# Patient Record
Sex: FEMALE | Race: WHITE | NOT HISPANIC OR LATINO | Employment: UNEMPLOYED | URBAN - NONMETROPOLITAN AREA
[De-identification: names, ages, dates, MRNs, and addresses within clinical notes are randomized per-mention and may not be internally consistent; named-entity substitution may affect disease eponyms.]

---

## 2023-10-26 RX ORDER — AMOXICILLIN 500 MG/1
1 CAPSULE ORAL 2 TIMES DAILY
COMMUNITY
Start: 2017-06-25 | End: 2023-12-11 | Stop reason: ALTCHOICE

## 2023-10-26 RX ORDER — OFLOXACIN 3 MG/ML
10 SOLUTION AURICULAR (OTIC) DAILY
COMMUNITY
Start: 2017-06-25 | End: 2023-12-11 | Stop reason: ALTCHOICE

## 2023-10-26 RX ORDER — MECLIZINE HCL 25MG 25 MG/1
25 TABLET, CHEWABLE ORAL EVERY 8 HOURS
COMMUNITY
Start: 2017-06-25 | End: 2023-12-11 | Stop reason: WASHOUT

## 2023-10-27 ENCOUNTER — OFFICE VISIT (OUTPATIENT)
Dept: OBSTETRICS AND GYNECOLOGY | Facility: CLINIC | Age: 58
End: 2023-10-27
Payer: MEDICARE

## 2023-10-27 VITALS
SYSTOLIC BLOOD PRESSURE: 119 MMHG | DIASTOLIC BLOOD PRESSURE: 78 MMHG | HEIGHT: 69 IN | BODY MASS INDEX: 31.25 KG/M2 | WEIGHT: 211 LBS

## 2023-10-27 DIAGNOSIS — N39.3 STRESS BLADDER INCONTINENCE, FEMALE: ICD-10-CM

## 2023-10-27 DIAGNOSIS — Z01.419 WELL WOMAN EXAM WITH ROUTINE GYNECOLOGICAL EXAM: Primary | ICD-10-CM

## 2023-10-27 LAB
BILIRUBIN, POC: NEGATIVE
BLOOD URINE, POC: NEGATIVE
CLARITY, POC: CLEAR
COLOR, POC: YELLOW
GLUCOSE URINE, POC: NEGATIVE
KETONES, POC: NEGATIVE
LEUKOCYTE EST, POC: ABNORMAL
NITRITE, POC: NEGATIVE
PH, POC: 5.5
POC APPEARANCE OF BODY FLUID: CLEAR
SPECIFIC GRAVITY, POC: 1.01
URINE PROTEIN, POC: NEGATIVE
UROBILINOGEN, POC: 0.2

## 2023-10-27 PROCEDURE — 81002 URINALYSIS NONAUTO W/O SCOPE: CPT | Performed by: NURSE PRACTITIONER

## 2023-10-27 PROCEDURE — 99386 PREV VISIT NEW AGE 40-64: CPT | Performed by: NURSE PRACTITIONER

## 2023-10-27 PROCEDURE — 1036F TOBACCO NON-USER: CPT | Performed by: NURSE PRACTITIONER

## 2023-10-27 RX ORDER — VENLAFAXINE HYDROCHLORIDE 150 MG/1
150 CAPSULE, EXTENDED RELEASE ORAL DAILY
COMMUNITY
End: 2023-12-11 | Stop reason: SDUPTHER

## 2023-10-27 RX ORDER — ASPIRIN 81 MG/1
81 TABLET ORAL DAILY
COMMUNITY

## 2023-10-27 RX ORDER — VENLAFAXINE 75 MG/1
75 TABLET ORAL 2 TIMES DAILY
COMMUNITY
End: 2024-05-06 | Stop reason: SDUPTHER

## 2023-10-27 RX ORDER — METFORMIN HYDROCHLORIDE EXTENDED-RELEASE TABLETS 500 MG/1
500 TABLET, FILM COATED, EXTENDED RELEASE ORAL
COMMUNITY
End: 2024-02-29 | Stop reason: SDUPTHER

## 2023-10-27 RX ORDER — METOPROLOL TARTRATE 25 MG/1
25 TABLET, FILM COATED ORAL DAILY
COMMUNITY
End: 2023-12-11 | Stop reason: SDUPTHER

## 2023-10-27 RX ORDER — SENNOSIDES 8.6 MG/1
1 TABLET ORAL DAILY
COMMUNITY

## 2023-10-27 RX ORDER — LISINOPRIL 20 MG/1
20 TABLET ORAL DAILY
COMMUNITY
End: 2023-12-11 | Stop reason: SDUPTHER

## 2023-10-27 RX ORDER — HYDROXYCHLOROQUINE SULFATE 200 MG/1
200 TABLET, FILM COATED ORAL DAILY
COMMUNITY
End: 2024-03-19 | Stop reason: SDUPTHER

## 2023-10-27 RX ORDER — ROSUVASTATIN CALCIUM 20 MG/1
20 TABLET, COATED ORAL DAILY
COMMUNITY
End: 2023-12-11 | Stop reason: SDUPTHER

## 2023-10-27 RX ORDER — OMEPRAZOLE 40 MG/1
40 CAPSULE, DELAYED RELEASE ORAL
COMMUNITY
End: 2023-12-11 | Stop reason: SDUPTHER

## 2023-10-27 RX ORDER — HYDROCHLOROTHIAZIDE 12.5 MG/1
12.5 TABLET ORAL DAILY
COMMUNITY
End: 2023-12-11 | Stop reason: SDUPTHER

## 2023-10-27 NOTE — PROGRESS NOTES
Subjective   Patient ID: Raquel Horn is a 58 y.o. female who presents for Bladder incontinence  (Patient is here for a annual due to discovery of her cervix after getting hysterectomy. Patient is also here for bladder incontinence./Last PAP= 5/2014 negative //Last MAMMO= 2022 next appointment 10/31 //Last Colonoscopy= not due //[facility]. [facility] BRUCE MA II/).  Raquel Horn is a 58 y.o. female new patient here today for well-woman and UI    OB/GYN History:  - Pt is needing a pap, she had a hysterectomy but is unsure if her cervix was removed    - Denies bleeding    Prolapse Symptoms:   - She had prolapse and had this surgically repaired in 2000  - She reports she feels a bulge again     Urinary Symptoms:  - She reports she can usually makes it to the bathroom but does leak slightly  - She wears pads   - Caffeine only in the morning     Bowel Symptoms:   - Reports she feels like she does not empty well sometimes     Sexual Activity:   - Not sexually active since 2012  - She repots she will awake from her sleeping and is having an orgasm   - She denies feeling aroused but reports she does orgasm   - This happens a few times a month   - She has masturbated and this doesn't prevent the spontaneous orgasms  - She is not interested in seeing a provided for this at this time but potentially in the future     PMH:   - DM  - HTN    PSH:   - Vaginal hysterectomy 2000  - Appendectomy    Social History: Non-smoker, no alcohol use, no drug use    FHx: Denies family history of breast, ovarian, colon, and uterine cancer.    Screenings:   - Last pap 2014, WNL  - Mammogram scheduled            Review of Systems    Objective   Physical Exam  Constitutional:       Appearance: Normal appearance.   Genitourinary:      Vulva, bladder, rectum and urethral meatus normal.      Right Labia: No rash.     Left Labia: No rash.     No labial fusion noted.      Vaginal cuff intact.     No vaginal prolapse present.     No vaginal atrophy  present.       Right Adnexa: no mass present.     Left Adnexa: no mass present.     No cervical discharge.      Uterus is not enlarged or tender.      No urethral tenderness present.      Levator ani not tender and obturator internus not tender.  HENT:      Head: Normocephalic.   Pulmonary:      Effort: Pulmonary effort is normal.   Neurological:      Mental Status: She is alert and oriented to person, place, and time.   Psychiatric:         Mood and Affect: Mood normal.         Behavior: Behavior normal.             Assessment/Plan   ASSESSMENT &PLAN     Assessment:   58 y.o. old female being assessed for orgasmic dysfunction and Well woman visit with routine gynecologic exam  Comorbidities include: HTN    Diagnoses:   #1 orgasmic dysfunction  #2 Well woman visit with routine gynecologic exam,   3 Incontinence    Plan:   1. Well woman visit with routine gynecologic exam  - Normal pelvic exam today.   - Advised patient she did not have a cervix as she had a vaginal hysterectomy    The vaginal cuff has a dimple from having a sacrospinous fixation for prolapse.     2 . orgasmic dysfunction  - Advised patient we could place a referral for this however she opted to post-pone treatment as it happens rarely     3. Discussed incontinence briefly and if more of a problem in the future can address    Follow-up PRN with Nanda OGDEN       Scribe Attestation  By signing my name below, IDeidre , Janett   attest that this documentation has been prepared under the direction and in the presence of ALIN Bourne.     Nanda Figueroa APRN-CNP, personally performed the services described in this documentation which was scribed virtually and I confirm that it is both accurate and complete.

## 2023-10-31 ENCOUNTER — APPOINTMENT (OUTPATIENT)
Dept: RADIOLOGY | Facility: HOSPITAL | Age: 58
End: 2023-10-31
Payer: COMMERCIAL

## 2023-11-03 ENCOUNTER — HOSPITAL ENCOUNTER (OUTPATIENT)
Dept: RADIOLOGY | Facility: HOSPITAL | Age: 58
Discharge: HOME | End: 2023-11-03
Payer: MEDICARE

## 2023-11-03 DIAGNOSIS — R74.8 ABNORMAL LEVELS OF OTHER SERUM ENZYMES: ICD-10-CM

## 2023-11-03 DIAGNOSIS — K76.0 FATTY (CHANGE OF) LIVER, NOT ELSEWHERE CLASSIFIED: ICD-10-CM

## 2023-11-03 PROCEDURE — 76705 ECHO EXAM OF ABDOMEN: CPT | Performed by: STUDENT IN AN ORGANIZED HEALTH CARE EDUCATION/TRAINING PROGRAM

## 2023-11-03 PROCEDURE — 76705 ECHO EXAM OF ABDOMEN: CPT

## 2023-11-07 ENCOUNTER — HOSPITAL ENCOUNTER (OUTPATIENT)
Dept: RADIOLOGY | Facility: HOSPITAL | Age: 58
Discharge: HOME | End: 2023-11-07
Payer: MEDICARE

## 2023-11-07 DIAGNOSIS — Z12.31 ENCOUNTER FOR SCREENING MAMMOGRAM FOR MALIGNANT NEOPLASM OF BREAST: ICD-10-CM

## 2023-11-07 PROCEDURE — 77067 SCR MAMMO BI INCL CAD: CPT | Performed by: RADIOLOGY

## 2023-11-07 PROCEDURE — 77063 BREAST TOMOSYNTHESIS BI: CPT | Performed by: RADIOLOGY

## 2023-11-07 PROCEDURE — 77063 BREAST TOMOSYNTHESIS BI: CPT

## 2023-11-15 ENCOUNTER — HOSPITAL ENCOUNTER (OUTPATIENT)
Dept: RADIOLOGY | Facility: EXTERNAL LOCATION | Age: 58
Discharge: HOME | End: 2023-11-15
Payer: MEDICARE

## 2023-11-29 ENCOUNTER — APPOINTMENT (OUTPATIENT)
Dept: PRIMARY CARE | Facility: CLINIC | Age: 58
End: 2023-11-29
Payer: MEDICARE

## 2023-12-11 ENCOUNTER — OFFICE VISIT (OUTPATIENT)
Dept: PRIMARY CARE | Facility: CLINIC | Age: 58
End: 2023-12-11
Payer: MEDICARE

## 2023-12-11 VITALS
WEIGHT: 214 LBS | OXYGEN SATURATION: 95 % | BODY MASS INDEX: 31.7 KG/M2 | SYSTOLIC BLOOD PRESSURE: 118 MMHG | DIASTOLIC BLOOD PRESSURE: 70 MMHG | TEMPERATURE: 97 F | HEART RATE: 90 BPM | HEIGHT: 69 IN

## 2023-12-11 DIAGNOSIS — F32.A DEPRESSION, UNSPECIFIED DEPRESSION TYPE: ICD-10-CM

## 2023-12-11 DIAGNOSIS — I10 PRIMARY HYPERTENSION: ICD-10-CM

## 2023-12-11 DIAGNOSIS — E78.2 MIXED HYPERLIPIDEMIA: ICD-10-CM

## 2023-12-11 DIAGNOSIS — R51.9 HEADACHE DISORDER: ICD-10-CM

## 2023-12-11 DIAGNOSIS — R16.0 ENLARGED LIVER: ICD-10-CM

## 2023-12-11 DIAGNOSIS — K21.9 GASTROESOPHAGEAL REFLUX DISEASE WITHOUT ESOPHAGITIS: Primary | ICD-10-CM

## 2023-12-11 PROCEDURE — 1036F TOBACCO NON-USER: CPT | Performed by: FAMILY MEDICINE

## 2023-12-11 PROCEDURE — 99204 OFFICE O/P NEW MOD 45 MIN: CPT | Performed by: FAMILY MEDICINE

## 2023-12-11 PROCEDURE — 3074F SYST BP LT 130 MM HG: CPT | Performed by: FAMILY MEDICINE

## 2023-12-11 PROCEDURE — 3078F DIAST BP <80 MM HG: CPT | Performed by: FAMILY MEDICINE

## 2023-12-11 RX ORDER — METOPROLOL TARTRATE 25 MG/1
25 TABLET, FILM COATED ORAL 2 TIMES DAILY
Qty: 180 TABLET | Refills: 3 | Status: SHIPPED | OUTPATIENT
Start: 2023-12-11 | End: 2024-03-19 | Stop reason: SDUPTHER

## 2023-12-11 RX ORDER — TRAMADOL HYDROCHLORIDE 50 MG/1
50 TABLET ORAL EVERY 12 HOURS PRN
COMMUNITY
Start: 2022-06-29 | End: 2024-02-23 | Stop reason: ALTCHOICE

## 2023-12-11 RX ORDER — VENLAFAXINE HYDROCHLORIDE 150 MG/1
150 CAPSULE, EXTENDED RELEASE ORAL DAILY
Qty: 90 CAPSULE | Refills: 3 | Status: SHIPPED | OUTPATIENT
Start: 2023-12-11 | End: 2024-03-19 | Stop reason: SDUPTHER

## 2023-12-11 RX ORDER — CHOLECALCIFEROL (VITAMIN D3) 50 MCG
2000 TABLET ORAL
COMMUNITY
Start: 2015-11-23 | End: 2024-02-23 | Stop reason: ALTCHOICE

## 2023-12-11 RX ORDER — OMEPRAZOLE 40 MG/1
40 CAPSULE, DELAYED RELEASE ORAL
Qty: 90 CAPSULE | Refills: 3 | Status: SHIPPED | OUTPATIENT
Start: 2023-12-11 | End: 2024-03-19 | Stop reason: SDUPTHER

## 2023-12-11 RX ORDER — TRAZODONE HYDROCHLORIDE 50 MG/1
25 TABLET ORAL NIGHTLY
COMMUNITY
End: 2024-05-06 | Stop reason: SDUPTHER

## 2023-12-11 RX ORDER — ROSUVASTATIN CALCIUM 20 MG/1
20 TABLET, COATED ORAL DAILY
Qty: 90 TABLET | Refills: 3 | Status: SHIPPED | OUTPATIENT
Start: 2023-12-11 | End: 2024-05-30 | Stop reason: WASHOUT

## 2023-12-11 RX ORDER — HYDROCHLOROTHIAZIDE 12.5 MG/1
12.5 TABLET ORAL DAILY
Qty: 90 TABLET | Refills: 3 | Status: SHIPPED | OUTPATIENT
Start: 2023-12-11 | End: 2023-12-22 | Stop reason: SDUPTHER

## 2023-12-11 RX ORDER — LISINOPRIL 20 MG/1
20 TABLET ORAL DAILY
Qty: 90 TABLET | Refills: 3 | Status: SHIPPED | OUTPATIENT
Start: 2023-12-11 | End: 2024-03-19 | Stop reason: SDUPTHER

## 2023-12-11 ASSESSMENT — ENCOUNTER SYMPTOMS
DYSURIA: 0
NUMBNESS: 0
EYE DISCHARGE: 0
FLANK PAIN: 0
SHORTNESS OF BREATH: 0
SORE THROAT: 0
APPETITE CHANGE: 0
JOINT SWELLING: 0
ARTHRALGIAS: 0
NAUSEA: 0
MYALGIAS: 0
HEMATURIA: 0
VOMITING: 0
OCCASIONAL FEELINGS OF UNSTEADINESS: 0
ABDOMINAL PAIN: 0
SLEEP DISTURBANCE: 1
HEADACHES: 1
NERVOUS/ANXIOUS: 1
DIZZINESS: 0
DYSPHORIC MOOD: 1
ACTIVITY CHANGE: 0
BLOOD IN STOOL: 0
RHINORRHEA: 1
EYE ITCHING: 0
UNEXPECTED WEIGHT CHANGE: 0
LOSS OF SENSATION IN FEET: 0
WEAKNESS: 0
SINUS PRESSURE: 0
LIGHT-HEADEDNESS: 0
COUGH: 0
FEVER: 0
CONSTIPATION: 0
DEPRESSION: 0
WHEEZING: 0
DIARRHEA: 0
PALPITATIONS: 0

## 2023-12-11 ASSESSMENT — PATIENT HEALTH QUESTIONNAIRE - PHQ9
1. LITTLE INTEREST OR PLEASURE IN DOING THINGS: NOT AT ALL
SUM OF ALL RESPONSES TO PHQ9 QUESTIONS 1 AND 2: 0
2. FEELING DOWN, DEPRESSED OR HOPELESS: NOT AT ALL

## 2023-12-11 NOTE — PATIENT INSTRUCTIONS
MEDICATIONS REFILLED   REFERRAL GI AND TO NEUROLOGY   VISIT IN MARCH FOR LABS FIRST THEN SEE   CMP A1C CBC TSH T4 LIPID HEPATITIS

## 2023-12-11 NOTE — PROGRESS NOTES
"Subjective   Patient ID: Raquel Horn is a 58 y.o. female who presents for New Patient Visit (ESTABLISH CARE- WOULD LIKE TO GO OVER MEDICATIONS. DISCUSS HER SLEEPING CONCERNS- she has very vivid dreams that cause headaches, she reports that if she sleeps with her arms and legs together she will sweat and wake up drenched.), Nose Problem (States she has had a runny nose for quite a while- clear drainage but will just drip \"like a faucet\" out of no where), and Urinary Incontinence (Would like to discuss concerns with her incontinence.).    HPI SLEEP STUDIES   ABNORMAL OF SWEATING   VIVID DREAMS   HEADACHE     Review of Systems   Constitutional:  Negative for activity change, appetite change, fever and unexpected weight change.   HENT:  Positive for rhinorrhea. Negative for congestion, ear pain, postnasal drip, sinus pressure and sore throat.    Eyes:  Negative for discharge, itching and visual disturbance.   Respiratory:  Negative for cough, shortness of breath and wheezing.    Cardiovascular:  Negative for chest pain, palpitations and leg swelling.   Gastrointestinal:  Negative for abdominal pain, blood in stool, constipation, diarrhea, nausea and vomiting.   Endocrine: Negative for cold intolerance, heat intolerance and polyuria.        DISORDER SWEATING    Genitourinary:  Negative for dysuria, flank pain and hematuria.   Musculoskeletal:  Negative for arthralgias, gait problem, joint swelling and myalgias.   Skin:  Negative for rash.   Allergic/Immunologic: Negative for environmental allergies and food allergies.   Neurological:  Positive for headaches. Negative for dizziness, syncope, weakness, light-headedness and numbness.   Psychiatric/Behavioral:  Positive for dysphoric mood and sleep disturbance. The patient is nervous/anxious.        Objective   /70   Pulse 90   Temp 36.1 °C (97 °F) (Temporal)   Ht 1.74 m (5' 8.5\")   Wt 97.1 kg (214 lb)   SpO2 95%   BMI 32.07 kg/m²     Physical " "Exam  Constitutional:       Appearance: Normal appearance. She is obese.      Comments: FANS HERSELF CONTINUOUSLY /NO OBVIOUS SWEATING   SHAKES AND SRINGS HER HANDS   \"HAS RAYNAUD'S AND THEN THEY WOULD BLOW UP THEY ARE SO HOT\"   HENT:      Head: Normocephalic and atraumatic.      Nose: Nose normal.      Mouth/Throat:      Mouth: Mucous membranes are moist.   Eyes:      Extraocular Movements: Extraocular movements intact.      Pupils: Pupils are equal, round, and reactive to light.   Cardiovascular:      Rate and Rhythm: Normal rate and regular rhythm.   Pulmonary:      Effort: Pulmonary effort is normal.      Breath sounds: Normal breath sounds.   Abdominal:      Palpations: Abdomen is soft.   Musculoskeletal:         General: Normal range of motion.      Cervical back: Normal range of motion and neck supple.   Skin:     General: Skin is warm and dry.   Neurological:      General: No focal deficit present.      Mental Status: She is alert and oriented to person, place, and time.   Psychiatric:         Mood and Affect: Mood normal.         Behavior: Behavior normal.         Assessment/Plan   Diagnoses and all orders for this visit:  Gastroesophageal reflux disease without esophagitis  -     omeprazole (PriLOSEC) 40 mg DR capsule; Take 1 capsule (40 mg) by mouth once daily in the morning. Take before meals. Do not crush or chew.  -     Referral to Gastroenterology; Future  Enlarged liver  -     Referral to Gastroenterology; Future  Headache disorder  -     Referral to Neurology; Future  Mixed hyperlipidemia  -     rosuvastatin (Crestor) 20 mg tablet; Take 1 tablet (20 mg) by mouth once daily.  Depression, unspecified depression type  -     venlafaxine XR (Effexor-XR) 150 mg 24 hr capsule; Take 1 capsule (150 mg) by mouth once daily. Do not crush or chew.  Primary hypertension  -     hydroCHLOROthiazide (HYDRODiuril) 12.5 mg tablet; Take 1 tablet (12.5 mg) by mouth once daily.  -     metoprolol tartrate (Lopressor) 25 " mg tablet; Take 1 tablet (25 mg) by mouth 2 times a day.  -     lisinopril 20 mg tablet; Take 1 tablet (20 mg) by mouth once daily.

## 2023-12-21 ENCOUNTER — PATIENT MESSAGE (OUTPATIENT)
Dept: PRIMARY CARE | Facility: CLINIC | Age: 58
End: 2023-12-21
Payer: MEDICARE

## 2023-12-21 DIAGNOSIS — I10 PRIMARY HYPERTENSION: ICD-10-CM

## 2023-12-22 NOTE — TELEPHONE ENCOUNTER
From: Dinorah Horn  To: Yamilet Frazier,   Sent: 12/21/2023 3:23 PM EST  Subject: medication refill    To Dr. Frazier, this is Dinorah Justina birth date, 1965,,, I have another prescription I need refilled, HYDROXYCHLOROQUINE 200 mg. Thank You

## 2023-12-27 RX ORDER — HYDROCHLOROTHIAZIDE 12.5 MG/1
12.5 TABLET ORAL DAILY
Qty: 90 TABLET | Refills: 3 | Status: SHIPPED | OUTPATIENT
Start: 2023-12-27 | End: 2024-03-19 | Stop reason: SDUPTHER

## 2024-01-05 ENCOUNTER — APPOINTMENT (OUTPATIENT)
Dept: GASTROENTEROLOGY | Facility: CLINIC | Age: 59
End: 2024-01-05
Payer: MEDICARE

## 2024-01-09 ENCOUNTER — APPOINTMENT (OUTPATIENT)
Dept: NEUROLOGY | Facility: CLINIC | Age: 59
End: 2024-01-09
Payer: MEDICARE

## 2024-01-11 ENCOUNTER — OFFICE VISIT (OUTPATIENT)
Dept: NEUROLOGY | Facility: CLINIC | Age: 59
End: 2024-01-11
Payer: MEDICARE

## 2024-01-11 VITALS — SYSTOLIC BLOOD PRESSURE: 132 MMHG | DIASTOLIC BLOOD PRESSURE: 84 MMHG | HEART RATE: 84 BPM

## 2024-01-11 DIAGNOSIS — R51.9 HEADACHE DISORDER: ICD-10-CM

## 2024-01-11 DIAGNOSIS — R51.9 NOCTURNAL HEADACHES: Primary | ICD-10-CM

## 2024-01-11 DIAGNOSIS — G56.03 CARPAL TUNNEL SYNDROME, BILATERAL: ICD-10-CM

## 2024-01-11 PROCEDURE — 1036F TOBACCO NON-USER: CPT | Performed by: PSYCHIATRY & NEUROLOGY

## 2024-01-11 PROCEDURE — 99205 OFFICE O/P NEW HI 60 MIN: CPT | Performed by: PSYCHIATRY & NEUROLOGY

## 2024-01-11 RX ORDER — BISMUTH SUBSALICYLATE 262 MG
1 TABLET,CHEWABLE ORAL DAILY
COMMUNITY

## 2024-01-11 NOTE — PATIENT INSTRUCTIONS
You have developed headaches on the right side of your head only when you sleep. You may also have a pinched nerve at the right base of your skull that causes a different kind of discomfort in the back of the right side of your head. Please have a blood test to check your vitamin B12 level and get an MRI of your brain before and after dye is given. Please also work on cutting back your beer consumption to 4 cans a night.     You also likely have carpal tunnel syndrome at the base of your hands, causing your hands and arms to go numb and tingly when you sleep. We will obtain an EMG to confirm the diagnosis and see how extensive it is.     I will call you with the results and we will decide what to do next. There are medications that can be used to prevent headaches if needed.

## 2024-01-11 NOTE — PROGRESS NOTES
Chief complaint:      Right-sided headache when sleeping.  History of Present Illness:     Ms. Horn is a 57 yo right-handed woman who has developed recurrent right-sided headaches only at night for a few years. They were occasional at first but now wake her up several times a night. She does not have headaches during the day. She does not have a headache when she lies down to sleep. Sometime during the night, and multiple times a night she gets a headache on the right side of her head, which she thinks is associated with dreaming. She has vivid dreams and her head starts to hurt. She will then wake herself up, and she wakes up with the headache, which resolves usually within less than a minute, but definitely by two minutes. Some of her dreams are scary and upsetting and the headaches escalate quickly until she wakes herself up. She sleeps 8-9 hours and feels like she has slept well. Her  has restless legs syndrome and may kick and wake her up at night.     She slipped on the garage floor at age 9 or 10 and smacked her head on the cement. She had dizziness for years after that, but they finally resolved. She does not remember having much headache in the past.     She feels fine during the day. She is  with 4 children. She is not working now due to a lot of rheumatoid, psoriatic, and mostly osteoarthritis pain. She had a CABG in 2010. She takes care of her house. She drives without difficulty. She smoked on and off for 30 years and quit in 2009. She has a 6 pack of beer a night. No street drug use. She has one glass of iced green tea in the morning.     Visit Vitals  /84   Pulse 84        Physical examination:    She is a pleasant, healthy-appearing woman. Phalen's sign was positive for bilateral carpal tunel syndrome.   Neurologic Exam     Mental Status   Her speech was clear, fluent and appropriate.      Cranial Nerves     CN II   Visual fields full to confrontation.     CN III, IV, VI    Extraocular motions are normal.     CN V   Facial sensation intact.     CN VII   Facial expression full, symmetric.     CN VIII   CN VIII normal.   Hearing: intact    CN IX, X   Palate: symmetric    CN XI   CN XI normal.     CN XII   CN XII normal.     Motor Exam   Muscle bulk: normal  Overall muscle tone: normal    Strength   Right deltoid: 5/5  Left deltoid: 5/5  Right biceps: 5/5  Left biceps: 5/5  Right interossei: 5/5  Left interossei: 5/5  Right iliopsoas: 5/5  Left iliopsoas: 5/5    Sensory Exam   Light touch normal.   Pinprick normal.     Gait, Coordination, and Reflexes     Coordination   Finger to nose coordination: normal    Tremor   Resting tremor: absent  Intention tremor: absent  Action tremor: absent    Reflexes   Right brachioradialis: 2+  Left brachioradialis: 2+  Right biceps: 2+  Left biceps: 2+  Right triceps: 2+  Left triceps: 2+  Right patellar: 2+  Left patellar: 2+  Right achilles: 2+  Left achilles: 2+  Right plantar: normal  Left plantar: normal  Her gait was narrow-based and steady. She could walk on her heels or toes and perform a tandem gait.          1. Nocturnal headaches  Vitamin B12    MR brain w and wo IV contrast      2. Headache disorder  Referral to Neurology      3. Carpal tunnel syndrome, bilateral  EMG & nerve conduction             Orders Placed This Encounter   Procedures    MR brain w and wo IV contrast     Standing Status:   Future     Standing Expiration Date:   1/11/2025     Order Specific Question:   Reason for exam:     Answer:   daily right-sided headaches during sleep only.     Order Specific Question:   Does the patient have a Cochlear Implant, Pacemaker, Defibrillator, Pacing Wire, Brain Aneurysm Clip, Implanted Nerve or Bone Graft Simulator, Implanted Breast Tissue Expander, Glucose Monitor or Neulasta Device?     Answer:   No     Order Specific Question:   Radiologist to Determine Optimal Study     Answer:   Yes     Order Specific Question:   Release result to  [facility]     Answer:   Immediate [1]     Order Specific Question:   Is this exam part of a Research Study? If Yes, link this order to the research study     Answer:   No    Vitamin B12     Standing Status:   Future     Standing Expiration Date:   1/11/2025     Order Specific Question:   Release result to [facility]     Answer:   Immediate [1]    EMG & nerve conduction     Standing Status:   Future     Standing Expiration Date:   1/11/2025     Scheduling Instructions:      At the time of the EMG appointment, the patient's skin should be clear of lotions, oils, or creams.  No other special preparations are required.  Patients can take all other medications as prescribed.  Please contact electromyography laboratory if patient is on cholinesterase inhibitor treatment.  T      here are no after effects and the patient can return to their usual activities immediately upon leaving the laboratory.  The results of the EMG examination are posted to the EHR, faxed and mailed to referring physician. If you have further questions, please call the Neuro EMG laboratory at [phone] for the  [facility] Labs:  [facility] ( main campus), [facility], [facility], and [place].       For [place] call [phone].  Ask for Cardiology      For [place] call [phone].       For [place] call [phone]      For [place] call [phone]. Select option 3      For [place] call [phone].       For [place] call [phone].       For [place] call [phone].           Order Specific Question:   EMG Indication     Answer:   Carpal Tunnel Syndrome (CTS)     Order Specific Question:   Performing Entity     Answer:   [place]     Order Specific Question:   Laterality     Answer:   Bilateral     Order Specific Question:   Patient is on anticoagulant     Answer:   No     Order Specific Question:   Release result to [facility]     Answer:   Immediate [1]          Impression and Plan:  Raquel has developed recurrent  right-sided headaches only when she sleeps, and which resolve soon after waking up. She reports generally sleeping well. She also sometimes gets a pain in the right posterior head, possibly due to occipital neuralgia, which appears to be a separate issue. She will have an MRI of the brain -/+ contrast and have her B12 level checked. She consumes a 6-pack of beer a day, which qualifies her as alcoholic, and which affects her sleep quality. She has a family history of alcoholism and notes that the headaches in her sleep occurred before she started drinking heavily, but she will work on cutting back.     She also has symptoms of carpal tunnel syndrome. She will have an EMG to confirm the diagnosis and its extent. I will notify her of the test results and further evaluation will then be determined.     Marilu Quezada MD

## 2024-01-19 ENCOUNTER — APPOINTMENT (OUTPATIENT)
Dept: GASTROENTEROLOGY | Facility: CLINIC | Age: 59
End: 2024-01-19
Payer: MEDICARE

## 2024-01-23 ENCOUNTER — ANCILLARY PROCEDURE (OUTPATIENT)
Dept: NEUROLOGY | Facility: CLINIC | Age: 59
End: 2024-01-23
Payer: MEDICARE

## 2024-01-23 DIAGNOSIS — M79.602 PAIN OF LEFT UPPER EXTREMITY: Primary | ICD-10-CM

## 2024-01-23 DIAGNOSIS — R20.0 NUMBNESS: ICD-10-CM

## 2024-01-23 DIAGNOSIS — M79.601 PAIN OF RIGHT UPPER EXTREMITY: ICD-10-CM

## 2024-01-23 DIAGNOSIS — G56.03 CARPAL TUNNEL SYNDROME, BILATERAL: ICD-10-CM

## 2024-01-23 PROCEDURE — 95912 NRV CNDJ TEST 11-12 STUDIES: CPT | Performed by: PSYCHIATRY & NEUROLOGY

## 2024-01-23 PROCEDURE — 95886 MUSC TEST DONE W/N TEST COMP: CPT | Performed by: PSYCHIATRY & NEUROLOGY

## 2024-01-23 NOTE — PROGRESS NOTES
Nerve conduction studies and needle EMG was performed today on this patient.  Full scanned report is available in the Media tab in Epic (Chart Review, Media tab, double-click the report to enlarge it).    Impression:  Nerve conduction study and needle examination of the bilateral upper extremities were performed, see details in table. The results were abnormal because of the followin. Bilaterally prolonged median sensory latencies. Consistent with mild bilateral median neuropathy at the wrist (carpal tunnel syndrome).        Jame Roger MD

## 2024-01-25 ENCOUNTER — APPOINTMENT (OUTPATIENT)
Dept: RADIOLOGY | Facility: HOSPITAL | Age: 59
End: 2024-01-25
Payer: MEDICARE

## 2024-01-25 ENCOUNTER — TELEPHONE (OUTPATIENT)
Dept: NEUROLOGY | Facility: CLINIC | Age: 59
End: 2024-01-25
Payer: MEDICARE

## 2024-02-05 ENCOUNTER — OFFICE VISIT (OUTPATIENT)
Dept: GASTROENTEROLOGY | Facility: CLINIC | Age: 59
End: 2024-02-05
Payer: MEDICARE

## 2024-02-05 VITALS
BODY MASS INDEX: 32.07 KG/M2 | SYSTOLIC BLOOD PRESSURE: 120 MMHG | HEIGHT: 69 IN | DIASTOLIC BLOOD PRESSURE: 79 MMHG | HEART RATE: 77 BPM | RESPIRATION RATE: 16 BRPM | OXYGEN SATURATION: 98 % | WEIGHT: 216.5 LBS | TEMPERATURE: 97.2 F

## 2024-02-05 DIAGNOSIS — R79.89 LIVER FUNCTION TEST ABNORMALITY: ICD-10-CM

## 2024-02-05 DIAGNOSIS — K76.0 HEPATIC STEATOSIS: Primary | ICD-10-CM

## 2024-02-05 DIAGNOSIS — K21.9 GASTROESOPHAGEAL REFLUX DISEASE WITHOUT ESOPHAGITIS: ICD-10-CM

## 2024-02-05 DIAGNOSIS — R16.0 ENLARGED LIVER: ICD-10-CM

## 2024-02-05 PROCEDURE — 1036F TOBACCO NON-USER: CPT

## 2024-02-05 PROCEDURE — 82103 ALPHA-1-ANTITRYPSIN TOTAL: CPT

## 2024-02-05 PROCEDURE — 86256 FLUORESCENT ANTIBODY TITER: CPT

## 2024-02-05 PROCEDURE — 99204 OFFICE O/P NEW MOD 45 MIN: CPT

## 2024-02-05 PROCEDURE — 82390 ASSAY OF CERULOPLASMIN: CPT

## 2024-02-05 PROCEDURE — 82105 ALPHA-FETOPROTEIN SERUM: CPT

## 2024-02-05 PROCEDURE — 82607 VITAMIN B-12: CPT

## 2024-02-05 PROCEDURE — 87340 HEPATITIS B SURFACE AG IA: CPT

## 2024-02-05 PROCEDURE — 86038 ANTINUCLEAR ANTIBODIES: CPT

## 2024-02-05 PROCEDURE — 87389 HIV-1 AG W/HIV-1&-2 AB AG IA: CPT

## 2024-02-05 PROCEDURE — 86015 ACTIN ANTIBODY EACH: CPT

## 2024-02-05 PROCEDURE — 86225 DNA ANTIBODY NATIVE: CPT

## 2024-02-05 PROCEDURE — 86706 HEP B SURFACE ANTIBODY: CPT

## 2024-02-05 PROCEDURE — 86235 NUCLEAR ANTIGEN ANTIBODY: CPT

## 2024-02-05 PROCEDURE — 86381 MITOCHONDRIAL ANTIBODY EACH: CPT

## 2024-02-05 RX ORDER — NITROGLYCERIN 0.3 MG/1
0.3 TABLET SUBLINGUAL EVERY 5 MIN PRN
COMMUNITY

## 2024-02-05 ASSESSMENT — ENCOUNTER SYMPTOMS
FEVER: 0
ROS GI COMMENTS: SEE HPI
VOMITING: 0
CHILLS: 0
ABDOMINAL PAIN: 0
FATIGUE: 0
OCCASIONAL FEELINGS OF UNSTEADINESS: 0
UNEXPECTED WEIGHT CHANGE: 0
RECTAL PAIN: 0
DEPRESSION: 0
TROUBLE SWALLOWING: 0
CONSTIPATION: 0
BLOOD IN STOOL: 0
DIAPHORESIS: 0
APPETITE CHANGE: 0
ANAL BLEEDING: 0
ABDOMINAL DISTENTION: 1
DIARRHEA: 1
NAUSEA: 0
LOSS OF SENSATION IN FEET: 0

## 2024-02-05 ASSESSMENT — PAIN SCALES - GENERAL: PAINLEVEL: 0-NO PAIN

## 2024-02-05 NOTE — PROGRESS NOTES
"History Of Present Illness  Dinorah Horn \"Raquel\" is a 58 y.o. female presenting with a chief complaint of Hepatomegaly. Pt states she has had elevations in liver enzymes in the past. She had an ultrasound that showed enlarged liver and was referred to GI.  She denies FH liver ca.     Currently drinks a 6 pack beer per day. Has quit drinking multiple times but starts back up. She started drinking again in June 2023.     Denies abdominal pain. Pt complains of \"watery stools\" a few times per week, thinks it is due to metformin. A lot of times it depends on what she eats. Cheese constipates her. Has bloating under her stomach, it pushes out all the time. Even if she only eats half a sandwich she gets really full.    Omeprazole keeps her reflux under control. Doesn't really consume spicy foods or snacks at night because they will trigger GERD symptoms. She can't go without omeprazole for a few days.       LiverTox medication review  Lisinopril- Likelihood score: B (likely but rare cause of clinically apparent liver injury).  Crestor- Likelihood score: A (likely cause of clinically apparent liver injury).  Metformin- Likelihood score: B (rare but well documented cause of clinically apparent liver injury).  Trazodone- Likelihood score: B (likely but rare cause of clinically apparent liver injury).  Likelihood score (venlafaxine): B (highly likely rare causes of clinically apparent liver injury).    Never abused IV drugs. Her husbands first wife did have hep c, he has been seronegative and patient has been tested multiple times for hepatitis with negative results.    Social History  She reports that she quit smoking about 14 years ago. Her smoking use included cigarettes. She has a 15.00 pack-year smoking history. She has never used smokeless tobacco. She reports current alcohol use of about 20.0 standard drinks of alcohol per week. She reports that she does not use drugs.  She does not take NSAIDs on a regular " basis    Family History  Family History   Problem Relation Name Age of Onset    Alcohol abuse Mother Trice     Cancer Mother Trice     Diabetes Mother Trice     Heart disease Mother Trice     Arthritis Father Reed     Atrial fibrillation Father Reed     Cancer Father Reed     COPD Father Reed     Hearing loss Father Reed     Heart disease Father Reed     Stroke Paternal Grandfather Guy     Alcohol abuse Paternal Grandmother Brigette     Alcohol abuse Sister Ioana     Alcohol abuse Sister Alis     Alcohol abuse Daughter Katerine     Cancer Father's Brother Xavier     Cancer Father's Brother Ravenna     Colon cancer Father's Brother Daljit J     Drug abuse Sister Ioana GARCIA      The patient does have a FH of CRC- father's brother. she does not have a FH of IBD    Review of Systems   Constitutional:  Negative for appetite change, chills, diaphoresis, fatigue, fever and unexpected weight change.   HENT:  Negative for trouble swallowing.    Gastrointestinal:  Positive for abdominal distention and diarrhea. Negative for abdominal pain, anal bleeding, blood in stool, constipation, nausea, rectal pain and vomiting.        See HPI   All other systems reviewed and are negative.        Physical Exam  Vitals reviewed.   Constitutional:       General: She is awake. She is not in acute distress.     Appearance: Normal appearance. She is obese. She is not ill-appearing.   HENT:      Head: Normocephalic and atraumatic.      Nose: Nose normal.      Mouth/Throat:      Mouth: Mucous membranes are moist.   Eyes:      General: No scleral icterus.     Conjunctiva/sclera: Conjunctivae normal.      Pupils: Pupils are equal, round, and reactive to light.   Cardiovascular:      Rate and Rhythm: Normal rate.   Pulmonary:      Effort: Pulmonary effort is normal.   Abdominal:      General: Bowel sounds are normal. There is no distension.      Palpations: Abdomen is soft. There is hepatomegaly. There is no mass.       "Tenderness: There is no abdominal tenderness. There is no guarding or rebound.      Hernia: No hernia is present.   Musculoskeletal:         General: Normal range of motion.      Cervical back: Normal range of motion.   Skin:     General: Skin is warm and dry.      Coloration: Skin is not jaundiced or pale.   Neurological:      Mental Status: She is alert and oriented to person, place, and time. Mental status is at baseline.   Psychiatric:         Attention and Perception: Attention and perception normal.         Mood and Affect: Mood normal.         Behavior: Behavior normal.         Thought Content: Thought content normal.         Judgment: Judgment normal.          Last Vital Signs  /79 (BP Location: Left arm, Patient Position: Sitting, BP Cuff Size: Adult)   Pulse 77   Temp 36.2 °C (97.2 °F) (Temporal)   Resp 16   Ht 1.74 m (5' 8.5\")   Wt 98.2 kg (216 lb 8 oz)   SpO2 98%   BMI 32.44 kg/m²      Relevant Results  CBC + DIFF  Order: [id number]  Component  Ref Range & Units 9 mo ago   WBC  3.70 - 11.00 k/uL 7.93   RBC  3.90 - 5.20 m/uL 3.59 Low    Hemoglobin  11.5 - 15.5 g/dL 12.1   Hematocrit  36.0 - 46.0 % 34.7 Low    MCV  80.0 - 100.0 fL 96.7   MCH  26.0 - 34.0 pg 33.7   MCHC  30.5 - 36.0 g/dL 34.9   RDW-CV  11.5 - 15.0 % 12.7   Platelet Count  150 - 400 k/uL 211   MPV  9.0 - 12.7 fL 9.6   Neutrophils %  % 67.1   Abs Neut  1.45 - 7.50 k/uL 5.33   Lymphocytes %  % 24.0   Abs Lymph  1.00 - 4.00 k/uL 1.90   Monocytes %  % 7.2   Abs Mono  <0.87 k/uL 0.57   Eosinophils %  % 1.3   Abs Eosin  <0.46 k/uL 0.10   Basophils %  % 0.3   Abs Baso  <0.11 k/uL <0.03   Immature Granulocytes %  % 0.1   Abs Immature Gran  <0.10 k/uL <0.03   NRBC  /100 WBC 0.0   Absolute nRBC  <0.01 k/uL <0.01   Diff Type Auto   Resulting Agency [facility]     Specimen Collected: 05/09/23 14:34    Contains abnormal data COMP METABOLIC PANEL  Order: [id number]  Component  Ref Range & Units 9 mo ago   Protein, Total  6.3 - 8.0 " g/dL 8.2 High    Albumin  3.9 - 4.9 g/dL 4.6   Calcium, Total  8.5 - 10.2 mg/dL 9.9   Bilirubin, Total  0.2 - 1.3 mg/dL 0.4   Alkaline Phosphatase  34 - 123 U/L 164 High    AST  13 - 35 U/L 89 High    ALT  7 - 38 U/L 97 High    Glucose  74 - 99 mg/dL 123 High    Comment: The American Diabetes Association (ADA) provides guidance for cutoff values for fasting glucose and random glucose. The ADA defines fasting as no caloric intake for at least 8 hours. Fasting plasma glucose results between 100 to 125 mg/dL indicate increased risk for diabetes (prediabetes).  Fasting plasma glucose results greater than or equal to 126 mg/dL meet the criteria for diagnosis of diabetes. In the absence of unequivocal hyperglycemia, results should be confirmed by repeat testing. In a patient with classic symptoms of hyperglycemia or hyperglycemic crisis, random plasma glucose results greater than or equal to 200 mg/dL meet the criteria for diagnosis of diabetes.  Reference: Standards of Medical Care in Diabetes 2016, American Diabetes Association. Diabetes Care. 2016.39(Suppl 1).   BUN  7 - 21 mg/dL 10   Creatinine  0.58 - 0.96 mg/dL 0.63   Sodium  136 - 144 mmol/L 136   Potassium  3.7 - 5.1 mmol/L 4.7   Chloride  97 - 105 mmol/L 98   CO2  22 - 30 mmol/L 25   Anion Gap  9 - 18 mmol/L 13   Estimated Glomerular Filtration Rate  >=60 mL/min/1.73m² 103   Comment: Estimated Glomerular Filtration Rate (eGFR) is calculated using the 2021 CKD-EPI creatinine equation. This equation utilizes serum creatinine, sex, and age as parameters. The creatinine assay has traceable calibration to isotope dilution-mass spectrometry. Refer to KDIGO guidelines for clinical interpretation. In patients with unstable renal function, e.g. those with acute kidney injury, the eGFR may not accurately reflect actual GFR.   Resulting Agency [facility]     Specimen Collected: 05/09/23 14:34   HEP C AB IA W/CONF SCRN  Order: [id number]  Component  Ref  Range & Units 10 mo ago   Hep C Antibody IA  Negative Negative   Comment: The result suggests no evidence of active infection with Hepatitis C virus. Should recent infection be suspected, repeat testing may be considered 4-6 weeks after this draw.   Resulting [facility]     Specimen Collected: 03/24/23 15:11   HEP B SURF AG SCRN  Order: [id number]  Component  Ref Range & Units 10 mo ago   HBsAg  Negative Negative   Resulting [facility]     Specimen Collected: 03/24/23 15:11    Contains abnormal data HEP B SURF AB  Order: [id number]  Component  Ref Range & Units 10 mo ago   Hep B Surface Ab, Qual  Positive Negative Abnormal    Comment: No evidence of antibodies to Hepatitis B surface antigen.   Hep B Surface Ab Quant  >=12.00 mIU/mL <8.00 Low    Resulting [facility]     Specimen Collected: 03/24/23 15:11   HEP B CORE AB TOTAL  Order: [id number]  Component  Ref Range & Units 10 mo ago   Hepatitis B Core Ab, Total  Negative Negative   Comment: No evidence of current or past infection with Hepatitis B virus. Should recent infection be suspected, repeat testing may be considered 3-4 weeks after this draw.   Resulting [facility]     Specimen Collected: 03/24/23 15:11     US RUQ 11/3/2023- IMPRESSION:  Enlarged steatotic liver.      No cholelithiasis or bile duct dilation.    EGD 7/28/2021 [facility]- A.   Duodenal biopsy:          Fragments of duodenal mucosa with preservation of the villous architecture and mild          chronic duodenitis.          No villous blunting/flattening and no intraepithelial lymphocyte increase are noted.     B.   Gastric biopsy:          Gastric mucosa with mild chronic gastritis but with no lymphoepithelial lesions,          ulceration, dysplasia or goblet cell metaplasia. No H. pylori found on H&E sections.     C.   Antral erosion biopsy:          Gastric mucosa with mild chronic  gastritis but with no lymphoepithelial lesions,          ulceration, dysplasia or goblet cell metaplasia. No H. pylori found on H&E sections.     D.   Distal esophageal biopsy:          Squamocolumnar mucosa with mild chronic inflammation, reactive changes and no          intestinal metaplasia. No dysplasia is found.     E.   Proximal esophageal biopsy:          Detached fragments of squamous mucosa with focal mild chronic inflammation.          PAS/LG stain is negative for fungal microorganisms.   EGD 11/22/2021 [facility]-  A.   Gastric biopsy:          Gastric mucosa with mild chronic gastritis but with no lymphoepithelial lesions,          ulceration, dysplasia or goblet cell metaplasia. No H. pylori found on H&E sections.     B.   Distal esophageal biopsy:          Squamocolumnar mucosa with mild chronic inflammation, reactive changes and no          intestinal metaplasia. No dysplasia is found.     C.   Duodenal biopsy:          Fragments of duodenal mucosa with preservation of the villous architecture and mild          chronic duodenitis.          No villous blunting/flattening and no intraepithelial lymphocyte increase are noted.     D.   Proximal esophageal biopsy:          Detached fragments of squamous mucosa with focal mild chronic inflammation.   Colonoscopy 7/28/2021 [facility]-Biopsies A.   Terminal ileum, biopsy:         - No pathologic change. Negative for ileitis.     B.   Ascending colon, biopsy:         - No pathologic change. Negative for colitis.     C.   Descending colon, biopsy:         - No pathologic change. Negative for colitis.     D.   Rectum, biopsy:         - No pathologic change. Negative for colitis.     E.   Transverse colon polyp, polypectomy:         - Tubular adenoma.     Assessment/Plan   58 y.o. female presenting to GI clinic for evaluation of hepatomegaly and hepatic steatosis found on US abdomen Nov 2023. Has longstanding history of alcohol abuse on and off throughout her life, currently  drinking 6 beers/day.  Consequences of fatty liver and progression to cirrhosis reviewed  Complete alcohol abstinence reviewed and recommended  US liver with dopplers  Fibroscan to evaluate fibrosis score  Chronic liver disease workup    Problem List Items Addressed This Visit       Gastroesophageal reflux disease without esophagitis     Other Visit Diagnoses       Hepatic steatosis    -  Primary    Relevant Orders    Alpha-1-Antitrypsin    Anti-Mitochondrial Antibody    Anti-Smooth Muscle Antibody    CBC and Auto Differential    Alpha-Fetoprotein    Ceruloplasmin    Ferritin    Hepatic Function Panel    Basic Metabolic Panel    HIV 1/2 Antigen/Antibody Screen with Reflex to Confirmation    Iron and TIBC    Liver Elastography (Fibroscan)    GORDON with Reflex to JOHNATHAN    Protime-INR    US liver with doppler    Enlarged liver        Relevant Orders    Alpha-1-Antitrypsin    Anti-Mitochondrial Antibody    Anti-Smooth Muscle Antibody    CBC and Auto Differential    Alpha-Fetoprotein    Ceruloplasmin    Ferritin    Hepatic Function Panel    Basic Metabolic Panel    HIV 1/2 Antigen/Antibody Screen with Reflex to Confirmation    Iron and TIBC    Liver Elastography (Fibroscan)    GORDON with Reflex to JOHNATHAN    Protime-INR    US liver with doppler            Ratna Calderón, NICOLE-CNP

## 2024-02-09 ENCOUNTER — TELEPHONE (OUTPATIENT)
Dept: GASTROENTEROLOGY | Facility: CLINIC | Age: 59
End: 2024-02-09
Payer: MEDICARE

## 2024-02-13 ENCOUNTER — HOSPITAL ENCOUNTER (OUTPATIENT)
Dept: RADIOLOGY | Facility: HOSPITAL | Age: 59
Discharge: HOME | End: 2024-02-13
Payer: MEDICARE

## 2024-02-13 DIAGNOSIS — R51.9 NOCTURNAL HEADACHES: ICD-10-CM

## 2024-02-13 PROCEDURE — 70553 MRI BRAIN STEM W/O & W/DYE: CPT | Performed by: STUDENT IN AN ORGANIZED HEALTH CARE EDUCATION/TRAINING PROGRAM

## 2024-02-13 PROCEDURE — 2550000001 HC RX 255 CONTRASTS: Performed by: PSYCHIATRY & NEUROLOGY

## 2024-02-13 PROCEDURE — A9575 INJ GADOTERATE MEGLUMI 0.1ML: HCPCS | Performed by: PSYCHIATRY & NEUROLOGY

## 2024-02-13 PROCEDURE — 70553 MRI BRAIN STEM W/O & W/DYE: CPT

## 2024-02-13 RX ORDER — GADOTERATE MEGLUMINE 376.9 MG/ML
20 INJECTION INTRAVENOUS
Status: COMPLETED | OUTPATIENT
Start: 2024-02-13 | End: 2024-02-13

## 2024-02-13 RX ADMIN — GADOTERATE MEGLUMINE 20 ML: 376.9 INJECTION INTRAVENOUS at 14:31

## 2024-02-14 ENCOUNTER — TELEPHONE (OUTPATIENT)
Dept: NEUROLOGY | Facility: CLINIC | Age: 59
End: 2024-02-14
Payer: MEDICARE

## 2024-02-15 NOTE — TELEPHONE ENCOUNTER
"I spoke to Raquel after her brain MRI -/+ contrast showed non-specific white matter changes, no abnormal enhancement. She is compliant with her blood pressure medications and multivitamins and said that her blood pressure is usually good. She is planning to quit drinking 6 beers a night \"cold turkey\" in the next week. She said she has done it before with no withdrawal symptoms. She will call if nocturnal headaches persist.     Her B12 blood test was fine, but she has markers for autoimmune disease. She is trying to get an appt with Dr. Nisa Nation since her insurance no longer allows her to see her F rheumatologist.    Follow up here will be arranged as needed.  "

## 2024-02-16 ENCOUNTER — CLINICAL SUPPORT (OUTPATIENT)
Dept: GASTROENTEROLOGY | Facility: CLINIC | Age: 59
End: 2024-02-16
Payer: MEDICARE

## 2024-02-16 DIAGNOSIS — R16.0 ENLARGED LIVER: ICD-10-CM

## 2024-02-16 DIAGNOSIS — K76.0 HEPATIC STEATOSIS: ICD-10-CM

## 2024-02-16 PROCEDURE — 91200 LIVER ELASTOGRAPHY: CPT | Performed by: INTERNAL MEDICINE

## 2024-02-16 NOTE — LETTER
February 20, 2024     Ratna Calderón, APRN-CNP  [address]  [place] [facility] [place] [address]    Patient: Raquel Horn   YOB: 1965   Date of Visit: 2/16/2024       Dear Dr. Ratna Calderón, APRN-CNP:    Thank you for referring Raquel Horn to me for evaluation. Below are my notes for this consultation.  If you have questions, please do not hesitate to call me. I look forward to following your patient along with you.       Sincerely,     [id number]      CC: Yamilet Frazier, DO  ______________________________________________________________________________________      Results:  E (median liver stiffness measurement):   21.5   kPa  CAP (controlled attenuation parameter):  227   dB/m    Interpretation:  This was a technically adequate study. The Fibrosis score is consistent with Metavir F4 (cirrhosis) . The CAP score is consistent with 0 - 5 % hepatocyte steatosis (steatosis stage 0 of 3 ) .    Pasha Benson MD  Hepatology

## 2024-02-19 ENCOUNTER — HOSPITAL ENCOUNTER (OUTPATIENT)
Dept: RADIOLOGY | Facility: HOSPITAL | Age: 59
Discharge: HOME | End: 2024-02-19
Payer: MEDICARE

## 2024-02-19 DIAGNOSIS — K76.0 HEPATIC STEATOSIS: ICD-10-CM

## 2024-02-19 DIAGNOSIS — R16.0 ENLARGED LIVER: ICD-10-CM

## 2024-02-19 PROCEDURE — 76705 ECHO EXAM OF ABDOMEN: CPT | Performed by: RADIOLOGY

## 2024-02-19 PROCEDURE — 76705 ECHO EXAM OF ABDOMEN: CPT

## 2024-02-20 NOTE — PROGRESS NOTES
Results:  E (median liver stiffness measurement):   21.5   kPa  CAP (controlled attenuation parameter):  227   dB/m    Interpretation:  This was a technically adequate study. The Fibrosis score is consistent with Metavir F4 (cirrhosis) . The CAP score is consistent with 0 - 5 % hepatocyte steatosis (steatosis stage 0 of 3 ) .    Pasha Benson MD  Hepatology

## 2024-02-21 ENCOUNTER — TELEPHONE (OUTPATIENT)
Dept: GASTROENTEROLOGY | Facility: CLINIC | Age: 59
End: 2024-02-21
Payer: MEDICARE

## 2024-02-21 ENCOUNTER — PATIENT MESSAGE (OUTPATIENT)
Dept: GASTROENTEROLOGY | Facility: CLINIC | Age: 59
End: 2024-02-21

## 2024-02-23 ENCOUNTER — OFFICE VISIT (OUTPATIENT)
Dept: RHEUMATOLOGY | Facility: CLINIC | Age: 59
End: 2024-02-23
Payer: MEDICARE

## 2024-02-23 VITALS — DIASTOLIC BLOOD PRESSURE: 66 MMHG | WEIGHT: 213 LBS | BODY MASS INDEX: 31.92 KG/M2 | SYSTOLIC BLOOD PRESSURE: 108 MMHG

## 2024-02-23 DIAGNOSIS — R76.8 RHEUMATOID FACTOR POSITIVE: ICD-10-CM

## 2024-02-23 DIAGNOSIS — L40.9 PSORIASIS: ICD-10-CM

## 2024-02-23 DIAGNOSIS — R76.8 ANA POSITIVE: ICD-10-CM

## 2024-02-23 DIAGNOSIS — L40.50 PSORIATIC ARTHRITIS (MULTI): Primary | ICD-10-CM

## 2024-02-23 DIAGNOSIS — M75.101 ROTATOR CUFF SYNDROME OF RIGHT SHOULDER: ICD-10-CM

## 2024-02-23 PROCEDURE — 99204 OFFICE O/P NEW MOD 45 MIN: CPT | Performed by: INTERNAL MEDICINE

## 2024-02-23 PROCEDURE — 99214 OFFICE O/P EST MOD 30 MIN: CPT | Performed by: INTERNAL MEDICINE

## 2024-02-23 PROCEDURE — 2500000004 HC RX 250 GENERAL PHARMACY W/ HCPCS (ALT 636 FOR OP/ED): Performed by: INTERNAL MEDICINE

## 2024-02-23 PROCEDURE — 20610 DRAIN/INJ JOINT/BURSA W/O US: CPT | Performed by: INTERNAL MEDICINE

## 2024-02-23 PROCEDURE — 1036F TOBACCO NON-USER: CPT | Performed by: INTERNAL MEDICINE

## 2024-02-23 RX ORDER — TRIAMCINOLONE ACETONIDE 40 MG/ML
40 INJECTION, SUSPENSION INTRA-ARTICULAR; INTRAMUSCULAR
Status: COMPLETED | OUTPATIENT
Start: 2024-02-23 | End: 2024-02-23

## 2024-02-23 RX ADMIN — TRIAMCINOLONE ACETONIDE 40 MG: 40 INJECTION, SUSPENSION INTRA-ARTICULAR; INTRAMUSCULAR at 15:43

## 2024-02-23 NOTE — PROGRESS NOTES
"Ref per Dr. Chinchilla for evaluation and treatment of PSA.  Previous Rheum Dr. Gonsalez ( [place] ) followed by Ashli Wilson ( [facility] ) last visit x 1 year ago.    No treatment x 1 year.   Previous treatment  cosentyx ( rash )2018, Enbrel, no relief, Humira, no relief.  I think it starts with an M, an injectible.  Tremphya with good results for PS but not joint pain.  No treatment since.  Patient states she does not take N-saids due to cirrhosis.  Never evaluated with DERM    HPI - she states she saw rheum in 2018 in [place] \"He was from [facility], and then she went to [facility] and saw him for 2 yrs until he left.  She then saw rheum at [facility] in 2022.   Her insurance changed.  She hasn't seen rheum in 9 mo, and she says her insurance won't cover tremfya, then she said it was too expensive, then she said that prev rheum didn't prescribe when she had the new insurance.  She said that her insurance wasn't activated until oct and found out \"through the information pamphlet said that it would cost too much.  She doesn't want to take it anyway because she has cirrhosis.  Then she said that her 1st rheum told her she couldn't get it because of liver dz.  She said her next rheum was afraid to put her on it because of her liver as well (although she was on it for 2 yrs).  She isn't seeing GI until May.  She is still on hcq.   She c/o knee, foot, ankle, and shoulder pain.  She had shoulder injection last Apr.  Her fingers hurt, and she has a trigger finger.   She as been on cosentyx \"a bad skin rash - it was more than a rash - it was some kind of disease - it was terrible\" on arms and chest.  She states she has never been on any other meds - then said that \"just what's listed\" - enbrel and humira - didn't help.   She said she was dx with \"Osteo, psoriatic, and some rheumatoid\"   \"he wasn't 100% sure of it\"   Her hands swelling is \"continuous\" in her hands and \"bone protrusions more or less\"  Hands always " "feel stiff.  Her R shoulder hurts to lie on it.  Ps starting to come back on her face - none elsewhere - she has had on \"elbows and a few other areas, so it wasn't severe\"   No fever, HA, sicca, mouth sores, CP, resp, or GI.  The tips of her fingers turn white, even when it isn't cold.  She gets hand numbness at night - was dx with CTS and was recommended neutral wrist splints - hasn't tried yet    FH - +arthritis.  +lupus - sister.  No Ps.   No IBD.  No liver dz  SH - former smoker.  +EtOH - stopped 1 wk ago.   No drugs    PE  Gen- NAD  Neck - supple, no LAD  CV - RRR no r/m/g  Lungs - CTA  Abd - +BS  Extr - 2+ DP, PT, and rad pulses.  No c/c/e  Skin - few sm red macules around eyebrows, no scale/plaque  Psych - depressed affect  Neuro - nl strength.  Reflexes 1+ symmetric  Msk - sl puffiness R fingers.  Few sm heberdons.  No pain with ROM R shoulder.  L shoulder pain with ROM.  Mild diffuse back tenderness    A/P - Pt with EtOH cirrhosis, Ps/PsA, on hcq and prev on tremfya  She states both her prev rheum didn't want her on tremfya d/t cirrhosis, although she was on in from most recent rheum, and tremfya isn't usually a problem with liver dz, but she is concerned.  She will be seeing hepatol in May and can d/w him.  Her cirrhosis precludes the use of most DMARDs.  She will continue hcq - reviewed need for yearly eye exam.  She has had rash with cosentyx, and humira and enbrel didn't help.  +RF likely d/t liver dz.  CCP Ab neg  +SSA - can be seen in PBC and other liver dz.  She doesn't have partic s/s of CTD  She declined trigger finger but requested R subacr injection although unable to elicit.  R subacr injection - expl risks/benefits.  Pt gave written consent.  Aseptic tech, injected with 40mg kenalog and 1ml 1% lido  She will follow up after appt with hepatol in May  Patient ID: Dinorah Horn \"Raquel\" is a 58 y.o. female.    Large Joint Injection/Arthrocentesis: R subacromial bursa on 2/23/2024 3:43 " PM  Medications: 40 mg triamcinolone acetonide 40 mg/mL  Procedure, treatment alternatives, risks and benefits explained, specific risks discussed. Consent was given by the patient.

## 2024-02-27 ENCOUNTER — TELEPHONE (OUTPATIENT)
Dept: GASTROENTEROLOGY | Facility: CLINIC | Age: 59
End: 2024-02-27
Payer: MEDICARE

## 2024-02-27 DIAGNOSIS — K59.00 CONSTIPATION, UNSPECIFIED CONSTIPATION TYPE: Primary | ICD-10-CM

## 2024-02-27 NOTE — TELEPHONE ENCOUNTER
Pt called into office, has been undergoing lifestyle changes, is now eating more fruits, veggies, whole grains, lean meats, and has stopped drinking alcohol. She has not had a good bm in approximately 1 week since implementing these changes. She has had a little dizziness in the morning which subsides- she thinks may  be withdrawal from ETOH. Currently taking her daily senna along with MiraLAX. Reports adequate water intake.     Suspect constipation may be related to increased fiber intake. Plan to increase to 2-3 senna daily and continue MiraLAX, then can go back to her daily senna with MiraLAX PRN.

## 2024-02-29 DIAGNOSIS — E11.9 TYPE 2 DIABETES MELLITUS WITHOUT COMPLICATION, WITHOUT LONG-TERM CURRENT USE OF INSULIN (MULTI): Primary | ICD-10-CM

## 2024-02-29 RX ORDER — METFORMIN HYDROCHLORIDE EXTENDED-RELEASE TABLETS 500 MG/1
500 TABLET, FILM COATED, EXTENDED RELEASE ORAL
Qty: 180 TABLET | Refills: 0 | Status: SHIPPED | OUTPATIENT
Start: 2024-02-29 | End: 2024-03-08 | Stop reason: WASHOUT

## 2024-03-06 ENCOUNTER — TELEPHONE (OUTPATIENT)
Dept: GASTROENTEROLOGY | Facility: CLINIC | Age: 59
End: 2024-03-06
Payer: MEDICARE

## 2024-03-06 DIAGNOSIS — K59.00 CONSTIPATION, UNSPECIFIED CONSTIPATION TYPE: Primary | ICD-10-CM

## 2024-03-06 DIAGNOSIS — K70.30 ALCOHOLIC CIRRHOSIS OF LIVER WITHOUT ASCITES (MULTI): ICD-10-CM

## 2024-03-06 RX ORDER — POLYETHYLENE GLYCOL 3350 17 G/17G
116 POWDER, FOR SOLUTION ORAL ONCE
Qty: 116 G | Refills: 1 | Status: SHIPPED | OUTPATIENT
Start: 2024-03-06 | End: 2024-03-08

## 2024-03-06 NOTE — TELEPHONE ENCOUNTER
----- Message from Mary Walton MA sent at 3/5/2024  3:47 PM EST -----  Regarding: Patient would like you to call her  Patient called requesting to speak with you, she declined speaking with nurse. Has further questions regarding her liver.   [phone]    Still having issues with Bms- constipation. Had 1 bm last week and 1 yesterday. Using 2 stool softeners and MiraLAX daily.     Worried everything has sodium, fat, sugar in it. Using salt substitute and watching labels. Has totally revamped her diet.    Sent mini bowel prep. Nutrition consult placed.

## 2024-03-07 ENCOUNTER — APPOINTMENT (OUTPATIENT)
Dept: PRIMARY CARE | Facility: CLINIC | Age: 59
End: 2024-03-07
Payer: MEDICARE

## 2024-03-08 ENCOUNTER — OFFICE VISIT (OUTPATIENT)
Dept: PRIMARY CARE | Facility: CLINIC | Age: 59
End: 2024-03-08
Payer: MEDICARE

## 2024-03-08 VITALS
DIASTOLIC BLOOD PRESSURE: 60 MMHG | TEMPERATURE: 97.1 F | OXYGEN SATURATION: 98 % | SYSTOLIC BLOOD PRESSURE: 106 MMHG | BODY MASS INDEX: 30.87 KG/M2 | WEIGHT: 206 LBS | HEART RATE: 91 BPM

## 2024-03-08 DIAGNOSIS — G47.00 INSOMNIA, UNSPECIFIED TYPE: Primary | ICD-10-CM

## 2024-03-08 DIAGNOSIS — K70.30 ALCOHOLIC CIRRHOSIS, UNSPECIFIED WHETHER ASCITES PRESENT (MULTI): ICD-10-CM

## 2024-03-08 DIAGNOSIS — E11.9 TYPE 2 DIABETES MELLITUS WITHOUT COMPLICATION, WITHOUT LONG-TERM CURRENT USE OF INSULIN (MULTI): ICD-10-CM

## 2024-03-08 LAB — POC HEMOGLOBIN A1C: 5.8 % (ref 4.2–6.5)

## 2024-03-08 PROCEDURE — 3078F DIAST BP <80 MM HG: CPT | Performed by: FAMILY MEDICINE

## 2024-03-08 PROCEDURE — 1036F TOBACCO NON-USER: CPT | Performed by: FAMILY MEDICINE

## 2024-03-08 PROCEDURE — 99214 OFFICE O/P EST MOD 30 MIN: CPT | Performed by: FAMILY MEDICINE

## 2024-03-08 PROCEDURE — 83036 HEMOGLOBIN GLYCOSYLATED A1C: CPT | Performed by: FAMILY MEDICINE

## 2024-03-08 PROCEDURE — 4010F ACE/ARB THERAPY RXD/TAKEN: CPT | Performed by: FAMILY MEDICINE

## 2024-03-08 PROCEDURE — 3074F SYST BP LT 130 MM HG: CPT | Performed by: FAMILY MEDICINE

## 2024-03-08 RX ORDER — METFORMIN HYDROCHLORIDE 500 MG/1
TABLET, EXTENDED RELEASE ORAL
COMMUNITY
Start: 2024-03-04 | End: 2024-06-04 | Stop reason: SDUPTHER

## 2024-03-08 ASSESSMENT — ENCOUNTER SYMPTOMS
JOINT SWELLING: 0
RHINORRHEA: 0
EYE DISCHARGE: 0
APPETITE CHANGE: 0
SORE THROAT: 0
SHORTNESS OF BREATH: 0
PALPITATIONS: 0
DIZZINESS: 0
NERVOUS/ANXIOUS: 0
DYSURIA: 0
HEADACHES: 0
LIGHT-HEADEDNESS: 0
DYSPHORIC MOOD: 0
SLEEP DISTURBANCE: 0
ABDOMINAL PAIN: 1
HEMATURIA: 0
DIARRHEA: 0
VOMITING: 0
EYE ITCHING: 0
ACTIVITY CHANGE: 0
CONSTIPATION: 0
NAUSEA: 0
ARTHRALGIAS: 1
BLOOD IN STOOL: 0
MYALGIAS: 0
WEAKNESS: 0
NUMBNESS: 0
FEVER: 0
COUGH: 0
SINUS PRESSURE: 0
WHEEZING: 0
UNEXPECTED WEIGHT CHANGE: 0
ABDOMINAL DISTENTION: 1
FLANK PAIN: 0

## 2024-03-08 NOTE — PROGRESS NOTES
"Subjective   Patient ID: Raquel Horn is a 58 y.o. female who presents for Back Problem (\"AWHILE\" PAIN ), OZEMPIC, DISCUSS MEDICATION (CONCERNS REGARDING LIVER- DX WITH CHIRROSIS), and Insomnia (INTERESTED IN [facility] GUMMIES).    HPI PATIENT HAS PSORIATIC ARTHRITIS AND FOLLOWS WITH DR GARCIA   SEES  NEUROLOGY   RECENT GI VISITS  ABNORMAL LIVER TESTS 6 MONTHS AGO   ON METFORMIN WITH NO DIAGNOSIS OF DIABETES     Review of Systems   Constitutional:  Negative for activity change, appetite change, fever and unexpected weight change.   HENT:  Negative for congestion, ear pain, postnasal drip, rhinorrhea, sinus pressure and sore throat.    Eyes:  Negative for discharge, itching and visual disturbance.   Respiratory:  Negative for cough, shortness of breath and wheezing.    Cardiovascular:  Negative for chest pain, palpitations and leg swelling.   Gastrointestinal:  Positive for abdominal distention and abdominal pain. Negative for blood in stool, constipation, diarrhea, nausea and vomiting.        FOLLOWS GI WITH CIRRHOSIS AND WILL SOON SEE LIVER SPECIALIST  SHE HAS QUIT DRINKING NOW FOR 3 WEEKS PERHAPS SHE SAID   SHE HAS LOST 10 POUNDS THAT WAY  NOT NEED FOR OZEMPIC             Endocrine: Negative for cold intolerance, heat intolerance and polyuria.   Genitourinary:  Negative for dysuria, flank pain and hematuria.   Musculoskeletal:  Positive for arthralgias. Negative for gait problem, joint swelling and myalgias.   Skin:  Negative for rash.   Allergic/Immunologic: Negative for environmental allergies and food allergies.   Neurological:  Negative for dizziness, syncope, weakness, light-headedness, numbness and headaches.   Psychiatric/Behavioral:  Negative for dysphoric mood and sleep disturbance. The patient is not nervous/anxious.        Objective   /60   Pulse 91   Temp 36.2 °C (97.1 °F)   Wt 93.4 kg (206 lb)   SpO2 98%   BMI 30.87 kg/m²     Physical Exam  Vitals and nursing note reviewed.   Constitutional:       " Appearance: Normal appearance.   HENT:      Head: Normocephalic.   Cardiovascular:      Rate and Rhythm: Normal rate and regular rhythm.      Pulses: Normal pulses.      Heart sounds: Normal heart sounds. No murmur heard.     No friction rub. No gallop.   Pulmonary:      Effort: Pulmonary effort is normal. No respiratory distress.      Breath sounds: Normal breath sounds. No wheezing.   Abdominal:      General: Bowel sounds are normal. There is no distension.      Palpations: Abdomen is soft.      Tenderness: There is no abdominal tenderness.   Musculoskeletal:         General: No deformity. Normal range of motion.   Skin:     General: Skin is warm and dry.      Capillary Refill: Capillary refill takes less than 2 seconds.   Neurological:      General: No focal deficit present.      Mental Status: She is alert and oriented to person, place, and time.   Psychiatric:         Mood and Affect: Mood normal.         Assessment/Plan   Diagnoses and all orders for this visit:  Insomnia, unspecified type  Alcoholic cirrhosis, unspecified whether ascites present (CMS/HCC)  Type 2 diabetes mellitus without complication, without long-term current use of insulin (CMS/HCC)  -     POCT glycosylated hemoglobin (Hb A1C) manually resulted

## 2024-03-11 ENCOUNTER — TELEPHONE (OUTPATIENT)
Dept: GASTROENTEROLOGY | Facility: CLINIC | Age: 59
End: 2024-03-11
Payer: MEDICARE

## 2024-03-11 DIAGNOSIS — K59.00 CONSTIPATION, UNSPECIFIED CONSTIPATION TYPE: Primary | ICD-10-CM

## 2024-03-11 NOTE — TELEPHONE ENCOUNTER
Spoke with pt- she is currently traveling and going to [place] to help with her father. Patient is still having constipation despite the MiraLAX/ Ducolax x2. Last bm was 3/8/24. She would like to try prune juice. Recommend tap water or soap suds enema. If no BM, she will need to go to UC for eval/treatment. Verbalized understanding.

## 2024-03-11 NOTE — TELEPHONE ENCOUNTER
HPI: Chest Pain


History Per: Patient


History/Exam Limitations: no limitations





<Bandar Gregg - Last Filed: 10/21/17 09:11>





<Jarod García - Last Filed: 10/21/17 09:25>


Time Seen by Provider: 10/21/17 07:16


Chief Complaint (Nursing): Chest Pain


Additional Complaint(s): 





69 y/o M with a PMHx of HTN, DM and HLD complaining of chest pain that began 2 

days ago, is constant, pressure-like, sub-sternal, non-radiating and aggravated 

with movement. Pt reports feeling anxious about family in [place]. Pt 

denies recent trauma, fever, SOB, cough, abdominal pain, N/V, acid reflux or 

change in bowel movement. 


NKDA


PMHX: HTN, DM, HLD, BPH and nephropathy.


PSHx: appendectomy in .


FHx: extensive Hx of HTN and DM.


SHx: No tobacco, alcohol or recreational drugs.


 (Bandar Gregg)





Supervising Attending Note





<Bandar Gregg - Last Filed: 10/21/17 09:11>





- Supervising Attending Note


The Documented history was done by the: Physician Extender


The documented physical exam was done by the: Physician Extender


The documented procedures were done by the: Physician Extender





- Attestation:


I have personally seen and examined this patient.: Yes


I have fully participated in the care of the patient.: Yes


I have reviewed all pertinent clinical information: Yes





<Jarod García - Last Filed: 10/21/17 09:25>





- Notes:


Notes:: 





Chest pain 2 days (Jarod García)





Past Medical History





- Medical History


PMH: Diabetes (type I and II), HTN, Hypercholesterolemia, Chronic Kidney Disease


   Denies: CAD, CVA





- Surgical History


Surgical History: Appendectomy


   Denies: CABG





- Family History


Family History: States: Unknown Family Hx





<Bandar Gregg - Last Filed: 10/21/17 09:11>





<Jarod García - Last Filed: 10/21/17 09:25>


Vital Signs: 





 Last Vital Signs











Temp  98.4 F   10/21/17 06:39


 


Pulse  73   10/21/17 06:39


 


Resp  16   10/21/17 06:39


 


BP  157/55 H  10/21/17 06:39


 


Pulse Ox  99   10/21/17 09:13














- Home Medications


Home Medications: 


 Ambulatory Orders











 Medication  Instructions  Recorded


 


SITagliptin [Januvia] 50 mg PO DAILY #0 tab 10/24/15


 


cloNIDine [clonidine HCl] 0.1 mg PO BID #0 tab 10/24/15


 


Albuterol HFA [Ventolin HFA 90 2 puff IH [id number] PRN #1 inh 17





mcg/actuation (8 g)]  














- Allergies


Allergies/Adverse Reactions: 


 Allergies











Allergy/AdvReac Type Severity Reaction Status Date / Time


 


No Known Allergies Allergy   Verified 16 19:00














Curb-65 Severity Score





- CURB-65 Severity Score


Confusion: No


Respiratory Rate greater than/equal to 30: No


Systolic BP <90 or Diastolic BP less than/equal 60mmHg: No


Age >64: Yes


Curb-65 Score: 1


Percentage 30-day mortality: 2.7%





<Bandar Gregg - Last Filed: 10/21/17 09:11>





Wells Criteria for PE





- Wells Criteria for Pulmonary Embolism


Clinical Signs and Symptoms of DVT: No


P.E is #1 Diagnosis, or Equally Likely: No


Heart Rate >100: No


Immobilization at least 3 days;Surgery previous 4 weeks: No


Previous, objectively diagnosed PE or DVT: No


Hemoptysis: No


Malignancy w/treatment within 6 months, or palliative: No


Total Score: 0





<Bandar Gregg - Last Filed: 10/21/17 09:11>





Review of Systems


Constitutional: Negative for: Fever, Weakness, Malaise


Eyes: Negative for: Pain


ENT: Negative for: Ear Pain, Nose Congestion, Throat Pain


Cardiovascular: Positive for: Chest Pain.  Negative for: Palpitations, Light 

Headedness


Respiratory: Negative for: Cough, Shortness of Breath, Hemoptysis, SOB with 

Exertion


Gastrointestinal: Negative for: Nausea, Vomiting, Abdominal Pain, Diarrhea, 

Constipation


Genitourinary Male: Negative for: Dysuria, Frequency


Musculoskeletal: Negative for: Neck Pain, Shoulder Pain, Arm Pain


Skin: Negative for: Rash


Neurological: Negative for: Weakness, Numbness, Change in Speech, Confusion, 

Dizziness


Psych: Positive for: Anxiety.  Negative for: Psychosis, Suicidal ideation, 

Withdrawal





<Bandar Gregg Last Filed: 10/21/17 09:11>


Cardiovascular: Positive for: Chest Pain





<Jarod García - Last Filed: 10/21/17 09:25>





Physical Exam





- Reviewed


Vital Signs Reviewed: Yes





- Physical Exam


Appears: Positive for: Well, Non-toxic, No Acute Distress


Head Exam: Positive for: ATRAUMATIC, NORMAL INSPECTION


Skin: Positive for: Normal Color


Eye Exam: Positive for: Normal appearance, PERRL


ENT: Positive for: Normal ENT Inspection


Neck: Positive for: Normal, Supple


Cardiovascular/Chest: Positive for: Regular Rate, Rhythm


Respiratory: Positive for: Normal Breath Sounds


Gastrointestinal/Abdominal: Positive for: Normal Exam, Bowel Sounds, Soft.  

Negative for: Tenderness, Distended, Guarding


Neurologic/Psych: Positive for: Alert, Oriented





<Bandar Gregg - Last Filed: 10/21/17 09:11>





- Physical Exam


Cardiovascular/Chest: Positive for: Regular Rate, Rhythm


Respiratory: Positive for: Normal Breath Sounds





<Jarod García - Last Filed: 10/21/17 09:25>





- Laboratory Results


Result Diagrams: 


 10/21/17 07:30





 10/21/17 07:30





- ECG


O2 Sat by Pulse Oximetry: 99





<Bandar Gregg - Last Filed: 10/21/17 09:11>





- Laboratory Results


Result Diagrams: 


 10/21/17 07:30





 10/21/17 07:30


Interpretation Of Abn Labs: 5.3 k; elevated bun and cr





- ECG


ECG: Positive for: Interpreted By Me, Viewed By Me


ECG Rhythm: Positive for: Normal QRS, Normal ST Segment, Sinus Rhythm


Pulse Ox Interpretation: Normal





- Radiology


X-Ray: Interpreted by Me, Viewed By Me


X-Ray Interpretation: No Acute Disease





<Jarod García - Last Filed: 10/21/17 09:25>





- Progress


ED Course And Treament: 





924:  Stable.  AAOx3.  Spoke with Salomón.  Will admit tele obs.  Pain free.  (

Jarod García)





Medical Decision Making





<Bandar Gregg - Last Filed: 10/21/17 09:11>





<Jarod García - Last Filed: 10/21/17 09:25>


Medical Decision Makin y/o M with a PMHx of HTN, DM and HLD c/o chest pain.





Plan:





--EKG


--Troponin


--CBC


--CMP


--Coagulation profile


--Chest X ray


--Aspirin


--IV NSS 9%





 (Bandar Gregg)





Disposition





<Bandar Gregg - Last Filed: 10/21/17 09:11>





- Patient ED Disposition


Is Patient to be Admitted: Yes


Counseled Patient/Family Regarding: Studies Performed, Diagnosis





- Disposition


Disposition Time: 09:25





- Pt Status Changed To:


Hospital Disposition Of: Observation





- POA


Present On Arrival: None


Core Measure Indicators: Chest Pain





<Jarod García - Last Filed: 10/21/17 09:25>





- Clinical Impression


Clinical Impression: 


 Acute chest pain, Hyperkalemia








- Disposition


Condition: FAIR Patient calling in requesting to speak with Ratna. Patient advised Ratna is with an appointment right now and I would give her the message.   Sandro

## 2024-03-12 ENCOUNTER — APPOINTMENT (OUTPATIENT)
Dept: PRIMARY CARE | Facility: CLINIC | Age: 59
End: 2024-03-12
Payer: MEDICARE

## 2024-03-19 DIAGNOSIS — I10 PRIMARY HYPERTENSION: ICD-10-CM

## 2024-03-19 DIAGNOSIS — M19.90 OSTEOARTHRITIS, UNSPECIFIED OSTEOARTHRITIS TYPE, UNSPECIFIED SITE: Primary | ICD-10-CM

## 2024-03-19 DIAGNOSIS — K21.9 GASTROESOPHAGEAL REFLUX DISEASE WITHOUT ESOPHAGITIS: ICD-10-CM

## 2024-03-19 DIAGNOSIS — F32.A DEPRESSION, UNSPECIFIED DEPRESSION TYPE: ICD-10-CM

## 2024-03-20 RX ORDER — VENLAFAXINE HYDROCHLORIDE 150 MG/1
150 CAPSULE, EXTENDED RELEASE ORAL DAILY
Qty: 90 CAPSULE | Refills: 0 | Status: SHIPPED | OUTPATIENT
Start: 2024-03-20 | End: 2024-05-30 | Stop reason: SDUPTHER

## 2024-03-20 RX ORDER — HYDROXYCHLOROQUINE SULFATE 200 MG/1
200 TABLET, FILM COATED ORAL DAILY
Qty: 90 TABLET | Refills: 0 | Status: SHIPPED | OUTPATIENT
Start: 2024-03-20

## 2024-03-20 RX ORDER — HYDROCHLOROTHIAZIDE 12.5 MG/1
12.5 TABLET ORAL DAILY
Qty: 90 TABLET | Refills: 0 | Status: SHIPPED | OUTPATIENT
Start: 2024-03-20

## 2024-03-20 RX ORDER — METOPROLOL TARTRATE 25 MG/1
25 TABLET, FILM COATED ORAL 2 TIMES DAILY
Qty: 180 TABLET | Refills: 0 | Status: SHIPPED | OUTPATIENT
Start: 2024-03-20

## 2024-03-20 RX ORDER — LISINOPRIL 20 MG/1
20 TABLET ORAL DAILY
Qty: 90 TABLET | Refills: 0 | Status: SHIPPED | OUTPATIENT
Start: 2024-03-20

## 2024-03-20 RX ORDER — OMEPRAZOLE 40 MG/1
40 CAPSULE, DELAYED RELEASE ORAL
Qty: 90 CAPSULE | Refills: 0 | Status: SHIPPED | OUTPATIENT
Start: 2024-03-20

## 2024-04-01 ENCOUNTER — APPOINTMENT (OUTPATIENT)
Dept: NEUROLOGY | Facility: CLINIC | Age: 59
End: 2024-04-01
Payer: MEDICARE

## 2024-05-06 DIAGNOSIS — G47.00 INSOMNIA, UNSPECIFIED TYPE: Primary | ICD-10-CM

## 2024-05-06 RX ORDER — VENLAFAXINE 75 MG/1
75 TABLET ORAL 2 TIMES DAILY
Qty: 90 TABLET | Refills: 1 | Status: SHIPPED | OUTPATIENT
Start: 2024-05-06

## 2024-05-06 RX ORDER — TRAZODONE HYDROCHLORIDE 50 MG/1
25 TABLET ORAL NIGHTLY
Qty: 15 TABLET | Refills: 3 | Status: SHIPPED | OUTPATIENT
Start: 2024-05-06 | End: 2024-06-04 | Stop reason: SDUPTHER

## 2024-05-09 ENCOUNTER — TELEPHONE (OUTPATIENT)
Dept: PRIMARY CARE | Facility: CLINIC | Age: 59
End: 2024-05-09
Payer: MEDICARE

## 2024-05-09 NOTE — TELEPHONE ENCOUNTER
[facility] called regarding the Effexor script that was sent over.  They mention that the 75mg was only sent over for 90 day, but it says BID.  She also says that Herlinda usually was on the extended release which is 150mg once daily.  I called to confirm the info with her and there was no VM or answer.

## 2024-05-13 DIAGNOSIS — G47.00 INSOMNIA, UNSPECIFIED TYPE: ICD-10-CM

## 2024-05-13 RX ORDER — VENLAFAXINE HYDROCHLORIDE 75 MG/1
75 CAPSULE, EXTENDED RELEASE ORAL EVERY EVENING
Qty: 90 CAPSULE | Refills: 3 | Status: SHIPPED | OUTPATIENT
Start: 2024-05-13 | End: 2024-05-30 | Stop reason: SDUPTHER

## 2024-05-13 RX ORDER — VENLAFAXINE 75 MG/1
75 TABLET ORAL EVERY EVENING
Qty: 90 TABLET | Refills: 1 | Status: CANCELLED | OUTPATIENT
Start: 2024-05-13

## 2024-05-30 ENCOUNTER — OFFICE VISIT (OUTPATIENT)
Dept: GASTROENTEROLOGY | Facility: CLINIC | Age: 59
End: 2024-05-30
Payer: MEDICARE

## 2024-05-30 ENCOUNTER — LAB (OUTPATIENT)
Dept: LAB | Facility: LAB | Age: 59
End: 2024-05-30
Payer: MEDICARE

## 2024-05-30 VITALS
BODY MASS INDEX: 29.22 KG/M2 | DIASTOLIC BLOOD PRESSURE: 75 MMHG | SYSTOLIC BLOOD PRESSURE: 112 MMHG | OXYGEN SATURATION: 100 % | WEIGHT: 195 LBS | HEART RATE: 63 BPM

## 2024-05-30 DIAGNOSIS — R16.0 ENLARGED LIVER: ICD-10-CM

## 2024-05-30 DIAGNOSIS — R79.89 LIVER FUNCTION TEST ABNORMALITY: ICD-10-CM

## 2024-05-30 DIAGNOSIS — E88.810 METABOLIC SYNDROME: ICD-10-CM

## 2024-05-30 DIAGNOSIS — K76.0 HEPATIC STEATOSIS: ICD-10-CM

## 2024-05-30 DIAGNOSIS — K70.30 ALCOHOLIC CIRRHOSIS OF LIVER WITHOUT ASCITES (MULTI): Primary | ICD-10-CM

## 2024-05-30 LAB
ALBUMIN SERPL BCP-MCNC: 4.5 G/DL (ref 3.4–5)
ALP SERPL-CCNC: 68 U/L (ref 33–136)
ALT SERPL W P-5'-P-CCNC: 18 U/L (ref 7–45)
ANION GAP SERPL CALC-SCNC: 14 MMOL/L (ref 10–20)
AST SERPL W P-5'-P-CCNC: 20 U/L (ref 9–39)
BASOPHILS # BLD AUTO: 0.02 X10*3/UL (ref 0–0.1)
BASOPHILS NFR BLD AUTO: 0.4 %
BILIRUB DIRECT SERPL-MCNC: 0 MG/DL (ref 0–0.3)
BILIRUB SERPL-MCNC: 0.5 MG/DL (ref 0–1.2)
BUN SERPL-MCNC: 20 MG/DL (ref 6–23)
CALCIUM SERPL-MCNC: 9.6 MG/DL (ref 8.6–10.3)
CHLORIDE SERPL-SCNC: 102 MMOL/L (ref 98–107)
CO2 SERPL-SCNC: 26 MMOL/L (ref 21–32)
CREAT SERPL-MCNC: 0.83 MG/DL (ref 0.5–1.05)
EGFRCR SERPLBLD CKD-EPI 2021: 81 ML/MIN/1.73M*2
EOSINOPHIL # BLD AUTO: 0.1 X10*3/UL (ref 0–0.7)
EOSINOPHIL NFR BLD AUTO: 1.8 %
ERYTHROCYTE [DISTWIDTH] IN BLOOD BY AUTOMATED COUNT: 12.9 % (ref 11.5–14.5)
GLUCOSE SERPL-MCNC: 76 MG/DL (ref 74–99)
HCT VFR BLD AUTO: 31 % (ref 36–46)
HGB BLD-MCNC: 10.3 G/DL (ref 12–16)
IMM GRANULOCYTES # BLD AUTO: 0.01 X10*3/UL (ref 0–0.7)
IMM GRANULOCYTES NFR BLD AUTO: 0.2 % (ref 0–0.9)
INR PPP: 1 (ref 0.9–1.1)
LYMPHOCYTES # BLD AUTO: 1.62 X10*3/UL (ref 1.2–4.8)
LYMPHOCYTES NFR BLD AUTO: 29.7 %
MCH RBC QN AUTO: 31.9 PG (ref 26–34)
MCHC RBC AUTO-ENTMCNC: 33.2 G/DL (ref 32–36)
MCV RBC AUTO: 96 FL (ref 80–100)
MONOCYTES # BLD AUTO: 0.41 X10*3/UL (ref 0.1–1)
MONOCYTES NFR BLD AUTO: 7.5 %
NEUTROPHILS # BLD AUTO: 3.29 X10*3/UL (ref 1.2–7.7)
NEUTROPHILS NFR BLD AUTO: 60.4 %
NRBC BLD-RTO: 0 /100 WBCS (ref 0–0)
PLATELET # BLD AUTO: 169 X10*3/UL (ref 150–450)
POTASSIUM SERPL-SCNC: 4.2 MMOL/L (ref 3.5–5.3)
PROT SERPL-MCNC: 7.4 G/DL (ref 6.4–8.2)
PROTHROMBIN TIME: 11.7 SECONDS (ref 9.8–12.8)
RBC # BLD AUTO: 3.23 X10*6/UL (ref 4–5.2)
SODIUM SERPL-SCNC: 138 MMOL/L (ref 136–145)
WBC # BLD AUTO: 5.5 X10*3/UL (ref 4.4–11.3)

## 2024-05-30 PROCEDURE — 80053 COMPREHEN METABOLIC PANEL: CPT

## 2024-05-30 PROCEDURE — 81256 HFE GENE: CPT

## 2024-05-30 PROCEDURE — 4010F ACE/ARB THERAPY RXD/TAKEN: CPT | Performed by: INTERNAL MEDICINE

## 2024-05-30 PROCEDURE — 80074 ACUTE HEPATITIS PANEL: CPT

## 2024-05-30 PROCEDURE — 99215 OFFICE O/P EST HI 40 MIN: CPT | Performed by: INTERNAL MEDICINE

## 2024-05-30 PROCEDURE — 36415 COLL VENOUS BLD VENIPUNCTURE: CPT

## 2024-05-30 PROCEDURE — 85610 PROTHROMBIN TIME: CPT

## 2024-05-30 PROCEDURE — 82784 ASSAY IGA/IGD/IGG/IGM EACH: CPT

## 2024-05-30 PROCEDURE — 3074F SYST BP LT 130 MM HG: CPT | Performed by: INTERNAL MEDICINE

## 2024-05-30 PROCEDURE — 85025 COMPLETE CBC W/AUTO DIFF WBC: CPT

## 2024-05-30 PROCEDURE — 99205 OFFICE O/P NEW HI 60 MIN: CPT | Performed by: INTERNAL MEDICINE

## 2024-05-30 PROCEDURE — 82248 BILIRUBIN DIRECT: CPT

## 2024-05-30 PROCEDURE — 3078F DIAST BP <80 MM HG: CPT | Performed by: INTERNAL MEDICINE

## 2024-05-30 PROCEDURE — 82104 ALPHA-1-ANTITRYPSIN PHENO: CPT

## 2024-05-30 RX ORDER — MINERAL OIL
30 OIL (ML) ORAL DAILY PRN
COMMUNITY
End: 2024-06-13

## 2024-05-30 RX ORDER — CYANOCOBALAMIN (VITAMIN B-12) 500 MCG
TABLET ORAL DAILY
COMMUNITY
End: 2024-06-13 | Stop reason: ALTCHOICE

## 2024-05-30 ASSESSMENT — PAIN SCALES - GENERAL: PAINLEVEL: 0-NO PAIN

## 2024-05-30 ASSESSMENT — ENCOUNTER SYMPTOMS: DEPRESSION: 0

## 2024-05-30 NOTE — PATIENT INSTRUCTIONS
Welcome to Dr. [facility].  Dr. Benson sees patients at the following sites:  [place] [address] [facility] at [facility]  [address], [address] at [facility] at [facility], [facility] [address]    Dr. Benson's hepatology care coordinator, Kirstin PAPPAS, can be reached at [phone].  Dr. Benson's , Stephanie Bryan, can be reached at [phone].     Get blood work to further evaluate your liver.     Get a Transjugular Liver Biopsy with Portal Pressures.  Call [phone] to schedule.    Follow up with me in clinic in 3 months.   Schedule your visit on your way out today. If unable, please call [phone] to schedule.

## 2024-05-30 NOTE — PROGRESS NOTES
"Subjective     Evaluation of suspected cirrhosis.    History of Present Illness:   Dinorah Horn \"Raquel\" is a 59 y.o. female who presents to the [facility] at [facility], for further evaluation of new diagnosis of cirrhosis.    She is referred by Ratna RIVERA CNP.  Her initial appointment was in February.  The reason for visit was for hepatomegaly, elevations in her liver enzymes, suspected chronic liver disease.    The patient recalls that she was told she had fatty liver many years ago.  She never recalls manifesting any symptoms from her liver disease.  She denies a history of jaundice, fluid retention such as her ascites, confusion from hepatic encephalopathy.  She does report some chronic GI symptoms including constipation.    As regards to liver disease risk factors, she reports a history of alcohol excess: that she would drink 50 beers per week (16 ounce beers).  She also has metabolic risk factors including diabetes hypertension and heart disease.    Initial laboratory workup was notable for the following (February 2024):  Albumin 4.6 bilirubin of 0.5 alkaline phosphatase of 102 ALT of 31 AST of 29.  A year ago her liver enzymes were abnormal (May 2023):  AST of 89 ALT of 97 alkaline phosphatase 164 total protein 8.2 albumin 4.6.  She had an GORDON that was checked -positive at 1-160.  Her anti-SSA was positive greater than 8.0. Her ferritin was slightly elevated at 257.  Hepatitis B and C serologies were negative.  HIV was negative.    Liver imaging was performed: The liver measures 16.9 cm with diffuse heterogenicity of the echotexture of the liver.    She was referred for liver elastography with Fibroscan:  E (median liver stiffness measurement):   21.5   kPa   CAP (controlled attenuation parameter):  227   dB/m   This was consistent with cirrhosis.    She has cut out all alcohol use.  She has improved her diet significantly.  She is lost some weight.    She is referred to me for " further evaluation.    Review of Systems  Review of Systems  Refer to the new patient questionnaire for self-reported comprehensive review of systems.    Past Medical History   has a past medical history of Arthritis, Depression, Diabetes mellitus (Multi), GERD (gastroesophageal reflux disease), Headache, Heart disease, Hypertension, and Visual impairment.     She was diagnosed with diabetes about 2 years ago.    She has a history of cardiac surgery.    She last had a colonoscopy in 2021.     Social History   reports that she quit smoking about 15 years ago. Her smoking use included cigarettes. She started smoking about 30 years ago. She has a 15 pack-year smoking history. She has never used smokeless tobacco. She reports that she does not currently use alcohol. She reports that she does not use drugs.     Family History  family history includes Alcohol abuse in her daughter, mother, paternal grandmother, sister, and sister; Arthritis in her father; Atrial fibrillation in her father; COPD in her father; Cancer in her father, father's brother, father's brother, and mother; Colon cancer in her father's brother; Diabetes in her mother; Drug abuse in her sister; Hearing loss in her father; Heart disease in her father and mother; Stroke in her paternal grandfather.     Allergies  Allergies   Allergen Reactions    Cosentyx [Secukinumab] Rash       Medications  Current Outpatient Medications   Medication Instructions    aspirin 81 mg, oral, Daily    hydroCHLOROthiazide (MICROZIDE) 12.5 mg, oral, Daily    hydroxychloroquine (PLAQUENIL) 200 mg, oral, Daily    lisinopril 20 mg, oral, Daily    melatonin 1 mg tablet oral, Daily    metFORMIN  mg 24 hr tablet     metoprolol tartrate (LOPRESSOR) 25 mg, oral, 2 times daily    mineral oil liquid 30 mL, oral, Daily PRN    multivitamin tablet 1 tablet, oral, Daily    nitroglycerin (NITROSTAT) 0.3 mg, sublingual, Every 5 min PRN    omeprazole (PRILOSEC) 40 mg, oral, Daily before  "breakfast, Do not crush or chew.    rosuvastatin (CRESTOR) 20 mg, oral, Daily    sennosides (Senokot) 8.6 mg tablet 1 tablet, oral, Daily    traZODone (DESYREL) 25 mg, oral, Nightly    venlafaxine (EFFEXOR) 75 mg, oral, 2 times daily    venlafaxine XR (EFFEXOR-XR) 150 mg, oral, Daily, Do not crush or chew.    venlafaxine XR (EFFEXOR-XR) 75 mg, oral, Every evening, Take with food.        Objective   Visit Vitals  /75   Pulse 63          10/27/2023     2:09 PM 12/11/2023     4:12 PM 1/11/2024     3:14 PM 2/5/2024     2:25 PM 2/23/2024     2:38 PM 3/8/2024    10:51 AM 5/30/2024     3:23 PM   Vitals   Systolic 119 118 132 120 108 106 112   Diastolic 78 70 84 79 66 60 75   Heart Rate  90 84 77  91 63   Temp  36.1 °C (97 °F)  36.2 °C (97.2 °F)  36.2 °C (97.1 °F)    Resp    16      Height (in) 1.753 m (5' 9\") 1.74 m (5' 8.5\")  1.74 m (5' 8.5\")      Weight (lb) 211 214  216.5 213 206 195   BMI 31.16 kg/m2 32.07 kg/m2  32.44 kg/m2 31.92 kg/m2 30.87 kg/m2 29.22 kg/m2   BSA (m2) 2.16 m2 2.17 m2  2.18 m2 2.16 m2 2.12 m2 2.07 m2   Visit Report Report Report Report Report Report Report Report     Physical Exam  Vitals and nursing note reviewed.   Constitutional:       Appearance: Normal appearance. She is obese.      Comments: In general she appears slightly anxious.   HENT:      Head: Normocephalic and atraumatic.      Mouth/Throat:      Mouth: Mucous membranes are moist.      Pharynx: Oropharynx is clear.   Eyes:      General: No scleral icterus.     Extraocular Movements: Extraocular movements intact.      Pupils: Pupils are equal, round, and reactive to light.   Cardiovascular:      Rate and Rhythm: Normal rate and regular rhythm.      Heart sounds: No murmur heard.  Pulmonary:      Effort: Pulmonary effort is normal.      Breath sounds: Normal breath sounds.   Abdominal:      General: Abdomen is flat. Bowel sounds are normal.      Palpations: Abdomen is soft.   Musculoskeletal:         General: No swelling. Normal " "range of motion.      Right lower leg: No edema.      Left lower leg: No edema.   Skin:     Coloration: Skin is not jaundiced.   Neurological:      General: No focal deficit present.      Mental Status: She is alert and oriented to person, place, and time. Mental status is at baseline.   Psychiatric:         Mood and Affect: Mood normal.         Thought Content: Thought content normal.         Judgment: Judgment normal.         Labs    WBC   Date/Time Value Ref Range Status   02/05/2024 03:30 PM 5.9 4.4 - 11.3 x10*3/uL Final     Hemoglobin   Date/Time Value Ref Range Status   02/05/2024 03:30 PM 12.3 12.0 - 16.0 g/dL Final     Hematocrit   Date/Time Value Ref Range Status   02/05/2024 03:30 PM 37.1 36.0 - 46.0 % Final     MCV   Date/Time Value Ref Range Status   02/05/2024 03:30 PM 98 80 - 100 fL Final     Platelets   Date/Time Value Ref Range Status   02/05/2024 03:30  150 - 450 x10*3/uL Final        Total Protein   Date/Time Value Ref Range Status   02/05/2024 03:30 PM 8.2 6.4 - 8.2 g/dL Final     Albumin   Date/Time Value Ref Range Status   02/05/2024 03:30 PM 4.6 3.4 - 5.0 g/dL Final     AST   Date/Time Value Ref Range Status   02/05/2024 03:30 PM 29 9 - 39 U/L Final     ALT   Date/Time Value Ref Range Status   02/05/2024 03:30 PM 31 7 - 45 U/L Final     Comment:     Patients treated with Sulfasalazine may generate falsely decreased results for ALT.     Alkaline Phosphatase   Date/Time Value Ref Range Status   02/05/2024 03:30  33 - 110 U/L Final     Bilirubin, Total   Date/Time Value Ref Range Status   02/05/2024 03:30 PM 0.5 0.0 - 1.2 mg/dL Final     Bilirubin, Direct   Date/Time Value Ref Range Status   02/05/2024 03:30 PM 0.1 0.0 - 0.3 mg/dL Final        No results found for: \"VITD25\", \"VITAMINA\", \"VTAI\", \"VITEALPHA\", \"VITEGAMMA\"     AST   Date/Time Value Ref Range Status   02/05/2024 03:30 PM 29 9 - 39 U/L Final     ALT   Date/Time Value Ref Range Status   02/05/2024 03:30 PM 31 7 - 45 U/L Final " "    Comment:     Patients treated with Sulfasalazine may generate falsely decreased results for ALT.     Alkaline Phosphatase   Date/Time Value Ref Range Status   02/05/2024 03:30  33 - 110 U/L Final     Bilirubin, Total   Date/Time Value Ref Range Status   02/05/2024 03:30 PM 0.5 0.0 - 1.2 mg/dL Final     Bilirubin, Direct   Date/Time Value Ref Range Status   02/05/2024 03:30 PM 0.1 0.0 - 0.3 mg/dL Final     Albumin   Date/Time Value Ref Range Status   02/05/2024 03:30 PM 4.6 3.4 - 5.0 g/dL Final     Total Protein   Date/Time Value Ref Range Status   02/05/2024 03:30 PM 8.2 6.4 - 8.2 g/dL Final        Protime   Date/Time Value Ref Range Status   02/05/2024 03:30 PM 11.9 9.8 - 12.8 seconds Final     INR   Date/Time Value Ref Range Status   02/05/2024 03:30 PM 1.1 0.9 - 1.1 Final       Assessment/Plan   Dinorah Horn \"Raquel\" is a 59 y.o. female who presents to liver clinic for initial evaluation of suspected cirrhosis.    She has multiple risk factors for chronic liver disease.  Most notably she has a history of alcohol excess which she self-reported consumption of 50 beers per week.  This level of drinking certainly puts her at risk for significant liver disease.  Fortunately she has stopped her alcohol use.  She also has significant metabolic risk factors for metabolic associated fatty liver -including hypertension, diabetes, heart disease, and obesity.  She also has a history of autoimmune disease and a positive GORDON.    I have recommended some lab work to complete her workup for concomitant liver disorders.  I would like her to do genetic hemochromatosis testing, genetic testing for alpha-1 antitrypsin deficiency.  I would also like to send off immunoglobulins to complete an evaluation for autoimmune hepatitis as well.    I have suggested a transjugular liver biopsy with portal pressure measurements.  This can help confirm a diagnosis of cirrhosis also will assess for clinically significant portal " hypertension.    I provided counseling regarding her liver disease evaluation to date.  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    She will return to see me after completing a liver biopsy.    Instructions      Pasha Benson MD

## 2024-05-30 NOTE — LETTER
"June 11, 2024     ALIN Nicholson  [address] [address]    Patient: Raquel Horn   YOB: 1965   Date of Visit: 5/30/2024       Dear ALIN Fox:    Thank you for referring Raquel Horn to me for evaluation. Below are my notes for this consultation.  If you have questions, please do not hesitate to call me. I look forward to following your patient along with you.       Sincerely,     Pasha Benson MD      CC: No Recipients  ______________________________________________________________________________________    Subjective     Evaluation of suspected cirrhosis.    History of Present Illness:   Dinorah Horn \"Pratibha" is a 59 y.o. female who presents to the [facility] at [facility], for further evaluation of new diagnosis of cirrhosis.    She is referred by Ratna RIVERA CNP.  Her initial appointment was in February.  The reason for visit was for hepatomegaly, elevations in her liver enzymes, suspected chronic liver disease.    The patient recalls that she was told she had fatty liver many years ago.  She never recalls manifesting any symptoms from her liver disease.  She denies a history of jaundice, fluid retention such as her ascites, confusion from hepatic encephalopathy.  She does report some chronic GI symptoms including constipation.    As regards to liver disease risk factors, she reports a history of alcohol excess: that she would drink 50 beers per week (16 ounce beers).  She also has metabolic risk factors including diabetes hypertension and heart disease.    Initial laboratory workup was notable for the following (February 2024):  Albumin 4.6 bilirubin of 0.5 alkaline phosphatase of 102 ALT of 31 AST of 29.  A year ago her liver enzymes were abnormal (May 2023):  AST of 89 ALT of 97 alkaline phosphatase 164 total protein 8.2 albumin 4.6.  She had an GORDON that was checked -positive at 1-160.  Her anti-SSA was positive " greater than 8.0. Her ferritin was slightly elevated at 257.  Hepatitis B and C serologies were negative.  HIV was negative.    Liver imaging was performed: The liver measures 16.9 cm with diffuse heterogenicity of the echotexture of the liver.    She was referred for liver elastography with Fibroscan:  E (median liver stiffness measurement):   21.5   kPa   CAP (controlled attenuation parameter):  227   dB/m   This was consistent with cirrhosis.    She has cut out all alcohol use.  She has improved her diet significantly.  She is lost some weight.    She is referred to me for further evaluation.    Review of Systems  Review of Systems  Refer to the new patient questionnaire for self-reported comprehensive review of systems.    Past Medical History   has a past medical history of Arthritis, Depression, Diabetes mellitus (Multi), GERD (gastroesophageal reflux disease), Headache, Heart disease, Hypertension, and Visual impairment.     She was diagnosed with diabetes about 2 years ago.    She has a history of cardiac surgery.    She last had a colonoscopy in 2021.     Social History   reports that she quit smoking about 15 years ago. Her smoking use included cigarettes. She started smoking about 30 years ago. She has a 15 pack-year smoking history. She has never used smokeless tobacco. She reports that she does not currently use alcohol. She reports that she does not use drugs.     Family History  family history includes Alcohol abuse in her daughter, mother, paternal grandmother, sister, and sister; Arthritis in her father; Atrial fibrillation in her father; COPD in her father; Cancer in her father, father's brother, father's brother, and mother; Colon cancer in her father's brother; Diabetes in her mother; Drug abuse in her sister; Hearing loss in her father; Heart disease in her father and mother; Stroke in her paternal grandfather.     Allergies  Allergies   Allergen Reactions   • Cosentyx [Secukinumab] Rash  "      Medications  Current Outpatient Medications   Medication Instructions   • aspirin 81 mg, oral, Daily   • hydroCHLOROthiazide (MICROZIDE) 12.5 mg, oral, Daily   • hydroxychloroquine (PLAQUENIL) 200 mg, oral, Daily   • lisinopril 20 mg, oral, Daily   • melatonin 1 mg tablet oral, Daily   • metFORMIN  mg 24 hr tablet    • metoprolol tartrate (LOPRESSOR) 25 mg, oral, 2 times daily   • mineral oil liquid 30 mL, oral, Daily PRN   • multivitamin tablet 1 tablet, oral, Daily   • nitroglycerin (NITROSTAT) 0.3 mg, sublingual, Every 5 min PRN   • omeprazole (PRILOSEC) 40 mg, oral, Daily before breakfast, Do not crush or chew.   • rosuvastatin (CRESTOR) 20 mg, oral, Daily   • sennosides (Senokot) 8.6 mg tablet 1 tablet, oral, Daily   • traZODone (DESYREL) 25 mg, oral, Nightly   • venlafaxine (EFFEXOR) 75 mg, oral, 2 times daily   • venlafaxine XR (EFFEXOR-XR) 150 mg, oral, Daily, Do not crush or chew.   • venlafaxine XR (EFFEXOR-XR) 75 mg, oral, Every evening, Take with food.        Objective   Visit Vitals  /75   Pulse 63          10/27/2023     2:09 PM 12/11/2023     4:12 PM 1/11/2024     3:14 PM 2/5/2024     2:25 PM 2/23/2024     2:38 PM 3/8/2024    10:51 AM 5/30/2024     3:23 PM   Vitals   Systolic 119 118 132 120 108 106 112   Diastolic 78 70 84 79 66 60 75   Heart Rate  90 84 77  91 63   Temp  36.1 °C (97 °F)  36.2 °C (97.2 °F)  36.2 °C (97.1 °F)    Resp    16      Height (in) 1.753 m (5' 9\") 1.74 m (5' 8.5\")  1.74 m (5' 8.5\")      Weight (lb) 211 214  216.5 213 206 195   BMI 31.16 kg/m2 32.07 kg/m2  32.44 kg/m2 31.92 kg/m2 30.87 kg/m2 29.22 kg/m2   BSA (m2) 2.16 m2 2.17 m2  2.18 m2 2.16 m2 2.12 m2 2.07 m2   Visit Report Report Report Report Report Report Report Report     Physical Exam  Vitals and nursing note reviewed.   Constitutional:       Appearance: Normal appearance. She is obese.      Comments: In general she appears slightly anxious.   HENT:      Head: Normocephalic and atraumatic.      " Mouth/Throat:      Mouth: Mucous membranes are moist.      Pharynx: Oropharynx is clear.   Eyes:      General: No scleral icterus.     Extraocular Movements: Extraocular movements intact.      Pupils: Pupils are equal, round, and reactive to light.   Cardiovascular:      Rate and Rhythm: Normal rate and regular rhythm.      Heart sounds: No murmur heard.  Pulmonary:      Effort: Pulmonary effort is normal.      Breath sounds: Normal breath sounds.   Abdominal:      General: Abdomen is flat. Bowel sounds are normal.      Palpations: Abdomen is soft.   Musculoskeletal:         General: No swelling. Normal range of motion.      Right lower leg: No edema.      Left lower leg: No edema.   Skin:     Coloration: Skin is not jaundiced.   Neurological:      General: No focal deficit present.      Mental Status: She is alert and oriented to person, place, and time. Mental status is at baseline.   Psychiatric:         Mood and Affect: Mood normal.         Thought Content: Thought content normal.         Judgment: Judgment normal.         Labs    WBC   Date/Time Value Ref Range Status   02/05/2024 03:30 PM 5.9 4.4 - 11.3 x10*3/uL Final     Hemoglobin   Date/Time Value Ref Range Status   02/05/2024 03:30 PM 12.3 12.0 - 16.0 g/dL Final     Hematocrit   Date/Time Value Ref Range Status   02/05/2024 03:30 PM 37.1 36.0 - 46.0 % Final     MCV   Date/Time Value Ref Range Status   02/05/2024 03:30 PM 98 80 - 100 fL Final     Platelets   Date/Time Value Ref Range Status   02/05/2024 03:30  150 - 450 x10*3/uL Final        Total Protein   Date/Time Value Ref Range Status   02/05/2024 03:30 PM 8.2 6.4 - 8.2 g/dL Final     Albumin   Date/Time Value Ref Range Status   02/05/2024 03:30 PM 4.6 3.4 - 5.0 g/dL Final     AST   Date/Time Value Ref Range Status   02/05/2024 03:30 PM 29 9 - 39 U/L Final     ALT   Date/Time Value Ref Range Status   02/05/2024 03:30 PM 31 7 - 45 U/L Final     Comment:     Patients treated with Sulfasalazine may  "generate falsely decreased results for ALT.     Alkaline Phosphatase   Date/Time Value Ref Range Status   02/05/2024 03:30  33 - 110 U/L Final     Bilirubin, Total   Date/Time Value Ref Range Status   02/05/2024 03:30 PM 0.5 0.0 - 1.2 mg/dL Final     Bilirubin, Direct   Date/Time Value Ref Range Status   02/05/2024 03:30 PM 0.1 0.0 - 0.3 mg/dL Final        No results found for: \"VITD25\", \"VITAMINA\", \"VTAI\", \"VITEALPHA\", \"VITEGAMMA\"     AST   Date/Time Value Ref Range Status   02/05/2024 03:30 PM 29 9 - 39 U/L Final     ALT   Date/Time Value Ref Range Status   02/05/2024 03:30 PM 31 7 - 45 U/L Final     Comment:     Patients treated with Sulfasalazine may generate falsely decreased results for ALT.     Alkaline Phosphatase   Date/Time Value Ref Range Status   02/05/2024 03:30  33 - 110 U/L Final     Bilirubin, Total   Date/Time Value Ref Range Status   02/05/2024 03:30 PM 0.5 0.0 - 1.2 mg/dL Final     Bilirubin, Direct   Date/Time Value Ref Range Status   02/05/2024 03:30 PM 0.1 0.0 - 0.3 mg/dL Final     Albumin   Date/Time Value Ref Range Status   02/05/2024 03:30 PM 4.6 3.4 - 5.0 g/dL Final     Total Protein   Date/Time Value Ref Range Status   02/05/2024 03:30 PM 8.2 6.4 - 8.2 g/dL Final        Protime   Date/Time Value Ref Range Status   02/05/2024 03:30 PM 11.9 9.8 - 12.8 seconds Final     INR   Date/Time Value Ref Range Status   02/05/2024 03:30 PM 1.1 0.9 - 1.1 Final       Assessment/Plan   Dinorah Horn \"Raquel\" is a 59 y.o. female who presents to liver clinic for initial evaluation of suspected cirrhosis.    She has multiple risk factors for chronic liver disease.  Most notably she has a history of alcohol excess which she self-reported consumption of 50 beers per week.  This level of drinking certainly puts her at risk for significant liver disease.  Fortunately she has stopped her alcohol use.  She also has significant metabolic risk factors for metabolic associated fatty liver -including " hypertension, diabetes, heart disease, and obesity.  She also has a history of autoimmune disease and a positive GORDON.    I have recommended some lab work to complete her workup for concomitant liver disorders.  I would like her to do genetic hemochromatosis testing, genetic testing for alpha-1 antitrypsin deficiency.  I would also like to send off immunoglobulins to complete an evaluation for autoimmune hepatitis as well.    I have suggested a transjugular liver biopsy with portal pressure measurements.  This can help confirm a diagnosis of cirrhosis also will assess for clinically significant portal hypertension.    I provided counseling regarding her liver disease evaluation to date.  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    She will return to see me after completing a liver biopsy.    Instructions      Pasha Benson MD

## 2024-05-31 LAB
HAV IGM SER QL: NONREACTIVE
HBV CORE IGM SER QL: NONREACTIVE
HBV SURFACE AG SERPL QL IA: NONREACTIVE
HCV AB SER QL: NONREACTIVE
IGA SERPL-MCNC: 382 MG/DL (ref 70–400)
IGG SERPL-MCNC: 1200 MG/DL (ref 700–1600)
IGM SERPL-MCNC: 31 MG/DL (ref 40–230)

## 2024-06-03 LAB
A1AT PHENOTYP SERPL-IMP: NORMAL
A1AT SERPL-MCNC: 122 MG/DL (ref 90–200)

## 2024-06-04 ENCOUNTER — PATIENT MESSAGE (OUTPATIENT)
Dept: PRIMARY CARE | Facility: CLINIC | Age: 59
End: 2024-06-04
Payer: MEDICARE

## 2024-06-04 DIAGNOSIS — E11.9 TYPE 2 DIABETES MELLITUS WITHOUT COMPLICATION, WITHOUT LONG-TERM CURRENT USE OF INSULIN (MULTI): Primary | ICD-10-CM

## 2024-06-04 DIAGNOSIS — G47.00 INSOMNIA, UNSPECIFIED TYPE: ICD-10-CM

## 2024-06-04 LAB
ELECTRONICALLY SIGNED BY: NORMAL
HFE GENE MUT TESTED BLD/T: NORMAL
HFE P.C282Y BLD/T QL: NORMAL
HFE P.H63D BLD/T QL: NORMAL

## 2024-06-04 NOTE — TELEPHONE ENCOUNTER
From: Dinorah Horn  To: Yamilet Frazier  Sent: 6/4/2024 12:40 PM EDT  Subject: Trazadone, and Metformin    Hi, this Dinorah Horn, 1965. I need these refilled. Thank you

## 2024-06-05 RX ORDER — TRAZODONE HYDROCHLORIDE 50 MG/1
25 TABLET ORAL NIGHTLY
Qty: 15 TABLET | Refills: 3 | Status: SHIPPED | OUTPATIENT
Start: 2024-06-05

## 2024-06-05 RX ORDER — METFORMIN HYDROCHLORIDE 500 MG/1
TABLET, EXTENDED RELEASE ORAL
Qty: 90 TABLET | Refills: 3 | Status: SHIPPED | OUTPATIENT
Start: 2024-06-05

## 2024-06-07 ENCOUNTER — APPOINTMENT (OUTPATIENT)
Dept: RHEUMATOLOGY | Facility: CLINIC | Age: 59
End: 2024-06-07
Payer: MEDICARE

## 2024-06-10 ENCOUNTER — PATIENT MESSAGE (OUTPATIENT)
Dept: GASTROENTEROLOGY | Facility: CLINIC | Age: 59
End: 2024-06-10

## 2024-06-12 NOTE — H&P
"History Of Present Illness  Dinorah Horn \"Raquel\" is a 59 y.o. female presenting with suspected cirrhosis. PMH includes hx of alcohol use, HTN, DMII, GERD.  Patient denies having any blood in her stools or urine.  Patient does report feeling cold and has some dizziness when she stands at times.     Past Medical History  She has a past medical history of Arthritis, Coronary artery disease, Depression, Diabetes mellitus (Multi), GERD (gastroesophageal reflux disease), Headache, Heart disease, Hypertension, and Visual impairment.    Surgical History  She has a past surgical history that includes Appendectomy; Breast surgery (2003, implants); Coronary artery bypass graft; Hysterectomy (Partial, 2000); Forearm fracture surgery (Right, 1992); and Forearm hardware removal (Right, 2003).     Social History  She reports that she quit smoking about 15 years ago. Her smoking use included cigarettes. She started smoking about 30 years ago. She has a 15 pack-year smoking history. She has never used smokeless tobacco. She reports that she does not currently use alcohol. She reports that she does not use drugs.    Family History  Family History   Problem Relation Name Age of Onset    Alcohol abuse Mother Trice     Cancer Mother Trice     Diabetes Mother Trice     Heart disease Mother Trice     Arthritis Father Reed     Atrial fibrillation Father Reed     Cancer Father Reed     COPD Father Reed     Hearing loss Father Reed     Heart disease Father Reed     Stroke Paternal Grandfather Guy     Alcohol abuse Paternal Grandmother Tanglewilde     Alcohol abuse Sister Ioana     Alcohol abuse Sister Alis     Alcohol abuse Daughter Katerine     Cancer Father's Brother Xavier     Cancer Father's Brother Bridgton     Colon cancer Father's Brother Bridgton J     Drug abuse Sister Ioana GARCIA         Allergies  Cosentyx [secukinumab]    Home Medications  Current Outpatient Medications on File Prior to Encounter   Medication " Sig Dispense Refill    aspirin 81 mg EC tablet Take 1 tablet (81 mg) by mouth once daily.      hydroCHLOROthiazide (Microzide) 12.5 mg tablet Take 1 tablet (12.5 mg) by mouth once daily. 90 tablet 0    hydroxychloroquine (Plaquenil) 200 mg tablet Take 1 tablet (200 mg) by mouth once daily. 90 tablet 0    lisinopril 20 mg tablet Take 1 tablet (20 mg) by mouth once daily. 90 tablet 0    metFORMIN  mg 24 hr tablet Take one daily 90 tablet 3    metoprolol tartrate (Lopressor) 25 mg tablet Take 1 tablet (25 mg) by mouth 2 times a day. 180 tablet 0    multivitamin tablet Take 1 tablet by mouth once daily.      omeprazole (PriLOSEC) 40 mg DR capsule Take 1 capsule (40 mg) by mouth once daily in the morning. Take before meals. Do not crush or chew. 90 capsule 0    traZODone (Desyrel) 50 mg tablet Take 0.5 tablets (25 mg) by mouth once daily at bedtime. 15 tablet 3    venlafaxine (Effexor) 75 mg tablet Take 1 tablet (75 mg) by mouth 2 times a day. 90 tablet 1    nitroglycerin (Nitrostat) 0.3 mg SL tablet Place 1 tablet (0.3 mg) under the tongue every 5 minutes if needed for chest pain.      [DISCONTINUED] melatonin 1 mg tablet Take by mouth once daily.      [DISCONTINUED] mineral oil liquid Take 30 mL by mouth once daily as needed for constipation.      [DISCONTINUED] sennosides (Senokot) 8.6 mg tablet Take 1 tablet (8.6 mg) by mouth once daily.       No current facility-administered medications on file prior to encounter.          Inpatient Medications:  Scheduled medications   Medication Dose Route Frequency     PRN medications   Medication     Continuous Medications   Medication Dose Last Rate    sodium chloride 0.9%  100 mL/hr 100 mL/hr (06/13/24 7032)         Review of Systems   Constitutional:         +feeling cold at times   HENT: Negative.     Eyes: Negative.    Respiratory: Negative.     Cardiovascular: Negative.    Gastrointestinal: Negative.    Endocrine: Negative.    Genitourinary: Negative.   "  Musculoskeletal: Negative.    Skin: Negative.    Allergic/Immunologic: Negative.    Neurological:  Positive for dizziness.   Hematological: Negative.    Psychiatric/Behavioral: Negative.            Physical Exam  Constitutional:       General: She is awake. She is not in acute distress.     Appearance: She is not ill-appearing.   Cardiovascular:      Rate and Rhythm: Normal rate and regular rhythm.      Pulses: Normal pulses.           Radial pulses are 2+ on the right side and 2+ on the left side.        Dorsalis pedis pulses are 2+ on the right side and 2+ on the left side.      Heart sounds: Normal heart sounds. No murmur heard.  Pulmonary:      Effort: Pulmonary effort is normal.      Breath sounds: Normal breath sounds and air entry.   Abdominal:      General: Bowel sounds are normal.      Palpations: Abdomen is soft.      Tenderness: There is no abdominal tenderness.   Musculoskeletal:      Right lower leg: No edema.      Left lower leg: No edema.   Skin:     General: Skin is warm and dry.   Neurological:      General: No focal deficit present.      Mental Status: She is alert and oriented to person, place, and time.      GCS: GCS eye subscore is 4. GCS verbal subscore is 5. GCS motor subscore is 6.   Psychiatric:         Mood and Affect: Mood normal.         Behavior: Behavior is cooperative.        Sedation Plan    ASA 3     Mallampati class: I.         Last Recorded Vitals  Blood pressure 103/65, pulse 70, temperature 37 °C (98.6 °F), temperature source Temporal, resp. rate 18, height 1.753 m (5' 9\"), weight 87.5 kg (193 lb), SpO2 97%.         Vitals from the Past 24 Hours  Heart Rate:  [70]   Temp:  [37 °C (98.6 °F)]   Resp:  [18]   BP: (103)/(65)   Height:  [175.3 cm (5' 9\")]   Weight:  [87.5 kg (193 lb)]   SpO2:  [97 %]          Relevant Results    Labs    CBC:   Recent Labs     05/30/24  1721 02/05/24  1530   WBC 5.5 5.9   HGB 10.3* 12.3   HCT 31.0* 37.1    215   MCV 96 98     BMP/CMP:   Recent " "Labs     05/30/24  1721 02/05/24  1530    135*   K 4.2 3.8    98   BUN 20 14   CREATININE 0.83 0.57   CO2 26 28   CALCIUM 9.6 10.1   PROT 7.4 8.2   BILITOT 0.5 0.5   ALKPHOS 68 102   ALT 18 31   AST 20 29   GLUCOSE 76 93      Lipid Panel: No results for input(s): \"CHOL\", \"HDL\", \"CHHDL\", \"LDL\", \"VLDL\", \"TRIG\", \"NHDL\" in the last 55421 hours.  Cardiac       No lab exists for component: \"CK\", \"CKMBP\"   Hemoglobin A1C:   Recent Labs     03/08/24  1129 07/31/23  1333 09/06/22  1551 03/31/22  0000   HGBA1C 5.8 5.5 6.0* 8.6*     TSH/ Free T4: No results for input(s): \"TSH\", \"FREET4\" in the last 73224 hours.  Iron:   Recent Labs     02/05/24  1530   FERRITIN 257*   TIBC 353   IRONSAT 25     Coag:     ABO: No results found for: \"ABO\"    Past Cardiology Tests (Last 3 Years):    EKG:  No results for input(s): \"ATRRATE\", \"VENTRATE\", \"PRINT\", \"QRSDUR\", \"QTCFRED\", \"QTCCALCB\" in the last 17680 hours.No results found for this or any previous visit (from the past 4464 hour(s)).  Echo:  Echocardiogram: No results found for this or any previous visit from the past 1800 days.    Ejection Fractions:  No results found for: \"EF\"  Cath:  Coronary Angiography: No results found for this or any previous visit from the past 1800 days.    Right Heart Cath: No results found for this or any previous visit from the past 1800 days.    Stress Test:  Nuclear:No results found for this or any previous visit from the past 1800 days.    Metabolic Stress: No results found for this or any previous visit from the past 1800 days.    Cardiac Imaging:  Cardiac Scoring: No results found for this or any previous visit from the past 1800 days.    Cardiac MRI: No results found for this or any previous visit from the past 1800 days.         Assessment/Plan  Assessment/Plan   Active Problems:  There are no active Hospital Problems.        Suspected cirrhosis  -transjugular liver biopsy with pressures with Dr. Valentine on 6/13/24     Anemia  -repeat CBC to " evaluate H&H       I spent 30 minutes in the professional and overall care of this patient.      Balaji Jarrell, APRN-CNP, DNP

## 2024-06-13 ENCOUNTER — HOSPITAL ENCOUNTER (OUTPATIENT)
Dept: CARDIOLOGY | Facility: HOSPITAL | Age: 59
Discharge: HOME | End: 2024-06-13
Payer: MEDICARE

## 2024-06-13 VITALS
TEMPERATURE: 98.6 F | BODY MASS INDEX: 28.58 KG/M2 | HEIGHT: 69 IN | WEIGHT: 193 LBS | HEART RATE: 67 BPM | RESPIRATION RATE: 18 BRPM | SYSTOLIC BLOOD PRESSURE: 106 MMHG | OXYGEN SATURATION: 98 % | DIASTOLIC BLOOD PRESSURE: 62 MMHG

## 2024-06-13 DIAGNOSIS — R79.89 LIVER FUNCTION TEST ABNORMALITY: ICD-10-CM

## 2024-06-13 LAB
ERYTHROCYTE [DISTWIDTH] IN BLOOD BY AUTOMATED COUNT: 13.6 % (ref 11.5–14.5)
HCT VFR BLD AUTO: 28.2 % (ref 36–46)
HGB BLD-MCNC: 9.3 G/DL (ref 12–16)
MCH RBC QN AUTO: 32.3 PG (ref 26–34)
MCHC RBC AUTO-ENTMCNC: 33 G/DL (ref 32–36)
MCV RBC AUTO: 98 FL (ref 80–100)
NRBC BLD-RTO: 0 /100 WBCS (ref 0–0)
PLATELET # BLD AUTO: 220 X10*3/UL (ref 150–450)
RBC # BLD AUTO: 2.88 X10*6/UL (ref 4–5.2)
WBC # BLD AUTO: 5.1 X10*3/UL (ref 4.4–11.3)

## 2024-06-13 PROCEDURE — 85027 COMPLETE CBC AUTOMATED: CPT | Performed by: NURSE PRACTITIONER

## 2024-06-13 PROCEDURE — 2500000004 HC RX 250 GENERAL PHARMACY W/ HCPCS (ALT 636 FOR OP/ED): Performed by: NURSE PRACTITIONER

## 2024-06-13 PROCEDURE — 36415 COLL VENOUS BLD VENIPUNCTURE: CPT | Performed by: NURSE PRACTITIONER

## 2024-06-13 RX ORDER — SODIUM CHLORIDE 9 MG/ML
100 INJECTION, SOLUTION INTRAVENOUS CONTINUOUS
Status: DISCONTINUED | OUTPATIENT
Start: 2024-06-13 | End: 2024-06-14 | Stop reason: HOSPADM

## 2024-06-13 ASSESSMENT — PAIN - FUNCTIONAL ASSESSMENT
PAIN_FUNCTIONAL_ASSESSMENT: 0-10
PAIN_FUNCTIONAL_ASSESSMENT: 0-10

## 2024-06-13 ASSESSMENT — PAIN SCALES - GENERAL
PAINLEVEL_OUTOF10: 0 - NO PAIN
PAINLEVEL_OUTOF10: 0 - NO PAIN

## 2024-06-13 ASSESSMENT — ENCOUNTER SYMPTOMS
PSYCHIATRIC NEGATIVE: 1
HEMATOLOGIC/LYMPHATIC NEGATIVE: 1
GASTROINTESTINAL NEGATIVE: 1
CARDIOVASCULAR NEGATIVE: 1
ALLERGIC/IMMUNOLOGIC NEGATIVE: 1
RESPIRATORY NEGATIVE: 1
MUSCULOSKELETAL NEGATIVE: 1
DIZZINESS: 1
EYES NEGATIVE: 1
ENDOCRINE NEGATIVE: 1

## 2024-06-13 NOTE — SIGNIFICANT EVENT
Patient's repeat hemoglobin shows a decrease of 1.0.  Due to her unstable anemia, her procedure was canceled for today.  Patient was updated on the plan.  Patient will follow-up with her PCP regarding this new anemia and will reschedule her procedure when her anemia is stable.

## 2024-06-13 NOTE — DISCHARGE INSTRUCTIONS
Please follow up with PCP regarding anemia. You will need to reschedule for this procedure once your anemia is stable.

## 2024-06-13 NOTE — POST-PROCEDURE NOTE
Interventional Radiology Brief Postprocedure Note    No procedure was performed at the pt is anemic. A Hb was repeated this morning which showed further decrease in her Hb. With the risk of bleeding from a liver bx, this is not the most prudent time to perform this procedure. She'll be refereed to her PCP for workup and this transjuguluar liver bx can be rescheduled when feasible.    Kash Valentine MD

## 2024-06-14 ENCOUNTER — OFFICE VISIT (OUTPATIENT)
Dept: PRIMARY CARE | Facility: CLINIC | Age: 59
End: 2024-06-14
Payer: MEDICARE

## 2024-06-14 VITALS
BODY MASS INDEX: 28.65 KG/M2 | HEART RATE: 94 BPM | TEMPERATURE: 97.1 F | WEIGHT: 194 LBS | SYSTOLIC BLOOD PRESSURE: 100 MMHG | OXYGEN SATURATION: 100 % | DIASTOLIC BLOOD PRESSURE: 60 MMHG

## 2024-06-14 DIAGNOSIS — K70.30 ALCOHOLIC CIRRHOSIS OF LIVER WITHOUT ASCITES (MULTI): Primary | ICD-10-CM

## 2024-06-14 PROCEDURE — 4010F ACE/ARB THERAPY RXD/TAKEN: CPT | Performed by: FAMILY MEDICINE

## 2024-06-14 PROCEDURE — 3078F DIAST BP <80 MM HG: CPT | Performed by: FAMILY MEDICINE

## 2024-06-14 PROCEDURE — 3074F SYST BP LT 130 MM HG: CPT | Performed by: FAMILY MEDICINE

## 2024-06-14 PROCEDURE — 99213 OFFICE O/P EST LOW 20 MIN: CPT | Performed by: FAMILY MEDICINE

## 2024-06-14 RX ORDER — VIT C/E/ZN/COPPR/LUTEIN/ZEAXAN 250MG-90MG
25 CAPSULE ORAL DAILY
COMMUNITY

## 2024-06-14 NOTE — PATIENT INSTRUCTIONS
THE SPECIALIST SAID THAT HE WILL TALK WITH THE DOCTOR WHO WILL DO THE BIOPSY   I SUSPECT MONDAY SOMETHING WILL BE RESCHEDULED

## 2024-06-14 NOTE — PROGRESS NOTES
"Subjective   Patient ID: Raquel Horn is a 59 y.o. female who presents for Follow-up (Low blood count and needing a liver biopsy.), Pain (Back pain under her shoulder blades- for a long time, but more painful more recently with any activity.), and Nasal Congestion (Drainage all day long until she goes to bed. For \"years\").    HPI PATIENT WAS SENT TO THE INTERVENTIONAL SPECIALIST FOR LIVER BIOPSY BY HER HEPATOLOGIST /THIS WAS DECLINED AND SHE WAS SENT TO PCP    Review of Systems   Constitutional:  Negative for activity change, appetite change, fever and unexpected weight change.   HENT:  Positive for postnasal drip and rhinorrhea. Negative for congestion, ear pain, sinus pressure and sore throat.    Eyes:  Negative for discharge, itching and visual disturbance.   Respiratory:  Negative for cough, shortness of breath and wheezing.    Cardiovascular:  Negative for chest pain, palpitations and leg swelling.   Gastrointestinal:  Positive for abdominal distention. Negative for abdominal pain, blood in stool, constipation, diarrhea, nausea and vomiting.        KNOWN ALCOHOLIC CIRRHOSIS    Endocrine: Negative for cold intolerance, heat intolerance and polyuria.   Genitourinary:  Negative for dysuria, flank pain and hematuria.   Musculoskeletal:  Negative for arthralgias, gait problem, joint swelling and myalgias.   Skin:  Negative for rash.   Allergic/Immunologic: Negative for environmental allergies and food allergies.   Neurological:  Negative for dizziness, syncope, weakness, light-headedness, numbness and headaches.   Psychiatric/Behavioral:  Negative for dysphoric mood and sleep disturbance. The patient is not nervous/anxious.        Objective   /60   Pulse 94   Temp 36.2 °C (97.1 °F)   Wt 88 kg (194 lb)   SpO2 100%   BMI 28.65 kg/m²     Physical Exam  Vitals and nursing note reviewed.   Constitutional:       Appearance: Normal appearance.   HENT:      Head: Normocephalic.   Cardiovascular:      Rate and " Rhythm: Normal rate and regular rhythm.      Pulses: Normal pulses.      Heart sounds: Normal heart sounds. No murmur heard.     No friction rub. No gallop.   Pulmonary:      Effort: Pulmonary effort is normal. No respiratory distress.      Breath sounds: Normal breath sounds. No wheezing.   Abdominal:      General: Bowel sounds are normal. There is distension.      Palpations: Abdomen is soft.      Tenderness: There is no abdominal tenderness.   Musculoskeletal:         General: No deformity. Normal range of motion.   Skin:     General: Skin is warm and dry.      Capillary Refill: Capillary refill takes less than 2 seconds.   Neurological:      General: No focal deficit present.      Mental Status: She is alert and oriented to person, place, and time.   Psychiatric:         Mood and Affect: Mood normal.         Assessment/Plan   Problem List Items Addressed This Visit             ICD-10-CM    Alcoholic cirrhosis (Multi) - Primary K70.30

## 2024-06-20 ENCOUNTER — TELEPHONE (OUTPATIENT)
Dept: GASTROENTEROLOGY | Facility: CLINIC | Age: 59
End: 2024-06-20
Payer: MEDICARE

## 2024-06-20 DIAGNOSIS — K70.30 ALCOHOLIC CIRRHOSIS OF LIVER WITHOUT ASCITES (MULTI): ICD-10-CM

## 2024-06-20 ASSESSMENT — ENCOUNTER SYMPTOMS
PALPITATIONS: 0
NUMBNESS: 0
HEADACHES: 0
ARTHRALGIAS: 0
SHORTNESS OF BREATH: 0
VOMITING: 0
DIZZINESS: 0
LIGHT-HEADEDNESS: 0
EYE DISCHARGE: 0
EYE ITCHING: 0
DIARRHEA: 0
SLEEP DISTURBANCE: 0
RHINORRHEA: 1
HEMATURIA: 0
MYALGIAS: 0
ABDOMINAL DISTENTION: 1
DYSURIA: 0
NERVOUS/ANXIOUS: 0
SORE THROAT: 0
UNEXPECTED WEIGHT CHANGE: 0
NAUSEA: 0
FEVER: 0
SINUS PRESSURE: 0
ACTIVITY CHANGE: 0
FLANK PAIN: 0
APPETITE CHANGE: 0
ABDOMINAL PAIN: 0
JOINT SWELLING: 0
COUGH: 0
CONSTIPATION: 0
WHEEZING: 0
DYSPHORIC MOOD: 0
WEAKNESS: 0
BLOOD IN STOOL: 0

## 2024-06-20 NOTE — TELEPHONE ENCOUNTER
Hepatology Nurse Coordinator Note  Received a message from  at [facility] for Dr. Benson stating patient called regarding rescheduling her liver biopsy, that was delayed due to a low hemoglobin.     Discussed with Dr. Benson who made the following plan/recommendations for patient:  -Get CBC now. If hemoglobin drops further, will need to consider EGD/Colonoscopy.   -Hold off on Liver biopsy for now. Will provide further recommendations once blood work completed and reviewed.    Patient denies any signs/symptoms of bleeding.   Patient verbalized understanding.  Patient was provided contact information for Dr. Benson's office.

## 2024-06-27 ENCOUNTER — TELEPHONE (OUTPATIENT)
Dept: GASTROENTEROLOGY | Facility: CLINIC | Age: 59
End: 2024-06-27
Payer: MEDICARE

## 2024-06-27 NOTE — TELEPHONE ENCOUNTER
Hepatology Nurse Coordinator Note   Received voicemail from patient that she was waiting to hear what needed to be done for Dr. Benson. Attempted to call patient back to review the plan that had been discussed with her on 6/20/24. Was unable to get connected to patient through the google assistant that was set up on her phone. Sent a [employer] message with information. See for additional documentation.

## 2024-06-28 ENCOUNTER — LAB (OUTPATIENT)
Dept: LAB | Facility: LAB | Age: 59
End: 2024-06-28
Payer: MEDICARE

## 2024-06-28 DIAGNOSIS — K70.30 ALCOHOLIC CIRRHOSIS OF LIVER WITHOUT ASCITES (MULTI): ICD-10-CM

## 2024-06-28 DIAGNOSIS — M19.90 OSTEOARTHRITIS, UNSPECIFIED OSTEOARTHRITIS TYPE, UNSPECIFIED SITE: ICD-10-CM

## 2024-06-28 DIAGNOSIS — I10 PRIMARY HYPERTENSION: ICD-10-CM

## 2024-06-28 DIAGNOSIS — K21.9 GASTROESOPHAGEAL REFLUX DISEASE WITHOUT ESOPHAGITIS: ICD-10-CM

## 2024-06-28 DIAGNOSIS — G47.00 INSOMNIA, UNSPECIFIED TYPE: ICD-10-CM

## 2024-06-28 LAB
ERYTHROCYTE [DISTWIDTH] IN BLOOD BY AUTOMATED COUNT: 13.2 % (ref 11.5–14.5)
HCT VFR BLD AUTO: 32 % (ref 36–46)
HGB BLD-MCNC: 10.6 G/DL (ref 12–16)
MCH RBC QN AUTO: 32.9 PG (ref 26–34)
MCHC RBC AUTO-ENTMCNC: 33.1 G/DL (ref 32–36)
MCV RBC AUTO: 99 FL (ref 80–100)
NRBC BLD-RTO: 0 /100 WBCS (ref 0–0)
PLATELET # BLD AUTO: 259 X10*3/UL (ref 150–450)
RBC # BLD AUTO: 3.22 X10*6/UL (ref 4–5.2)
WBC # BLD AUTO: 9 X10*3/UL (ref 4.4–11.3)

## 2024-06-28 PROCEDURE — 36415 COLL VENOUS BLD VENIPUNCTURE: CPT

## 2024-06-28 RX ORDER — OMEPRAZOLE 40 MG/1
40 CAPSULE, DELAYED RELEASE ORAL
Qty: 90 CAPSULE | Refills: 3 | Status: SHIPPED | OUTPATIENT
Start: 2024-06-28

## 2024-06-28 RX ORDER — HYDROCHLOROTHIAZIDE 12.5 MG/1
12.5 TABLET ORAL DAILY
Qty: 90 TABLET | Refills: 1 | Status: SHIPPED | OUTPATIENT
Start: 2024-06-28

## 2024-06-28 RX ORDER — LISINOPRIL 20 MG/1
20 TABLET ORAL DAILY
Qty: 90 TABLET | Refills: 1 | Status: SHIPPED | OUTPATIENT
Start: 2024-06-28

## 2024-06-28 RX ORDER — METOPROLOL TARTRATE 25 MG/1
25 TABLET, FILM COATED ORAL 2 TIMES DAILY
Qty: 180 TABLET | Refills: 1 | Status: SHIPPED | OUTPATIENT
Start: 2024-06-28

## 2024-06-28 RX ORDER — VENLAFAXINE 75 MG/1
75 TABLET ORAL 2 TIMES DAILY
Qty: 90 TABLET | Refills: 1 | Status: SHIPPED | OUTPATIENT
Start: 2024-06-28

## 2024-06-28 RX ORDER — HYDROXYCHLOROQUINE SULFATE 200 MG/1
200 TABLET, FILM COATED ORAL DAILY
Qty: 90 TABLET | Refills: 1 | Status: SHIPPED | OUTPATIENT
Start: 2024-06-28

## 2024-07-01 ENCOUNTER — APPOINTMENT (OUTPATIENT)
Dept: RHEUMATOLOGY | Facility: CLINIC | Age: 59
End: 2024-07-01
Payer: MEDICARE

## 2024-07-02 ENCOUNTER — TELEPHONE (OUTPATIENT)
Dept: GASTROENTEROLOGY | Facility: CLINIC | Age: 59
End: 2024-07-02
Payer: MEDICARE

## 2024-07-02 DIAGNOSIS — I10 PRIMARY HYPERTENSION: ICD-10-CM

## 2024-07-02 DIAGNOSIS — G47.00 INSOMNIA, UNSPECIFIED TYPE: ICD-10-CM

## 2024-07-03 RX ORDER — VENLAFAXINE HYDROCHLORIDE 75 MG/1
75 CAPSULE, EXTENDED RELEASE ORAL DAILY
COMMUNITY
End: 2024-07-03 | Stop reason: ENTERED-IN-ERROR

## 2024-07-03 NOTE — TELEPHONE ENCOUNTER
Hepatology Nurse Coordinator Note  Patient called for plan from Dr. Benson. Per Dr. Benson, patient needs to get a colonoscopy and EGD and should go to a local provider who can order before any further recommendations. [employer] message sent to patient with a call back number for office if any questions.

## 2024-07-04 ENCOUNTER — PATIENT MESSAGE (OUTPATIENT)
Dept: GASTROENTEROLOGY | Facility: CLINIC | Age: 59
End: 2024-07-04
Payer: MEDICARE

## 2024-07-04 DIAGNOSIS — D64.9 ANEMIA, UNSPECIFIED TYPE: Primary | ICD-10-CM

## 2024-07-08 RX ORDER — TRAZODONE HYDROCHLORIDE 50 MG/1
25 TABLET ORAL NIGHTLY
Qty: 15 TABLET | Refills: 3 | Status: SHIPPED | OUTPATIENT
Start: 2024-07-08

## 2024-07-08 RX ORDER — HYDROCHLOROTHIAZIDE 12.5 MG/1
12.5 TABLET ORAL DAILY
Qty: 90 TABLET | Refills: 1 | Status: SHIPPED | OUTPATIENT
Start: 2024-07-08

## 2024-07-08 NOTE — TELEPHONE ENCOUNTER
She will need EGD and colonoscopy performed with gastro due to her cirrhosis. I do not believe Dr. Aaron has any upcoming procedure dates out here.

## 2024-07-12 ENCOUNTER — TELEPHONE (OUTPATIENT)
Dept: GASTROENTEROLOGY | Facility: HOSPITAL | Age: 59
End: 2024-07-12
Payer: MEDICARE

## 2024-07-16 ENCOUNTER — APPOINTMENT (OUTPATIENT)
Dept: RHEUMATOLOGY | Facility: CLINIC | Age: 59
End: 2024-07-16
Payer: MEDICARE

## 2024-07-16 DIAGNOSIS — Z12.11 COLON CANCER SCREENING: Primary | ICD-10-CM

## 2024-07-16 RX ORDER — SODIUM, POTASSIUM,MAG SULFATES 17.5-3.13G
0.51 SOLUTION, RECONSTITUTED, ORAL ORAL SEE ADMIN INSTRUCTIONS
Qty: 354 ML | Refills: 0 | Status: SHIPPED | OUTPATIENT
Start: 2024-07-16

## 2024-07-24 ENCOUNTER — APPOINTMENT (OUTPATIENT)
Dept: RHEUMATOLOGY | Facility: CLINIC | Age: 59
End: 2024-07-24
Payer: MEDICARE

## 2024-07-26 DIAGNOSIS — E11.9 TYPE 2 DIABETES MELLITUS WITHOUT COMPLICATION, WITHOUT LONG-TERM CURRENT USE OF INSULIN (MULTI): ICD-10-CM

## 2024-07-26 RX ORDER — METFORMIN HYDROCHLORIDE 500 MG/1
500 TABLET, EXTENDED RELEASE ORAL
Qty: 180 TABLET | Refills: 1 | Status: SHIPPED | OUTPATIENT
Start: 2024-07-26

## 2024-07-30 ENCOUNTER — APPOINTMENT (OUTPATIENT)
Dept: RHEUMATOLOGY | Facility: CLINIC | Age: 59
End: 2024-07-30
Payer: MEDICARE

## 2024-08-02 ENCOUNTER — APPOINTMENT (OUTPATIENT)
Dept: RHEUMATOLOGY | Facility: CLINIC | Age: 59
End: 2024-08-02
Payer: MEDICARE

## 2024-08-13 ENCOUNTER — ANESTHESIA EVENT (OUTPATIENT)
Dept: OPERATING ROOM | Facility: HOSPITAL | Age: 59
End: 2024-08-13
Payer: MEDICARE

## 2024-08-13 DIAGNOSIS — E78.2 MIXED HYPERLIPIDEMIA: Primary | ICD-10-CM

## 2024-08-13 DIAGNOSIS — G47.00 INSOMNIA, UNSPECIFIED TYPE: ICD-10-CM

## 2024-08-13 RX ORDER — ONDANSETRON HYDROCHLORIDE 2 MG/ML
8 INJECTION, SOLUTION INTRAVENOUS ONCE
Status: CANCELLED | OUTPATIENT
Start: 2024-08-13 | End: 2024-08-13

## 2024-08-13 RX ORDER — ROSUVASTATIN CALCIUM 20 MG/1
20 TABLET, COATED ORAL DAILY
COMMUNITY
End: 2024-08-13 | Stop reason: SDUPTHER

## 2024-08-13 RX ORDER — ALBUTEROL SULFATE 0.83 MG/ML
2.5 SOLUTION RESPIRATORY (INHALATION) ONCE AS NEEDED
Status: CANCELLED | OUTPATIENT
Start: 2024-08-13

## 2024-08-13 RX ORDER — ROSUVASTATIN CALCIUM 20 MG/1
20 TABLET, COATED ORAL DAILY
Qty: 90 TABLET | Refills: 0 | Status: SHIPPED | OUTPATIENT
Start: 2024-08-13

## 2024-08-13 RX ORDER — VENLAFAXINE 75 MG/1
75 TABLET ORAL 2 TIMES DAILY
Qty: 90 TABLET | Refills: 0 | Status: SHIPPED | OUTPATIENT
Start: 2024-08-13

## 2024-08-13 RX ORDER — ACETAMINOPHEN 325 MG/1
650 TABLET ORAL EVERY 4 HOURS PRN
Status: CANCELLED | OUTPATIENT
Start: 2024-08-13

## 2024-08-14 ENCOUNTER — HOSPITAL ENCOUNTER (OUTPATIENT)
Dept: OPERATING ROOM | Facility: HOSPITAL | Age: 59
Setting detail: OUTPATIENT SURGERY
Discharge: HOME | End: 2024-08-14
Payer: MEDICARE

## 2024-08-14 ENCOUNTER — ANESTHESIA (OUTPATIENT)
Dept: OPERATING ROOM | Facility: HOSPITAL | Age: 59
End: 2024-08-14
Payer: MEDICARE

## 2024-08-14 VITALS
WEIGHT: 187.39 LBS | HEART RATE: 80 BPM | DIASTOLIC BLOOD PRESSURE: 79 MMHG | RESPIRATION RATE: 16 BRPM | SYSTOLIC BLOOD PRESSURE: 123 MMHG | BODY MASS INDEX: 27.76 KG/M2 | TEMPERATURE: 97.7 F | OXYGEN SATURATION: 99 % | HEIGHT: 69 IN

## 2024-08-14 DIAGNOSIS — D64.9 ANEMIA, UNSPECIFIED TYPE: ICD-10-CM

## 2024-08-14 DIAGNOSIS — G47.00 INSOMNIA, UNSPECIFIED TYPE: ICD-10-CM

## 2024-08-14 LAB — GLUCOSE BLD MANUAL STRIP-MCNC: 114 MG/DL (ref 74–99)

## 2024-08-14 PROCEDURE — 2500000004 HC RX 250 GENERAL PHARMACY W/ HCPCS (ALT 636 FOR OP/ED): Performed by: ANESTHESIOLOGY

## 2024-08-14 PROCEDURE — 43239 EGD BIOPSY SINGLE/MULTIPLE: CPT | Performed by: INTERNAL MEDICINE

## 2024-08-14 PROCEDURE — 7100000010 HC PHASE TWO TIME - EACH INCREMENTAL 1 MINUTE: Performed by: ANESTHESIOLOGY

## 2024-08-14 PROCEDURE — 3600000002 HC OR TIME - INITIAL BASE CHARGE - PROCEDURE LEVEL TWO: Performed by: ANESTHESIOLOGY

## 2024-08-14 PROCEDURE — 3700000002 HC GENERAL ANESTHESIA TIME - EACH INCREMENTAL 1 MINUTE: Performed by: ANESTHESIOLOGY

## 2024-08-14 PROCEDURE — A45380 PR COLONOSCOPY,BIOPSY: Performed by: NURSE ANESTHETIST, CERTIFIED REGISTERED

## 2024-08-14 PROCEDURE — 3700000001 HC GENERAL ANESTHESIA TIME - INITIAL BASE CHARGE: Performed by: ANESTHESIOLOGY

## 2024-08-14 PROCEDURE — A45380 PR COLONOSCOPY,BIOPSY: Performed by: ANESTHESIOLOGY

## 2024-08-14 PROCEDURE — 82947 ASSAY GLUCOSE BLOOD QUANT: CPT

## 2024-08-14 PROCEDURE — 2500000005 HC RX 250 GENERAL PHARMACY W/O HCPCS: Performed by: NURSE ANESTHETIST, CERTIFIED REGISTERED

## 2024-08-14 PROCEDURE — 45380 COLONOSCOPY AND BIOPSY: CPT | Performed by: INTERNAL MEDICINE

## 2024-08-14 PROCEDURE — 2500000004 HC RX 250 GENERAL PHARMACY W/ HCPCS (ALT 636 FOR OP/ED): Performed by: NURSE ANESTHETIST, CERTIFIED REGISTERED

## 2024-08-14 PROCEDURE — 7100000009 HC PHASE TWO TIME - INITIAL BASE CHARGE: Performed by: ANESTHESIOLOGY

## 2024-08-14 PROCEDURE — 3600000007 HC OR TIME - EACH INCREMENTAL 1 MINUTE - PROCEDURE LEVEL TWO: Performed by: ANESTHESIOLOGY

## 2024-08-14 PROCEDURE — 2720000007 HC OR 272 NO HCPCS: Performed by: ANESTHESIOLOGY

## 2024-08-14 RX ORDER — MIDAZOLAM HYDROCHLORIDE 1 MG/ML
INJECTION INTRAMUSCULAR; INTRAVENOUS AS NEEDED
Status: DISCONTINUED | OUTPATIENT
Start: 2024-08-14 | End: 2024-08-14

## 2024-08-14 RX ORDER — FENTANYL CITRATE 50 UG/ML
INJECTION, SOLUTION INTRAMUSCULAR; INTRAVENOUS AS NEEDED
Status: DISCONTINUED | OUTPATIENT
Start: 2024-08-14 | End: 2024-08-14

## 2024-08-14 RX ORDER — LIDOCAINE HYDROCHLORIDE 10 MG/ML
INJECTION, SOLUTION EPIDURAL; INFILTRATION; INTRACAUDAL; PERINEURAL AS NEEDED
Status: DISCONTINUED | OUTPATIENT
Start: 2024-08-14 | End: 2024-08-14

## 2024-08-14 RX ORDER — SODIUM CHLORIDE, SODIUM LACTATE, POTASSIUM CHLORIDE, CALCIUM CHLORIDE 600; 310; 30; 20 MG/100ML; MG/100ML; MG/100ML; MG/100ML
100 INJECTION, SOLUTION INTRAVENOUS CONTINUOUS
Status: DISCONTINUED | OUTPATIENT
Start: 2024-08-14 | End: 2024-08-15 | Stop reason: HOSPADM

## 2024-08-14 RX ORDER — PROPOFOL 10 MG/ML
INJECTION, EMULSION INTRAVENOUS AS NEEDED
Status: DISCONTINUED | OUTPATIENT
Start: 2024-08-14 | End: 2024-08-14

## 2024-08-14 RX ORDER — VENLAFAXINE HYDROCHLORIDE 150 MG/1
150 CAPSULE, EXTENDED RELEASE ORAL DAILY
COMMUNITY

## 2024-08-14 SDOH — HEALTH STABILITY: MENTAL HEALTH: CURRENT SMOKER: 0

## 2024-08-14 ASSESSMENT — COLUMBIA-SUICIDE SEVERITY RATING SCALE - C-SSRS
6. HAVE YOU EVER DONE ANYTHING, STARTED TO DO ANYTHING, OR PREPARED TO DO ANYTHING TO END YOUR LIFE?: NO
2. HAVE YOU ACTUALLY HAD ANY THOUGHTS OF KILLING YOURSELF?: NO
1. IN THE PAST MONTH, HAVE YOU WISHED YOU WERE DEAD OR WISHED YOU COULD GO TO SLEEP AND NOT WAKE UP?: NO

## 2024-08-14 ASSESSMENT — ENCOUNTER SYMPTOMS
ROS GI COMMENTS: HEARTBURN
CONSTIPATION: 1
DIARRHEA: 1

## 2024-08-14 ASSESSMENT — PAIN - FUNCTIONAL ASSESSMENT: PAIN_FUNCTIONAL_ASSESSMENT: 0-10

## 2024-08-14 ASSESSMENT — PAIN SCALES - GENERAL: PAINLEVEL_OUTOF10: 0 - NO PAIN

## 2024-08-14 NOTE — ANESTHESIA POSTPROCEDURE EVALUATION
"Patient: Dinorah Horn \"Raquel\"    Procedure Summary       Date: 08/14/24 Room / Location: [facility] OR    Anesthesia Start: 1021 Anesthesia Stop: 1102    Procedures:       EGD      COLONOSCOPY Diagnosis: Anemia, unspecified type    Scheduled Providers: Marlen Goyal MD; Panfilo Varela MD Responsible Provider: Panfilo Varela MD    Anesthesia Type: MAC ASA Status: 3            Anesthesia Type: MAC    Vitals Value Taken Time   BP 97/67 08/14/24 1059   Temp 36.5 °C (97.7 °F) 08/14/24 1059   Pulse 78 08/14/24 1059   Resp 16 08/14/24 1059   SpO2 99 % 08/14/24 1059       Anesthesia Post Evaluation    Patient location during evaluation: PACU  Patient participation: complete - patient participated  Level of consciousness: awake  Pain management: adequate  Multimodal analgesia pain management approach  Airway patency: patent  Two or more strategies used to mitigate risk of obstructive sleep apnea  Cardiovascular status: acceptable  Respiratory status: acceptable  Hydration status: acceptable  Postoperative Nausea and Vomiting: none        No notable events documented.    "

## 2024-08-14 NOTE — H&P
"History Of Present Illness  Dinorah Horn \"Raquel\" is a 59 y.o. female presenting to GI lab for both EGD, colonoscopy     Past Medical History  Past Medical History:   Diagnosis Date    Arthritis     Coronary artery disease     Depression     Diabetes mellitus (Multi)     GERD (gastroesophageal reflux disease)     Headache     Heart disease     Hypertension     Visual impairment        Surgical History  Past Surgical History:   Procedure Laterality Date    APPENDECTOMY      BREAST SURGERY  2003    Augmentation    CORONARY ARTERY BYPASS GRAFT      FOREARM FRACTURE SURGERY Right 1992    FOREARM HARDWARE REMOVAL Right 2003    HYSTERECTOMY  Partial, 2000        Social History  She reports that she quit smoking about 15 years ago. Her smoking use included cigarettes. She started smoking about 30 years ago. She has a 15 pack-year smoking history. She has never used smokeless tobacco. She reports that she does not currently use alcohol. She reports that she does not use drugs.    Family History  Family History   Problem Relation Name Age of Onset    Alcohol abuse Mother Trice     Cancer Mother Trice     Diabetes Mother Trice     Heart disease Mother Trice     Arthritis Father Reed     Atrial fibrillation Father Reed     Cancer Father Reed     COPD Father Reed     Hearing loss Father Reed     Heart disease Father Reed     Stroke Paternal Grandfather Guy     Alcohol abuse Paternal Grandmother Brigette     Alcohol abuse Sister Ioana     Alcohol abuse Sister Alis     Alcohol abuse Daughter Katerine     Cancer Father's Brother Xavier     Cancer Father's Brother Daljit     Colon cancer Father's Brother [place] J     Drug abuse Sister Ioana M         Allergies  Cosentyx [secukinumab]    Review of Systems   Gastrointestinal:  Positive for constipation and diarrhea.        Heartburn        Physical Exam  Cardiovascular:      Rate and Rhythm: Normal rate and regular rhythm.   Pulmonary:      Effort: " "Pulmonary effort is normal. No respiratory distress.   Neurological:      Mental Status: She is alert and oriented to person, place, and time.          Last Recorded Vitals  Blood pressure 128/72, pulse 84, temperature 36.4 °C (97.5 °F), resp. rate 17, height 1.753 m (5' 9\"), weight 85 kg (187 lb 6.3 oz).    Relevant Results             Assessment/Plan   Assessment & Plan  Anemia, unspecified type             Anemia  GERD  Change in bowel habit.    EGD, colonoscopy      Marlen Goyal MD    "

## 2024-08-14 NOTE — DISCHARGE INSTRUCTIONS
Patient Instructions after a Colonoscopy      The anesthetics, sedatives or narcotics which were given to you today will be acting in your body for the next 24 hours, so you might feel a little sleepy or groggy.  This feeling should slowly wear off. Carefully read and follow the instructions.     You received sedation today:  - Do not drive or operate any machinery or power tools of any kind.   - No alcoholic beverages today, not even beer or wine.  - Do not make any important decisions or sign any legal documents.  - No over the counter medications that contain alcohol or that may cause drowsiness.  - Do not make any important decisions or sign any legal documents.  - Make sure you have someone with you for first 24 hours.    While it is common to experience mild to moderate abdominal distention, gas, or belching after your procedure, if any of these symptoms occur following discharge from the GI Lab or within one week of having your procedure, call the [facility] to be advised whether a visit to your nearest Urgent Care or Emergency Department is indicated.  Take this paper with you if you go.     - If you develop an allergic reaction to the medications that were given during your procedure such as difficulty breathing, rash, hives, severe nausea, vomiting or lightheadedness.  - If you experience chest pain, shortness of breath, severe abdominal pain, fevers and chills.  -If you develop signs and symptoms of bleeding such as blood in your spit, if your stools turn black, tarry, or bloody  - If you have not urinated within 8 hours following your procedure.  - If your IV site becomes painful, red, inflamed, or looks infected.    If you received a biopsy/polypectomy/sphincterotomy the following instructions apply below:    __ Do not use Aspirin containing products, non-steroidal medications or anti-coagulants for one week following your procedure. (Examples of these types of medications are: Advil,  Arthrotec, Aleve, Coumadin, Ecotrin, Heparin, Ibuprofen, Indocin, Motrin, Naprosyn, Nuprin, Plavix, Vioxx, and Voltarin, or their generic forms.  This list is not all-inclusive.  Check with your physician or pharmacist before resuming medications.)   __ Eat a soft diet today.  Avoid foods that are poorly digested for the next 24 hours.  These foods would include: nuts, beans, lettuce, red meats, and fried foods. Start with liquids and advance your diet as tolerated, gradually work up to eating solids.   __ Do not have a Barium Study or Enema for one week.    Your physician recommends the additional following instructions:    -You have a contact number available for emergencies. The signs and symptoms of potential delayed complications were discussed with you. You may return to normal activities tomorrow.  -Resume your previous diet.  -Continue your present medications.   -We are waiting for your pathology results.  -Your physician has recommended a repeat colonoscopy (date to be determined after pending pathology results are reviewed) for surveillance based on pathology results.  -The findings and recommendations have been discussed with you.  -The findings and recommendations were discussed with your family.  - Please see Medication Reconciliation Form for new medication/medications prescribed.       If you experience any problems or have any questions following discharge from the GI Lab, please call:        Nurse Signature                                                                        Date___________________                                                                            Patient/Responsible Party Signature                                        Date___________________Patient Instructions after an endoscopy or colonoscopy      The anesthetics, sedatives or narcotics which were given to you today will be acting in your body for the next 24 hours, so you might feel a little sleepy or groggy.  This  feeling should slowly wear off. Carefully read and follow the instructions.     You received sedation today:  - Do not drive or operate any machinery or power tools of any kind.   - No alcoholic beverages today, not even beer or wine.  - Do not make any important decisions or sign any legal documents.  - No over the counter medications that contain alcohol or that may cause drowsiness.  - Do not make any important decisions or sign any legal documents.    While it is common to experience mild to moderate abdominal distention, gas, or belching after your procedure, if any of these symptoms occur following discharge from the GI Lab or within one week of having your procedure, call the [facility] to be advised whether a visit to your nearest Urgent Care or Emergency Department is indicated.  Take this paper with you if you go.     - If you develop an allergic reaction to the medications that were given during your procedure such as difficulty breathing, rash, hives, severe nausea, vomiting or lightheadedness.- If you experience chest pain, shortness of breath, severe abdominal pain, fevers and chills.    -If you develop signs and symptoms of bleeding such as blood in your spit, if your stools turn black, tarry, or bloody    - If you have not urinated within 8 hours following your procedure.- If your IV site becomes painful, red, inflamed, or looks infected.

## 2024-08-14 NOTE — ANESTHESIA PREPROCEDURE EVALUATION
"Patient: Dinorah Horn \"Raquel\"    Procedure Information       Date/Time: 08/14/24 0930    Scheduled providers: Marlen Goyal MD; Panfilo Varela MD    Procedures:       EGD      COLONOSCOPY    Location: [facility] OR          Vitals:    08/14/24 0846   BP: 128/72   Pulse: 84   Resp: 17   Temp: 36.4 °C (97.5 °F)       Past Surgical History:   Procedure Laterality Date    APPENDECTOMY      BREAST SURGERY  2003    Augmentation    CORONARY ARTERY BYPASS GRAFT      FOREARM FRACTURE SURGERY Right 1992    FOREARM HARDWARE REMOVAL Right 2003    HYSTERECTOMY  Partial, 2000     Past Medical History:   Diagnosis Date    Arthritis     Coronary artery disease     Depression     Diabetes mellitus (Multi)     GERD (gastroesophageal reflux disease)     Headache     Heart disease     Hypertension     Visual impairment        Current Outpatient Medications:     aspirin 81 mg EC tablet, Take 1 tablet (81 mg) by mouth once daily., Disp: , Rfl:     cholecalciferol (Vitamin D3) 25 MCG (1000 UT) capsule, Take 1 capsule (25 mcg) by mouth once daily., Disp: , Rfl:     cyanocobalamin (Vitamin B-12) 50 mcg tablet, Take 1 tablet (50 mcg) by mouth once daily., Disp: , Rfl:     hydroCHLOROthiazide (Microzide) 12.5 mg tablet, Take 1 tablet (12.5 mg) by mouth once daily., Disp: 90 tablet, Rfl: 1    hydroxychloroquine (Plaquenil) 200 mg tablet, Take 1 tablet (200 mg) by mouth once daily., Disp: 90 tablet, Rfl: 1    lisinopril 20 mg tablet, Take 1 tablet (20 mg) by mouth once daily., Disp: 90 tablet, Rfl: 1    metFORMIN  mg 24 hr tablet, Take 1 tablet (500 mg) by mouth 2 times daily (morning and late afternoon). Take one daily, Disp: 180 tablet, Rfl: 1    metoprolol tartrate (Lopressor) 25 mg tablet, Take 1 tablet (25 mg) by mouth 2 times a day., Disp: 180 tablet, Rfl: 1    multivitamin tablet, Take 1 tablet by mouth once daily., Disp: , Rfl:     omeprazole (PriLOSEC) 40 mg DR capsule, Take 1 capsule (40 mg) by mouth " once daily in the morning. Take before meals. Do not crush or chew., Disp: 90 capsule, Rfl: 3    rosuvastatin (Crestor) 20 mg tablet, Take 1 tablet (20 mg) by mouth once daily., Disp: 90 tablet, Rfl: 0    sodium,potassium,mag sulfates (Suprep) 17.5-3.13-1.6 gram recon soln solution, Take 1 bottle by mouth see administration instructions., Disp: 354 mL, Rfl: 0    traZODone (Desyrel) 50 mg tablet, Take 0.5 tablets (25 mg) by mouth once daily at bedtime., Disp: 15 tablet, Rfl: 3    venlafaxine (Effexor) 75 mg tablet, Take 1 tablet (75 mg) by mouth 2 times a day., Disp: 90 tablet, Rfl: 0    nitroglycerin (Nitrostat) 0.3 mg SL tablet, Place 1 tablet (0.3 mg) under the tongue every 5 minutes if needed for chest pain., Disp: , Rfl:     Current Facility-Administered Medications:     lactated Ringer's infusion, 100 mL/hr, intravenous, Continuous, Cleveland Russell MD, Last Rate: 100 mL/hr at 08/14/24 0903, 100 mL/hr at 08/14/24 0903  Prior to Admission medications    Medication Sig Start Date End Date Taking? Authorizing Provider   aspirin 81 mg EC tablet Take 1 tablet (81 mg) by mouth once daily.   Yes Historical Provider, MD   cholecalciferol (Vitamin D3) 25 MCG (1000 UT) capsule Take 1 capsule (25 mcg) by mouth once daily.   Yes Historical Provider, MD   cyanocobalamin (Vitamin B-12) 50 mcg tablet Take 1 tablet (50 mcg) by mouth once daily.   Yes Historical Provider, MD   hydroCHLOROthiazide (Microzide) 12.5 mg tablet Take 1 tablet (12.5 mg) by mouth once daily. 7/8/24  Yes Yamilet Frazier DO   hydroxychloroquine (Plaquenil) 200 mg tablet Take 1 tablet (200 mg) by mouth once daily. 6/28/24  Yes Yamilet Frazier DO   lisinopril 20 mg tablet Take 1 tablet (20 mg) by mouth once daily. 6/28/24  Yes Yamilet Frazier DO   metFORMIN  mg 24 hr tablet Take 1 tablet (500 mg) by mouth 2 times daily (morning and late afternoon). Take one daily 7/26/24  Yes Yamilet Frazier,    metoprolol tartrate (Lopressor) 25 mg  tablet Take 1 tablet (25 mg) by mouth 2 times a day. 6/28/24  Yes Yamilet Frazier DO   multivitamin tablet Take 1 tablet by mouth once daily.   Yes Historical Provider, MD   omeprazole (PriLOSEC) 40 mg DR capsule Take 1 capsule (40 mg) by mouth once daily in the morning. Take before meals. Do not crush or chew. 6/28/24  Yes Yamilet Frazier DO   rosuvastatin (Crestor) 20 mg tablet Take 1 tablet (20 mg) by mouth once daily. 8/13/24  Yes Yamilet Frazier DO   sodium,potassium,mag sulfates (Suprep) 17.5-3.13-1.6 gram recon soln solution Take 1 bottle by mouth see administration instructions. 7/16/24  Yes Josse Aaron,    traZODone (Desyrel) 50 mg tablet Take 0.5 tablets (25 mg) by mouth once daily at bedtime. 7/8/24  Yes Yamilet Frazier DO   venlafaxine (Effexor) 75 mg tablet Take 1 tablet (75 mg) by mouth 2 times a day. 8/13/24  Yes Yamilet Frazier DO   nitroglycerin (Nitrostat) 0.3 mg SL tablet Place 1 tablet (0.3 mg) under the tongue every 5 minutes if needed for chest pain.    Historical Provider, MD   rosuvastatin (Crestor) 20 mg tablet Take 1 tablet (20 mg) by mouth once daily.  8/13/24  Historical Provider, MD   venlafaxine (Effexor) 75 mg tablet Take 1 tablet (75 mg) by mouth 2 times a day. 6/28/24 8/13/24  Yamilet Frazier DO     Allergies   Allergen Reactions    Cosentyx [Secukinumab] Rash     Social History     Tobacco Use    Smoking status: Former     Current packs/day: 0.00     Average packs/day: 1 pack/day for 15.0 years (15.0 ttl pk-yrs)     Types: Cigarettes     Start date: 4/4/1994     Quit date: 4/4/2009     Years since quitting: 15.3    Smokeless tobacco: Never    Tobacco comments:     I smoked on and off through a 15 year period.   Substance Use Topics    Alcohol use: Not Currently     Comment: 0         Chemistry    Lab Results   Component Value Date/Time     05/30/2024 1721    K 4.2 05/30/2024 1721     05/30/2024 1721    CO2 26 05/30/2024 1721    BUN 20  05/30/2024 1721    CREATININE 0.83 05/30/2024 1721    Lab Results   Component Value Date/Time    CALCIUM 9.6 05/30/2024 1721    ALKPHOS 68 05/30/2024 1721    AST 20 05/30/2024 1721    ALT 18 05/30/2024 1721    BILITOT 0.5 05/30/2024 1721          Lab Results   Component Value Date/Time    WBC 9.0 06/28/2024 1458    HGB 10.6 (L) 06/28/2024 1458    HCT 32.0 (L) 06/28/2024 1458     06/28/2024 1458     Lab Results   Component Value Date/Time    PROTIME 11.7 05/30/2024 1721    INR 1.0 05/30/2024 1721     No results found for this or any previous visit (from the past 4464 hour(s)).  No results found for this or any previous visit from the past 1095 days.      Relevant Problems   GI   (+) Gastroesophageal reflux disease without esophagitis      Liver   (+) Alcoholic cirrhosis (Multi)      Endocrine   (+) Type 2 diabetes mellitus without complication, without long-term current use of insulin (Multi)       Clinical information reviewed:    Allergies  Meds   Med Hx  Surg Hx            NPO Detail:  NPO/Void Status  Date of Last Solid: 08/13/24  Time of Last Solid: 2200  Last Intake Type: Clear fluids         Physical Exam    Airway  Mallampati: II     Cardiovascular - normal exam     Dental    Pulmonary    Abdominal            Anesthesia Plan    History of general anesthesia?: yes  History of complications of general anesthesia?: no    ASA 3     MAC     The patient is not a current smoker.  Patient was not previously instructed to abstain from smoking on day of procedure.  Patient did not smoke on day of procedure.  Education provided regarding risk of obstructive sleep apnea.  intravenous induction   Anesthetic plan and risks discussed with patient.    Plan discussed with CRNA.

## 2024-08-26 ENCOUNTER — APPOINTMENT (OUTPATIENT)
Dept: RHEUMATOLOGY | Facility: CLINIC | Age: 59
End: 2024-08-26
Payer: MEDICARE

## 2024-09-03 LAB
LAB AP ASR DISCLAIMER: NORMAL
LABORATORY COMMENT REPORT: NORMAL
PATH REPORT.FINAL DX SPEC: NORMAL
PATH REPORT.GROSS SPEC: NORMAL
PATH REPORT.RELEVANT HX SPEC: NORMAL
PATH REPORT.TOTAL CANCER: NORMAL

## 2024-09-05 ENCOUNTER — OFFICE VISIT (OUTPATIENT)
Dept: GASTROENTEROLOGY | Facility: CLINIC | Age: 59
End: 2024-09-05
Payer: MEDICARE

## 2024-09-05 ENCOUNTER — LAB (OUTPATIENT)
Dept: LAB | Facility: LAB | Age: 59
End: 2024-09-05
Payer: MEDICARE

## 2024-09-05 VITALS
DIASTOLIC BLOOD PRESSURE: 67 MMHG | OXYGEN SATURATION: 99 % | WEIGHT: 193.8 LBS | TEMPERATURE: 96.6 F | HEIGHT: 69 IN | HEART RATE: 71 BPM | BODY MASS INDEX: 28.71 KG/M2 | SYSTOLIC BLOOD PRESSURE: 99 MMHG

## 2024-09-05 DIAGNOSIS — R79.89 LIVER FUNCTION TEST ABNORMALITY: ICD-10-CM

## 2024-09-05 DIAGNOSIS — D64.9 ANEMIA, UNSPECIFIED TYPE: Primary | ICD-10-CM

## 2024-09-05 DIAGNOSIS — Z71.2 ENCOUNTER TO DISCUSS TEST RESULTS: ICD-10-CM

## 2024-09-05 DIAGNOSIS — D64.9 ANEMIA, UNSPECIFIED TYPE: ICD-10-CM

## 2024-09-05 DIAGNOSIS — K70.30 ALCOHOLIC CIRRHOSIS OF LIVER WITHOUT ASCITES (MULTI): ICD-10-CM

## 2024-09-05 LAB
ALBUMIN SERPL BCP-MCNC: 4.3 G/DL (ref 3.4–5)
ALP SERPL-CCNC: 84 U/L (ref 33–136)
ALT SERPL W P-5'-P-CCNC: 21 U/L (ref 7–45)
AST SERPL W P-5'-P-CCNC: 18 U/L (ref 9–39)
BILIRUB DIRECT SERPL-MCNC: 0.1 MG/DL (ref 0–0.3)
BILIRUB SERPL-MCNC: 0.4 MG/DL (ref 0–1.2)
DAT-POLYSPECIFIC: NORMAL
HGB RETIC QN: 36 PG (ref 28–38)
IMMATURE RETIC FRACTION: 9.3 %
IRON SATN MFR SERPL: 23 % (ref 25–45)
IRON SERPL-MCNC: 80 UG/DL (ref 35–150)
LDH SERPL L TO P-CCNC: 121 U/L (ref 84–246)
PROT SERPL-MCNC: 7.4 G/DL (ref 6.4–8.2)
RETICS #: 0.04 X10*6/UL (ref 0.02–0.08)
RETICS/RBC NFR AUTO: 1.3 % (ref 0.5–2)
TIBC SERPL-MCNC: 341 UG/DL (ref 240–445)
UIBC SERPL-MCNC: 261 UG/DL (ref 110–370)

## 2024-09-05 PROCEDURE — 82746 ASSAY OF FOLIC ACID SERUM: CPT

## 2024-09-05 PROCEDURE — 82728 ASSAY OF FERRITIN: CPT

## 2024-09-05 PROCEDURE — 85060 BLOOD SMEAR INTERPRETATION: CPT | Performed by: INTERNAL MEDICINE

## 2024-09-05 PROCEDURE — 99214 OFFICE O/P EST MOD 30 MIN: CPT | Performed by: INTERNAL MEDICINE

## 2024-09-05 PROCEDURE — 3008F BODY MASS INDEX DOCD: CPT | Performed by: INTERNAL MEDICINE

## 2024-09-05 PROCEDURE — 83540 ASSAY OF IRON: CPT

## 2024-09-05 PROCEDURE — 4010F ACE/ARB THERAPY RXD/TAKEN: CPT | Performed by: INTERNAL MEDICINE

## 2024-09-05 PROCEDURE — 83550 IRON BINDING TEST: CPT

## 2024-09-05 PROCEDURE — 3078F DIAST BP <80 MM HG: CPT | Performed by: INTERNAL MEDICINE

## 2024-09-05 PROCEDURE — 85025 COMPLETE CBC W/AUTO DIFF WBC: CPT

## 2024-09-05 PROCEDURE — 85045 AUTOMATED RETICULOCYTE COUNT: CPT

## 2024-09-05 PROCEDURE — 3074F SYST BP LT 130 MM HG: CPT | Performed by: INTERNAL MEDICINE

## 2024-09-05 PROCEDURE — 83010 ASSAY OF HAPTOGLOBIN QUANT: CPT

## 2024-09-05 PROCEDURE — 82607 VITAMIN B-12: CPT

## 2024-09-05 PROCEDURE — 80076 HEPATIC FUNCTION PANEL: CPT

## 2024-09-05 PROCEDURE — 36415 COLL VENOUS BLD VENIPUNCTURE: CPT

## 2024-09-05 PROCEDURE — 83615 LACTATE (LD) (LDH) ENZYME: CPT

## 2024-09-05 ASSESSMENT — PAIN SCALES - GENERAL: PAINLEVEL: 0-NO PAIN

## 2024-09-05 NOTE — PATIENT INSTRUCTIONS
Welcome to Dr. [facility].  Dr. Benson sees patients at the following sites:  [place] [address] [facility] at [facility]  [address], [address] at [facility] at [facility], [facility] [address]    Dr. Benson's hepatology care coordinator, Kirstin PAPPAS, can be reached at [phone].  Dr. Benson's , Stephanie Bryan, can be reached at [phone].     Get blood work now. This will include additional blood work to work up your anemia.     Get a Liver Ultrasound. This is due now.   Do not eat or drink anything 6 hours prior to your exam.  Call [phone] to schedule.     Follow up and plan (for IR) will be based on results.

## 2024-09-05 NOTE — LETTER
"September 10, 2024     Yamilet Frazier DO  [address]  [place] [address]    Patient: Raquel Horn   YOB: 1965   Date of Visit: 9/5/2024       Dear Dr. Yamilet Frazier, :    Thank you for referring Raquel Horn to me for evaluation. Below are my notes for this consultation.  If you have questions, please do not hesitate to call me. I look forward to following your patient along with you.       Sincerely,     Pasha Benson MD      CC: No Recipients  ______________________________________________________________________________________    Subjective    Evaluation of suspected cirrhosis.    History of Present Illness:   Dinorah Horn \"Pratibha" is a 59 y.o. female who presents to the [facility] at [facility], for a follow up evaluation of a new diagnosis of cirrhosis.    She is referred by Ratna RIVERA CNP.  Her initial appointment was in February.  The reason for visit was for hepatomegaly, elevations in her liver enzymes, suspected chronic liver disease.    Interval history:  Liver biopsy was cancelled by IR Physician (due to anemia).  Anemia is stable. EGD and Colonoscopy has been completed/performed.    The patient recalls that she was told she had fatty liver many years ago.  She never recalls manifesting any symptoms from her liver disease.  She denies a history of jaundice, fluid retention such as her ascites, confusion from hepatic encephalopathy.  She does report some chronic GI symptoms including constipation.    As regards to liver disease risk factors, she reports a history of alcohol excess: that she would drink 50 beers per week (16 ounce beers).  She also has metabolic risk factors including diabetes hypertension and heart disease. She did cut out all alcohol use for a few months after findings out about her cirrhosis and chronic liver disease. She does report that she has been drinking beer again, but his cut back significantly.     Initial laboratory " workup was notable for the following (February 2024):  Albumin 4.6 bilirubin of 0.5 alkaline phosphatase of 102 ALT of 31 AST of 29.  A year ago her liver enzymes were abnormal (May 2023):  AST of 89 ALT of 97 alkaline phosphatase 164 total protein 8.2 albumin 4.6.  She had an GORDON that was checked -positive at 1-160.  Her anti-SSA was positive greater than 8.0. Her ferritin was slightly elevated at 257.  Hepatitis B and C serologies were negative.  HIV was negative.    Liver imaging was performed: The liver measures 16.9 cm with diffuse heterogenicity of the echotexture of the liver.    She was referred for liver elastography with Fibroscan:  E (median liver stiffness measurement):   21.5   kPa   CAP (controlled attenuation parameter):  227   dB/m   This was consistent with cirrhosis.    Review of Systems  Review of Systems  Refer to the new patient questionnaire for self-reported comprehensive review of systems.    Past Medical History   has a past medical history of Arthritis, Coronary artery disease, Depression, Diabetes mellitus (Multi), GERD (gastroesophageal reflux disease), Headache, Heart disease, Hypertension, and Visual impairment.     She was diagnosed with diabetes about 2 years ago.    She has a history of cardiac surgery.    She last had a colonoscopy in 2021. Recently repeated.    Social History   reports that she quit smoking about 15 years ago. Her smoking use included cigarettes. She started smoking about 30 years ago. She has a 15 pack-year smoking history. She has never used smokeless tobacco. She reports that she does not currently use alcohol. She reports that she does not use drugs.     Family History  family history includes Alcohol abuse in her daughter, mother, paternal grandmother, sister, and sister; Arthritis in her father; Atrial fibrillation in her father; COPD in her father; Cancer in her father, father's brother, father's brother, and mother; Colon cancer in her father's brother;  "Diabetes in her mother; Drug abuse in her sister; Hearing loss in her father; Heart disease in her father and mother; Stroke in her paternal grandfather.     Allergies  Allergies   Allergen Reactions   • Cosentyx [Secukinumab] Rash       Medications  Current Outpatient Medications   Medication Instructions   • aspirin 81 mg, oral, Daily   • cholecalciferol (VITAMIN D3) 25 mcg, oral, Daily   • cyanocobalamin (VITAMIN B-12) 50 mcg, oral, Daily   • hydroCHLOROthiazide (MICROZIDE) 12.5 mg, oral, Daily   • hydroxychloroquine (PLAQUENIL) 200 mg, oral, Daily   • lisinopril 20 mg, oral, Daily   • metFORMIN XR (GLUCOPHAGE-XR) 500 mg, oral, 2 times daily (morning and late afternoon), Take one daily   • metoprolol tartrate (LOPRESSOR) 25 mg, oral, 2 times daily   • multivitamin tablet 1 tablet, oral, Daily   • nitroglycerin (NITROSTAT) 0.3 mg, sublingual, Every 5 min PRN   • omeprazole (PRILOSEC) 40 mg, oral, Daily before breakfast, Do not crush or chew.   • rosuvastatin (CRESTOR) 20 mg, oral, Daily   • sodium,potassium,mag sulfates (Suprep) 17.5-3.13-1.6 gram recon soln solution 1 bottle, oral, See admin instructions   • traZODone (DESYREL) 25 mg, oral, Nightly   • venlafaxine (EFFEXOR) 75 mg, oral, 2 times daily   • venlafaxine XR (EFFEXOR-XR) 150 mg, oral, Daily, Do not crush or chew.        Objective  Visit Vitals  BP 99/67   Pulse 71   Temp 35.9 °C (96.6 °F)          6/13/2024     7:20 AM 6/13/2024     9:00 AM 6/14/2024     3:47 PM 8/14/2024     8:46 AM 8/14/2024    10:59 AM 8/14/2024    11:20 AM 9/5/2024     3:18 PM   Vitals   Systolic 103 106 100 128 97 123 99   Diastolic 65 62 60 72 67 79 67   Heart Rate 70 67 94 84 78 80 71   Temp 37 °C (98.6 °F)  36.2 °C (97.1 °F) 36.4 °C (97.5 °F) 36.5 °C (97.7 °F)  35.9 °C (96.6 °F)   Resp 18 18  17 16 16    Height (in) 1.753 m (5' 9\")   1.753 m (5' 9\")   1.753 m (5' 9\")   Weight (lb) 193  194 187.39   193.8   BMI 28.5 kg/m2  28.65 kg/m2 27.67 kg/m2   28.62 kg/m2   BSA (m2) 2.06 m2 "  2.07 m2 2.03 m2   2.07 m2   Visit Report   Report    Report     Physical Exam  Vitals and nursing note reviewed.   Constitutional:       Appearance: Normal appearance. She is obese.      Comments: In general she appears slightly anxious.   HENT:      Head: Normocephalic and atraumatic.      Mouth/Throat:      Mouth: Mucous membranes are moist.      Pharynx: Oropharynx is clear.   Eyes:      General: No scleral icterus.     Extraocular Movements: Extraocular movements intact.      Pupils: Pupils are equal, round, and reactive to light.   Cardiovascular:      Rate and Rhythm: Normal rate and regular rhythm.      Heart sounds: No murmur heard.  Pulmonary:      Effort: Pulmonary effort is normal.      Breath sounds: Normal breath sounds.   Abdominal:      General: Abdomen is flat. Bowel sounds are normal.      Palpations: Abdomen is soft.   Musculoskeletal:         General: No swelling. Normal range of motion.      Right lower leg: No edema.      Left lower leg: No edema.   Skin:     Coloration: Skin is not jaundiced.   Neurological:      General: No focal deficit present.      Mental Status: She is alert and oriented to person, place, and time. Mental status is at baseline.   Psychiatric:         Mood and Affect: Mood normal.         Thought Content: Thought content normal.         Judgment: Judgment normal.       Labs    WBC   Date/Time Value Ref Range Status   06/28/2024 02:58 PM 9.0 4.4 - 11.3 x10*3/uL Final     Hemoglobin   Date/Time Value Ref Range Status   06/28/2024 02:58 PM 10.6 (L) 12.0 - 16.0 g/dL Final     Hematocrit   Date/Time Value Ref Range Status   06/28/2024 02:58 PM 32.0 (L) 36.0 - 46.0 % Final     MCV   Date/Time Value Ref Range Status   06/28/2024 02:58 PM 99 80 - 100 fL Final     Platelets   Date/Time Value Ref Range Status   06/28/2024 02:58  150 - 450 x10*3/uL Final        Total Protein   Date/Time Value Ref Range Status   05/30/2024 05:21 PM 7.4 6.4 - 8.2 g/dL Final     Albumin  "  Date/Time Value Ref Range Status   05/30/2024 05:21 PM 4.5 3.4 - 5.0 g/dL Final     AST   Date/Time Value Ref Range Status   05/30/2024 05:21 PM 20 9 - 39 U/L Final     ALT   Date/Time Value Ref Range Status   05/30/2024 05:21 PM 18 7 - 45 U/L Final     Comment:     Patients treated with Sulfasalazine may generate falsely decreased results for ALT.     Alkaline Phosphatase   Date/Time Value Ref Range Status   05/30/2024 05:21 PM 68 33 - 136 U/L Final     Bilirubin, Total   Date/Time Value Ref Range Status   05/30/2024 05:21 PM 0.5 0.0 - 1.2 mg/dL Final     Bilirubin, Direct   Date/Time Value Ref Range Status   05/30/2024 05:21 PM 0.0 0.0 - 0.3 mg/dL Final        No results found for: \"VITD25\", \"VITAMINA\", \"VTAI\", \"VITEALPHA\", \"VITEGAMMA\"     AST   Date/Time Value Ref Range Status   05/30/2024 05:21 PM 20 9 - 39 U/L Final     ALT   Date/Time Value Ref Range Status   05/30/2024 05:21 PM 18 7 - 45 U/L Final     Comment:     Patients treated with Sulfasalazine may generate falsely decreased results for ALT.     Alkaline Phosphatase   Date/Time Value Ref Range Status   05/30/2024 05:21 PM 68 33 - 136 U/L Final     Bilirubin, Total   Date/Time Value Ref Range Status   05/30/2024 05:21 PM 0.5 0.0 - 1.2 mg/dL Final     Bilirubin, Direct   Date/Time Value Ref Range Status   05/30/2024 05:21 PM 0.0 0.0 - 0.3 mg/dL Final     Albumin   Date/Time Value Ref Range Status   05/30/2024 05:21 PM 4.5 3.4 - 5.0 g/dL Final     Total Protein   Date/Time Value Ref Range Status   05/30/2024 05:21 PM 7.4 6.4 - 8.2 g/dL Final        Protime   Date/Time Value Ref Range Status   05/30/2024 05:21 PM 11.7 9.8 - 12.8 seconds Final     INR   Date/Time Value Ref Range Status   05/30/2024 05:21 PM 1.0 0.9 - 1.1 Final     MELD 3.0: 7 at 5/30/2024  5:21 PM  MELD-Na: 6 at 5/30/2024  5:21 PM  Calculated from:  Serum Creatinine: 0.83 mg/dL (Using min of 1 mg/dL) at 5/30/2024  5:21 PM  Serum Sodium: 138 mmol/L (Using max of 137 mmol/L) at 5/30/2024  " "5:21 PM  Total Bilirubin: 0.5 mg/dL (Using min of 1 mg/dL) at 5/30/2024  5:21 PM  Serum Albumin: 4.5 g/dL (Using max of 3.5 g/dL) at 5/30/2024  5:21 PM  INR(ratio): 1.0 at 5/30/2024  5:21 PM  Age at listing (hypothetical): 59 years  Sex: Female at 5/30/2024  5:21 PM      Assessment/Plan  Dinorah Horn \"Pratibha" is a 59 y.o. female who presents to liver clinic for a follow up evaluation of suspected cirrhosis.    She has multiple risk factors for chronic liver disease.  Most notably she has a history of alcohol excess which she self-reported consumption of 50 beers per week.  This level of drinking certainly puts her at risk for significant liver disease.  Fortunately she has cut back on her alcohol use.  She also has significant metabolic risk factors for metabolic associated fatty liver -including hypertension, diabetes, heart disease, and obesity.  She also has a history of autoimmune disease and a positive GORDON.    I have recommended some lab work to complete her workup for concomitant liver disorders.  I would like her to do genetic hemochromatosis testing, genetic testing for alpha-1 antitrypsin deficiency.  I would also like to send off immunoglobulins to complete an evaluation for autoimmune hepatitis as well. I did recommend a liver biopsy, which was cancelled. The reason was due to anemia. I have since sent her for endoscopic evaluation. We reviewed these results in the office today. She has been taking iron. Her Hb is improving.    I would like to obtain the liver biopsy, as I originally  suggested:transjugular liver biopsy with portal pressure measurements.  This can help confirm a diagnosis of cirrhosis also will assess for clinically significant portal hypertension. Her EGD - reports very small EV - difficult to appreciate on the pictures from her endoscopy report.    I provided counseling regarding her liver disease evaluation to date.  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    I will further evaluate her anemia. I do not suspect any GI bleeding at this time.   She will return to see me after completing a liver biopsy.    Instructions      Pasha Benson MD

## 2024-09-05 NOTE — PROGRESS NOTES
"Subjective     Evaluation of suspected cirrhosis.    History of Present Illness:   Dinorah Horn \"Raquel\" is a 59 y.o. female who presents to the [facility] at [facility], for a follow up evaluation of a new diagnosis of cirrhosis.    She is referred by Ratna RIVERA CNP.  Her initial appointment was in February.  The reason for visit was for hepatomegaly, elevations in her liver enzymes, suspected chronic liver disease.    Interval history:  Liver biopsy was cancelled by IR Physician (due to anemia).  Anemia is stable. EGD and Colonoscopy has been completed/performed.    The patient recalls that she was told she had fatty liver many years ago.  She never recalls manifesting any symptoms from her liver disease.  She denies a history of jaundice, fluid retention such as her ascites, confusion from hepatic encephalopathy.  She does report some chronic GI symptoms including constipation.    As regards to liver disease risk factors, she reports a history of alcohol excess: that she would drink 50 beers per week (16 ounce beers).  She also has metabolic risk factors including diabetes hypertension and heart disease. She did cut out all alcohol use for a few months after findings out about her cirrhosis and chronic liver disease. She does report that she has been drinking beer again, but his cut back significantly.     Initial laboratory workup was notable for the following (February 2024):  Albumin 4.6 bilirubin of 0.5 alkaline phosphatase of 102 ALT of 31 AST of 29.  A year ago her liver enzymes were abnormal (May 2023):  AST of 89 ALT of 97 alkaline phosphatase 164 total protein 8.2 albumin 4.6.  She had an GORDON that was checked -positive at 1-160.  Her anti-SSA was positive greater than 8.0. Her ferritin was slightly elevated at 257.  Hepatitis B and C serologies were negative.  HIV was negative.    Liver imaging was performed: The liver measures 16.9 cm with diffuse heterogenicity of the " echotexture of the liver.    She was referred for liver elastography with Fibroscan:  E (median liver stiffness measurement):   21.5   kPa   CAP (controlled attenuation parameter):  227   dB/m   This was consistent with cirrhosis.    Review of Systems  Review of Systems  Refer to the new patient questionnaire for self-reported comprehensive review of systems.    Past Medical History   has a past medical history of Arthritis, Coronary artery disease, Depression, Diabetes mellitus (Multi), GERD (gastroesophageal reflux disease), Headache, Heart disease, Hypertension, and Visual impairment.     She was diagnosed with diabetes about 2 years ago.    She has a history of cardiac surgery.    She last had a colonoscopy in 2021. Recently repeated.    Social History   reports that she quit smoking about 15 years ago. Her smoking use included cigarettes. She started smoking about 30 years ago. She has a 15 pack-year smoking history. She has never used smokeless tobacco. She reports that she does not currently use alcohol. She reports that she does not use drugs.     Family History  family history includes Alcohol abuse in her daughter, mother, paternal grandmother, sister, and sister; Arthritis in her father; Atrial fibrillation in her father; COPD in her father; Cancer in her father, father's brother, father's brother, and mother; Colon cancer in her father's brother; Diabetes in her mother; Drug abuse in her sister; Hearing loss in her father; Heart disease in her father and mother; Stroke in her paternal grandfather.     Allergies  Allergies   Allergen Reactions    Cosentyx [Secukinumab] Rash       Medications  Current Outpatient Medications   Medication Instructions    aspirin 81 mg, oral, Daily    cholecalciferol (VITAMIN D3) 25 mcg, oral, Daily    cyanocobalamin (VITAMIN B-12) 50 mcg, oral, Daily    hydroCHLOROthiazide (MICROZIDE) 12.5 mg, oral, Daily    hydroxychloroquine (PLAQUENIL) 200 mg, oral, Daily    lisinopril 20  "mg, oral, Daily    metFORMIN XR (GLUCOPHAGE-XR) 500 mg, oral, 2 times daily (morning and late afternoon), Take one daily    metoprolol tartrate (LOPRESSOR) 25 mg, oral, 2 times daily    multivitamin tablet 1 tablet, oral, Daily    nitroglycerin (NITROSTAT) 0.3 mg, sublingual, Every 5 min PRN    omeprazole (PRILOSEC) 40 mg, oral, Daily before breakfast, Do not crush or chew.    rosuvastatin (CRESTOR) 20 mg, oral, Daily    sodium,potassium,mag sulfates (Suprep) 17.5-3.13-1.6 gram recon soln solution 1 bottle, oral, See admin instructions    traZODone (DESYREL) 25 mg, oral, Nightly    venlafaxine (EFFEXOR) 75 mg, oral, 2 times daily    venlafaxine XR (EFFEXOR-XR) 150 mg, oral, Daily, Do not crush or chew.        Objective   Visit Vitals  BP 99/67   Pulse 71   Temp 35.9 °C (96.6 °F)          6/13/2024     7:20 AM 6/13/2024     9:00 AM 6/14/2024     3:47 PM 8/14/2024     8:46 AM 8/14/2024    10:59 AM 8/14/2024    11:20 AM 9/5/2024     3:18 PM   Vitals   Systolic 103 106 100 128 97 123 99   Diastolic 65 62 60 72 67 79 67   Heart Rate 70 67 94 84 78 80 71   Temp 37 °C (98.6 °F)  36.2 °C (97.1 °F) 36.4 °C (97.5 °F) 36.5 °C (97.7 °F)  35.9 °C (96.6 °F)   Resp 18 18  17 16 16    Height (in) 1.753 m (5' 9\")   1.753 m (5' 9\")   1.753 m (5' 9\")   Weight (lb) 193  194 187.39   193.8   BMI 28.5 kg/m2  28.65 kg/m2 27.67 kg/m2   28.62 kg/m2   BSA (m2) 2.06 m2  2.07 m2 2.03 m2   2.07 m2   Visit Report   Report    Report     Physical Exam  Vitals and nursing note reviewed.   Constitutional:       Appearance: Normal appearance. She is obese.      Comments: In general she appears slightly anxious.   HENT:      Head: Normocephalic and atraumatic.      Mouth/Throat:      Mouth: Mucous membranes are moist.      Pharynx: Oropharynx is clear.   Eyes:      General: No scleral icterus.     Extraocular Movements: Extraocular movements intact.      Pupils: Pupils are equal, round, and reactive to light.   Cardiovascular:      Rate and Rhythm: " Normal rate and regular rhythm.      Heart sounds: No murmur heard.  Pulmonary:      Effort: Pulmonary effort is normal.      Breath sounds: Normal breath sounds.   Abdominal:      General: Abdomen is flat. Bowel sounds are normal.      Palpations: Abdomen is soft.   Musculoskeletal:         General: No swelling. Normal range of motion.      Right lower leg: No edema.      Left lower leg: No edema.   Skin:     Coloration: Skin is not jaundiced.   Neurological:      General: No focal deficit present.      Mental Status: She is alert and oriented to person, place, and time. Mental status is at baseline.   Psychiatric:         Mood and Affect: Mood normal.         Thought Content: Thought content normal.         Judgment: Judgment normal.       Labs    WBC   Date/Time Value Ref Range Status   06/28/2024 02:58 PM 9.0 4.4 - 11.3 x10*3/uL Final     Hemoglobin   Date/Time Value Ref Range Status   06/28/2024 02:58 PM 10.6 (L) 12.0 - 16.0 g/dL Final     Hematocrit   Date/Time Value Ref Range Status   06/28/2024 02:58 PM 32.0 (L) 36.0 - 46.0 % Final     MCV   Date/Time Value Ref Range Status   06/28/2024 02:58 PM 99 80 - 100 fL Final     Platelets   Date/Time Value Ref Range Status   06/28/2024 02:58  150 - 450 x10*3/uL Final        Total Protein   Date/Time Value Ref Range Status   05/30/2024 05:21 PM 7.4 6.4 - 8.2 g/dL Final     Albumin   Date/Time Value Ref Range Status   05/30/2024 05:21 PM 4.5 3.4 - 5.0 g/dL Final     AST   Date/Time Value Ref Range Status   05/30/2024 05:21 PM 20 9 - 39 U/L Final     ALT   Date/Time Value Ref Range Status   05/30/2024 05:21 PM 18 7 - 45 U/L Final     Comment:     Patients treated with Sulfasalazine may generate falsely decreased results for ALT.     Alkaline Phosphatase   Date/Time Value Ref Range Status   05/30/2024 05:21 PM 68 33 - 136 U/L Final     Bilirubin, Total   Date/Time Value Ref Range Status   05/30/2024 05:21 PM 0.5 0.0 - 1.2 mg/dL Final     Bilirubin, Direct  "  Date/Time Value Ref Range Status   05/30/2024 05:21 PM 0.0 0.0 - 0.3 mg/dL Final        No results found for: \"VITD25\", \"VITAMINA\", \"VTAI\", \"VITEALPHA\", \"VITEGAMMA\"     AST   Date/Time Value Ref Range Status   05/30/2024 05:21 PM 20 9 - 39 U/L Final     ALT   Date/Time Value Ref Range Status   05/30/2024 05:21 PM 18 7 - 45 U/L Final     Comment:     Patients treated with Sulfasalazine may generate falsely decreased results for ALT.     Alkaline Phosphatase   Date/Time Value Ref Range Status   05/30/2024 05:21 PM 68 33 - 136 U/L Final     Bilirubin, Total   Date/Time Value Ref Range Status   05/30/2024 05:21 PM 0.5 0.0 - 1.2 mg/dL Final     Bilirubin, Direct   Date/Time Value Ref Range Status   05/30/2024 05:21 PM 0.0 0.0 - 0.3 mg/dL Final     Albumin   Date/Time Value Ref Range Status   05/30/2024 05:21 PM 4.5 3.4 - 5.0 g/dL Final     Total Protein   Date/Time Value Ref Range Status   05/30/2024 05:21 PM 7.4 6.4 - 8.2 g/dL Final        Protime   Date/Time Value Ref Range Status   05/30/2024 05:21 PM 11.7 9.8 - 12.8 seconds Final     INR   Date/Time Value Ref Range Status   05/30/2024 05:21 PM 1.0 0.9 - 1.1 Final     MELD 3.0: 7 at 5/30/2024  5:21 PM  MELD-Na: 6 at 5/30/2024  5:21 PM  Calculated from:  Serum Creatinine: 0.83 mg/dL (Using min of 1 mg/dL) at 5/30/2024  5:21 PM  Serum Sodium: 138 mmol/L (Using max of 137 mmol/L) at 5/30/2024  5:21 PM  Total Bilirubin: 0.5 mg/dL (Using min of 1 mg/dL) at 5/30/2024  5:21 PM  Serum Albumin: 4.5 g/dL (Using max of 3.5 g/dL) at 5/30/2024  5:21 PM  INR(ratio): 1.0 at 5/30/2024  5:21 PM  Age at listing (hypothetical): 59 years  Sex: Female at 5/30/2024  5:21 PM      Assessment/Plan   Dinorah Horn \"Raquel\" is a 59 y.o. female who presents to liver clinic for a follow up evaluation of suspected cirrhosis.    She has multiple risk factors for chronic liver disease.  Most notably she has a history of alcohol excess which she self-reported consumption of 50 beers per week.  This " level of drinking certainly puts her at risk for significant liver disease.  Fortunately she has cut back on her alcohol use.  She also has significant metabolic risk factors for metabolic associated fatty liver -including hypertension, diabetes, heart disease, and obesity.  She also has a history of autoimmune disease and a positive GORDON.    I have recommended some lab work to complete her workup for concomitant liver disorders.  I would like her to do genetic hemochromatosis testing, genetic testing for alpha-1 antitrypsin deficiency.  I would also like to send off immunoglobulins to complete an evaluation for autoimmune hepatitis as well. I did recommend a liver biopsy, which was cancelled. The reason was due to anemia. I have since sent her for endoscopic evaluation. We reviewed these results in the office today. She has been taking iron. Her Hb is improving.    I would like to obtain the liver biopsy, as I originally  suggested:transjugular liver biopsy with portal pressure measurements.  This can help confirm a diagnosis of cirrhosis also will assess for clinically significant portal hypertension. Her EGD - reports very small EV - difficult to appreciate on the pictures from her endoscopy report.    I provided counseling regarding her liver disease evaluation to date.  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    I will further evaluate her anemia. I do not suspect any GI bleeding at this time.   She will return to see me after completing a liver biopsy.    Instructions      Pasha Benson MD

## 2024-09-06 ENCOUNTER — APPOINTMENT (OUTPATIENT)
Dept: RHEUMATOLOGY | Facility: CLINIC | Age: 59
End: 2024-09-06
Payer: MEDICARE

## 2024-09-06 LAB
BASOPHILS # BLD AUTO: 0.03 X10*3/UL (ref 0–0.1)
BASOPHILS NFR BLD AUTO: 0.4 %
EOSINOPHIL # BLD AUTO: 0.1 X10*3/UL (ref 0–0.7)
EOSINOPHIL NFR BLD AUTO: 1.5 %
ERYTHROCYTE [DISTWIDTH] IN BLOOD BY AUTOMATED COUNT: 12.2 % (ref 11.5–14.5)
FERRITIN SERPL-MCNC: 305 NG/ML (ref 8–150)
FOLATE SERPL-MCNC: 21.8 NG/ML
HAPTOGLOB SERPL NEPH-MCNC: 199 MG/DL (ref 30–200)
HCT VFR BLD AUTO: 33.8 % (ref 36–46)
HGB BLD-MCNC: 11.2 G/DL (ref 12–16)
IMM GRANULOCYTES # BLD AUTO: 0.02 X10*3/UL (ref 0–0.7)
IMM GRANULOCYTES NFR BLD AUTO: 0.3 % (ref 0–0.9)
LYMPHOCYTES # BLD AUTO: 2.08 X10*3/UL (ref 1.2–4.8)
LYMPHOCYTES NFR BLD AUTO: 30.6 %
MCH RBC QN AUTO: 32.1 PG (ref 26–34)
MCHC RBC AUTO-ENTMCNC: 33.1 G/DL (ref 32–36)
MCV RBC AUTO: 97 FL (ref 80–100)
MONOCYTES # BLD AUTO: 0.64 X10*3/UL (ref 0.1–1)
MONOCYTES NFR BLD AUTO: 9.4 %
NEUTROPHILS # BLD AUTO: 3.92 X10*3/UL (ref 1.2–7.7)
NEUTROPHILS NFR BLD AUTO: 57.8 %
NRBC BLD-RTO: 0 /100 WBCS (ref 0–0)
PATH REVIEW-CBC DIFFERENTIAL: NORMAL
PLATELET # BLD AUTO: 242 X10*3/UL (ref 150–450)
RBC # BLD AUTO: 3.49 X10*6/UL (ref 4–5.2)
VIT B12 SERPL-MCNC: 1035 PG/ML (ref 211–911)
WBC # BLD AUTO: 6.8 X10*3/UL (ref 4.4–11.3)

## 2024-09-09 ENCOUNTER — TELEPHONE (OUTPATIENT)
Dept: GASTROENTEROLOGY | Facility: HOSPITAL | Age: 59
End: 2024-09-09
Payer: MEDICARE

## 2024-09-09 RX ORDER — FERROUS SULFATE 325(65) MG
325 TABLET ORAL DAILY
COMMUNITY

## 2024-09-09 NOTE — TELEPHONE ENCOUNTER
Hepatology Nurse Coordinator Note  Patient called to provide the name and strength for the iron supplement she takes at home, as requested to during her recent office visit. Patient states she is on Ferrous Sulfate 325 mg. Will update medication list and make Dr. Benson aware.

## 2024-09-11 ENCOUNTER — HOSPITAL ENCOUNTER (OUTPATIENT)
Dept: RADIOLOGY | Facility: HOSPITAL | Age: 59
Discharge: HOME | End: 2024-09-11
Payer: MEDICARE

## 2024-09-11 DIAGNOSIS — R79.89 LIVER FUNCTION TEST ABNORMALITY: ICD-10-CM

## 2024-09-11 DIAGNOSIS — D64.9 ANEMIA, UNSPECIFIED TYPE: ICD-10-CM

## 2024-09-11 PROCEDURE — 76705 ECHO EXAM OF ABDOMEN: CPT

## 2024-09-11 PROCEDURE — 76705 ECHO EXAM OF ABDOMEN: CPT | Performed by: STUDENT IN AN ORGANIZED HEALTH CARE EDUCATION/TRAINING PROGRAM

## 2024-09-13 ENCOUNTER — TELEPHONE (OUTPATIENT)
Dept: GASTROENTEROLOGY | Facility: HOSPITAL | Age: 59
End: 2024-09-13

## 2024-09-13 ENCOUNTER — OFFICE VISIT (OUTPATIENT)
Facility: CLINIC | Age: 59
End: 2024-09-13
Payer: MEDICARE

## 2024-09-13 ENCOUNTER — APPOINTMENT (OUTPATIENT)
Dept: PRIMARY CARE | Facility: CLINIC | Age: 59
End: 2024-09-13
Payer: MEDICARE

## 2024-09-13 VITALS
DIASTOLIC BLOOD PRESSURE: 75 MMHG | HEART RATE: 89 BPM | HEIGHT: 69 IN | OXYGEN SATURATION: 97 % | WEIGHT: 194.2 LBS | SYSTOLIC BLOOD PRESSURE: 125 MMHG | BODY MASS INDEX: 28.76 KG/M2

## 2024-09-13 VITALS
DIASTOLIC BLOOD PRESSURE: 60 MMHG | WEIGHT: 192 LBS | TEMPERATURE: 97.7 F | BODY MASS INDEX: 28.35 KG/M2 | HEART RATE: 85 BPM | OXYGEN SATURATION: 100 % | SYSTOLIC BLOOD PRESSURE: 110 MMHG

## 2024-09-13 DIAGNOSIS — Z87.2 HISTORY OF PSORIATIC ARTHRITIS: ICD-10-CM

## 2024-09-13 DIAGNOSIS — Z23 NEED FOR INFLUENZA VACCINATION: ICD-10-CM

## 2024-09-13 DIAGNOSIS — R76.8 RHEUMATOID FACTOR POSITIVE: Primary | ICD-10-CM

## 2024-09-13 DIAGNOSIS — E11.9 TYPE 2 DIABETES MELLITUS WITHOUT COMPLICATION, WITHOUT LONG-TERM CURRENT USE OF INSULIN (MULTI): Primary | ICD-10-CM

## 2024-09-13 DIAGNOSIS — G89.29 CHRONIC THORACIC BACK PAIN, UNSPECIFIED BACK PAIN LATERALITY: ICD-10-CM

## 2024-09-13 DIAGNOSIS — M47.812 OSTEOARTHRITIS OF CERVICAL SPINE, UNSPECIFIED SPINAL OSTEOARTHRITIS COMPLICATION STATUS: ICD-10-CM

## 2024-09-13 DIAGNOSIS — L40.9 PSORIASIS: ICD-10-CM

## 2024-09-13 DIAGNOSIS — M54.6 CHRONIC THORACIC BACK PAIN, UNSPECIFIED BACK PAIN LATERALITY: ICD-10-CM

## 2024-09-13 DIAGNOSIS — Z12.31 ENCOUNTER FOR SCREENING MAMMOGRAM FOR MALIGNANT NEOPLASM OF BREAST: ICD-10-CM

## 2024-09-13 PROBLEM — R94.31 PROLONGED QT INTERVAL: Status: ACTIVE | Noted: 2018-04-12

## 2024-09-13 PROBLEM — R21 RASH AND OTHER NONSPECIFIC SKIN ERUPTION: Status: ACTIVE | Noted: 2019-03-19

## 2024-09-13 PROBLEM — M19.90 OSTEOARTHRITIS: Status: ACTIVE | Noted: 2024-09-13

## 2024-09-13 PROBLEM — L42 PITYRIASIS ROSEA: Status: ACTIVE | Noted: 2019-04-30

## 2024-09-13 PROBLEM — M25.569 PAIN IN UNSPECIFIED KNEE: Status: ACTIVE | Noted: 2018-10-17

## 2024-09-13 PROBLEM — N64.81 PTOSIS OF BREAST: Status: ACTIVE | Noted: 2018-10-12

## 2024-09-13 PROBLEM — F10.90 ALCOHOL INTAKE ABOVE RECOMMENDED SENSIBLE LIMITS: Status: ACTIVE | Noted: 2018-10-17

## 2024-09-13 PROBLEM — L63.9 ALOPECIA AREATA: Status: ACTIVE | Noted: 2024-09-13

## 2024-09-13 PROBLEM — B37.2 CANDIDIASIS OF SKIN: Status: ACTIVE | Noted: 2019-04-30

## 2024-09-13 PROBLEM — M65.30 TRIGGER FINGER: Status: ACTIVE | Noted: 2024-09-13

## 2024-09-13 PROBLEM — Z98.82 BREAST IMPLANT STATUS: Status: ACTIVE | Noted: 2018-10-02

## 2024-09-13 PROBLEM — R91.1 LUNG NODULE: Status: ACTIVE | Noted: 2020-09-17

## 2024-09-13 PROBLEM — L40.50 PSORIATIC ARTHRITIS (MULTI): Status: ACTIVE | Noted: 2022-06-15

## 2024-09-13 PROBLEM — T85.44XA CAPSULAR CONTRACTURE OF BREAST IMPLANT: Status: ACTIVE | Noted: 2018-10-12

## 2024-09-13 PROBLEM — M06.9 RHEUMATOID ARTHRITIS: Status: ACTIVE | Noted: 2018-11-15

## 2024-09-13 PROBLEM — R63.4 UNINTENTIONAL WEIGHT LOSS: Status: ACTIVE | Noted: 2018-07-13

## 2024-09-13 PROBLEM — S62.315D: Status: ACTIVE | Noted: 2022-07-07

## 2024-09-13 PROBLEM — E78.5 HYPERLIPIDEMIA: Status: ACTIVE | Noted: 2022-06-15

## 2024-09-13 PROBLEM — J32.9 CHRONIC SINUSITIS: Status: ACTIVE | Noted: 2019-04-01

## 2024-09-13 PROBLEM — R73.03 PREDIABETES: Status: ACTIVE | Noted: 2018-07-13

## 2024-09-13 PROBLEM — I51.9 HEART DISEASE: Status: ACTIVE | Noted: 2024-09-13

## 2024-09-13 PROBLEM — I20.89 STABLE ANGINA (CMS-HCC): Status: ACTIVE | Noted: 2020-07-02

## 2024-09-13 PROBLEM — E04.1 THYROID NODULE: Status: ACTIVE | Noted: 2020-09-24

## 2024-09-13 PROBLEM — R22.1 NECK MASS: Status: ACTIVE | Noted: 2020-09-15

## 2024-09-13 PROBLEM — I73.00 RAYNAUD'S PHENOMENON: Status: ACTIVE | Noted: 2019-01-21

## 2024-09-13 PROBLEM — G89.4 CHRONIC PAIN DISORDER: Status: ACTIVE | Noted: 2021-02-15

## 2024-09-13 PROBLEM — R59.1 LYMPHADENOPATHY: Status: ACTIVE | Noted: 2021-01-26

## 2024-09-13 PROBLEM — I20.9 TYPICAL ANGINA (CMS-HCC): Status: ACTIVE | Noted: 2022-06-15

## 2024-09-13 PROBLEM — N95.1 MENOPAUSAL AND FEMALE CLIMACTERIC STATES: Status: ACTIVE | Noted: 2019-01-21

## 2024-09-13 PROBLEM — M06.4 POLYARTHRITIS, INFLAMMATORY (MULTI): Status: ACTIVE | Noted: 2018-10-17

## 2024-09-13 PROBLEM — R83.0 ABNORMAL FINDINGS IN CEREBROSPINAL FLUID, ABNORMAL LEVEL OF ENZYMES: Status: ACTIVE | Noted: 2022-06-15

## 2024-09-13 PROBLEM — R32 URINARY INCONTINENCE: Status: ACTIVE | Noted: 2020-05-11

## 2024-09-13 PROBLEM — R20.2 PARESTHESIA OF BILATERAL LEGS: Status: ACTIVE | Noted: 2019-01-21

## 2024-09-13 PROBLEM — M15.9 GENERALIZED OSTEOARTHRITIS: Status: ACTIVE | Noted: 2021-02-15

## 2024-09-13 PROBLEM — R00.2 PALPITATIONS: Status: ACTIVE | Noted: 2020-08-05

## 2024-09-13 PROBLEM — M25.539 ARTHRALGIA OF WRIST: Status: ACTIVE | Noted: 2018-10-02

## 2024-09-13 PROBLEM — R09.81 SINUS CONGESTION: Status: ACTIVE | Noted: 2018-04-12

## 2024-09-13 PROBLEM — M79.642 LEFT HAND PAIN: Status: ACTIVE | Noted: 2020-08-06

## 2024-09-13 PROBLEM — Z98.890 HISTORY OF CARDIAC CATHETERIZATION: Status: ACTIVE | Noted: 2021-04-14

## 2024-09-13 PROBLEM — R04.0 EPISTAXIS: Status: ACTIVE | Noted: 2021-12-28

## 2024-09-13 PROBLEM — M79.605 PAIN OF LEFT LOWER EXTREMITY: Status: ACTIVE | Noted: 2019-01-21

## 2024-09-13 PROBLEM — Z95.1 HISTORY OF CORONARY ARTERY BYPASS SURGERY: Status: ACTIVE | Noted: 2022-06-15

## 2024-09-13 PROBLEM — F17.200 TOBACCO DEPENDENCE SYNDROME: Status: ACTIVE | Noted: 2020-09-15

## 2024-09-13 PROBLEM — R06.02 SHORTNESS OF BREATH: Status: ACTIVE | Noted: 2020-08-05

## 2024-09-13 PROBLEM — M65.4 RADIAL STYLOID TENOSYNOVITIS: Status: ACTIVE | Noted: 2019-03-19

## 2024-09-13 PROBLEM — R94.39 ABNORMAL CARDIOVASCULAR STRESS TEST: Status: ACTIVE | Noted: 2020-08-03

## 2024-09-13 PROBLEM — I73.00 RAYNAUD'S DISEASE: Status: ACTIVE | Noted: 2024-09-13

## 2024-09-13 LAB — POC HEMOGLOBIN A1C: 6.2 % (ref 4.2–6.5)

## 2024-09-13 PROCEDURE — 83036 HEMOGLOBIN GLYCOSYLATED A1C: CPT | Performed by: FAMILY MEDICINE

## 2024-09-13 PROCEDURE — 3078F DIAST BP <80 MM HG: CPT | Performed by: FAMILY MEDICINE

## 2024-09-13 PROCEDURE — 3078F DIAST BP <80 MM HG: CPT | Performed by: INTERNAL MEDICINE

## 2024-09-13 PROCEDURE — 90673 RIV3 VACCINE NO PRESERV IM: CPT | Performed by: FAMILY MEDICINE

## 2024-09-13 PROCEDURE — 3008F BODY MASS INDEX DOCD: CPT | Performed by: INTERNAL MEDICINE

## 2024-09-13 PROCEDURE — 1036F TOBACCO NON-USER: CPT | Performed by: INTERNAL MEDICINE

## 2024-09-13 PROCEDURE — 4010F ACE/ARB THERAPY RXD/TAKEN: CPT | Performed by: INTERNAL MEDICINE

## 2024-09-13 PROCEDURE — 3074F SYST BP LT 130 MM HG: CPT | Performed by: INTERNAL MEDICINE

## 2024-09-13 PROCEDURE — 3074F SYST BP LT 130 MM HG: CPT | Performed by: FAMILY MEDICINE

## 2024-09-13 PROCEDURE — 90471 IMMUNIZATION ADMIN: CPT | Performed by: FAMILY MEDICINE

## 2024-09-13 PROCEDURE — 99205 OFFICE O/P NEW HI 60 MIN: CPT | Performed by: INTERNAL MEDICINE

## 2024-09-13 PROCEDURE — 4010F ACE/ARB THERAPY RXD/TAKEN: CPT | Performed by: FAMILY MEDICINE

## 2024-09-13 PROCEDURE — 99214 OFFICE O/P EST MOD 30 MIN: CPT | Performed by: FAMILY MEDICINE

## 2024-09-13 ASSESSMENT — ENCOUNTER SYMPTOMS
LOSS OF SENSATION IN FEET: 0
OCCASIONAL FEELINGS OF UNSTEADINESS: 0
DEPRESSION: 0

## 2024-09-13 ASSESSMENT — PATIENT HEALTH QUESTIONNAIRE - PHQ9
2. FEELING DOWN, DEPRESSED OR HOPELESS: NOT AT ALL
SUM OF ALL RESPONSES TO PHQ9 QUESTIONS 1 AND 2: 0
1. LITTLE INTEREST OR PLEASURE IN DOING THINGS: NOT AT ALL

## 2024-09-13 ASSESSMENT — PAIN SCALES - GENERAL: PAINLEVEL: 3

## 2024-09-13 NOTE — PATIENT INSTRUCTIONS
SEE IN 6 MONTHS ABOUT THE DIABETES  USE FLONASE OTC FOR THE EAR ISSUE   TWO SPRAYS BOTH SIDES OF THE NOSE DAILY   HAD THE FLU VACCINE     INSTRUCTED IN  SOME EXERCISES FOR UPPER BACK

## 2024-09-13 NOTE — PROGRESS NOTES
Subjective   Patient ID: Raquel Horn is a 59 y.o. female who presents for Diabetes (Last A1C was completed on 3/8/24 resulting in 5.8.  Today in office 6.2.), EAR BLOCKAGE, Nasal Congestion (DAILY), Back Pain (UPPER BACK BETWEEN SHOULDER BLADES. ), and Hot Flashes (AND NIGHT SWEATS).    HPI SEVERAL ISSUES TODAY   60 YO HAVE MENOPAUSAL SYMPTOMS  PERIODS STOPPED   SHE IS DIABETIC WITH GOOD LONG TERM CONTROL   EAR ACHES      Review of Systems   Constitutional:  Negative for activity change, appetite change, fever and unexpected weight change.   HENT:  Negative for congestion, ear pain, postnasal drip, rhinorrhea, sinus pressure and sore throat.    Eyes:  Negative for discharge, itching and visual disturbance.   Respiratory:  Negative for cough, shortness of breath and wheezing.    Cardiovascular:  Negative for chest pain, palpitations and leg swelling.   Gastrointestinal:  Negative for abdominal pain, blood in stool, constipation, diarrhea, nausea and vomiting.   Endocrine: Positive for cold intolerance and heat intolerance. Negative for polyuria.        HOT FLASHES    Genitourinary:  Positive for menstrual problem. Negative for dysuria, flank pain and hematuria.        NO BLEEDING BUT TERRIBLE HOT FLASHES    Musculoskeletal:  Negative for arthralgias, gait problem, joint swelling and myalgias.   Skin:  Negative for rash.   Allergic/Immunologic: Negative for environmental allergies and food allergies.   Neurological:  Negative for dizziness, syncope, weakness, light-headedness, numbness and headaches.   Psychiatric/Behavioral:  Negative for dysphoric mood and sleep disturbance. The patient is not nervous/anxious.        Objective   /60   Pulse 85   Temp 36.5 °C (97.7 °F)   Wt 87.1 kg (192 lb)   SpO2 100%   BMI 28.35 kg/m²     Physical Exam  Vitals and nursing note reviewed.   Constitutional:       General: She is in acute distress.      Appearance: Normal appearance.      Comments: HAVING HOT FLASHES AND  FANNING HERSELF WITH NO HELP    HENT:      Head: Normocephalic.   Cardiovascular:      Rate and Rhythm: Normal rate and regular rhythm.      Pulses: Normal pulses.      Heart sounds: Normal heart sounds. No murmur heard.     No friction rub. No gallop.   Pulmonary:      Effort: Pulmonary effort is normal. No respiratory distress.      Breath sounds: Normal breath sounds. No wheezing.   Abdominal:      General: There is no distension.      Palpations: Abdomen is soft.      Tenderness: There is no abdominal tenderness.   Musculoskeletal:         General: No deformity. Normal range of motion.   Skin:     General: Skin is warm and dry.      Capillary Refill: Capillary refill takes less than 2 seconds.   Neurological:      General: No focal deficit present.      Mental Status: She is alert and oriented to person, place, and time.   Psychiatric:         Mood and Affect: Mood normal.         Assessment/Plan   Problem List Items Addressed This Visit             ICD-10-CM    Type 2 diabetes mellitus without complication, without long-term current use of insulin (Multi) - Primary E11.9    Relevant Orders    POCT glycosylated hemoglobin (Hb A1C) manually resulted (Completed)    Need for influenza vaccination Z23    Relevant Orders    Flu vaccine, trivalent, preservative free, no egg protein, age 18y+ (Flublok) (Completed)    Encounter for screening mammogram for malignant neoplasm of breast Z12.31    Relevant Orders    BI mammo bilateral screening tomosynthesis

## 2024-09-13 NOTE — PROGRESS NOTES
"Subjective   Patient ID: [id number]   Dinorah Horn \"Raquel\" is a 59 y.o. female who presents for arthritis?    HPI   was seeing Dr. Ashli Wilson 05/2023:  She was diagnosed with psoriasis in the 1990's, started in left elbow.  Then diagnosed with psoriatic arthritis/rheumatoid arthritis in 2019 based on pain in her hands, wrists, shoulders, knees, ankles, hip. Prior notes also report enthesitis and dactylitis.   Prior treatments: cosentyx (bad skin reaction), enbrel (not effective), humira (not effective)  Current treatment: tremfya (started 1 year ago) - psoriasis cleared, joints improved by 50%  Per A&P:  Continue tremfya and add HCQ 200mg bid for active arthritis and dactylitis of 2nd and 3rd digits.   She also had a R subacromial bursa CSI during that visit.     She then saw Dr. Nation 02/2024:  Was still on HCQ, A&P:  She states both her prev rheum didn't want her on tremfya d/t cirrhosis, although she was on in from most recent rheum, and tremfya isn't usually a problem with liver dz, but she is concerned.  She will be seeing hepatol in May and can d/w him.  Her cirrhosis precludes the use of most DMARDs.  She will continue hcq - reviewed need for yearly eye exam.  She has had rash with cosentyx, and humira and enbrel didn't help.  +RF likely d/t liver dz.  CCP Ab neg  +SSA - can be seen in PBC and other liver dz.  She doesn't have partic s/s of CTD  She declined trigger finger but requested R subacr injection although unable to elicit.  R subacr injection - expl risks/benefits.  Pt gave written consent.  Aseptic tech, injected with 40mg kenalog and 1ml 1% lido  She will follow up after appt with hepatol in May  Rheumatologic labs have showed a + GORDON, SSA>8, +RF, neg CCP    Current IS:  HCQ 200mg daily     Psoriasis is doing well overall, only small spot on forehead. Reports that joint pain was an issue prior to diagnosis of PsO (bilateral CMCs, wrists, R middle finger trigger finger PIP>DIP and 2nd and " 3rd MCPs), worse with activity. Worst are trigger finger and the thumbs. Associated with joint swelling. Denies dactylitis.   She limps early in the morning first thing after she wakes up because of her L ankle. Gets better after a few hours but then worse again at the end of the day.   Has had plantar fascitis.   Knees are sore and crunch, worse with steps, stairs, and activity.   Elbows are good.   B/l shoulder pain, R>L and R SAB injections help.   She reports no prior difference in symptoms while on/off tremfya. Unsure if HCQ is helping.     Lately upper back is hurting at level of shoulder blades, with activity.       She is getting worked up by GI for suspected cirrhosis and is getting a liver biopsy. EGD with very small esophageal varices.     ROS  Constitutional: Denies fever, unintentional weight loss, night sweats or headaches  Eyes: Denies dry eyes. No blurry vision, redness or pain or photophobia  ENT: Denies dry mouth, dental loss, loss of taste, nasal or oral ulcers, difficulty swallowing  Cardiovascular: Denies chest pain  Respiratory: Denies shortness of breath, or recurrent respiratory infections  Gastrointestinal: Denies nausea, vomiting, heartburn, abdominal pain. Has alternating constipation and diarrhea. No melena or hematochezia  Genitourinary: No recurrent urinary infections or STDs, no genital or anal ulcers.  Integumentary: Denies photosensitivity, rash or lesions, + biphasic Raynaud's phenomenon  Neurological: + numbness or tingling in hands and feet. Has CTS. No muscle weakness. + urinary incontinence   Hematologic/Lymphatic: Denies history of clots (arterial or venous), or abortions/miscarriages.   MSK: as above.   Muscular: Denies weakness, difficulty rising from chair or combing the hair, muscle aches, or problems with hand      Very seldom feels anxious or depressed. Feels unrefreshed in AM and fatigue.     PMH/PSH: DM, CAD s/p CABG, GERD, IBS, cervical DJD, Fibromyalgia, PsO,  fatty liver disease suspected cirrhosis, R knee torn ligament.   Social: has 4 children. No alcohol intake, ex-smoker quit in 2009, no drug use.   FHx: No family history of autoimmune diseases       Patient Active Problem List   Diagnosis    Gastroesophageal reflux disease without esophagitis    Insomnia    Alcoholic cirrhosis (Multi)    Type 2 diabetes mellitus without complication, without long-term current use of insulin (Multi)    Abnormal cardiovascular stress test    Abnormal findings in cerebrospinal fluid, abnormal level of enzymes    Alcohol intake above recommended sensible limits    Alopecia areata    Arthralgia of wrist    Multiple joint pain    Arteriosclerosis of coronary artery    Breast implant status    Chest pain    Candidiasis of skin    Capsular contracture of breast implant    Chronic pain disorder    Chronic sinusitis    Fibromyalgia    Closed displaced fracture of base of fourth metacarpal bone of left hand with routine healing    Depressive disorder    Epistaxis    Generalized osteoarthritis    Heart disease    Hiatal hernia    History of cardiac catheterization    Lung nodule    Left hand pain    Mixed hyperlipidemia    Hyperlipidemia    History of coronary artery bypass surgery    Lymphadenopathy    Memory loss    Menopausal and female climacteric states    Neck mass    Osteoarthritis    Pain in unspecified knee    Pain of left lower extremity    Palpitations    Paresthesia of bilateral legs    Pityriasis rosea    Precordial pain    Rheumatoid arthritis (Multi)    Polyarthritis, inflammatory (Multi)    Prediabetes    Primary hypertension    Prolonged QT interval    Psoriasis    Psoriatic arthritis (Multi)    Ptosis of breast    Radial styloid tenosynovitis    Shortness of breath    Raynaud's phenomenon    Raynaud's disease    Rash and other nonspecific skin eruption    Trigger finger    Tobacco dependence syndrome    Thyroid nodule    Steatosis of liver    Typical angina (CMS-HCC)    Stable  angina (CMS-HCC)    Sinus congestion    Unintentional weight loss    Urinary incontinence        Past Medical History:   Diagnosis Date    Arthritis     Coronary artery disease     Depression     Diabetes mellitus (Multi)     GERD (gastroesophageal reflux disease)     Headache     Heart disease     Hypertension     Visual impairment         Past Surgical History:   Procedure Laterality Date    APPENDECTOMY      BREAST SURGERY  2003    Augmentation    CORONARY ARTERY BYPASS GRAFT      FOREARM FRACTURE SURGERY Right 1992    FOREARM HARDWARE REMOVAL Right 2003    HYSTERECTOMY  Partial, 2000        Social History     Socioeconomic History    Marital status:      Spouse name: Not on file    Number of children: Not on file    Years of education: Not on file    Highest education level: Not on file   Occupational History    Not on file   Tobacco Use    Smoking status: Former     Current packs/day: 0.00     Average packs/day: 1 pack/day for 15.0 years (15.0 ttl pk-yrs)     Types: Cigarettes     Start date: 4/4/1994     Quit date: 4/4/2009     Years since quitting: 15.4    Smokeless tobacco: Never    Tobacco comments:     I smoked on and off through a 15 year period.   Vaping Use    Vaping status: Never Used   Substance and Sexual Activity    Alcohol use: Not Currently     Comment: 0    Drug use: Never    Sexual activity: Not Currently     Partners: Male     Birth control/protection: None     Comment: My cervix has been removed.   Other Topics Concern    Not on file   Social History Narrative    Not on file     Social Determinants of Health     Financial Resource Strain: Low Risk  (7/28/2023)    Received from [facility]    Overall Financial Resource Strain (CARDIA)     Difficulty of Paying Living Expenses: Not hard at all   Food Insecurity: No Food Insecurity (7/28/2023)    Received from [facility]    Hunger Vital Sign     Worried About Running Out of Food in the Last  Year: Never true     Ran Out of Food in the Last Year: Never true   Transportation Needs: No Transportation Needs (7/28/2023)    Received from [facility]    PRAPARE - Transportation     Lack of Transportation (Medical): No     Lack of Transportation (Non-Medical): No   Physical Activity: Unknown (7/28/2023)    Received from [facility]    Exercise Vital Sign     Days of Exercise per Week: 3 days     Minutes of Exercise per Session: Patient declined   Stress: Stress Concern Present (7/28/2023)    Received from [facility]    [employer] - Occupational Stress Questionnaire     Feeling of Stress : To some extent   Social Connections: Moderately Isolated (7/28/2023)    Received from [facility]    Social Connection and Isolation Panel [NHANES]     Frequency of Communication with Friends and Family: More than three times a week     Frequency of Social Gatherings with Friends and Family: Twice a week     Attends Oriental orthodox Services: Never     Active Member of Clubs or Organizations: No     Attends Club or Organization Meetings: Never     Marital Status:    Intimate Partner Violence: Not on file   Housing Stability: Low Risk  (7/28/2023)    Received from [facility]    Housing Stability Vital Sign     Unable to Pay for Housing in the Last Year: No     Number of Places Lived in the Last Year: 1     Unstable Housing in the Last Year: No        Allergies   Allergen Reactions    Cosentyx [Secukinumab] Rash          Current Outpatient Medications:     aspirin 81 mg EC tablet, Take 1 tablet (81 mg) by mouth once daily., Disp: , Rfl:     cholecalciferol (Vitamin D3) 25 MCG (1000 UT) capsule, Take 1 capsule (25 mcg) by mouth once daily., Disp: , Rfl:     cyanocobalamin (Vitamin B-12) 50 mcg tablet, Take 1 tablet (50 mcg) by mouth once daily., Disp: , Rfl:     ferrous sulfate, 325 mg  ferrous sulfate, tablet, Take 1 tablet (325 mg) by mouth once daily., Disp: , Rfl:     hydroCHLOROthiazide (Microzide) 12.5 mg tablet, Take 1 tablet (12.5 mg) by mouth once daily., Disp: 90 tablet, Rfl: 1    hydroxychloroquine (Plaquenil) 200 mg tablet, Take 1 tablet (200 mg) by mouth once daily., Disp: 90 tablet, Rfl: 1    lisinopril 20 mg tablet, Take 1 tablet (20 mg) by mouth once daily., Disp: 90 tablet, Rfl: 1    metFORMIN  mg 24 hr tablet, Take 1 tablet (500 mg) by mouth 2 times daily (morning and late afternoon). Take one daily, Disp: 180 tablet, Rfl: 1    metoprolol tartrate (Lopressor) 25 mg tablet, Take 1 tablet (25 mg) by mouth 2 times a day., Disp: 180 tablet, Rfl: 1    multivitamin tablet, Take 1 tablet by mouth once daily., Disp: , Rfl:     nitroglycerin (Nitrostat) 0.3 mg SL tablet, Place 1 tablet (0.3 mg) under the tongue every 5 minutes if needed for chest pain., Disp: , Rfl:     omeprazole (PriLOSEC) 40 mg DR capsule, Take 1 capsule (40 mg) by mouth once daily in the morning. Take before meals. Do not crush or chew., Disp: 90 capsule, Rfl: 3    rosuvastatin (Crestor) 20 mg tablet, Take 1 tablet (20 mg) by mouth once daily., Disp: 90 tablet, Rfl: 0    traZODone (Desyrel) 50 mg tablet, Take 0.5 tablets (25 mg) by mouth once daily at bedtime., Disp: 15 tablet, Rfl: 3    venlafaxine (Effexor) 75 mg tablet, Take 1 tablet (75 mg) by mouth 2 times a day., Disp: 90 tablet, Rfl: 0    venlafaxine XR (Effexor-XR) 150 mg 24 hr capsule, Take 1 capsule (150 mg) by mouth once daily. Do not crush or chew., Disp: , Rfl:        Objective     Visit Vitals  /75   Pulse 89      Physical Exam  General: Flat affect. AAOx3, Cooperative  Head: normocephalic, atraumatic  Eyes: EOMI, conjunctiva clear, sclera white, anicteric  Throat/Mouth: No oral deformities, no cheek swelling, mucosa appear dry, no oral ulcers noted or loss of dentition   Neck/Lymph: FROM, trachea midline  Neuro: CN II-XII grossly intact, no  "focal deficit  Skin: No PsO evident on exam today or photosensitive areas  MSK:   Significant diffuse myofascial tenderness.   Upper Extremities:  Hand/Fingers: fullness of 2nd and 3rd MCPs bilaterally, ttp and synovial thickening of 2nd and 3rd MCPs bilaterally, R 2nd and 3rd PIPs, L 2nd and 3rd PIP. Synovial thickening of R 4th PIP but no ttp.  + grind test bilaterally.   Wrists: bilateral ttp, swelling at wrist, and pain on flexion/extension.    Elbows: No tenderness, swelling, erythema or warmth at elbows, FROM grossly. No nodules, no enthesitis.   Shoulders: Shoulder ROM limited to 150 deg L and 140 deg R   Lower Extremities:   Knees: No tenderness, deformities, swelling, rashes, or warmth, normal ROM grossly. + crepitu  Ankles: b/l lateral ankle ttp.  No tenderness, edema, erythema of joint line.  Feet: Negative MTP squeeze. Normal ROM grossly.        Lab Results   Component Value Date    WBC 6.8 09/05/2024    HGB 11.2 (L) 09/05/2024    HCT 33.8 (L) 09/05/2024    MCV 97 09/05/2024     09/05/2024        Chemistry    Lab Results   Component Value Date/Time     05/30/2024 1721    K 4.2 05/30/2024 1721     05/30/2024 1721    CO2 26 05/30/2024 1721    BUN 20 05/30/2024 1721    CREATININE 0.83 05/30/2024 1721    Lab Results   Component Value Date/Time    CALCIUM 9.6 05/30/2024 1721    ALKPHOS 84 09/05/2024 1632    AST 18 09/05/2024 1632    ALT 21 09/05/2024 1632    BILITOT 0.4 09/05/2024 1632           No results found for: \"CRP\"   Lab Results   Component Value Date    GORDON Positive (A) 02/05/2024      No results found for: \"CKTOTAL\"  Lab Results   Component Value Date    NEUTROABS 3.92 09/05/2024      Lab Results   Component Value Date    FERRITIN 305 (H) 09/05/2024      Lab Results   Component Value Date    HEPAIGM Nonreactive 05/30/2024    HEPBCIGM Nonreactive 05/30/2024    HEPCAB Nonreactive 05/30/2024      Lab Results   Component Value Date    ALT 21 09/05/2024    AST 18 09/05/2024    ALKPHOS " "84 09/05/2024    BILITOT 0.4 09/05/2024      No results found for: \"PPD\"   No results found for: \"URICACID\"   Lab Results   Component Value Date    CALCIUM 9.6 05/30/2024      No results found for: \"SPEP\", \"UPEP\"   No results found for: \"ALBUR\", \"[id number]\"      XR hands 2023 at [facility]  RESULT:   Healing nondisplaced fourth metacarpal base fracture with interval   decreased conspicuity of the fracture and slight interval callus   formation.  No new fracture.  No dislocation.  Severe first CMC and   triscaphe degenerative changes scattered mild degenerative changes of   some of the IP joints.  Diffuse osteopenia.     MR C spine 05/24/2022  MPRESSION:     1. Congenital cervical canal narrowing with superimposed disc/joint   degeneration.     2. Moderate canal narrowing at C5-6 and C6-7.     3.  Significant bony bilateral C4-5 and C5-6 and C6-7 foramina narrowing.     COUNTING REFERENCE: Superior cervical disc is taken as C2-3.  Structural   anomalies: None.        Assessment/Plan    A 59 year old F with DM, CAD s/p CABG, cervical DJD, Fibromyalgia, PsO, fatty liver disease suspected cirrhosis, currently undergoing work up.     She's had a prior diagnosis of RA/PsA. She describes her pains mostly as mechanical and her most bothersome joint is her CMCs bilaterally which is mostly likely from OA and also has polyarticular OA. Her RF is low titer but also can be seen in liver disease. I am picking up evidence of synovitis and synovial thickening of multiple peripheral joints. No dactylits or enthesitis on exam today. No uveitis or IBP or IBD.   She is currently underdosed for her weight with HCQ. Reports no prior benefit to TNFi and Tremfya.     -Labs today   -Will start by obtaining an MSK US of her hands and wrists and ankles to evaluate for additional dactylitis/enthesitis vs synovitis.   -Treatment escalation will be challenging given her suspect liver cirrhosis. Will need to discuss with hepatologist prior to adding.   - " Patient is due for her yearly eye exam.     DEXA 03/2023 reviewed and normal.     RTC in 1 month for discussion of results       Ravi Mcqueen MD  Division of Rheumatology   [facility]

## 2024-09-16 PROBLEM — Z23 NEED FOR INFLUENZA VACCINATION: Status: ACTIVE | Noted: 2024-09-16

## 2024-09-16 PROBLEM — Z12.31 ENCOUNTER FOR SCREENING MAMMOGRAM FOR MALIGNANT NEOPLASM OF BREAST: Status: ACTIVE | Noted: 2024-09-16

## 2024-09-16 ASSESSMENT — ENCOUNTER SYMPTOMS
FEVER: 0
DIZZINESS: 0
CONSTIPATION: 0
DYSPHORIC MOOD: 0
LIGHT-HEADEDNESS: 0
SINUS PRESSURE: 0
SORE THROAT: 0
PALPITATIONS: 0
VOMITING: 0
ABDOMINAL PAIN: 0
DIARRHEA: 0
UNEXPECTED WEIGHT CHANGE: 0
ENDOCRINE COMMENTS: HOT FLASHES
WEAKNESS: 0
BLOOD IN STOOL: 0
DYSURIA: 0
NERVOUS/ANXIOUS: 0
ACTIVITY CHANGE: 0
JOINT SWELLING: 0
FLANK PAIN: 0
RHINORRHEA: 0
EYE ITCHING: 0
NAUSEA: 0
MYALGIAS: 0
HEMATURIA: 0
WHEEZING: 0
NUMBNESS: 0
SLEEP DISTURBANCE: 0
HEADACHES: 0
SHORTNESS OF BREATH: 0
APPETITE CHANGE: 0
EYE DISCHARGE: 0
ARTHRALGIAS: 0
COUGH: 0

## 2024-09-18 ENCOUNTER — HOSPITAL ENCOUNTER (OUTPATIENT)
Dept: RADIOLOGY | Facility: HOSPITAL | Age: 59
Discharge: HOME | End: 2024-09-18
Payer: MEDICARE

## 2024-09-18 ENCOUNTER — TELEPHONE (OUTPATIENT)
Dept: GASTROENTEROLOGY | Facility: HOSPITAL | Age: 59
End: 2024-09-18
Payer: MEDICARE

## 2024-09-18 DIAGNOSIS — Z87.2 HISTORY OF PSORIATIC ARTHRITIS: ICD-10-CM

## 2024-09-18 DIAGNOSIS — R76.8 RHEUMATOID FACTOR POSITIVE: ICD-10-CM

## 2024-09-18 PROCEDURE — 73562 X-RAY EXAM OF KNEE 3: CPT | Mod: 50

## 2024-09-18 PROCEDURE — 73130 X-RAY EXAM OF HAND: CPT | Mod: 50

## 2024-09-18 PROCEDURE — 73130 X-RAY EXAM OF HAND: CPT | Mod: BILATERAL PROCEDURE | Performed by: RADIOLOGY

## 2024-09-18 PROCEDURE — 73562 X-RAY EXAM OF KNEE 3: CPT | Mod: BILATERAL PROCEDURE | Performed by: RADIOLOGY

## 2024-09-19 ENCOUNTER — LAB (OUTPATIENT)
Dept: LAB | Facility: LAB | Age: 59
End: 2024-09-19
Payer: MEDICARE

## 2024-09-19 DIAGNOSIS — K70.30 ALCOHOLIC CIRRHOSIS OF LIVER WITHOUT ASCITES (MULTI): Primary | ICD-10-CM

## 2024-09-19 DIAGNOSIS — R76.8 RHEUMATOID FACTOR POSITIVE: ICD-10-CM

## 2024-09-19 DIAGNOSIS — Z87.2 HISTORY OF PSORIATIC ARTHRITIS: ICD-10-CM

## 2024-09-19 LAB
CRP SERPL-MCNC: 0.1 MG/DL
ERYTHROCYTE [SEDIMENTATION RATE] IN BLOOD BY WESTERGREN METHOD: 8 MM/H (ref 0–30)

## 2024-09-19 PROCEDURE — 36415 COLL VENOUS BLD VENIPUNCTURE: CPT

## 2024-09-19 PROCEDURE — 86431 RHEUMATOID FACTOR QUANT: CPT

## 2024-09-19 PROCEDURE — 81381 HLA I TYPING 1 ALLELE HR: CPT

## 2024-09-19 PROCEDURE — 86200 CCP ANTIBODY: CPT

## 2024-09-20 LAB
CCP IGG SERPL-ACNC: <1 U/ML
RHEUMATOID FACT SER NEPH-ACNC: 27 IU/ML (ref 0–15)

## 2024-09-25 LAB — HLAB27 TYPING: NEGATIVE

## 2024-09-30 ENCOUNTER — APPOINTMENT (OUTPATIENT)
Dept: RADIOLOGY | Facility: HOSPITAL | Age: 59
End: 2024-09-30
Payer: MEDICARE

## 2024-09-30 ENCOUNTER — TELEPHONE (OUTPATIENT)
Dept: RHEUMATOLOGY | Facility: CLINIC | Age: 59
End: 2024-09-30
Payer: MEDICARE

## 2024-09-30 NOTE — TELEPHONE ENCOUNTER
I tried calling the patient twice, The call did not go through possible because of [employer] national outage. She was contacted to see why she had canceled an ultrasound prior to her appointment.

## 2024-10-01 ENCOUNTER — TELEPHONE (OUTPATIENT)
Dept: RHEUMATOLOGY | Facility: CLINIC | Age: 59
End: 2024-10-01
Payer: MEDICARE

## 2024-10-01 NOTE — TELEPHONE ENCOUNTER
I called to see why the patient had canceled her ultrasound appointment before her next follow up. The patient answered and I was able to speak to her.

## 2024-10-03 ENCOUNTER — HOSPITAL ENCOUNTER (OUTPATIENT)
Dept: CARDIOLOGY | Facility: HOSPITAL | Age: 59
Discharge: HOME | End: 2024-10-03
Payer: MEDICARE

## 2024-10-03 VITALS
WEIGHT: 193 LBS | TEMPERATURE: 97.2 F | DIASTOLIC BLOOD PRESSURE: 67 MMHG | HEART RATE: 75 BPM | BODY MASS INDEX: 28.58 KG/M2 | SYSTOLIC BLOOD PRESSURE: 107 MMHG | OXYGEN SATURATION: 99 % | RESPIRATION RATE: 16 BRPM | HEIGHT: 69 IN

## 2024-10-03 DIAGNOSIS — R79.89 LIVER FUNCTION TEST ABNORMALITY: ICD-10-CM

## 2024-10-03 LAB — GLUCOSE BLD MANUAL STRIP-MCNC: 99 MG/DL (ref 74–99)

## 2024-10-03 PROCEDURE — 75889 VEIN X-RAY LIVER W/HEMODYNAM: CPT | Performed by: STUDENT IN AN ORGANIZED HEALTH CARE EDUCATION/TRAINING PROGRAM

## 2024-10-03 PROCEDURE — 2500000004 HC RX 250 GENERAL PHARMACY W/ HCPCS (ALT 636 FOR OP/ED): Performed by: STUDENT IN AN ORGANIZED HEALTH CARE EDUCATION/TRAINING PROGRAM

## 2024-10-03 PROCEDURE — 75889 VEIN X-RAY LIVER W/HEMODYNAM: CPT | Mod: RIGHT SIDE | Performed by: STUDENT IN AN ORGANIZED HEALTH CARE EDUCATION/TRAINING PROGRAM

## 2024-10-03 PROCEDURE — 99152 MOD SED SAME PHYS/QHP 5/>YRS: CPT | Mod: RIGHT SIDE | Performed by: STUDENT IN AN ORGANIZED HEALTH CARE EDUCATION/TRAINING PROGRAM

## 2024-10-03 PROCEDURE — 2500000005 HC RX 250 GENERAL PHARMACY W/O HCPCS: Performed by: STUDENT IN AN ORGANIZED HEALTH CARE EDUCATION/TRAINING PROGRAM

## 2024-10-03 PROCEDURE — 99152 MOD SED SAME PHYS/QHP 5/>YRS: CPT

## 2024-10-03 PROCEDURE — C1894 INTRO/SHEATH, NON-LASER: HCPCS

## 2024-10-03 PROCEDURE — 7100000010 HC PHASE TWO TIME - EACH INCREMENTAL 1 MINUTE

## 2024-10-03 PROCEDURE — 99153 MOD SED SAME PHYS/QHP EA: CPT

## 2024-10-03 PROCEDURE — 7100000009 HC PHASE TWO TIME - INITIAL BASE CHARGE

## 2024-10-03 PROCEDURE — 99222 1ST HOSP IP/OBS MODERATE 55: CPT | Performed by: NURSE PRACTITIONER

## 2024-10-03 PROCEDURE — 37200 TRANSCATHETER BIOPSY: CPT | Mod: RIGHT SIDE | Performed by: STUDENT IN AN ORGANIZED HEALTH CARE EDUCATION/TRAINING PROGRAM

## 2024-10-03 PROCEDURE — 36012 PLACE CATHETER IN VEIN: CPT | Mod: RIGHT SIDE | Performed by: STUDENT IN AN ORGANIZED HEALTH CARE EDUCATION/TRAINING PROGRAM

## 2024-10-03 PROCEDURE — 37200 TRANSCATHETER BIOPSY: CPT | Performed by: STUDENT IN AN ORGANIZED HEALTH CARE EDUCATION/TRAINING PROGRAM

## 2024-10-03 PROCEDURE — 2500000004 HC RX 250 GENERAL PHARMACY W/ HCPCS (ALT 636 FOR OP/ED): Performed by: NURSE PRACTITIONER

## 2024-10-03 PROCEDURE — 36011 PLACE CATHETER IN VEIN: CPT | Performed by: STUDENT IN AN ORGANIZED HEALTH CARE EDUCATION/TRAINING PROGRAM

## 2024-10-03 PROCEDURE — 82947 ASSAY GLUCOSE BLOOD QUANT: CPT

## 2024-10-03 PROCEDURE — 2550000001 HC RX 255 CONTRASTS: Performed by: STUDENT IN AN ORGANIZED HEALTH CARE EDUCATION/TRAINING PROGRAM

## 2024-10-03 PROCEDURE — 2720000007 HC OR 272 NO HCPCS

## 2024-10-03 PROCEDURE — C1769 GUIDE WIRE: HCPCS

## 2024-10-03 PROCEDURE — 75970 VASCULAR BIOPSY: CPT | Mod: RIGHT SIDE | Performed by: STUDENT IN AN ORGANIZED HEALTH CARE EDUCATION/TRAINING PROGRAM

## 2024-10-03 PROCEDURE — 99153 MOD SED SAME PHYS/QHP EA: CPT | Mod: RIGHT SIDE | Performed by: STUDENT IN AN ORGANIZED HEALTH CARE EDUCATION/TRAINING PROGRAM

## 2024-10-03 PROCEDURE — 76937 US GUIDE VASCULAR ACCESS: CPT | Mod: RIGHT SIDE | Performed by: STUDENT IN AN ORGANIZED HEALTH CARE EDUCATION/TRAINING PROGRAM

## 2024-10-03 RX ORDER — DEXTROSE 50 % IN WATER (D50W) INTRAVENOUS SYRINGE
12.5
Status: DISCONTINUED | OUTPATIENT
Start: 2024-10-03 | End: 2024-10-04 | Stop reason: HOSPADM

## 2024-10-03 RX ORDER — SODIUM CHLORIDE 9 MG/ML
100 INJECTION, SOLUTION INTRAVENOUS CONTINUOUS
Status: ACTIVE | OUTPATIENT
Start: 2024-10-03 | End: 2024-10-03

## 2024-10-03 RX ORDER — DEXTROSE 50 % IN WATER (D50W) INTRAVENOUS SYRINGE
25
Status: DISCONTINUED | OUTPATIENT
Start: 2024-10-03 | End: 2024-10-04 | Stop reason: HOSPADM

## 2024-10-03 RX ORDER — ACETAMINOPHEN 160 MG/5ML
650 SOLUTION ORAL EVERY 6 HOURS PRN
Status: DISCONTINUED | OUTPATIENT
Start: 2024-10-03 | End: 2024-10-04 | Stop reason: HOSPADM

## 2024-10-03 RX ORDER — FENTANYL CITRATE 50 UG/ML
INJECTION, SOLUTION INTRAMUSCULAR; INTRAVENOUS AS NEEDED
Status: DISCONTINUED | OUTPATIENT
Start: 2024-10-03 | End: 2024-10-03 | Stop reason: HOSPADM

## 2024-10-03 RX ORDER — LIDOCAINE HYDROCHLORIDE 10 MG/ML
INJECTION, SOLUTION EPIDURAL; INFILTRATION; INTRACAUDAL; PERINEURAL AS NEEDED
Status: DISCONTINUED | OUTPATIENT
Start: 2024-10-03 | End: 2024-10-03 | Stop reason: HOSPADM

## 2024-10-03 RX ORDER — MIDAZOLAM HYDROCHLORIDE 1 MG/ML
INJECTION, SOLUTION INTRAMUSCULAR; INTRAVENOUS AS NEEDED
Status: DISCONTINUED | OUTPATIENT
Start: 2024-10-03 | End: 2024-10-03 | Stop reason: HOSPADM

## 2024-10-03 RX ORDER — ACETAMINOPHEN 325 MG/1
650 TABLET ORAL EVERY 6 HOURS PRN
Status: DISCONTINUED | OUTPATIENT
Start: 2024-10-03 | End: 2024-10-04 | Stop reason: HOSPADM

## 2024-10-03 RX ORDER — SODIUM CHLORIDE 9 MG/ML
100 INJECTION, SOLUTION INTRAVENOUS CONTINUOUS
Status: DISCONTINUED | OUTPATIENT
Start: 2024-10-03 | End: 2024-10-03

## 2024-10-03 RX ORDER — ACETAMINOPHEN 650 MG/1
650 SUPPOSITORY RECTAL EVERY 6 HOURS PRN
Status: DISCONTINUED | OUTPATIENT
Start: 2024-10-03 | End: 2024-10-04 | Stop reason: HOSPADM

## 2024-10-03 ASSESSMENT — ENCOUNTER SYMPTOMS
CARDIOVASCULAR NEGATIVE: 1
CONSTITUTIONAL NEGATIVE: 1
RESPIRATORY NEGATIVE: 1
ALLERGIC/IMMUNOLOGIC NEGATIVE: 1
ARTHRALGIAS: 1
EYES NEGATIVE: 1
NUMBNESS: 1
GASTROINTESTINAL NEGATIVE: 1
HEMATOLOGIC/LYMPHATIC NEGATIVE: 1
PSYCHIATRIC NEGATIVE: 1
ENDOCRINE NEGATIVE: 1

## 2024-10-03 ASSESSMENT — PAIN SCALES - GENERAL
PAINLEVEL_OUTOF10: 0 - NO PAIN

## 2024-10-03 ASSESSMENT — PAIN - FUNCTIONAL ASSESSMENT
PAIN_FUNCTIONAL_ASSESSMENT: 0-10

## 2024-10-03 NOTE — POST-PROCEDURE NOTE
Interventional Radiology Brief Postprocedure Note    Attending: Josemanuel Bell    Diagnosis: Cirrhosis    Description of procedure: Transjugular liver biopsy with pressure measurements     IVC 8/7 (7) mmHg  Hepatic wedge 11/9 (10) mmHg  Free Hepatic 10/8 (9) mmHg  Right atrium 6 mmHg    Anesthesia:  MAC    Complications: None    Estimated Blood Loss: minimal    Medications (Filter: Administrations occurring from 1055 to 1055 on 10/03/24) As of 10/03/24 1055      None          Liver biopsy x2.      See detailed result report with images in PACS.    The patient tolerated the procedure well without incident or complication and is in stable condition.

## 2024-10-03 NOTE — NURSING NOTE
Home going instructions specific to procedure given. Easily arousable; responding appropriately. Vs +/- 20% of pre procedure status. Significant complications are absent. Ambulates without dizziness/age appropriate activity, pulse ox above or equal to 92% on room air/ordered oxygen treatment. Care Plan Complete. Discharge to home accompanied by estelita Park. Discharged via wheelchair.

## 2024-10-03 NOTE — PRE-PROCEDURE NOTE
Interventional Radiology Preprocedure Note    Indication for procedure: The encounter diagnosis was Liver function test abnormality.    Relevant review of systems: NA    Relevant Labs:   Lab Results   Component Value Date    CREATININE 0.83 05/30/2024    EGFR 81 05/30/2024    INR 1.0 05/30/2024    PROTIME 11.7 05/30/2024       Planned Sedation/Anesthesia: Moderate    Airway assessment: normal    Directed physical examination:    GENERAL: awake, no acute distress  HEENT: AT/NC  NECK: supple  CV: RRR  PULM: non-labored breathing  ABDOMEN: soft, ND  NEURO: AAOx3  MSK: full ROM  SKIN: no rash     Mallampati: II (hard and soft palate, upper portion of tonsils and uvula visible)    ASA Score: ASA 2 - Patient with mild systemic disease with no functional limitations    Benefits, risks and alternatives of procedure and planned sedation have been discussed with the patient and/or their representative. All questions answered and they agree to proceed.    None

## 2024-10-03 NOTE — DISCHARGE INSTRUCTIONS
Transjugular Liver Biopsy- Patient and Family Education    What is a Transjugular Liver Biopsy?  A liver biopsy is a test used to help doctors diagnose liver disease. It helps doctors decide how  serious the liver disease is. A small piece of tissue is taken from the liver using a special kind of  needle. The tissue is sent to the lab to be looked at under a microscope. The procedure takes  about 60 minutes.    Before the Test:  ? If you take blood-thinning medications, you Must ask your doctor when you should stop  taking your medicines. You may need to stop taking the medicine up to 7 days prior to  the test.  ? You will have a blood sample drawn.  ? Do Not Drink or Eat Anything after midnight the day before the test.  ? If you have diabetes, ask your Radiologist or Radiology Nurse if you should take your  medicine or adjust the dosage before the test.  ? Notify your doctor if you have any allergies, including allergies to X-Ray dye. Your  doctor may prescribe special pills to take before the test.  During the Test:  ? You will be lying on your back.  ? You will be given a medication to numb the site.  ? You will feel some pressure during the biopsy.  ? You will be given medication through an I.V. to help help you relax for the procedure.  ? You will be hooked up to monitors to measure your blood pressure, heart rate and  breathing.    After the Test:  ? Your doctor will want you to stay in bed 2 to 4 hours after the test.  ? Your blood pressure, heart rate, and biopsy site will be checked.    Follow these Instructions for a Safer Recovery:  Activity:  ? Rest by sitting up for 4 to 6 hours.  ? Limited activity for 24 hours after the test.  ? No driving for 24 hours. You will need someone to drive you home.  ? Do not do any heavy lifting, such as groceries or toddlers, for 48 hours.  ? Avoid intense exercise and contact sports for 48 hours.    Diet:  ? You may resume your normal diet.    Medicines:  ? If you take  blood-thinning medications.  ? You may take your other medicines as ordered by your doctor.    Sedation:  If you received medication for sedation, it will be active in your body for approximately 24  hours. So you may feel a little sleepy. Therefore, for the next 24 hours:  ? DO NOT drive a car, operate machinery or power tools. It is recommended that a   responsible adult be with you for the first 24 hours.  ? DO NOT drink any alcoholic beverages or take any non-prescriptive medications that   contain alcohol.  ? DO NOT make any important decisions or sign any legal/important documents.    Call your Doctor if you have:  ? Redness, swelling or pus-like drainage at the biopsy site.  ? Fever over 100.4 F or higher.  ? Pain or tenderness at the biopsy site that gets worst after the procedure.  ? Any Questions.    Call your Doctor right away, if you have any of these signs:  ? Bleeding from the biopsy site.  ? Shortness of breath or trouble breathing.  ? Increase in pain in the procedure site.  ? Dizziness or fainting.    If you are not able to contact your doctor, call 911 and go to the nearest Emergency Room.     How to Reach your Doctor:    Call [phone] with problems or questions.    This info is a general resource. It is not meant to replace your health care provider’s advice.  Ask your doctor or health care team any questions. Always follow their instructions.

## 2024-10-03 NOTE — H&P
"History Of Present Illness  Dinorah Horn \"Raquel\" is a 59 y.o. female presenting with suspected cirrhosis, here for transjugular liver biopsy. PMHx significant for DM2, CAD s/p CABG, cervical DJD, Fibromyalgia, psoriasis, hx heavy etoh use, HLD, OA, anemia.   Past Medical History:  Past Medical History:   Diagnosis Date    Arthritis     Coronary artery disease     Depression     Diabetes mellitus (Multi)     GERD (gastroesophageal reflux disease)     Headache     Heart disease     Hypertension     Visual impairment         Past Surgical History:  Past Surgical History:   Procedure Laterality Date    APPENDECTOMY      BREAST SURGERY  2003    Augmentation    CORONARY ARTERY BYPASS GRAFT      FOREARM FRACTURE SURGERY Right 1992    FOREARM HARDWARE REMOVAL Right 2003    HYSTERECTOMY  Partial, 2000          Social History:   reports that she quit smoking about 15 years ago. Her smoking use included cigarettes. She started smoking about 30 years ago. She has a 15 pack-year smoking history. She has never used smokeless tobacco. She reports that she does not currently use alcohol. She reports that she does not use drugs.     Family History:  Family History   Problem Relation Name Age of Onset    Alcohol abuse Mother Trice     Cancer Mother Trice     Diabetes Mother Trice     Heart disease Mother Trice     Arthritis Father Reed     Atrial fibrillation Father Reed     Cancer Father Reed     COPD Father Reed     Hearing loss Father Reed     Heart disease Father Reed     Stroke Paternal Grandfather Guy     Alcohol abuse Paternal Grandmother Brigette     Alcohol abuse Sister Ioana     Alcohol abuse Sister Alis     Alcohol abuse Daughter Katerine     Cancer Father's Brother Xavier     Cancer Father's Brother Daljit     Colon cancer Father's Brother Daljit JAFEF     Drug abuse Sister Ioana GARCIA         Allergies:  Allergies   Allergen Reactions    Cosentyx [Secukinumab] Rash        Home " Medications:  Current Outpatient Medications   Medication Instructions    aspirin 81 mg, oral, Daily    cholecalciferol (VITAMIN D3) 25 mcg, oral, Daily    cyanocobalamin (VITAMIN B-12) 50 mcg, oral, Daily    ferrous sulfate (325 mg ferrous sulfate) 325 mg, oral, Daily    hydroCHLOROthiazide (MICROZIDE) 12.5 mg, oral, Daily    hydroxychloroquine (PLAQUENIL) 200 mg, oral, Daily    lisinopril 20 mg, oral, Daily    metFORMIN XR (GLUCOPHAGE-XR) 500 mg, oral, 2 times daily (morning and late afternoon), Take one daily    metoprolol tartrate (LOPRESSOR) 25 mg, oral, 2 times daily    multivitamin tablet 1 tablet, oral, Daily    nitroglycerin (NITROSTAT) 0.3 mg, sublingual, Every 5 min PRN    omeprazole (PRILOSEC) 40 mg, oral, Daily before breakfast, Do not crush or chew.    rosuvastatin (CRESTOR) 20 mg, oral, Daily    traZODone (DESYREL) 25 mg, oral, Nightly    venlafaxine (EFFEXOR) 75 mg, oral, 2 times daily    venlafaxine XR (EFFEXOR-XR) 150 mg, oral, Daily, Do not crush or chew.       Inpatient Medications:  Scheduled medications   Medication Dose Route Frequency     PRN medications   Medication    dextrose    dextrose    glucagon    glucagon     Continuous Medications   Medication Dose Last Rate    sodium chloride 0.9%  100 mL/hr           Review of Systems   Constitutional: Negative.    HENT: Negative.     Eyes: Negative.    Respiratory: Negative.     Cardiovascular: Negative.    Gastrointestinal: Negative.    Endocrine: Negative.    Genitourinary: Negative.    Musculoskeletal:  Positive for arthralgias.   Skin: Negative.    Allergic/Immunologic: Negative.    Neurological:  Positive for numbness (parasthesia).   Hematological: Negative.    Psychiatric/Behavioral: Negative.            Physical Exam  General:  Patient is awake, alert, and oriented.  Patient is in no acute distress.  HEENT:  Pupils equal and reactive.  Normocephalic.  Moist mucosa.    Neck:  No JVD.   Cardiovascular:  Regular rate and rhythm.  Normal S1  "and S2. No murmurs/rubs/gallops. Radial pulses 2+.   Pulmonary:  Clear to auscultation bilaterally.  Abdomen:  Soft. Non-tender.   Non-distended.  Positive bowel sounds.  Lower Extremities:  Pedal pulses 2+ No LE edema.  Neurologic:  Cranial nerves II-XII grossly intact.   No focal deficit.   Skin: Skin warm and dry, no lesions. Normal skin turgor.   Psychiatric: Normal affect.     Sedation Plan    ASA 3     Mallampati class: I.    Risks, benefits, and alternatives discussed with patient.         NPO since 0000    Last Recorded Vitals  Blood pressure 137/80, pulse 76, temperature 36 °C (96.8 °F), temperature source Temporal, resp. rate 16, height 1.753 m (5' 9\"), weight 87.5 kg (193 lb), SpO2 96%.         Vitals from the Past 24 Hours  Heart Rate:  [76]   Temp:  [36 °C (96.8 °F)]   Resp:  [16]   BP: (137)/(80)   Height:  [175.3 cm (5' 9\")]   Weight:  [87.5 kg (193 lb)]   SpO2:  [96 %]          Relevant Results    Labs    CBC:   Recent Labs     09/05/24  1632 06/28/24  1458 06/13/24  0809 05/30/24  1721 02/05/24  1530   WBC 6.8 9.0 5.1 5.5 5.9   HGB 11.2* 10.6* 9.3* 10.3* 12.3   HCT 33.8* 32.0* 28.2* 31.0* 37.1    259 220 169 215   MCV 97 99 98 96 98     BMP/CMP:   Recent Labs     09/05/24  1632 05/30/24  1721 02/05/24  1530   NA  --  138 135*   K  --  4.2 3.8   CL  --  102 98   BUN  --  20 14   CREATININE  --  0.83 0.57   CO2  --  26 28   CALCIUM  --  9.6 10.1   PROT 7.4 7.4 8.2   BILITOT 0.4 0.5 0.5   ALKPHOS 84 68 102   ALT 21 18 31   AST 18 20 29   GLUCOSE  --  76 93      Lipid Panel: No results for input(s): \"CHOL\", \"HDL\", \"CHHDL\", \"LDL\", \"VLDL\", \"TRIG\", \"NHDL\" in the last 47816 hours.  Cardiac       No lab exists for component: \"CK\", \"CKMBP\"   Hemoglobin A1C:   Recent Labs     09/13/24  1104 03/08/24  1129 07/31/23  1333 09/06/22  1551 03/31/22  0000   HGBA1C 6.2 5.8 5.5 6.0* 8.6*     TSH/ Free T4: No results for input(s): \"TSH\", \"FREET4\" in the last 16179 hours.  Iron:   Recent Labs     09/05/24  1632 " "02/05/24  1530   FERRITIN 305* 257*   TIBC 341 353   IRONSAT 23* 25     Coag:     ABO: No results found for: \"ABO\"    Past Cardiology Tests (Last 3 Years):    EKG:  No results for input(s): \"ATRRATE\", \"VENTRATE\", \"PRINT\", \"QRSDUR\", \"QTCFRED\", \"QTCCALCB\" in the last 33692 hours.No results found for this or any previous visit (from the past 4464 hour(s)).  Echo:  Echocardiogram: No results found for this or any previous visit from the past 1800 days.    Ejection Fractions:  No results found for: \"EF\"  Cath:  Coronary Angiography: No results found for this or any previous visit from the past 1800 days.    Right Heart Cath: No results found for this or any previous visit from the past 1800 days.    Stress Test:  Nuclear:No results found for this or any previous visit from the past 1800 days.    Metabolic Stress: No results found for this or any previous visit from the past 1800 days.    Cardiac Imaging:  Cardiac Scoring: No results found for this or any previous visit from the past 1800 days.    Cardiac MRI: No results found for this or any previous visit from the past 1800 days.         Assessment/Plan  Assessment/Plan   Active Problems:  There are no active Hospital Problems.           #Abnormal Liver Fx Test  #Suspected Cirrhosis  -TJLB with portal pressure measurements with Dr. Bell     NP discussed with Dr. Bell regarding plan of care/ discharge plan      I spent 30 minutes in the professional and overall care of this patient.      Viviana Lee, APRN-CNP    "

## 2024-10-04 DIAGNOSIS — F32.A DEPRESSION, UNSPECIFIED DEPRESSION TYPE: Primary | ICD-10-CM

## 2024-10-05 RX ORDER — VENLAFAXINE HYDROCHLORIDE 150 MG/1
150 CAPSULE, EXTENDED RELEASE ORAL DAILY
Qty: 90 CAPSULE | Refills: 1 | Status: SHIPPED | OUTPATIENT
Start: 2024-10-05

## 2024-10-07 ENCOUNTER — TELEPHONE (OUTPATIENT)
Dept: GASTROENTEROLOGY | Facility: HOSPITAL | Age: 59
End: 2024-10-07
Payer: MEDICARE

## 2024-10-07 NOTE — TELEPHONE ENCOUNTER
Hepatology Nurse Coordinator Note  Patient called and left a voicemail that she received a referral to follow up with Dr. Benson after liver biopsy, but nothing available. She is currently scheduled for 1/16/2025. Review with Dr. Benson regarding scheduling/plan.

## 2024-10-08 NOTE — TELEPHONE ENCOUNTER
Hepatology Nurse Coordinator Note  Spoke with Dr. Benson regarding plan for follow up for patient. He states will hold off on moving appointment up until liver biopsy results are back. Will determine at that time when patient will need to be seen. Called patient and provided an update. She verbalized understanding.

## 2024-10-11 ENCOUNTER — APPOINTMENT (OUTPATIENT)
Facility: CLINIC | Age: 59
End: 2024-10-11
Payer: MEDICARE

## 2024-10-22 ENCOUNTER — HOSPITAL ENCOUNTER (OUTPATIENT)
Dept: RADIOLOGY | Facility: HOSPITAL | Age: 59
Discharge: HOME | End: 2024-10-22
Payer: MEDICARE

## 2024-10-22 ENCOUNTER — APPOINTMENT (OUTPATIENT)
Dept: PRIMARY CARE | Facility: CLINIC | Age: 59
End: 2024-10-22
Payer: MEDICARE

## 2024-10-22 VITALS
BODY MASS INDEX: 28.8 KG/M2 | HEART RATE: 78 BPM | SYSTOLIC BLOOD PRESSURE: 108 MMHG | WEIGHT: 195 LBS | OXYGEN SATURATION: 96 % | DIASTOLIC BLOOD PRESSURE: 60 MMHG | TEMPERATURE: 96.9 F

## 2024-10-22 DIAGNOSIS — R76.8 RHEUMATOID FACTOR POSITIVE: ICD-10-CM

## 2024-10-22 DIAGNOSIS — F32.A DEPRESSION, UNSPECIFIED DEPRESSION TYPE: ICD-10-CM

## 2024-10-22 DIAGNOSIS — G89.29 CHRONIC THORACIC BACK PAIN, UNSPECIFIED BACK PAIN LATERALITY: ICD-10-CM

## 2024-10-22 DIAGNOSIS — Z87.2 HISTORY OF PSORIATIC ARTHRITIS: ICD-10-CM

## 2024-10-22 DIAGNOSIS — R55 VASOMOTOR INSTABILITY: Primary | ICD-10-CM

## 2024-10-22 DIAGNOSIS — M54.6 CHRONIC THORACIC BACK PAIN, UNSPECIFIED BACK PAIN LATERALITY: ICD-10-CM

## 2024-10-22 DIAGNOSIS — G47.00 INSOMNIA, UNSPECIFIED TYPE: ICD-10-CM

## 2024-10-22 LAB
LABORATORY COMMENT REPORT: NORMAL
PATH REPORT.FINAL DX SPEC: NORMAL
PATH REPORT.GROSS SPEC: NORMAL
PATH REPORT.RELEVANT HX SPEC: NORMAL
PATH REPORT.TOTAL CANCER: NORMAL

## 2024-10-22 PROCEDURE — 76881 US COMPL JOINT R-T W/IMG: CPT

## 2024-10-22 PROCEDURE — 72072 X-RAY EXAM THORAC SPINE 3VWS: CPT

## 2024-10-22 PROCEDURE — 99214 OFFICE O/P EST MOD 30 MIN: CPT | Performed by: FAMILY MEDICINE

## 2024-10-22 PROCEDURE — 3074F SYST BP LT 130 MM HG: CPT | Performed by: FAMILY MEDICINE

## 2024-10-22 PROCEDURE — 3078F DIAST BP <80 MM HG: CPT | Performed by: FAMILY MEDICINE

## 2024-10-22 PROCEDURE — 4010F ACE/ARB THERAPY RXD/TAKEN: CPT | Performed by: FAMILY MEDICINE

## 2024-10-22 PROCEDURE — 72070 X-RAY EXAM THORAC SPINE 2VWS: CPT

## 2024-10-22 PROCEDURE — 72072 X-RAY EXAM THORAC SPINE 3VWS: CPT | Performed by: RADIOLOGY

## 2024-10-22 RX ORDER — VENLAFAXINE 75 MG/1
75 TABLET ORAL NIGHTLY
Qty: 90 TABLET | Refills: 0 | OUTPATIENT
Start: 2024-10-22

## 2024-10-22 RX ORDER — VENLAFAXINE HYDROCHLORIDE 150 MG/1
150 CAPSULE, EXTENDED RELEASE ORAL EVERY 12 HOURS
Qty: 90 CAPSULE | Refills: 1 | Status: SHIPPED | OUTPATIENT
Start: 2024-10-22 | End: 2024-10-23 | Stop reason: SDUPTHER

## 2024-10-22 NOTE — PATIENT INSTRUCTIONS
INCREASE THE EFFEXOR  TWICE DAILY   GIVE THIS A MONTH TO SEE IF INDEED IT WILL HELP THESE HOT FLASHES AND NIGHTS SWEATS

## 2024-10-22 NOTE — PROGRESS NOTES
Subjective   Patient ID: Melquiades Horn is a 59 y.o. female who presents for Hot Flashes (MELQUIADES HAS BEEN HAVING HOT FLASHES FOR YEARS, BUT HAS BECOME MORE FREQUENT AND SHE IS WAKING IN THE MIDDLE OF THE NIGHT SOAKED IN SWEAT.).    HPI MENOPAUSE WITH PARTIAL HYSTERECTOMY IN 2000  STILL HAVE OVARIES   She has alcohol related chronic liver disease and follows with specialist   Now having menopausal symptoms       Review of Systems   Constitutional:  Negative for activity change, appetite change, fever and unexpected weight change.   HENT:  Negative for congestion, ear pain, postnasal drip, rhinorrhea, sinus pressure and sore throat.    Eyes:  Negative for discharge, itching and visual disturbance.   Respiratory:  Negative for cough, shortness of breath and wheezing.    Cardiovascular:  Negative for chest pain, palpitations and leg swelling.   Gastrointestinal:  Negative for abdominal pain, blood in stool, constipation, diarrhea, nausea and vomiting.   Endocrine: Negative for cold intolerance, heat intolerance and polyuria.   Genitourinary:  Negative for dysuria, flank pain and hematuria.        NIGHT SWEATS AND HOT FLASHES   PERHAPS INCREASING THE SNRI WOULD HELP  BUT THIS WAS AT A TOXIC LEVEL AND SOME OTHER APPROACH IS NEEDED    Musculoskeletal:  Negative for arthralgias, gait problem, joint swelling and myalgias.   Skin:  Negative for rash.   Allergic/Immunologic: Negative for environmental allergies and food allergies.   Neurological:  Negative for dizziness, syncope, weakness, light-headedness, numbness and headaches.   Psychiatric/Behavioral:  Negative for dysphoric mood and sleep disturbance. The patient is not nervous/anxious.        Objective   /60   Pulse 78   Temp 36.1 °C (96.9 °F)   Wt 88.5 kg (195 lb)   SpO2 96%   BMI 28.80 kg/m²     Physical Exam  Vitals and nursing note reviewed.   Constitutional:       Appearance: Normal appearance.   HENT:      Head: Normocephalic.   Cardiovascular:      Rate  and Rhythm: Normal rate and regular rhythm.      Pulses: Normal pulses.      Heart sounds: Normal heart sounds. No murmur heard.     No friction rub. No gallop.   Pulmonary:      Effort: Pulmonary effort is normal. No respiratory distress.      Breath sounds: Normal breath sounds. No wheezing.   Abdominal:      General: There is no distension.      Tenderness: There is no abdominal tenderness.   Musculoskeletal:         General: No deformity. Normal range of motion.   Skin:     General: Skin is warm and dry.      Capillary Refill: Capillary refill takes less than 2 seconds.   Neurological:      General: No focal deficit present.      Mental Status: She is alert and oriented to person, place, and time.   Psychiatric:         Mood and Affect: Mood normal.         Assessment/Plan   Problem List Items Addressed This Visit             ICD-10-CM    Vasomotor instability - Primary R55    Depression F32.A

## 2024-10-23 ENCOUNTER — TELEPHONE (OUTPATIENT)
Dept: GASTROENTEROLOGY | Facility: HOSPITAL | Age: 59
End: 2024-10-23
Payer: MEDICARE

## 2024-10-23 DIAGNOSIS — F32.A DEPRESSION, UNSPECIFIED DEPRESSION TYPE: ICD-10-CM

## 2024-10-23 RX ORDER — VENLAFAXINE HYDROCHLORIDE 150 MG/1
150 CAPSULE, EXTENDED RELEASE ORAL EVERY 12 HOURS
Qty: 180 CAPSULE | Refills: 1 | Status: SHIPPED | OUTPATIENT
Start: 2024-10-23

## 2024-10-24 DIAGNOSIS — G47.00 INSOMNIA, UNSPECIFIED TYPE: ICD-10-CM

## 2024-10-24 PROBLEM — F32.A DEPRESSION: Status: ACTIVE | Noted: 2024-10-24

## 2024-10-24 PROBLEM — R55 VASOMOTOR INSTABILITY: Status: ACTIVE | Noted: 2024-10-24

## 2024-10-24 RX ORDER — VENLAFAXINE 75 MG/1
TABLET ORAL
Qty: 90 TABLET | Refills: 1 | Status: SHIPPED | OUTPATIENT
Start: 2024-10-24

## 2024-10-24 ASSESSMENT — ENCOUNTER SYMPTOMS
NERVOUS/ANXIOUS: 0
SINUS PRESSURE: 0
LIGHT-HEADEDNESS: 0
FLANK PAIN: 0
FEVER: 0
DIZZINESS: 0
NUMBNESS: 0
SORE THROAT: 0
HEMATURIA: 0
COUGH: 0
JOINT SWELLING: 0
APPETITE CHANGE: 0
ABDOMINAL PAIN: 0
DYSPHORIC MOOD: 0
CONSTIPATION: 0
DIARRHEA: 0
VOMITING: 0
EYE ITCHING: 0
EYE DISCHARGE: 0
HEADACHES: 0
DYSURIA: 0
SHORTNESS OF BREATH: 0
ACTIVITY CHANGE: 0
ARTHRALGIAS: 0
NAUSEA: 0
WHEEZING: 0
MYALGIAS: 0
PALPITATIONS: 0
BLOOD IN STOOL: 0
RHINORRHEA: 0
UNEXPECTED WEIGHT CHANGE: 0
SLEEP DISTURBANCE: 0
WEAKNESS: 0

## 2024-10-25 ENCOUNTER — APPOINTMENT (OUTPATIENT)
Facility: CLINIC | Age: 59
End: 2024-10-25
Payer: MEDICARE

## 2024-10-25 NOTE — TELEPHONE ENCOUNTER
Hepatology Nurse Coordinator Note  Returned call to patient. She is aware Dr. Benson is out until next week, but that he plans to call and reach out to her when he is back in the office regarding her liver biopsy results and recommendations. She is okay with this plan. Patient verbalized understanding.

## 2024-10-30 ENCOUNTER — APPOINTMENT (OUTPATIENT)
Facility: CLINIC | Age: 59
End: 2024-10-30
Payer: MEDICARE

## 2024-11-01 NOTE — SIGNIFICANT EVENT
"Everyone did a wonderful job and I would definitely recommend Epi.  The only issue pt has is that she has not received her liver biopsy results from her doctor yet.  He was supposed to call her because she does not have a follow up appointment until February but he still has not called her back. Instructed pt to call office and let staff know that  still has not called her with biopsy results and to also send a message to her doctor through \"My Chart' as she may get a quicker reply that way. Pt states understanding and states that she will follow up with  for results.  "

## 2024-11-01 NOTE — TELEPHONE ENCOUNTER
Hepatology Nurse Coordinator Note  Patient left a voicemail message stating she had not heard from Dr. Benson regarding her results. Also received a message from Dr. Benson that her number wasn't working when reaching out. Called patient back. Was able to get through on [phone]. Patient is aware Dr. Benson states he was going to try to call again later today. Will update MD on phone number. Patient verbalized understanding.

## 2024-11-04 NOTE — TELEPHONE ENCOUNTER
Hepatology Nurse Coordinator Note  Per Dr. Benson, he called and spoke with patient to review results/recommendations.

## 2024-11-06 ENCOUNTER — APPOINTMENT (OUTPATIENT)
Facility: CLINIC | Age: 59
End: 2024-11-06
Payer: MEDICARE

## 2024-11-06 VITALS
WEIGHT: 197.6 LBS | BODY MASS INDEX: 29.18 KG/M2 | SYSTOLIC BLOOD PRESSURE: 91 MMHG | DIASTOLIC BLOOD PRESSURE: 60 MMHG | OXYGEN SATURATION: 94 % | HEART RATE: 94 BPM

## 2024-11-06 DIAGNOSIS — M47.812 OSTEOARTHRITIS OF CERVICAL SPINE, UNSPECIFIED SPINAL OSTEOARTHRITIS COMPLICATION STATUS: ICD-10-CM

## 2024-11-06 DIAGNOSIS — L40.9 PSORIASIS: Primary | ICD-10-CM

## 2024-11-06 DIAGNOSIS — S86.312S TEAR OF PERONEAL TENDON, LEFT, SEQUELA: ICD-10-CM

## 2024-11-06 DIAGNOSIS — M65.842 STENOSING TENOSYNOVITIS OF FINGER OF LEFT HAND: ICD-10-CM

## 2024-11-06 DIAGNOSIS — M19.90 INFLAMMATORY ARTHRITIS: ICD-10-CM

## 2024-11-06 DIAGNOSIS — M15.9 GENERALIZED OSTEOARTHRITIS: ICD-10-CM

## 2024-11-06 PROCEDURE — 4010F ACE/ARB THERAPY RXD/TAKEN: CPT | Performed by: INTERNAL MEDICINE

## 2024-11-06 PROCEDURE — 3078F DIAST BP <80 MM HG: CPT | Performed by: INTERNAL MEDICINE

## 2024-11-06 PROCEDURE — 1036F TOBACCO NON-USER: CPT | Performed by: INTERNAL MEDICINE

## 2024-11-06 PROCEDURE — 99215 OFFICE O/P EST HI 40 MIN: CPT | Performed by: INTERNAL MEDICINE

## 2024-11-06 PROCEDURE — 3074F SYST BP LT 130 MM HG: CPT | Performed by: INTERNAL MEDICINE

## 2024-11-06 ASSESSMENT — ENCOUNTER SYMPTOMS
LOSS OF SENSATION IN FEET: 0
DEPRESSION: 0
OCCASIONAL FEELINGS OF UNSTEADINESS: 0

## 2024-11-06 ASSESSMENT — PAIN SCALES - GENERAL: PAINLEVEL_OUTOF10: 0-NO PAIN

## 2024-11-06 NOTE — PROGRESS NOTES
"Subjective   Patient ID: [id number]  Dinorah Horn \"Raquel\" is a 59 y.o. female who presents for follow up     HPI  PMH/PSH: DM, CAD s/p CABG, GERD, IBS, cervical DJD, Fibromyalgia, PsO, fatty liver disease suspected cirrhosis, R knee torn ligament.   Social: has 4 children. No alcohol intake, ex-smoker quit in 2009, no drug use.   FHx: No family history of autoimmune diseases      was seeing Dr. Ashli Wilson 05/2023:  She was diagnosed with psoriasis in the 1990's, started in left elbow.  Then diagnosed with psoriatic arthritis/rheumatoid arthritis in 2019 based on pain in her hands, wrists, shoulders, knees, ankles, hip. Prior notes also report enthesitis and dactylitis.   Prior treatments: cosentyx (bad skin reaction), enbrel (not effective), humira (not effective)  Current treatment: tremfya (started 1 year ago) - psoriasis cleared, joints improved by 50%  Per A&P:  Continue tremfya and add HCQ 200mg bid for active arthritis and dactylitis of 2nd and 3rd digits.   She also had a R subacromial bursa CSI during that visit.      She then saw Dr. Nation 02/2024:  Was still on HCQ, A&P:  Rheumatologic labs have showed a + GORDON, SSA>8, +RF, neg CCP     Current IS:  HCQ 200mg daily      First visit 09/13/2024:  Psoriasis is doing well overall, only small spot on forehead. Reports that joint pain was an issue prior to diagnosis of PsO (bilateral CMCs, wrists, R middle finger trigger finger PIP>DIP and 2nd and 3rd MCPs), worse with activity. Worst are trigger finger and the thumbs. Associated with joint swelling. Denies dactylitis.   She limps early in the morning first thing after she wakes up because of her L ankle. Gets better after a few hours but then worse again at the end of the day.   Has had plantar fascitis.   Knees are sore and crunch, worse with steps, stairs, and activity.   Elbows are good.   B/l shoulder pain, R>L and R SAB injections help.   She reports no prior difference in symptoms while on/off " "tremfya. Unsure if HCQ is helping.   Lately upper back is hurting at level of shoulder blades, with activity.      She is getting worked up by GI and liver biopsy with early cirrhosis. EGD with very small esophageal varices.     Since last visit, her joint pains are pretty much the same, most bothersome is L middle triggering fing, R shoulder and L foot, worse with activity and \"I end up limping\"  Hasn't tried splinting for more than a few days.   Feels unrefreshed in AM and fatigue.     She lost her dog last week.      Very seldom feels anxious or depressed.   No fever, chills, weight loss, night sweats or headaches. No dry eyes, blurry vision, redness or pain or photophobia. No dry mouth, dental loss, loss of taste, nasal or oral ulcers, difficulty swallowing. No chest pain. No shortness of breath, cough. No dysphagia, nausea, vomiting, heartburn.  Has alternating constipation and diarrhea. No melena or hematochezia  No genital or anal ulcers. No photosensitivity, rash or lesions, + biphasic Raynaud's phenomenon. + numbness or tingling in hands and feet. Has CTS. No muscle weakness. + urinary incontinence. Denies history of clots (arterial or venous), or abortions/miscarriages.     Patient Active Problem List   Diagnosis    Gastroesophageal reflux disease without esophagitis    Insomnia    Alcoholic cirrhosis (Multi)    Type 2 diabetes mellitus without complication, without long-term current use of insulin (Multi)    Abnormal cardiovascular stress test    Abnormal findings in cerebrospinal fluid, abnormal level of enzymes    Alcohol intake above recommended sensible limits    Alopecia areata    Arthralgia of wrist    Multiple joint pain    Arteriosclerosis of coronary artery    Breast implant status    Chest pain    Candidiasis of skin    Capsular contracture of breast implant    Chronic pain disorder    Chronic sinusitis    Fibromyalgia    Closed displaced fracture of base of fourth metacarpal bone of left hand " with routine healing    Depressive disorder    Epistaxis    Generalized osteoarthritis    Heart disease    Hiatal hernia    History of cardiac catheterization    Lung nodule    Left hand pain    Mixed hyperlipidemia    Hyperlipidemia    History of coronary artery bypass surgery    Lymphadenopathy    Memory loss    Menopausal and female climacteric states    Neck mass    Osteoarthritis    Pain in unspecified knee    Pain of left lower extremity    Palpitations    Paresthesia of bilateral legs    Pityriasis rosea    Precordial pain    Rheumatoid arthritis    Polyarthritis, inflammatory (Multi)    Prediabetes    Primary hypertension    Prolonged QT interval    Psoriasis    Psoriatic arthritis (Multi)    Ptosis of breast    Radial styloid tenosynovitis    Shortness of breath    Raynaud's phenomenon    Raynaud's disease    Rash and other nonspecific skin eruption    Trigger finger    Tobacco dependence syndrome    Thyroid nodule    Steatosis of liver    Typical angina (CMS-HCC)    Stable angina (CMS-HCC)    Sinus congestion    Unintentional weight loss    Urinary incontinence    Need for influenza vaccination    Encounter for screening mammogram for malignant neoplasm of breast    Vasomotor instability    Depression       Past Medical History:   Diagnosis Date    Arthritis     Coronary artery disease     Depression     Diabetes mellitus (Multi)     GERD (gastroesophageal reflux disease)     Headache     Heart disease     Hypertension     Visual impairment        Past Surgical History:   Procedure Laterality Date    APPENDECTOMY      BREAST SURGERY  2003    Augmentation    CORONARY ARTERY BYPASS GRAFT      FOREARM FRACTURE SURGERY Right 1992    FOREARM HARDWARE REMOVAL Right 2003    HYSTERECTOMY  Partial, 2000       Social History     Socioeconomic History    Marital status:      Spouse name: Not on file    Number of children: Not on file    Years of education: Not on file    Highest education level: Not on file    Occupational History    Not on file   Tobacco Use    Smoking status: Former     Current packs/day: 0.00     Average packs/day: 1 pack/day for 15.0 years (15.0 ttl pk-yrs)     Types: Cigarettes     Start date: 4/4/1994     Quit date: 4/4/2009     Years since quitting: 15.6    Smokeless tobacco: Never    Tobacco comments:     I smoked on and off through a 15 year period.   Vaping Use    Vaping status: Never Used   Substance and Sexual Activity    Alcohol use: Not Currently     Comment: 0    Drug use: Never    Sexual activity: Not Currently     Partners: Male     Birth control/protection: None     Comment: My cervix has been removed.   Other Topics Concern    Not on file   Social History Narrative    Not on file     Social Drivers of Health     Financial Resource Strain: Low Risk  (7/28/2023)    Received from [facility]    Overall Financial Resource Strain (CARDIA)     Difficulty of Paying Living Expenses: Not hard at all   Food Insecurity: No Food Insecurity (7/28/2023)    Received from [facility]    Hunger Vital Sign     Worried About Running Out of Food in the Last Year: Never true     Ran Out of Food in the Last Year: Never true   Transportation Needs: No Transportation Needs (7/28/2023)    Received from [facility]    PRAPARE - Transportation     Lack of Transportation (Medical): No     Lack of Transportation (Non-Medical): No   Physical Activity: Unknown (7/28/2023)    Received from [facility]    Exercise Vital Sign     Days of Exercise per Week: 3 days     Minutes of Exercise per Session: Patient declined   Stress: Stress Concern Present (7/28/2023)    Received from [facility]    [employer] - Occupational Stress Questionnaire     Feeling of Stress : To some extent   Social Connections: Moderately Isolated (7/28/2023)    Received from [facility]    Social Connection and Isolation Panel [NHANES]     Frequency of Communication with Friends and Family: More than three times a week     Frequency of Social Gatherings with Friends and Family: Twice a week     Attends Zoroastrianism Services: Never     Active Member of Clubs or Organizations: No     Attends Club or Organization Meetings: Never     Marital Status:    Intimate Partner Violence: Not on file   Housing Stability: Not on file       Allergies   Allergen Reactions    Cosentyx [Secukinumab] Rash         Current Outpatient Medications:     aspirin 81 mg EC tablet, Take 1 tablet (81 mg) by mouth once daily., Disp: , Rfl:     cholecalciferol (Vitamin D3) 25 MCG (1000 UT) capsule, Take 1 capsule (25 mcg) by mouth once daily., Disp: , Rfl:     cyanocobalamin (Vitamin B-12) 50 mcg tablet, Take 1 tablet (50 mcg) by mouth once daily., Disp: , Rfl:     ferrous sulfate, 325 mg ferrous sulfate, tablet, Take 1 tablet (325 mg) by mouth once daily., Disp: , Rfl:     hydroCHLOROthiazide (Microzide) 12.5 mg tablet, Take 1 tablet (12.5 mg) by mouth once daily., Disp: 90 tablet, Rfl: 1    hydroxychloroquine (Plaquenil) 200 mg tablet, Take 1 tablet (200 mg) by mouth once daily., Disp: 90 tablet, Rfl: 1    lisinopril 20 mg tablet, Take 1 tablet (20 mg) by mouth once daily., Disp: 90 tablet, Rfl: 1    metFORMIN  mg 24 hr tablet, Take 1 tablet (500 mg) by mouth 2 times daily (morning and late afternoon). Take one daily, Disp: 180 tablet, Rfl: 1    metoprolol tartrate (Lopressor) 25 mg tablet, Take 1 tablet (25 mg) by mouth 2 times a day., Disp: 180 tablet, Rfl: 1    multivitamin tablet, Take 1 tablet by mouth once daily., Disp: , Rfl:     nitroglycerin (Nitrostat) 0.3 mg SL tablet, Place 1 tablet (0.3 mg) under the tongue every 5 minutes if needed for chest pain., Disp: , Rfl:     omeprazole (PriLOSEC) 40 mg DR capsule, Take 1 capsule (40 mg) by mouth once daily in the morning. Take before meals. Do not crush or  chew., Disp: 90 capsule, Rfl: 3    rosuvastatin (Crestor) 20 mg tablet, Take 1 tablet (20 mg) by mouth once daily., Disp: 90 tablet, Rfl: 0    traZODone (Desyrel) 50 mg tablet, Take 0.5 tablets (25 mg) by mouth once daily at bedtime., Disp: 15 tablet, Rfl: 3    venlafaxine (Effexor) 75 mg tablet, Take 75mg in the morning daily., Disp: 90 tablet, Rfl: 1    venlafaxine XR (Effexor-XR) 150 mg 24 hr capsule, Take 1 capsule (150 mg) by mouth every 12 hours. Do not crush or chew., Disp: 180 capsule, Rfl: 1    Objective     Visit Vitals  BP 91/60   Pulse 94     Physical Exam  General: AAOx3, Cooperative  Head: normocephalic, atraumatic  Eyes: EOMI, conjunctiva clear, sclera white, anicteric  Throat/Mouth: No oral deformities, no cheek swelling, mucosa appear dry, no oral ulcers noted or loss of dentition   Neck/Lymph: FROM, trachea midline  Skin: No PsO evident on exam today or photosensitive areas  MSK:   Significant diffuse myofascial tenderness.   Upper Extremities:  Hand/Fingers: ttp of L 5th MCP and R 2nd PIP ttp and synovial thickening and R 2nd DIP ttp. fullness of 2nd and 3rd MCPs bilaterally. + grind test bilaterally. + ttp of A1 pulley of L 3rd digit.   Wrists: no tenderness, erythema, pain or swelling.   Elbows: No tenderness, swelling, erythema or warmth at elbows, FROM grossly. No nodules, no enthesitis.   Shoulders: Shoulder ROM limited to 150 deg L and 140 deg R   Lower Extremities:   Knees: No tenderness, deformities, swelling, rashes, or warmth, normal ROM grossly. + crepitus  Ankles: b/l lateral ankle ttp.  No tenderness, edema, erythema of joint line.  Feet: Negative MTP squeeze. Normal ROM grossly.      Lab Results   Component Value Date    WBC 6.8 09/05/2024    HGB 11.2 (L) 09/05/2024    HCT 33.8 (L) 09/05/2024    MCV 97 09/05/2024     09/05/2024       Chemistry    Lab Results   Component Value Date/Time     05/30/2024 1721    K 4.2 05/30/2024 1721     05/30/2024 1721    CO2 26  05/30/2024 1721    BUN 20 05/30/2024 1721    CREATININE 0.83 05/30/2024 1721    Lab Results   Component Value Date/Time    CALCIUM 9.6 05/30/2024 1721    ALKPHOS 84 09/05/2024 1632    AST 18 09/05/2024 1632    ALT 21 09/05/2024 1632    BILITOT 0.4 09/05/2024 1632          Lab Results   Component Value Date    CRP 0.10 09/19/2024    GORDON Positive (A) 02/05/2024    JO1 <0.2 02/05/2024    CALCIUM 9.6 05/30/2024    FERRITIN 305 (H) 09/05/2024     Alkaline Phosphatase   Date Value Ref Range Status   09/05/2024 84 33 - 136 U/L Final     AST   Date Value Ref Range Status   09/05/2024 18 9 - 39 U/L Final     Urea Nitrogen   Date Value Ref Range Status   05/30/2024 20 6 - 23 mg/dL Final     Sodium   Date Value Ref Range Status   05/30/2024 138 136 - 145 mmol/L Final     Potassium   Date Value Ref Range Status   05/30/2024 4.2 3.5 - 5.3 mmol/L Final     Bicarbonate   Date Value Ref Range Status   05/30/2024 26 21 - 32 mmol/L Final     ALT   Date Value Ref Range Status   09/05/2024 21 7 - 45 U/L Final     Comment:     Patients treated with Sulfasalazine may generate falsely decreased results for ALT.       XR thoracic spine 3 views  Narrative: Interpreted By:  Parminder Villanueva,   STUDY:  XR THORACIC SPINE 3 VIEWS      INDICATION:  Signs/Symptoms:evaluate for DISH vs OA.      COMPARISON:  None      ACCESSION NUMBER(S):  [id number]      ORDERING CLINICIAN:  MORENO LANE      FINDINGS:  Moderate diffuse thoracic degenerative changes at all levels.      No evidence of fracture. No evidence of DISH.      Impression: Moderate diffuse thoracic degenerative changes at all levels.      Signed by: Parminder Villanueva 10/25/2024 7:48 AM  Dictation workstation:   [id number]      No echocardiogram results found for the past 12 months  No DXA results found for the past 12 months     XR hands 2023 at [facility]  RESULT:   Healing nondisplaced fourth metacarpal base fracture with interval   decreased conspicuity of the fracture and slight interval callus    formation.  No new fracture.  No dislocation.  Severe first CMC and   triscaphe degenerative changes scattered mild degenerative changes of   some of the IP joints.  Diffuse osteopenia.      MR C spine 05/24/2022  MPRESSION:     1. Congenital cervical canal narrowing with superimposed disc/joint   degeneration.     2. Moderate canal narrowing at C5-6 and C6-7.     3.  Significant bony bilateral C4-5 and C5-6 and C6-7 foramina narrowing.     COUNTING REFERENCE: Superior cervical disc is taken as C2-3.  Structural   anomalies: None.        Assessment/Plan   A 59 year old F with DM, CAD s/p CABG, cervical DDD, Fibromyalgia, PsO, fatty liver disease and early cirrhosis.      She's had a prior diagnosis of RA/PsA. She describes her pains mostly as mechanical and her most bothersome joint is her CMCs bilaterally which is mostly likely from OA and also has polyarticular OA. Her RF is low titer. At initial visit, there was evidence of synovitis and further work up was pursued:   ESR, CRP normal, ACPA neg, + low titer RF.   XR thoracic spine DDD  XR knee mild OA  XR hands with OA changes severe at CMCs  MSK US R CMC OA, and OA changes but with some synovial proliferation at the 2nd and 5th MCP of left hand  MSK b/l ankle  1. Possible degree split tearing of the retro malleolar left peroneal  brevis tendon.  2. Mild focal interstitial tear of the inframalleolar peroneal brevis  tendon just distal to the peroneal tubercle.  3. Dorsal left foot lump correlates with region of severe dorsal  degenerative change appearing to be centered in the region of the  intermediate naviculocuneiform articulation although radiographic  correlation is recommended.    Results of testing were discussed with the patient.     Today there is synovitis of R 2nd PIP and DIP and L 5th MCP. There is also stenosing tenosynovitis of L third digit.   No uveitis or IBP or IBD or dactylitis.     -She is currently underdosed for her weight with HCQ.  Reports no prior benefit to TNFi and Tremfya.  Discussed trial of increased HCQ to 400mg daily after ophthalmic exam for 3 months and to assess response. 5mg/kg = 440mg. Patient is due for her yearly eye exam and she will Unga back after she sees ophthalmology. Referral placed.   -Treatment escalation will be challenging given her early liver cirrhosis but we discussed treatment options Will need to discuss with hepatologist prior to adding.   - Refer to medical spine for thoracic spine DDD  - Refer to orthopedics for peroneal tendon tear and CSI of stenosing tenosynovitis of L 3rd digit.     DEXA 03/2023 reviewed and normal.      RTC in 3 months.         Ravi Mcqueen MD  Division of Rheumatology   [facility]

## 2024-11-07 ENCOUNTER — HOSPITAL ENCOUNTER (OUTPATIENT)
Dept: RADIOLOGY | Facility: HOSPITAL | Age: 59
Discharge: HOME | End: 2024-11-07
Payer: MEDICARE

## 2024-11-07 DIAGNOSIS — Z12.31 ENCOUNTER FOR SCREENING MAMMOGRAM FOR MALIGNANT NEOPLASM OF BREAST: ICD-10-CM

## 2024-11-07 DIAGNOSIS — G47.00 INSOMNIA, UNSPECIFIED TYPE: ICD-10-CM

## 2024-11-07 PROCEDURE — 77067 SCR MAMMO BI INCL CAD: CPT | Performed by: RADIOLOGY

## 2024-11-07 PROCEDURE — 77067 SCR MAMMO BI INCL CAD: CPT

## 2024-11-07 PROCEDURE — 77063 BREAST TOMOSYNTHESIS BI: CPT | Performed by: RADIOLOGY

## 2024-11-07 RX ORDER — TRAZODONE HYDROCHLORIDE 50 MG/1
25 TABLET ORAL NIGHTLY
Qty: 15 TABLET | Refills: 0 | Status: SHIPPED | OUTPATIENT
Start: 2024-11-07

## 2024-11-15 DIAGNOSIS — E78.2 MIXED HYPERLIPIDEMIA: ICD-10-CM

## 2024-11-15 RX ORDER — ROSUVASTATIN CALCIUM 20 MG/1
20 TABLET, COATED ORAL DAILY
Qty: 90 TABLET | Refills: 1 | Status: SHIPPED | OUTPATIENT
Start: 2024-11-15

## 2024-11-27 ENCOUNTER — OFFICE VISIT (OUTPATIENT)
Dept: SPORTS MEDICINE | Facility: CLINIC | Age: 59
End: 2024-11-27
Payer: MEDICARE

## 2024-11-27 VITALS
HEART RATE: 90 BPM | SYSTOLIC BLOOD PRESSURE: 108 MMHG | BODY MASS INDEX: 29.18 KG/M2 | DIASTOLIC BLOOD PRESSURE: 60 MMHG | WEIGHT: 197 LBS | HEIGHT: 69 IN

## 2024-11-27 DIAGNOSIS — M79.641 BILATERAL HAND PAIN: ICD-10-CM

## 2024-11-27 DIAGNOSIS — M25.532 BILATERAL WRIST PAIN: ICD-10-CM

## 2024-11-27 DIAGNOSIS — M65.339 TRIGGER MIDDLE FINGER, UNSPECIFIED LATERALITY: ICD-10-CM

## 2024-11-27 DIAGNOSIS — M19.041 PRIMARY OSTEOARTHRITIS OF BOTH HANDS: Primary | ICD-10-CM

## 2024-11-27 DIAGNOSIS — M18.12 ARTHRITIS OF CARPOMETACARPAL (CMC) JOINT OF LEFT THUMB: ICD-10-CM

## 2024-11-27 DIAGNOSIS — M19.042 PRIMARY OSTEOARTHRITIS OF BOTH HANDS: Primary | ICD-10-CM

## 2024-11-27 DIAGNOSIS — M65.331 TRIGGER MIDDLE FINGER OF RIGHT HAND: ICD-10-CM

## 2024-11-27 DIAGNOSIS — M18.11 PRIMARY OSTEOARTHRITIS OF FIRST CARPOMETACARPAL JOINT OF RIGHT HAND: ICD-10-CM

## 2024-11-27 DIAGNOSIS — M65.322 TRIGGER INDEX FINGER OF LEFT HAND: ICD-10-CM

## 2024-11-27 DIAGNOSIS — M79.642 BILATERAL HAND PAIN: ICD-10-CM

## 2024-11-27 DIAGNOSIS — M25.531 BILATERAL WRIST PAIN: ICD-10-CM

## 2024-11-27 DIAGNOSIS — M65.842 STENOSING TENOSYNOVITIS OF FINGER OF LEFT HAND: ICD-10-CM

## 2024-11-27 PROCEDURE — 4010F ACE/ARB THERAPY RXD/TAKEN: CPT | Performed by: FAMILY MEDICINE

## 2024-11-27 PROCEDURE — 3008F BODY MASS INDEX DOCD: CPT | Performed by: FAMILY MEDICINE

## 2024-11-27 PROCEDURE — 3074F SYST BP LT 130 MM HG: CPT | Performed by: FAMILY MEDICINE

## 2024-11-27 PROCEDURE — 99214 OFFICE O/P EST MOD 30 MIN: CPT | Performed by: FAMILY MEDICINE

## 2024-11-27 PROCEDURE — 1036F TOBACCO NON-USER: CPT | Performed by: FAMILY MEDICINE

## 2024-11-27 PROCEDURE — 3078F DIAST BP <80 MM HG: CPT | Performed by: FAMILY MEDICINE

## 2024-11-27 PROCEDURE — 99204 OFFICE O/P NEW MOD 45 MIN: CPT | Performed by: FAMILY MEDICINE

## 2024-11-27 ASSESSMENT — PATIENT HEALTH QUESTIONNAIRE - PHQ9
2. FEELING DOWN, DEPRESSED OR HOPELESS: NOT AT ALL
1. LITTLE INTEREST OR PLEASURE IN DOING THINGS: NOT AT ALL
SUM OF ALL RESPONSES TO PHQ9 QUESTIONS 1 AND 2: 0

## 2024-11-27 ASSESSMENT — COLUMBIA-SUICIDE SEVERITY RATING SCALE - C-SSRS
1. IN THE PAST MONTH, HAVE YOU WISHED YOU WERE DEAD OR WISHED YOU COULD GO TO SLEEP AND NOT WAKE UP?: NO
6. HAVE YOU EVER DONE ANYTHING, STARTED TO DO ANYTHING, OR PREPARED TO DO ANYTHING TO END YOUR LIFE?: NO
2. HAVE YOU ACTUALLY HAD ANY THOUGHTS OF KILLING YOURSELF?: NO

## 2024-11-27 ASSESSMENT — ENCOUNTER SYMPTOMS
OCCASIONAL FEELINGS OF UNSTEADINESS: 0
DEPRESSION: 0
LOSS OF SENSATION IN FEET: 0

## 2024-11-27 ASSESSMENT — PAIN SCALES - GENERAL: PAINLEVEL_OUTOF10: 5

## 2024-11-27 NOTE — PROGRESS NOTES
"Verbal consent of the patient and/or verbal parental consent for patients under the age of 18 have been obtained to conduct a physical examination at this office visit.    New patient  History Of Present Illness  11/27/24 Dinorah Horn \"Pratibha" is a 59 y.o. female who presents for an evaluation of their BILATERAL hand. States that she has been dealing with hand pain for years. She was referred in by her Rheumatologist Dr. Ravi Mcqueen for referall to sports medicine/orthopedics for peroneal tendon tear and CSI of stenosing tenosynovitis of L 3rd digit especially trigger finger.  States that she has inconsistent 5/10 pain. States that rainy weather and cold weather are what flare her up the most. States that when flare ups occur she tries to tolerate it and tough it out as much as she can. States that she feels numbness tingling pins needles all the time in both hands right worse than left. States that she feels cracks and pops in both hands right worse than left, states she has history of carpel tunnel in both hands. She states that the trigger finger for the middle finger of the right hand is her biggest concern.  Additionally she had some diagnostic ultrasound done on top of her x-rays that showed synovitis and synovial thickening of multiple peripheral joints especially throughout the fingers with tenosynovitis noted of multiple areas especially 2nd through 5th digits at the MCP joint consistent with synovial proliferation most likely related to inflammatory and/or crystal arthropathies.  Rheumatology has her on hydroxychloroquine  AKA Plaquenil 200 mg daily.      All previous Progress Notes and imaging results related to this patients chief complaint have been reviewed in preparation for this examination.    Past Medical History  She has a past medical history of Arthritis, Coronary artery disease, Depression, Diabetes mellitus (Multi), GERD (gastroesophageal reflux disease), Headache, Heart disease, " Hypertension, and Visual impairment.    Surgical History  She has a past surgical history that includes Appendectomy; Breast surgery (2003); Coronary artery bypass graft; Hysterectomy (Partial, 2000); Forearm fracture surgery (Right, 1992); and Forearm hardware removal (Right, 2003).     Social History  She reports that she quit smoking about 15 years ago. Her smoking use included cigarettes. She started smoking about 30 years ago. She has a 15 pack-year smoking history. She has never used smokeless tobacco. She reports that she does not currently use alcohol. She reports that she does not use drugs.    Family History  Family History   Problem Relation Name Age of Onset    Alcohol abuse Mother Trice     Cancer Mother Trice     Diabetes Mother Trice     Heart disease Mother Trice     Arthritis Father Reed     Atrial fibrillation Father Reed     Cancer Father Reed     COPD Father Reed     Hearing loss Father Reed     Heart disease Father Reed     Stroke Paternal Grandfather Guy     Alcohol abuse Paternal Grandmother Brigette     Alcohol abuse Sister Ioana     Alcohol abuse Sister Alis     Alcohol abuse Daughter Katerine     Cancer Father's Brother Xavier     Cancer Father's Brother [place]     Colon cancer Father's Brother Daljit J     Drug abuse Sister Ioana GARCIA         Allergies  Cosentyx [secukinumab]    Review of Systems  CONSTITUTIONAL:   Negative for weight change, loss of appetite, fatigue, weakness, fever, chills, night sweats, headaches .           HEENT:   Negative for cold, cough, sore throat, sinus pain, swollen lymph nodes.           OPHTHALMOLOGY:   Negative for diminished vision, blurred vision, loss of vision, double vision.           ALLERGY:   Negative for runny nose, scratchy throat, sinus congestion, rash, facial pressure, nasal congestion, post-nasal drip.           CARDIOLOGY:   Negative for chest pain, palpitations, murmurs, irregular heart beat, shortness of breath,  leg edema, dyspnea on exertion, fatigue, dizziness.           RESPIRATORY:   Negative for chest pain, shortness of breath, swelling of the legs, asthma/copd, chest congestion, pain with breathing .           GASTROENTEROLOGY:   Negative for nausea, vomitting, heartburn, constipation, diarrhea, blood in stool, change in bowel habits, black stool.           HEMATOLOGY/LYMPH:   Negative for fatigue, loss of appetitie, easy bruising, easy bleeding, anemia, abnormal bleeding, slow healing.           ENDOCRINOLOGY:   Negative for polyuria, polydipsia, polyphagia, fatigue, weight loss, weight gain, cold intolerance, heat intolerance, diabetes.           MUSCULOSKELETAL:   Positive  for BILATERAL hand/wrists        DERMATOLOGY:   Negative for rash, bruising.           NEUROLOGY:   Negative for tingling, numbness, gait abnormality, paresthesias, weakness, sciatica.        Examination:i  BILATERAL  Thumb, 3rd Digit, and MCP  Erythema: Negative.   Edema: Positive all of her fingers sausage style fingers mixed with edema  Effusion: Negative.   Warmth: Negative.   Ecchymosis/Bruising: Negative.   Percussion Test: Negative.   Tuning Fork Test: Negative.   Abrasions: Negative.   Orientation: Positive all of her fingers sausage style fingers mixed with edema            ROM:   Wrist Flexion (80 degrees)  Wrist Extension (70 degrees)  Wrist Supination (90 degrees)  Wrist Pronation (90 degrees)         Wrist Ulnar Deviation (30 degrees)  Wrist Radial Deviation (20 degrees)          Finger MCP (100 degrees)/ PIP (100 degrees)/ DIP (90 degrees) Flexion  Finger MCP (30 degrees) /PIP (0 degrees) / DIP (20 degrees) Extension          Fingers ABduction (20 degrees)  Fingers ADduction (0 degrees)          Thumb & Fingertip Opposition Positive  unable to perform right side  Thumb Circumduction.    Positive all of her fingers sausage style fingers mixed with edema and square thumb at MCP joint        Muscle Strength:   Positive Decreased  muscle strength  +5/+5 Wrist Flexion  +5/+5 Wrist Extension        +5/+5 Wrist Supination  +5/+5 Wrist Pronation          +5/+5 Wrist Ulnar Deviation  +5/+5 Wrist Radial Deviation           Positive +4/+5 Finger MCP/PIP/DIP Flexion    Positive +4/+5 Finger MCP/PIP/DIP Extension            Positive +4/+5 Finger Abduction    Positive +4/+5 Finger Adduction            Positive +4/+5 Finger/Thumb Opposition    Positive +4/+5 Thumb Circumduction.            Motor/Neurological:    Normal  Tip of Thumb (Median Nerve)  Tip of 5th Finger (Ulnar Nerve)  Flex and Extend Thumb (Median and Radial Nerve)  Scissor Fingers (Ulnar Nerve)  Raise Thumb Against Resistance on Flat Surface (Median Nerve).          Sensation/Neurological:   Negative, Sensation Intact, 2 Point Discrimination Test Negative         C6: Lateral forearms, thumbs, anterior index finger, lateral half of the middle finger  C7: Some of posterior index finger, medial half of middle finger, upper posterior back, back of arms  C8: Ring finger, little finger, medial forearm, posterior upper back.     Palpation:   Positive Tenderness to Palpation BILATERAL Thumb, 3rd Digit, and MCP   at 3rd finger A1 pulley         Vascular:   +2/+4 Radial Pulse  +2/+4 Ulnar Pulse  Capillary Refill < 2 seconds.               Wrist/Hand/Finger - Motor Function:  Vincenzo-Littler Test: Negative.   Retinacular Test: Negative.   Princh  Test:   Positive   Key  Test: Negative.   Power  Test:   Positive   Trell  Test: Negative.         Wrist/Hand/Finger - Nerve Lesions/Compression Neuropathies: Right Side clinically worse than left  Carpal Tunnel Sign Test: Negative.  .   Phalen's Sign Test:Negative.    Reverse Phalen's Sign Test: Negative.   Wartenberg Sign Test: Negative.   Tinel's Sign Test: Negative.   Opposition Test: Positive right sided.   Pinch Test: Positive btwn 3rd finger and thumb.   Carpal/Digital Compression Test:Negative.   Ochsner Test: Negative.   Froment's  Sign Ulnar Nerve Test: Negative.   Intrinsic Ulnar Nerve Test: Negative.   O Test:Positive btwn 3rd finger and thumb.       Wrist/Hand/Finger - Stability:  Valgus Stess Test: Negative.   Varus Stess Test: Negative.   Dumont Scaphoid Shift Test: Negative.   Scapholunate Ballotment Test: Negative.   Limington Test: Negative.   Dorsal Capitate Displacement Apprehension Test: Negative.   Shuck Test: Negative.         Wrist/Hand/Finger - Tenosynovitis:  Flexor Digitorum Profundus Test:  Positive btwn 3rd finger    Flexor Digitorum Superficialis Test:  Positive btwn 3rd finger   Flexor Pollicis Longus Test:  Negative.   Flexor Pollicis Brevis Test:  Negative.   Flexor Radial Longus Test:  Negative.   Flexor Radial Brevis Test:  Negative.   Extensor Digitorum Profundus Test:  Negative.   Extensor Digitorum Superficialis Test:  Negative.   Extensor Pollicis Longus Test:  Negative.   Extensor Pollicis Brevis (EPB)[Dequervain's):  Negative.   Extensor Radial Longus Test:  Negative.   Extensor Radial Brevis Test:  Negative.   Muckard Test:  Positive .   Finklestein Test:  Positive .   Kanavel Sign:  Negative.   Flexor and Extensor Longus Test:  Negative.         Wrist/Hand/Finger - TFCC:  Supination Lift Test: Negative.   Grind Test: Negative.             Imaging and Diagnostics Review:  XR hand 3+ views bilateral  Study Result  Narrative & Impression   Interpreted By:  Misael Davis,   STUDY:  XR HAND 3+ VIEWS BILATERAL; ;  9/18/2024 4:56 pm      INDICATION:  Signs/Symptoms:Hx of RA vs PsA also has OA changes. Please evaluate  for any erosive disease..      ,R76.8 Other specified abnormal immunological findings in serum,Z87.2  Personal history of diseases of the skin and subcutaneous tissue      COMPARISON:  None.      ACCESSION NUMBER(S):  [id number]      ORDERING CLINICIAN:  MORENO LANE      FINDINGS:  Bilateral hands, three views of each      There is severe joint space narrowing osteophytosis in the 1st CMC  joints  bilaterally with moderate changes of 1st MCP and 1st IP  joints. Degenerative changes in the 5th ray interphalangeal joints as  well. There is no fracture. There is no dislocation. There is no  erosions or chondrocalcinosis.      IMPRESSION xray b/l hands 9/19/24:  No acute abnormality seen.   2. Multifocal osteoarthritis bilaterally, severe at the base of the thumbs    3. severe joint space narrowing osteophytosis in the 1st CMC  joints bilaterally with moderate changes of 1st MCP and 1st IP  joints. Degenerative changes in the 5th ray interphalangeal joints as  well.      MACRO:  None      Signed by: Misael Davis 9/19/2024 8:12 PM  Dictation workstation:   [id number]   --------------------------------------------------------------------------  US MSK upper extremity joints tendons muscles  US MSK upper extremity joints tendons muscles  Study Result  Narrative & Impression   Interpreted By:  Reed Patrick,   STUDY:  Musculoskeletal ultrasound of the bilateral hand and wrist dated  10/22/2024.  INDICATION:  Hand pain. Synovial screen.  COMPARISON:  None. Correlation is made with 09/18/2024 radiographs.   ACCESSION NUMBER(S):  [id number]; [id number]  ORDERING CLINICIAN:  MORENO LANE  TECHNIQUE:  Multiplanar color and grayscale ultrasound of the bilateral hand and  wrist was performed. Exam was performed according to a synovial  screening protocol.      FINDINGS:  RIGHT:      No fluid is evident in the flexor or extensor tendon sheaths as seen  on this exam.      Marginal osteophytes are seen of the 1st digit carpometacarpal  articulation without joint effusion synovial proliferation or  increased color signal intensity evident.      No significant no joint effusion synovial proliferation or increased  color signal intensity is evident at the 1st digit  metacarpophalangeal joint. There is mild marginal osteophyte  formation at the interphalangeal joint of the 1st digit without joint  effusion synovial  proliferation or increased color signal intensity  evident.      No significant joint effusion synovial proliferation or increased  color signal intensity is evident at the 2nd metacarpophalangeal  joint. Marginal micro osteophyte formation is seen of the proximal  distal interphalangeal joints of the 2nd digit without joint effusion  increased color signal intensity or synovial proliferation evident.      There is a trace volume effusion at the 3rd digit metacarpophalangeal  joint without synovial proliferation or increased color signal  intensity evident. Marginal micro osteophyte formation is seen of the  proximal distal interphalangeal joint of the 3rd digit without joint  effusion synovial proliferation or increased color signal intensity  evident.      No joint effusion synovial proliferation or increased color signal  intensity is evident at the 4th digit metacarpophalangeal joint.  There is a trace volume effusion of the 4th digit proximal  interphalangeal joint possibly with some mild capsular hyperemia  without synovial proliferation evident. No significant degenerative  change joint effusion are increased color signal intensity is evident  at the distal interphalangeal joint of the 4th digit.      No joint effusion synovial proliferation or increased color signal  intensity is evident at the 5th digit metacarpophalangeal joint.  Marginal osteophyte formation and small effusion is seen of the  proximal interphalangeal joint of the 5th digit without synovial  proliferation or increased color signal intensity evident. There is a  degree of flexion deformity at this joint. No significant  degenerative change joint effusion or synovial proliferation is  evident at the distal interphalangeal joint of the 5th digit.      LEFT:      No fluid is evident in the flexor or extensor tendon sheaths as seen  on this examination.      No joint effusion synovial proliferation or increased color signal  intensity is  evident at the metacarpophalangeal joint of the 1st  digit. Marginal osteophyte formation is seen of the interphalangeal  joint of the 1st digit without joint effusion synovial proliferation  or increased color signal intensity evident.      There is synovial proliferation with trace joint effusion and mild  hyperemia at the 2nd digit metacarpophalangeal joint. Micro  osteophyte formation is seen at the proximal distal interphalangeal  joint of the 2nd digit without joint effusion synovial proliferation  or increased color signal intensity evident.      No joint effusion synovial proliferation or increased color signal  intensity is evident at the 3rd digit metacarpophalangeal joint.  Micro osteophyte formation is seen of the proximal distal  interphalangeal joint of the 3rd digit without joint effusion  synovial proliferation or increased color signal intensity evident.      No joint effusion synovial proliferation or increased color signal  intensity is evident at the 4th digit metacarpophalangeal joint. No  joint effusion synovial proliferation or increased color signal  intensity is evident at the 4th digit interphalangeal joints.      There is a small volume joint effusion with mild synovial  proliferation without increased color signal intensity of the 5th  digit metacarpophalangeal joint. There is osteophyte formation with  trace joint effusion of the proximal interphalangeal joint of the 5th  digit. No synovial proliferation or increased color signal intensity  is evident. Micro osteophyte formation is seen of the distal  interphalangeal joint of the 5th digit without joint effusion  synovial proliferation or increased color signal intensity evident.      IMPRESSION DX U/S of B/L upper extremities:  Polyarticular degenerative change of the bilateral hand and wrist in  a pattern overall likely related to osteoarthrosis although there are  some joints that have trace effusions, described above which  are  nonspecific and possibly reactive. Given this, there does appear to  be some synovial proliferation at the 2nd and 5th digit  metacarpophalangeal joint of the left hand which could be related to  crystal and/or inflammatory arthropathy.      Signed by: Reed Patrick 10/22/2024 2:42 PM  Dictation workstation:   [id number]       Assessment   1. Primary osteoarthritis of both hands        2. Primary osteoarthritis of first carpometacarpal joint of right hand  Referral to Physical Therapy    MR hand right wo IV contrast      3. Arthritis of carpometacarpal (CMC) joint of left thumb  Referral to Physical Therapy    MR hand left wo IV contrast      4. Stenosing tenosynovitis of finger of left hand  Referral to Orthopaedic Surgery    Referral to Physical Therapy    MR hand left wo IV contrast      5. Bilateral hand pain  Referral to Physical Therapy    MR hand right wo IV contrast    MR hand left wo IV contrast      6. Bilateral wrist pain  Referral to Physical Therapy    MR hand right wo IV contrast    MR hand left wo IV contrast      7. Trigger middle finger of right hand  Referral to Physical Therapy    MR hand right wo IV contrast      8. Trigger index finger of left hand  Referral to Physical Therapy    MR hand left wo IV contrast      9. Trigger middle finger, unspecified laterality            Treatment or Intervention:  May continue alternating ice and moist heat therapy as needed   Stressed the importance of trying to keep her hands warm because of the amount of arthritis she has especially in the winter,   Start into Hand/Physical Therapy 1-2 times a week for 8-10 weeks with manual therapy as well as dry needling and IASTM  Reviewed home exercises to be performed by the patient routinely   Recommendation over-the-counter vitamin-D 2 -3000+ milligrams a day, as well as  a daily multivitamin.  Recommendation over-the-counter curcumin, turmeric, boswellia,  as directed to aid with joint inflammation.    Recommendation over-the-counter Move Free ULTRA for joint health.    Continue hydroxychloroquine  AKA Plaquenil 200 mg daily per her Rheumatologist Dr. Ravi Mcqueen .  Patient advised regarding the risks and/or potential adverse reactions and/or side effects of any prescribed medications along with any over-the-counter medications or any supplements used. Patient advised to seek immediate medical care if any adverse reactions occur. The patient and/or patient(s) parent(s) verbalized their understanding  MRI bilateral hands to further evaluate A1 pulley system for trigger fingers as well as further evaluate ligaments and tendons and further evaluate patient's bone structures  Continue follow-ups regularly as instructed by her Rheumatologist Dr. Ravi Mcqueen   Most likely in the future corticosteroid injections not only for the thumb but also trigger fingers  Also possibility in the future regenerative injections versus viscosupplementation injections versus a combination of both for fingers and thumbs  Possibility referral to orthopedics for surgical evaluation for trigger fingers if injections do not work however patient would like to try to avoid surgical intervention only for her fingers but also her thumbs  You have been ordered an MRI of the Bilateral Hands. Once you contact scheduling at [phone] and obtain the date and time of your MRI, contact our office at [phone] to schedule your follow-up appointment to review your results. Please note the results of your imaging will not be discussed over the telephone.   Follow-up after MRI of bilateral hands and the plan will do corticosteroid injections during that visit as well  Please note that this report has been produced using speech recognition software.  It may contain errors related to grammar, punctuation or spelling.  Electronically signed, but not reviewed.  Bre Guerrero D.O. FAOASM, Director of Sports Medicine      BRE GUERRERO on  11/27/24 at 4:05 PM.     Michael Guerrero DO, FAOASM

## 2024-11-27 NOTE — PATIENT INSTRUCTIONS
May continue PRICE therapy as needed  ,   Start into Hand/Physical Therapy 1-2 times a week for 8-10 weeks with manual therapy as well as dry needling and IASTM  Reviewed home exercises to be performed by the patient routinely , Went over ice cup icing massage to be performed by the patient regularly ,   Recommendation over-the-counter calcium with vitamin-D 2 -3000+ milligrams a day, as well as OTC symphytum as directed daily to promote bony healing, in addition to a daily multivitamin.  Recommendation over-the-counter curcumin, turmeric, boswellia, as well as egg shell membrane as directed to aid with joint inflammation.   Recommendation over-the-counter Move Free ULTRA for joint health.    Patient advised regarding the risks and/or potential adverse reactions and/or side effects of any prescribed medications along with any over-the-counter medications or any supplements used. Patient advised to seek immediate medical care if any adverse reactions occur. The patient and/or patient(s) parent(s) verbalized their understanding  You have been ordered an MRI of the Bilateral Hands. Once you contact scheduling at [phone] and obtain the date and time of your MRI, contact our office at [phone] to schedule your follow-up appointment to review your results. Please note the results of your imaging will not be discussed over the telephone.   Follow-up after MRI

## 2024-12-13 DIAGNOSIS — G47.00 INSOMNIA, UNSPECIFIED TYPE: ICD-10-CM

## 2024-12-16 RX ORDER — TRAZODONE HYDROCHLORIDE 50 MG/1
25 TABLET ORAL NIGHTLY
Qty: 90 TABLET | Refills: 0 | Status: SHIPPED | OUTPATIENT
Start: 2024-12-16

## 2024-12-18 ENCOUNTER — HOSPITAL ENCOUNTER (OUTPATIENT)
Dept: RADIOLOGY | Facility: HOSPITAL | Age: 59
Discharge: HOME | End: 2024-12-18
Payer: MEDICARE

## 2024-12-18 DIAGNOSIS — M25.532 BILATERAL WRIST PAIN: ICD-10-CM

## 2024-12-18 DIAGNOSIS — M65.331 TRIGGER MIDDLE FINGER OF RIGHT HAND: ICD-10-CM

## 2024-12-18 DIAGNOSIS — M18.12 ARTHRITIS OF CARPOMETACARPAL (CMC) JOINT OF LEFT THUMB: ICD-10-CM

## 2024-12-18 DIAGNOSIS — M79.641 BILATERAL HAND PAIN: ICD-10-CM

## 2024-12-18 DIAGNOSIS — M65.842 STENOSING TENOSYNOVITIS OF FINGER OF LEFT HAND: ICD-10-CM

## 2024-12-18 DIAGNOSIS — M25.531 BILATERAL WRIST PAIN: ICD-10-CM

## 2024-12-18 DIAGNOSIS — M18.11 PRIMARY OSTEOARTHRITIS OF FIRST CARPOMETACARPAL JOINT OF RIGHT HAND: ICD-10-CM

## 2024-12-18 DIAGNOSIS — M79.642 BILATERAL HAND PAIN: ICD-10-CM

## 2024-12-18 DIAGNOSIS — M65.322 TRIGGER INDEX FINGER OF LEFT HAND: ICD-10-CM

## 2024-12-18 PROCEDURE — 73218 MRI UPPER EXTREMITY W/O DYE: CPT | Mod: LT

## 2024-12-18 PROCEDURE — 73218 MRI UPPER EXTREMITY W/O DYE: CPT | Mod: RT

## 2024-12-18 PROCEDURE — 73218 MRI UPPER EXTREMITY W/O DYE: CPT | Mod: RIGHT SIDE | Performed by: RADIOLOGY

## 2024-12-18 PROCEDURE — 73218 MRI UPPER EXTREMITY W/O DYE: CPT | Mod: LEFT SIDE | Performed by: RADIOLOGY

## 2024-12-20 ENCOUNTER — APPOINTMENT (OUTPATIENT)
Dept: ORTHOPEDIC SURGERY | Facility: CLINIC | Age: 59
End: 2024-12-20
Payer: MEDICARE

## 2024-12-20 VITALS — WEIGHT: 194 LBS | BODY MASS INDEX: 28.73 KG/M2 | HEIGHT: 69 IN

## 2024-12-20 DIAGNOSIS — M47.812 OSTEOARTHRITIS OF CERVICAL SPINE, UNSPECIFIED SPINAL OSTEOARTHRITIS COMPLICATION STATUS: ICD-10-CM

## 2024-12-20 DIAGNOSIS — M54.89 INTERSCAPULAR PAIN: ICD-10-CM

## 2024-12-20 PROCEDURE — 4010F ACE/ARB THERAPY RXD/TAKEN: CPT | Performed by: ORTHOPAEDIC SURGERY

## 2024-12-20 PROCEDURE — 3008F BODY MASS INDEX DOCD: CPT | Performed by: ORTHOPAEDIC SURGERY

## 2024-12-20 PROCEDURE — 99203 OFFICE O/P NEW LOW 30 MIN: CPT | Performed by: ORTHOPAEDIC SURGERY

## 2024-12-23 NOTE — PROGRESS NOTES
HPI:Dinorah Horn is a pleasant 59-year-old woman who comes in with a complaints of several months of a nonspecific thoracic back pain.  She denies constitutional symptoms.  The symptoms do vary.  No radiculopathy in the upper or lower extremities.  She has not had physical therapy or other treatment.      ROS:  Reviewed on EMR and patient intake sheet.    PMH/SH:   Reviewed on EMR and patient intake sheet.    Exam:  Physical Exam    Constitutional: Well appearing; no acute distress  Eyes: pupils are equal and round  Psych: normal affect  Respiratory: non-labored breathing  Cardiovascular: regular rate and rhythm  GI: non-distended abdomen  Musculoskeletal: no pain with range of motion of the shoulders bilaterally; no signs of impingement  Neurologic: [5]/5 strength in the upper extremities bilaterally]; [negative] Beckford's; [no hyper-reflexia]    Radiology:  X-rays were personally reviewed.  They are unremarkable.    Diagnosis:  Thoracic back pain    Assessment and Plan:   59-year-old woman with nonspecific thoracic back pain which appears to be myofascial in origin.  At this time we will begin with physical therapy and intrascapular muscle strengthening.  If the symptoms persist and/or worsen, then further imaging including an MRI would not be unreasonable.  She will follow-up as needed if the symptoms persist.    The patient was in agreement with the plan. At the end of the visit today, the patient felt that all questions had been answered satisfactorily.  The patient was pleased with the visit and very appreciative for the care rendered.     Thank you very much for the kind referral.  It is a privilege, and a pleasure, to partner with you in the care of your patients.  I would be delighted to assist you with any further consultations as needed.      Bo Camacho MD    Chief of Spine Surgery, [facility]  Director of Spine Service, [facility]  [place]  , Department of Orthopaedics  [facility] [employer]  [address]  [place] [id number]  P: [phone]  [place] [facility]    This note was dictated with voice recognition software.  It has not been proofread for grammatical errors, typographical mistakes or other semantic inconsistencies.

## 2024-12-27 DIAGNOSIS — K21.9 GASTROESOPHAGEAL REFLUX DISEASE WITHOUT ESOPHAGITIS: ICD-10-CM

## 2024-12-27 DIAGNOSIS — E11.9 TYPE 2 DIABETES MELLITUS WITHOUT COMPLICATION, WITHOUT LONG-TERM CURRENT USE OF INSULIN (MULTI): ICD-10-CM

## 2024-12-27 DIAGNOSIS — I10 PRIMARY HYPERTENSION: ICD-10-CM

## 2024-12-27 DIAGNOSIS — M19.90 OSTEOARTHRITIS, UNSPECIFIED OSTEOARTHRITIS TYPE, UNSPECIFIED SITE: ICD-10-CM

## 2024-12-29 RX ORDER — HYDROXYCHLOROQUINE SULFATE 200 MG/1
200 TABLET, FILM COATED ORAL DAILY
Qty: 90 TABLET | Refills: 1 | Status: SHIPPED | OUTPATIENT
Start: 2024-12-29

## 2024-12-29 RX ORDER — METFORMIN HYDROCHLORIDE 500 MG/1
500 TABLET, EXTENDED RELEASE ORAL
Qty: 180 TABLET | Refills: 1 | Status: SHIPPED | OUTPATIENT
Start: 2024-12-29

## 2024-12-29 RX ORDER — LISINOPRIL 20 MG/1
20 TABLET ORAL DAILY
Qty: 90 TABLET | Refills: 1 | Status: SHIPPED | OUTPATIENT
Start: 2024-12-29

## 2024-12-29 RX ORDER — OMEPRAZOLE 40 MG/1
40 CAPSULE, DELAYED RELEASE ORAL
Qty: 90 CAPSULE | Refills: 3 | Status: SHIPPED | OUTPATIENT
Start: 2024-12-29

## 2024-12-29 RX ORDER — HYDROCHLOROTHIAZIDE 12.5 MG/1
12.5 TABLET ORAL DAILY
Qty: 90 TABLET | Refills: 1 | Status: SHIPPED | OUTPATIENT
Start: 2024-12-29

## 2024-12-30 DIAGNOSIS — I10 PRIMARY HYPERTENSION: ICD-10-CM

## 2024-12-31 RX ORDER — METOPROLOL TARTRATE 25 MG/1
25 TABLET, FILM COATED ORAL 2 TIMES DAILY
Qty: 180 TABLET | Refills: 1 | Status: SHIPPED | OUTPATIENT
Start: 2024-12-31

## 2025-01-06 DIAGNOSIS — E11.9 TYPE 2 DIABETES MELLITUS WITHOUT COMPLICATION, WITHOUT LONG-TERM CURRENT USE OF INSULIN (MULTI): ICD-10-CM

## 2025-01-09 RX ORDER — METFORMIN HYDROCHLORIDE 500 MG/1
TABLET, EXTENDED RELEASE ORAL
Qty: 180 TABLET | Refills: 1 | Status: SHIPPED | OUTPATIENT
Start: 2025-01-09

## 2025-01-16 ENCOUNTER — APPOINTMENT (OUTPATIENT)
Dept: GASTROENTEROLOGY | Facility: CLINIC | Age: 60
End: 2025-01-16
Payer: MEDICARE

## 2025-01-20 ENCOUNTER — APPOINTMENT (OUTPATIENT)
Dept: OCCUPATIONAL THERAPY | Facility: HOSPITAL | Age: 60
End: 2025-01-20
Payer: MEDICARE

## 2025-01-20 ENCOUNTER — DOCUMENTATION (OUTPATIENT)
Dept: OCCUPATIONAL THERAPY | Facility: HOSPITAL | Age: 60
End: 2025-01-20
Payer: MEDICARE

## 2025-01-20 ENCOUNTER — APPOINTMENT (OUTPATIENT)
Dept: PHYSICAL THERAPY | Facility: HOSPITAL | Age: 60
End: 2025-01-20
Payer: MEDICARE

## 2025-01-20 ENCOUNTER — DOCUMENTATION (OUTPATIENT)
Dept: PHYSICAL THERAPY | Facility: HOSPITAL | Age: 60
End: 2025-01-20
Payer: MEDICARE

## 2025-01-20 NOTE — PROGRESS NOTES
"Physical Therapy                 Therapy Communication Note    Patient Name: Dinorah Horn \"Raquel\"  MRN: [id number]  Today's Date: 1/20/2025     Discipline: Physical Therapy    Missed Time: Cancel, no reason given.  "

## 2025-01-20 NOTE — PROGRESS NOTES
"Occupational Therapy  Therapy Communication Note    Patient Name: Dinorah Horn \"Raquel\"  MRN: [id number]  Department:   Room: Room/bed info not found  Today's Date: 1/20/2025     Discipline: Occupational Therapy    Missed Time: Cancel    Comment: Patient canceled 1600 OT evaluation via my chart. No reason provided.   "

## 2025-01-29 ENCOUNTER — EVALUATION (OUTPATIENT)
Dept: PHYSICAL THERAPY | Facility: HOSPITAL | Age: 60
End: 2025-01-29
Payer: MEDICARE

## 2025-01-29 ENCOUNTER — EVALUATION (OUTPATIENT)
Dept: OCCUPATIONAL THERAPY | Facility: HOSPITAL | Age: 60
End: 2025-01-29
Payer: MEDICARE

## 2025-01-29 DIAGNOSIS — M79.641 BILATERAL HAND PAIN: Primary | ICD-10-CM

## 2025-01-29 DIAGNOSIS — M79.642 BILATERAL HAND PAIN: Primary | ICD-10-CM

## 2025-01-29 DIAGNOSIS — M54.89 INTERSCAPULAR PAIN: Primary | ICD-10-CM

## 2025-01-29 PROCEDURE — 97110 THERAPEUTIC EXERCISES: CPT | Mod: GP | Performed by: PHYSICAL THERAPIST

## 2025-01-29 PROCEDURE — 97161 PT EVAL LOW COMPLEX 20 MIN: CPT | Mod: GP | Performed by: PHYSICAL THERAPIST

## 2025-01-29 PROCEDURE — 97165 OT EVAL LOW COMPLEX 30 MIN: CPT | Mod: GO | Performed by: OCCUPATIONAL THERAPIST

## 2025-01-29 PROCEDURE — 97110 THERAPEUTIC EXERCISES: CPT | Mod: GO | Performed by: OCCUPATIONAL THERAPIST

## 2025-01-29 ASSESSMENT — PAIN - FUNCTIONAL ASSESSMENT
PAIN_FUNCTIONAL_ASSESSMENT: 0-10
PAIN_FUNCTIONAL_ASSESSMENT: 0-10

## 2025-01-29 ASSESSMENT — ENCOUNTER SYMPTOMS
LOSS OF SENSATION IN FEET: 0
OCCASIONAL FEELINGS OF UNSTEADINESS: 1
DEPRESSION: 0
LOSS OF SENSATION IN FEET: 0
OCCASIONAL FEELINGS OF UNSTEADINESS: 0
DEPRESSION: 0

## 2025-01-29 ASSESSMENT — PAIN SCALES - GENERAL
PAINLEVEL_OUTOF10: 5 - MODERATE PAIN
PAINLEVEL_OUTOF10: 1

## 2025-01-29 ASSESSMENT — ACTIVITIES OF DAILY LIVING (ADL)
ADL_ASSISTANCE: INDEPENDENT
BATHING_ASSISTANCE: INDEPENDENT

## 2025-01-29 ASSESSMENT — PAIN DESCRIPTION - DESCRIPTORS: DESCRIPTORS: DULL

## 2025-01-29 NOTE — PROGRESS NOTES
"  Physical Therapy  Physical Therapy Orthopedic Evaluation and Treatment    Patient Name: Dinorah Horn \"Pratibha"  MRN: [id number]  Today's Date: 1/29/2025  Time Calculation  Start Time: 1345  Stop Time: 1430  Time Calculation (min): 45 min    Today's Charges  PT Evaluation Time Entry  PT Evaluation (Low) Time Entry: 30  PT Therapeutic Procedures Time Entry  Therapeutic Exercise Time Entry: 15     Insurance:  Visit number: 1 of 1   Authorization info: no auth required  Payor: [email or web address] / Plan: [employer] / Product Type: *No Product type* /     Current Problem  Problem List Items Addressed This Visit             ICD-10-CM    Interscapular pain - Primary M54.89    Relevant Orders    Follow Up In Physical Therapy     1. Interscapular pain  Follow Up In Physical Therapy          General:  General  Reason for Referral: interscapular pain  Referred By: Dr. Camacho  Past Medical History Relevant to Rehab: cardiac catheterization, coronary artery bypass surgery, DM2, depression, RA, fibromyalgia, OA, chronic pain disorder.  Preferred Learning Style: auditory, kinesthetic, verbal, visual, written      Precautions:   Precautions  STEADI Fall Risk Score (The score of 4 or more indicates an increased risk of falling): 5  Medical Precautions: No known precautions/limitation    Medical History Form: Reviewed (scanned into chart)    Subjective:   Subjective   Chief Complaint: Patient is a 59 year old female who presents to clinic with complaints of interscapular pain. Patient states that it everything causes the pain. Patient states that the pain does not radiate. Patient states that sitting without movement helps decrease the pain. Patient states that the pain started about a year ago.  Patient has a prior medical history including: cardiac catheterization, coronary artery bypass surgery, DM2, depression, RA, fibromyalgia, OA, chronic pain disorder.  Onset Date: 12/20/2024  SULLY: Chronic    Current Condition: "   Same    Pain:  Pain Assessment: 0-10  0-10 (Numeric) Pain Score: 1  Pain Type: Chronic pain  Pain Location: Back  Pain Orientation: Mid, Right, Left  Pain Radiating Towards: n/a  Pain Descriptors: Dull  Highest: 9/10 pain  Lowest: 1/10 pain  Aggravating Factors:  everything  Relieving Factors:  Sitting and Lying    Relevant Information (PMH & Previous Tests/Imaging):   Thoracic spine xray on 10/22/2024:  IMPRESSION:  Moderate diffuse thoracic degenerative changes at all levels.    Previous Interventions/Treatments: None    Prior Level of Function (PLOF)  Patient previously independent with all ADLs  Exercise/Physical Activity: none  Hobbies: none stated    Patients Living Environment: Reviewed and no concern    Primary Language: English    Patient's Goal(s) for Therapy: become pain free    Red Flags: Do you have any of the following? No  Fever/chills, unexplained weight changes, dizziness/fainting, unexplained change in bowel or bladder functions, unexplained malaise or muscle weakness, night pain/sweats, numbness or tingling    Objective:  Objective     Cervical AROM  Cervical flexion: (80°): 29  Cervical extension: (50°): 44  Cervical rotation right: (80°): 47  Cervical rotation left: (80°): 20  Cervical sidebend right: (45°): 6  Cervical sidebend left: (45°): 13    Shoulder AROM  Shoulder AROM WFL: yes    Shoulder Strength  Shoulder Strength WFL: no (painful)  R shoulder flexion: (5/5): at least 3+/5  L shoulder flexion: (5/5): at least 3+/5  R shoulder abduction: (5/5): at least 3/5  L shoulder abduction: (5/5): at least 3/5  R shoulder ER: (5/5): 4/5  L shoulder ER: (5/5): 4/5  R shoulder IR: (5/5) : 4/5  L shoulder IR: (5/5): 4/5    Lumbar AROM  Lumbar flexion: (60°): min restriction  Lumbar extension (25°): WFL  Lumbar rotation right (30°): WFL  Lumbar rotation left (30°): min restriction  Lumbar sidebend right (25°): min restriction  Lumbar sidebend left (25°): min restriction    Hip PROM  R hip flexion:  "(125°): mod restriction  L hip flexion: (125°): mod restriction  R hip abduction: (45°): min restriction  L hip abduction: (45°): WFL  R hip ER: (45°): WFL  L hip ER: (45°): WFL  R hip IR: (45°): mod restriction  L hip IR: (45°): mod restriction    Specific Lower Extremity MMT  R Iliopsoas: (5/5): 4/5  L Iliopsoas: (5/5): 4/5      Special Tests    Spurlings Test: -  Radicular Symptoms: -    Palpation: increased muscle tension paraspinals    Joint Mobility: hypomobile cervical spine    Posture: shoulder rounded forward, head forward, and decreased cervical lordosis    Treatment Performed:  Therapeutic Exercise  Therapeutic Exercise Performed: Yes  Therapeutic Exercise Activity 1: s/l open book x5  Therapeutic Exercise Activity 2: cervical retraction x5  Therapeutic Exercise Activity 3: levator scap stretch 2x15\"  Therapeutic Exercise Activity 4: scap retraction x5  Therapeutic Exercise Activity 5: seated thoracic extension x5       Outcome Measures:  Other Measures  Oswestry Disablity Index (AVTAR): 22%     EDUCATION:   Individual(s) Educated: patient   Education Provided: Home exercise program, plan of care, activity modifications, pain management, and injury pathology  Handout(s) Provided: Scanned into chart  Home Program: Access Code: [email or web address]   Risk and Benefits Discussed with Patient/Caregiver/Other: Yes   Patient/Caregiver Demonstrated Understanding: Yes   Plan of Care Discussed and Agreed Upon: Yes   Patient Response to Education: Patient/Caregiver verbalized understanding of information, Patient/Caregiver performed return demonstration of exercises/activities, and Patient/Caregiver asked appropriate questions    Assessment: Patient presents with impaired strength and range of motion/flexibility resulting in limited participation in pain-free ADLs and inability to perform at their prior level of function. Patient demonstrated pain with hip passive range of motion, shoulder active range of motion, and palpation " of her cervical spine. Patient demonstrated impaired cervical spine range of motion, hypomobility, and point tenderness upon palpation of her paraspinals, levator scap, upper trap, and suboccipitals. Skilled PT warranted to address the above stated impairments, so the patient can perform FA's without increased pain or difficulty.    PT Assessment Results: Decreased strength, Pain, Decreased range of motion  Rehab Prognosis: Good  Evaluation/Treatment Tolerance: Patient tolerated treatment well    Complexity: low    Plan:  Treatment/Interventions: Education/ Instruction, Manual therapy, Neuromuscular re-education, Therapeutic activities, Therapeutic exercises  PT Plan: Skilled PT  PT Frequency: 2 times per week  Duration: 5 weeks  Onset Date: 12/20/24  Certification Period Start Date: 01/29/25  Certification Period End Date: 03/05/25  Number of Treatments Authorized: 1 of 1 (1 of 20 for 2025)  Rehab Potential: Good  Plan of Care Agreement: Patient    Goals: Set and discussed today  Active       PT Problem       Patient will achieve bilateral cervical side bending ROM WFL       Start:  01/29/25    Expected End:  03/05/25            Patient will achieve bilateral cervical rotation ROM WFL.       Start:  01/29/25    Expected End:  03/05/25            Patient will achieve cervical flexion ROM WFL.       Start:  01/29/25    Expected End:  03/05/25            Patient will achieve cervical extension ROM WFL.       Start:  01/29/25    Expected End:  03/05/25            Patient will achieve bilateral hip flexion ROM WFL.       Start:  01/29/25    Expected End:  03/05/25            Patient will achieve bilateral hip internal rotation ROM WFL In 90 degrees hip flexion       Start:  01/29/25    Expected End:  03/05/25            Patient will achieve spinal flexion to WFL.       Start:  01/29/25    Expected End:  03/05/25            Patient will achieve spinal extension to WFL.       Start:  01/29/25    Expected End:  03/05/25             Patient will achieve bilateral spinal side bending ROM WFL.       Start:  01/29/25    Expected End:  03/05/25            Patient will achieve bilateral spinal rotation ROM WFL.       Start:  01/29/25    Expected End:  03/05/25            Patient will achieve bilateral hip flexion strength of at least 4+/5       Start:  01/29/25    Expected End:  03/05/25            Patient will achieve bilateral hip abduction strength of at least 4+/5       Start:  01/29/25    Expected End:  03/05/25            Patient will demonstrate independence in home program for support of progression       Start:  01/29/25    Expected End:  03/05/25            Patient will report pain of no more than 2/10 demonstrating a reduction of overall pain       Start:  01/29/25    Expected End:  03/05/25            Patient will show a significant change in AVTAR (22% to 10%) patient reported outcome tool to demonstrate subjective imporovement       Start:  01/29/25    Expected End:  03/05/25                Plan of care was developed with input and agreement by the patient    Ambulatory Screenings Summary       Screening  Frequency  Date Last Completed   Spiritual and Cultural Beliefs   Screening each visit or episode of care 11/27/2024   Falls Risk Screening every ambulatory visit 1/29/2025  1:57 PM   Pain Screening annually at primary care visit  11/27/2024   Domestic Violence screening annually at primary care visit 11/27/2024   Depression Screening annually in the primary care setting 11/27/2024   Suicide Risk Screening annually in the primary care setting 11/27/2024   Nutrition and Food Insecurity   Screening at least annually at primary care visit     Key Learner annually in the primary care setting 11/27/2024   Drug Screen  11/6/2024  3:44 PM   Alcohol Screen  11/6/2024  3:44 PM   Advance Directive  11/27/2024       Osmin Ko, PT

## 2025-01-29 NOTE — PROGRESS NOTES
"    Occupational Therapy  Occupational Therapy Evaluation    Patient Name: Dinorah Horn \"Pratibha"  MRN: [id number]  : 1965  Today's Date: 2025  Time Calculation  Start Time: 1300  Stop Time: 1344  Time Calculation (min): 44 min    Today's Charges:  OT Evaluation Time Entry  OT Evaluation (Low) Time Entry: 34  OT Therapeutic Procedures Time Entry  Therapeutic Exercise Time Entry: 10    Current Problem  Problem List Items Addressed This Visit             ICD-10-CM    Bilateral hand pain - Primary M79.641, M79.642    Relevant Orders    Follow Up In Occupational Therapy     Insurance  Payor: [employer] / Plan: [employer] / Product Type: *No Product type* /     Ambulatory Screenings Summary     Screening  Frequency  Date Last Completed   Spiritual and Cultural Beliefs   Screening each visit or episode of care 2024   Falls Risk Screening every ambulatory visit 2025  1:17 PM   Pain Screening annually at primary care visit  2024   Domestic Violence screening annually at primary care visit 2024   Depression Screening annually in the primary care setting 2024   Suicide Risk Screening annually in the primary care setting 2024   Nutrition and Food Insecurity   Screening at least annually at primary care visit     Key Learner annually in the primary care setting 2024   Drug Screen  2024  3:44 PM   Alcohol Screen  2024  3:44 PM   Advance Directive  2024       Assessment  OT Assessment  OT Assessment Results: Decreased upper extremity range of motion, Decreased upper extremity strength, Decreased fine motor control, Decreased gross motor control, Decreased IADLs  Strengths: Ability to acquire knowledge, Attitude of self  Level of Complexity: Low  Clinical Presentation: Stable and/or uncomplicated characteristics  Pt. Tolerated session satisfactorily.  Patient is a 58 yo female referred to OT for arthritis and trigger finger of index on R " hand resulting in limited participation in pain-free ADLs and inability to perform at their prior level of function. Pt. Reports increased pain with crafting and daily activities, such as cleaning. Pt. Displays strength WFL, and  Pt would benefit from occupational therapy to address the above impairments in order to return to safe and pain-free ADLs and prior level of function.    Plan  Outpatient Plan  OT Plan: 1/4; tendon glides, putty, 9 hole peg  Frequency: 1x/wk  Duration: 4 weeks  Onset Date: 01/07/25  Certification Period Start Date: 01/29/25  Certification Period End Date: 02/28/25  Number of Treatments Authorized: auth  Rehab Potential: Good  Plan of Care Agreement: Patient  Planned Interventions: Therapeutic Exercise (72578), Therapeutic Activity (66670), Self-Care/Home Management (13991), Manual Therapy (94829), Neuromuscular Re-education (50026), Ultrasound (50140), Kinesiotaping, Hot Packs, and Cold Packs    General  OT Last Visit  OT Received On: 01/29/25  Reason for Referral: Hand pain  Referred By: Dr. Guerrero  Past Medical History Relevant to Rehab: DM, RA, hear disease, HBP OA, RA, liver disease, R arm fracture 4x  Previous Tests/Imaging): [facility]  Medical History Form: Reviewed and scanned into chart   Previous Interventions/Treatments: None  Primary Language: English  Preferred Learning Style: auditory, kinesthetic, verbal, visual, written    Red Flags:   Do you have any of the following? Yes - morning stiffness, changes in bowel or bladder, malaise, night pains sweats, fever chills, dizziness, fainting  Osteoporosis    Balance Difficulties/Falls  H/o CA Fever/chills   AM Stiffness Pacemaker Unexplained Weight Changes  Dizziness/Fainting     Unexplained Change in Bowel or Bladder Functions   Unexplained Malaise or Muscle Weakness  Night Pain/Sweats       Social Determinants of Health issues: Yes - money  Money  Unemployed/ work conditions  Education/Literacy Childhood experiences   Physical home  "environment Social supports/coping skills Healthy behaviors Access to healthcare     Subjective  General Comment: \"  Current condition since injury: Worse    Pt.'s goals for therapy: \"Become pain free.\"    Precautions  Precautions  STEADI Fall Risk Score (The score of 4 or more indicates an increased risk of falling): 6  Medical Precautions: No known precautions/limitation    Pain  Pain Assessment  Pain Assessment: 0-10  0-10 (Numeric) Pain Score: 5 - Moderate pain  Pain Type: Chronic pain  Pain Location: Hand  Pain Radiating Towards: fingers  As a result of this session, pt. reported pain remained the same.  End of session pain: 5/10  Aggravating Factors:  use of hands  Relieving Factors: Rest    Cognition  Cognition  Overall Cognitive Status: Within Functional Limits  Prior Level of Function/Home Living   Prior Function Per Pt/Caregiver Report  Level of [place]: Independent with ADLs and functional transfers, Independent with homemaking with ambulation  ADL Assistance: Independent  Homemaking Assistance: Independent  Ambulatory Assistance: Independent  Vocational: Unemployed  Leisure: cleaning, crafts  Hand Dominance: Right  IADL History  Homemaking Responsibilities: Yes  Meal Prep Responsibility: Primary  Laundry Responsibility: Primary  Cleaning Responsibility: Primary  Bill Paying/Finance Responsibility: Primary  Shopping Responsibility: Primary  Current License: Yes  Home Living  Type of Home: House  Lives With: Spouse  Home Living Comments: spouse is a  and gone most of the week  Concerns with home environment? No    Current ADL/IADL Function  ADL Function  ADL  Eating Assistance: Independent  Grooming Assistance: Independent  Bathing Assistance: Independent  UE Dressing Assistance: Independent  LE Dressing Assistance: Independent  Toileting Assistance with Device: Independent  Functional Assistance: Independent    Coordination  Coordination  Movements are Fluid and Coordinated: Yes    Hand " Function  Hand Function  Gross Grasp: Impaired  Coordination: Impaired    ROM/Strength  Right Hand Strength -  (lbs)  Handle Setting 2 (lbs): 59.33 lbs (60, 63, 55)  Right Hand Strength - Pinch (lbs)  Lateral (lbs): 16 lbs (16, 16, 16)  Tip 2 Point (lbs): 15 lbs (15, 15, 15)  Three Jaw Trell (lbs): 20 lbs (20, 20, 20)     Left Hand Strength -  (lbs)  Handle Setting 2 (lbs): 53 lbs (59, 59, 50)  Left Hand Strength - Pinch (lbs)  Lateral (lbs): 19.67 lbs (21, 20, 18)  Tip 2 Point (lbs): 14.67 lbs (15, 14, 15)  Three Jaw Trell (lbs): 21 lbs (21, 21, 21)    Treatment  This therapist instructed and demonstrated interventions to patient, patient completed the following under direct supervision of this therapist:  Therapeutic Exercise  Therapeutic Exercise Performed: Yes ROM and strengthening exercises completed this date. Pt. engaged in tendon glides x 10 reps. Handout provided.  Education on putty exercises today and handout provided.     Education  Education  Individual(s) Educated: Patient  Education Provided: Anatomy & Physiology, Diagnosis & Precautions, POC discussed and agreed upon, Risk and benefits of OT discussed with patient or other  Home Program: Handout issued, Tendon gliding  Risk and Benefits Discussed with Patient/Caregiver/Other: yes  Patient/Caregiver Demonstrated Understanding: yes  Plan of Care Discussed and Agreed Upon: yes  Patient Response to Education: Patient/Caregiver Verbalized Understanding of Information, Patient/Caregiver Performed Return Demonstration of Exercises/Activities, Patient/Caregiver Asked Appropriate Questions    HEP Provided Today: Yes -  Access Code: [email or web address]  URL: [email or web address]  Date: 01/29/2025  Prepared by: Maricel Olmstead    Exercises  - Rolling Putty on Table  - 1 x daily - 7 x weekly - 3 sets - 10 reps  - 3-Point Pinch with Putty  - 1 x daily - 7 x weekly - 3 sets - 10 reps  - Tip Pinch with Putty  - 1 x daily - 7 x weekly - 3 sets - 10  reps  - Thumb Opposition with Putty  - 1 x daily - 7 x weekly - 3 sets - 10 reps  - Putty Squeezes  - 1 x daily - 7 x weekly - 3 sets - 10 reps  - Finger Abduction with Putty  - 1 x daily - 7 x weekly - 3 sets - 10 reps  - Finger Extension with Putty  - 1 x daily - 7 x weekly - 3 sets - 10 reps  - Finger Adduction with Putty  - 1 x daily - 7 x weekly - 3 sets - 10 reps  - Key Pinch with Putty  - 1 x daily - 7 x weekly - 3 sets - 10 reps  - Finger Lumbricals with Putty  - 1 x daily - 7 x weekly - 3 sets - 10 reps    Outcome Measures  OT Adult Other Outcome Measures  Other Outcome Measures: QuickDASH: 33 raw = 50.00    Goals  Active       OT Goals       Pt. will decrease QuickDASH score by 8 points to show improved ability to participate in daily tasks with minimal difficulty/pain.        Start:  01/29/25    Expected End:  02/26/25       33 raw = 50.00         Pt. will demonstrate MI with stating and demonstrating home exercise program in order to improve patient's ability to perform self-care, household, and/or leisure tasks.        Start:  01/29/25    Expected End:  02/28/25            Patient will be able to identify 3 modalities and/or techniques to reduce pain by discharge.        Start:  01/29/25    Expected End:  02/28/25            Pt. will be able to state and apply joint protection techniques with independence during daily activity to increase participation with daily tasks.        Start:  01/29/25    Expected End:  02/28/25            Pt. will increase RUE hand strength to increase participation in self-care, household, and leisure tasks. lateral: 19lbs;         Start:  01/29/25    Expected End:  02/28/25                MEHNAZ Magallanes/MEGGAN

## 2025-02-11 ENCOUNTER — TELEPHONE (OUTPATIENT)
Dept: PHYSICAL THERAPY | Facility: HOSPITAL | Age: 60
End: 2025-02-11
Payer: MEDICARE

## 2025-02-13 ENCOUNTER — TREATMENT (OUTPATIENT)
Dept: OCCUPATIONAL THERAPY | Facility: HOSPITAL | Age: 60
End: 2025-02-13
Payer: MEDICARE

## 2025-02-13 ENCOUNTER — TREATMENT (OUTPATIENT)
Dept: PHYSICAL THERAPY | Facility: HOSPITAL | Age: 60
End: 2025-02-13
Payer: MEDICARE

## 2025-02-13 DIAGNOSIS — M79.642 BILATERAL HAND PAIN: ICD-10-CM

## 2025-02-13 DIAGNOSIS — M54.89 INTERSCAPULAR PAIN: Primary | ICD-10-CM

## 2025-02-13 DIAGNOSIS — M79.641 BILATERAL HAND PAIN: ICD-10-CM

## 2025-02-13 PROCEDURE — 97140 MANUAL THERAPY 1/> REGIONS: CPT | Mod: GP | Performed by: PHYSICAL THERAPIST

## 2025-02-13 PROCEDURE — 97110 THERAPEUTIC EXERCISES: CPT | Mod: GP | Performed by: PHYSICAL THERAPIST

## 2025-02-13 PROCEDURE — 97110 THERAPEUTIC EXERCISES: CPT | Mod: GO | Performed by: OCCUPATIONAL THERAPIST

## 2025-02-13 ASSESSMENT — PAIN DESCRIPTION - DESCRIPTORS: DESCRIPTORS: DULL;ACHING

## 2025-02-13 ASSESSMENT — PAIN - FUNCTIONAL ASSESSMENT
PAIN_FUNCTIONAL_ASSESSMENT: 0-10
PAIN_FUNCTIONAL_ASSESSMENT: 0-10

## 2025-02-13 ASSESSMENT — PAIN SCALES - GENERAL: PAINLEVEL_OUTOF10: 6

## 2025-02-13 NOTE — PROGRESS NOTES
"  Physical Therapy Treatment    Patient Name: Dinorah Horn  MRN: [id number]  Today's Date: 2/13/2025  Time Calculation  Start Time: 1519  Stop Time: 1558  Time Calculation (min): 39 min        PT Therapeutic Procedures Time Entry  Manual Therapy Time Entry: 10  Therapeutic Exercise Time Entry: 29                Current Problem  1. Interscapular pain  Follow Up In Physical Therapy        Problem List Items Addressed This Visit             ICD-10-CM    Interscapular pain - Primary M54.89       General  Reason for Referral: interscapular pain  Referred By: Dr. Camacho  Past Medical History Relevant to Rehab: cardiac catheterization, coronary artery bypass surgery, DM2, depression, RA, fibromyalgia, OA, chronic pain disorder.  Preferred Learning Style: auditory, kinesthetic, verbal, visual, written    Subjective   Current Condition:   Same  Patient reports increased back pain when doing puzzles or doing dishes (looking down).    Performing HEP?: Partially    Precautions  Precautions  Medical Precautions: No known precautions/limitation  Pain  Pain Assessment: 0-10  0-10 (Numeric) Pain Score: 6  Pain Type: Chronic pain  Pain Location: Shoulder  Pain Orientation: Right, Left  Pain Descriptors: Dull, Aching    Objective     Treatments:  Therapeutic Exercise  Therapeutic Exercise Performed: Yes  Therapeutic Exercise Activity 1: s/l open book x10  Therapeutic Exercise Activity 2: cervical retraction x10  Therapeutic Exercise Activity 3: levator scap stretch 3x15\"  Therapeutic Exercise Activity 4: scap retraction rec x10  Therapeutic Exercise Activity 5: seated thoracic extension x5  Therapeutic Exercise Activity 6: shoulder ext red x10  Therapeutic Exercise Activity 7: standing hip abd x10  Therapeutic Exercise Activity 8: standing hip ext x10  Therapeutic Exercise Activity 9: cervical spine rotation isometrics x5  Therapeutic Exercise Activity 10: cervical spine isometrics lateral flexion x5    Manual Therapy  Manual Therapy " Performed: Yes  Manual Therapy Activity 1: STM suboccipitals  Manual Therapy Activity 2: cervical spine traction  Manual Therapy Activity 3: cervical spine PA mobs    EDUCATION:   Individual(s) Educated: Patient  Education Provided: yes  Handout(s) Provided: Scanned into chart  Home Program: reviewed home exercise program   Risk and Benefits Discussed with Patient/Caregiver/Other: Yes   Patient/Caregiver Demonstrated Understanding: Yes   Patient Response to Education: Patient/Caregiver verbalized understanding of information, Patient/Caregiver performed return demonstration of exercises/activities, and Patient/Caregiver asked appropriate questions    Assessment: Patient's primary limitations are in her cervical and thoracic spine. During the initial evaluation, the initial focus was on her thoracic and lumbar spine, but after further assessment, it appears that our will be on her thoracic and cervical spine. Patient agrees with plan. Patient demonstrated increased muscle tension to suboccipitals and had sensitivity to STM. Introduced cervical spine isometrics for rotation and lateral flexion without increased pain.  PT Assessment  PT Assessment Results: Decreased strength, Pain, Decreased range of motion  Rehab Prognosis: Good  Evaluation/Treatment Tolerance: Patient limited by pain, Patient tolerated treatment well    Plan: Continue with POC. Progress cervical and thoracic spine exercises to improve range of motion/flexibility and decrease pain.  OP PT Plan  Treatment/Interventions: Education/ Instruction, Manual therapy, Neuromuscular re-education, Therapeutic activities, Therapeutic exercises  PT Plan: Skilled PT  PT Frequency: 2 times per week  Duration: 5 weeks  Onset Date: 12/20/24  Certification Period Start Date: 01/29/25  Certification Period End Date: 03/05/25  Number of Treatments Authorized: 2 of 10 (2 of 20 for 2025)  Rehab Potential: Good  Plan of Care Agreement: Patient    Goals:  Active       PT  Problem       Patient will achieve bilateral cervical side bending ROM WFL       Start:  01/29/25    Expected End:  03/05/25            Patient will achieve bilateral cervical rotation ROM WFL.       Start:  01/29/25    Expected End:  03/05/25            Patient will achieve cervical flexion ROM WFL.       Start:  01/29/25    Expected End:  03/05/25            Patient will achieve cervical extension ROM WFL.       Start:  01/29/25    Expected End:  03/05/25            Patient will achieve spinal flexion to WFL.       Start:  01/29/25    Expected End:  03/05/25            Patient will achieve spinal extension to WFL.       Start:  01/29/25    Expected End:  03/05/25            Patient will achieve bilateral spinal side bending ROM WFL.       Start:  01/29/25    Expected End:  03/05/25            Patient will achieve bilateral spinal rotation ROM WFL.       Start:  01/29/25    Expected End:  03/05/25            Patient will demonstrate independence in home program for support of progression       Start:  01/29/25    Expected End:  03/05/25            Patient will report pain of no more than 2/10 demonstrating a reduction of overall pain       Start:  01/29/25    Expected End:  03/05/25            Patient will show a significant change in AVTAR (22% to 10%) patient reported outcome tool to demonstrate subjective imporovement       Start:  01/29/25    Expected End:  03/05/25                 Osmin Ko, PT

## 2025-02-13 NOTE — PROGRESS NOTES
"  Occupational Therapy  Occupational Therapy Treatment    Patient Name: Dinorah Horn \"Pratibha"  MRN: [id number]  : 1965  Today's Date: 2025  Time Calculation  Start Time: 1605  Stop Time: 1644  Time Calculation (min): 39 min    Today's Charges:  OT Therapeutic Procedures Time Entry  Therapeutic Exercise Time Entry: 39    Current Problem  Problem List Items Addressed This Visit             ICD-10-CM    Bilateral hand pain M79.641, M79.642     Assessment  OT Assessment  OT Assessment Results: Decreased upper extremity range of motion, Decreased upper extremity strength, Decreased fine motor control, Decreased gross motor control, Decreased IADLs  Strengths: Ability to acquire knowledge, Attitude of self  Clinical Presentation: Stable and/or uncomplicated characteristics  Progress towards goals: Patient reports there has not been a significant change in functional abilities.  Patient tolerated treatment satisfactorily.  Patient reports she is not having intense pain in hands today. Patient was able to demonstrate tolerance for all putty exercises today. Patient was provided with pink putty and beads as well as handout.     Insurance  Payor: [employer] / Plan: [facility] [place] [employer] / Product Type: *No Product type* /     Plan  Outpatient Plan  OT Plan: ; review putty  Frequency: 1x/wk  Duration: 4 weeks  Onset Date: 25  Certification Period Start Date: 25  Certification Period End Date: 25  Number of Treatments Authorized: auth  Rehab Potential: Good  Plan of Care Agreement: Patient  Planned Interventions: Therapeutic Exercise (45358), Therapeutic Activity (19858), Self-Care/Home Management (84405), Manual Therapy (64198), Neuromuscular Re-education (98144), Ultrasound (76726), Kinesiotaping, Hot Packs, and Cold Packs     General  OT Last Visit  OT Received On: 25  Reason for Referral: Hand pain  Referred By: Dr. Guerrero  Past Medical History Relevant to Rehab: DM, RA, " "hear disease, HBP OA, RA, liver disease, R arm fracture 4x  Primary Language: English  Preferred Learning Style: auditory, kinesthetic, verbal, visual, written    Subjective     Current condition since injury: Same   Performing HEP? Yes    Precautions  Precautions  Medical Precautions: No known precautions/limitation    Pain  Pain Assessment  Pain Assessment: 0-10  0-10 (Numeric) Pain Score:  (\"not much\")  As a result of session pt. Reported pain remained the same  End of session pain: 0/10    Treatment  This therapist instructed and demonstrated interventions to patient, patient completed the following under direct supervision of this therapist:  Therapeutic Exercise  Therapeutic Exercise Performed: Yes ROM and strengthening exercises completed this date. Pt. engaged in tendon glides x 5 reps. Theraputty exercises completed this date with pink putty. Pt. engaged in 5 reps of the following exercises, rolls, squeezes, opposition, adduciton, gross finger extension, 3 jaw sruthi, key pinch, and lumbrical pinch. Putty bead removal completed this date to work on hand strength and fine motor movements. Pt tolerated well using pink putty. Pt. able to remove the beads and replace them with minimal difficulty.     Education  Education  Individual(s) Educated: Patient  Education Provided: Anatomy & Physiology, Diagnosis & Precautions, POC discussed and agreed upon, Risk and benefits of OT discussed with patient or other  Home Program: Handout issued  Equipment: Thera-putty (Pink)  Risk and Benefits Discussed with Patient/Caregiver/Other: yes  Patient/Caregiver Demonstrated Understanding: yes  Plan of Care Discussed and Agreed Upon: yes  Patient Response to Education: Patient/Caregiver Verbalized Understanding of Information, Patient/Caregiver Performed Return Demonstration of Exercises/Activities, Patient/Caregiver Asked Appropriate Questions    HEP Provided Today: Yes -   Access Code: [email or web address]  URL: " [email or web address]  Date: 02/13/2025  Prepared by: Maricel Olmstead    Exercises  - Rolling Putty on Table  - 1 x daily - 7 x weekly - 3 sets - 10 reps  - 3-Point Pinch with Putty  - 1 x daily - 7 x weekly - 3 sets - 10 reps  - Tip Pinch with Putty  - 1 x daily - 7 x weekly - 3 sets - 10 reps  - Thumb Opposition with Putty  - 1 x daily - 7 x weekly - 3 sets - 10 reps  - Putty Squeezes  - 1 x daily - 7 x weekly - 3 sets - 10 reps  - Finger Abduction with Putty  - 1 x daily - 7 x weekly - 3 sets - 10 reps  - Finger Extension with Putty  - 1 x daily - 7 x weekly - 3 sets - 10 reps  - Finger Adduction with Putty  - 1 x daily - 7 x weekly - 3 sets - 10 reps  - Key Pinch with Putty  - 1 x daily - 7 x weekly - 3 sets - 10 reps  - Finger Lumbricals with Putty  - 1 x daily - 7 x weekly - 3 sets - 10 reps    Outcome Measures  OT Adult Other Outcome Measures  9 Hole Peg Test: RUE:27.2 LUE:20.5      Goals  Active       OT Goals       Pt. will decrease QuickDASH score by 8 points to show improved ability to participate in daily tasks with minimal difficulty/pain.        Start:  01/29/25    Expected End:  02/26/25       33 raw = 50.00         Pt. will demonstrate MI with stating and demonstrating home exercise program in order to improve patient's ability to perform self-care, household, and/or leisure tasks.        Start:  01/29/25    Expected End:  02/28/25            Patient will be able to identify 3 modalities and/or techniques to reduce pain by discharge.        Start:  01/29/25    Expected End:  02/28/25            Pt. will be able to state and apply joint protection techniques with independence during daily activity to increase participation with daily tasks.        Start:  01/29/25    Expected End:  02/28/25            Pt. will increase RUE hand strength to increase participation in self-care, household, and leisure tasks. lateral: 19lbs;         Start:  01/29/25    Expected End:  02/28/25                 Maricel Olmstead, OTR/L

## 2025-02-18 ENCOUNTER — TREATMENT (OUTPATIENT)
Dept: OCCUPATIONAL THERAPY | Facility: HOSPITAL | Age: 60
End: 2025-02-18
Payer: MEDICARE

## 2025-02-18 ENCOUNTER — TREATMENT (OUTPATIENT)
Dept: PHYSICAL THERAPY | Facility: HOSPITAL | Age: 60
End: 2025-02-18
Payer: MEDICARE

## 2025-02-18 DIAGNOSIS — M54.89 INTERSCAPULAR PAIN: ICD-10-CM

## 2025-02-18 DIAGNOSIS — M79.641 BILATERAL HAND PAIN: ICD-10-CM

## 2025-02-18 DIAGNOSIS — M79.642 BILATERAL HAND PAIN: ICD-10-CM

## 2025-02-18 PROCEDURE — 97110 THERAPEUTIC EXERCISES: CPT | Mod: GO | Performed by: OCCUPATIONAL THERAPIST

## 2025-02-18 PROCEDURE — 97140 MANUAL THERAPY 1/> REGIONS: CPT | Mod: GP,CQ

## 2025-02-18 PROCEDURE — 97110 THERAPEUTIC EXERCISES: CPT | Mod: GP,CQ

## 2025-02-18 ASSESSMENT — PAIN - FUNCTIONAL ASSESSMENT
PAIN_FUNCTIONAL_ASSESSMENT: 0-10
PAIN_FUNCTIONAL_ASSESSMENT: 0-10

## 2025-02-18 ASSESSMENT — PAIN SCALES - GENERAL
PAINLEVEL_OUTOF10: 4
PAINLEVEL_OUTOF10: 4

## 2025-02-18 NOTE — PROGRESS NOTES
"  Physical Therapy Treatment    Patient Name: Dinorah Horn  MRN: [id number]  Today's Date: 2/18/2025  Time Calculation  Start Time: 1519  Stop Time: 1559  Time Calculation (min): 40 min        PT Therapeutic Procedures Time Entry  Manual Therapy Time Entry: 12  Therapeutic Exercise Time Entry: 28                Current Problem  1. Interscapular pain  Follow Up In Physical Therapy          General  Reason for Referral: interscapular pain  Referred By: Dr. Camacho  Past Medical History Relevant to Rehab: cardiac catheterization, coronary artery bypass surgery, DM2, depression, RA, fibromyalgia, OA, chronic pain disorder.  Preferred Learning Style: auditory, kinesthetic, verbal, visual, written    Subjective   Current Condition:   Same  Patient reports she is able to perform the exercises without a problem. Reports no change with symptoms since last session.     Performing HEP?: Yes    Precautions  Precautions  Medical Precautions: No known precautions/limitation  Pain  Pain Assessment: 0-10  0-10 (Numeric) Pain Score: 4  Pain Location: Neck    Objective         Treatments:    Therapeutic Exercise  Therapeutic Exercise Performed: Yes  Therapeutic Exercise Activity 1: s/l open book x10  Therapeutic Exercise Activity 2: cervical retraction x10  Therapeutic Exercise Activity 3: levator scap stretch 3x15\"  Therapeutic Exercise Activity 4: scap retraction Green 2x10  Therapeutic Exercise Activity 5: seated thoracic extension x5  Therapeutic Exercise Activity 6: shoulder ext red x10  Therapeutic Exercise Activity 7: standing hip abd x10  Therapeutic Exercise Activity 8: standing hip ext x10  Therapeutic Exercise Activity 9: B ER Red x10  Therapeutic Exercise Activity 10: B Extension Green 2x10         Manual Therapy  Manual Therapy Performed: Yes  Manual Therapy Activity 1: sub occipital release to decrease mm tension  Manual Therapy Activity 2: c-spine traction to decrease pain              EDUCATION:   Individual(s) " Educated: Patient  Education Provided: scap retract, extension, and bilateral ER  Risk and Benefits Discussed with Patient/Caregiver/Other: Yes   Patient/Caregiver Demonstrated Understanding: Yes   Patient Response to Education: Patient/Caregiver verbalized understanding of information and Patient/Caregiver performed return demonstration of exercises/activities    Assessment: Pt unable to tolerate PA mobs to the c-spine d/t c/o increased pain when attempted. Pt was able to tolerate manual traction to the c-spine with no change in pain after. Pt was able to perform strengthening exercises initiated today without c/o pain in the upper back or neck.   PT Assessment  PT Assessment Results: Decreased strength, Pain, Decreased range of motion  Rehab Prognosis: Good    Plan: Continue to progress current POC as tolerated to facilitate ability to perform functional activities.   OP PT Plan  Treatment/Interventions: Education/ Instruction, Manual therapy, Neuromuscular re-education, Therapeutic activities, Therapeutic exercises  PT Plan: Skilled PT  PT Frequency: 2 times per week  Duration: 5 weeks  Onset Date: 12/20/24  Certification Period Start Date: 01/29/25  Certification Period End Date: 03/05/25  Number of Treatments Authorized: 3 of 10 (3 of 20 for 2025)  Rehab Potential: Good  Plan of Care Agreement: Patient    Goals:  Active       PT Problem       Patient will achieve bilateral cervical side bending ROM WFL       Start:  01/29/25    Expected End:  03/05/25            Patient will achieve bilateral cervical rotation ROM WFL.       Start:  01/29/25    Expected End:  03/05/25            Patient will achieve cervical flexion ROM WFL.       Start:  01/29/25    Expected End:  03/05/25            Patient will achieve cervical extension ROM WFL.       Start:  01/29/25    Expected End:  03/05/25            Patient will achieve spinal flexion to WFL.       Start:  01/29/25    Expected End:  03/05/25            Patient will  achieve spinal extension to WFL.       Start:  01/29/25    Expected End:  03/05/25            Patient will achieve bilateral spinal side bending ROM WFL.       Start:  01/29/25    Expected End:  03/05/25            Patient will achieve bilateral spinal rotation ROM WFL.       Start:  01/29/25    Expected End:  03/05/25            Patient will demonstrate independence in home program for support of progression       Start:  01/29/25    Expected End:  03/05/25            Patient will report pain of no more than 2/10 demonstrating a reduction of overall pain       Start:  01/29/25    Expected End:  03/05/25            Patient will show a significant change in AVTAR (22% to 10%) patient reported outcome tool to demonstrate subjective imporovement       Start:  01/29/25    Expected End:  03/05/25                 Shola Carrion PTA

## 2025-02-18 NOTE — PROGRESS NOTES
"  Occupational Therapy  Occupational Therapy Treatment    Patient Name: Dinorah Horn \"Pratibha"  MRN: [id number]  : 1965  Today's Date: 2025  Time Calculation  Start Time: 1603  Stop Time: 1650  Time Calculation (min): 47 min    Today's Charges:  OT Therapeutic Procedures Time Entry  Therapeutic Exercise Time Entry: 47  Total Timed Minutes: 47  Total Untimed Minutes: 0      Current Problem  Problem List Items Addressed This Visit             ICD-10-CM    Bilateral hand pain M79.641, M79.642     Assessment  OT Assessment  OT Assessment Results: Decreased upper extremity range of motion, Decreased upper extremity strength, Decreased fine motor control, Decreased gross motor control, Decreased IADLs  Strengths: Ability to acquire knowledge, Attitude of self  Clinical Presentation: Stable and/or uncomplicated characteristics  Progress towards goals: Improved ROM. Improved strength.  Patient tolerated treatment satisfactorily.  Patient displays understanding of HEP. Plan for recheck tomorrow as patient is feeling comfortable with established HEP.     Insurance  Payor: [employer] / Plan: [employer] / Product Type: *No Product type* /     Plan  Outpatient Plan  OT Plan: 3/4; recheck  Frequency: 1x/wk  Duration: 4 weeks  Onset Date: 25  Certification Period Start Date: 25  Certification Period End Date: 25  Number of Treatments Authorized: auth  Rehab Potential: Good  Plan of Care Agreement: Patient  Planned Interventions: Therapeutic Exercise (42784), Therapeutic Activity (56441), Self-Care/Home Management (37714), Manual Therapy (32600), Neuromuscular Re-education (10736), Ultrasound (04146), Kinesiotaping, Hot Packs, and Cold Packs     General  OT Last Visit  OT Received On: 25  Reason for Referral: Hand pain  Referred By: Dr. Guerrero  Past Medical History Relevant to Rehab: DM, RA, hear disease, HBP OA, RA, liver disease, R arm fracture 4x  Primary Language: " "English  Preferred Learning Style: auditory, kinesthetic, verbal, visual, written    Subjective  General Comment: \"they seem worse when weather is coming.\"  Current condition since injury: Same   Performing HEP? Yes    Precautions  Precautions  Medical Precautions: No known precautions/limitation    Pain  Pain Assessment  Pain Assessment: 0-10  0-10 (Numeric) Pain Score: 4 (3-4)  As a result of session pt. Reported pain remained the same  End of session pain: 3-4/10    Treatment  This therapist instructed and demonstrated interventions to patient, patient completed the following under direct supervision of this therapist:  Therapeutic Exercise  Therapeutic Exercise Performed: Yes ROM and strengthening exercises completed this date. Exercises completed including towels squeezes  and hand pumping bilaterally x10 reps. Patient engaged in finger ROM exercises today including finger spreading, and MP extension with blocking x10 reps.  Finger extension stretch x10 reps with 10s holds. Blocking exercises completed to finger this date. Pt. completed DIP flexion blocked, PIP flexion blocked, PIP/DIP composite flexion, MP/PIP/DIP composite flexion x 10 reps with 10s holds.  Utilized red flex bar to complete vertical and horizontal wrist flex/ext, hand  and twist, thumb ab/adduction, and sup/pro x10 reps. Utilized yellow power web this date in with full grasp, 3 jaw pinch, and finger extensions. Pt. Completed x 10 reps. Patient tolerated all exercises well.     Education  Education  Individual(s) Educated: Patient  Education Provided: Anatomy & Physiology, Diagnosis & Precautions, POC discussed and agreed upon, Risk and benefits of OT discussed with patient or other  Risk and Benefits Discussed with Patient/Caregiver/Other: yes  Patient/Caregiver Demonstrated Understanding: yes  Plan of Care Discussed and Agreed Upon: yes  Patient Response to Education: Patient/Caregiver Verbalized Understanding of Information, " Patient/Caregiver Performed Return Demonstration of Exercises/Activities, Patient/Caregiver Asked Appropriate Questions    HEP Provided Today: Yes -   Access Code: [email or web address]  URL: [email or web address]  Date: 02/18/2025  Prepared by: Maricel Olmstead    Exercises  - Seated Finger MP Extension AROM with Blocking  - 1 x daily - 7 x weekly - 3 sets - 10 reps  - Finger Extension or EDC Glides: Pen Roll From Fist to Hook Fist  - 1 x daily - 7 x weekly - 3 sets - 10 reps  - Seated Finger Adduction and Extension with Towel  - 1 x daily - 7 x weekly - 3 sets - 10 reps  - Seated Pen Hurdles  - 1 x daily - 7 x weekly - 3 sets - 10 reps  - Finger Spreading  - 1 x daily - 7 x weekly - 3 sets - 10 reps  - Towel Roll Squeeze  - 2 x daily - 5 x weekly - 1 sets - 10 reps - 5-10 hold  - Hand Pumping  - 1 x daily - 7 x weekly - 3 sets - 10 reps  - Seated Single Digit Intrinsic Stretch  - 1 x daily - 7 x weekly - 3 sets - 10 reps  - Seated Finger Composite Flexion Stretch  - 1 x daily - 7 x weekly - 3 sets - 10 reps  - Hand AROM PIP Blocking  - 1 x daily - 7 x weekly - 3 sets - 10 reps  - Seated Finger DIP Flexion AROM with Blocking  - 1 x daily - 7 x weekly - 3 sets - 10 reps  - Seated Isolated Finger PIP Flexion AROM  - 1 x daily - 7 x weekly - 3 sets - 10 reps  - Thumb AROM MP Blocking  - 2 x daily - 7 x weekly - 1 sets - 10 reps - 6-10 hold  - Thumb AROM IP Blocking  - 2 x daily - 7 x weekly - 1 sets - 10 reps - 6-10 hold      Goals  Active       OT Goals       Pt. will decrease QuickDASH score by 8 points to show improved ability to participate in daily tasks with minimal difficulty/pain.        Start:  01/29/25    Expected End:  02/26/25       33 raw = 50.00         Pt. will demonstrate MI with stating and demonstrating home exercise program in order to improve patient's ability to perform self-care, household, and/or leisure tasks.        Start:  01/29/25    Expected End:  02/28/25            Patient will be  able to identify 3 modalities and/or techniques to reduce pain by discharge.        Start:  01/29/25    Expected End:  02/28/25            Pt. will be able to state and apply joint protection techniques with independence during daily activity to increase participation with daily tasks.        Start:  01/29/25    Expected End:  02/28/25            Pt. will increase RUE hand strength to increase participation in self-care, household, and leisure tasks. lateral: 19lbs;         Start:  01/29/25    Expected End:  02/28/25                MEHNAZ Magallanes/MEGGAN

## 2025-02-20 ENCOUNTER — OFFICE VISIT (OUTPATIENT)
Dept: GASTROENTEROLOGY | Facility: CLINIC | Age: 60
End: 2025-02-20
Payer: MEDICARE

## 2025-02-20 VITALS
WEIGHT: 194 LBS | BODY MASS INDEX: 28.65 KG/M2 | OXYGEN SATURATION: 99 % | SYSTOLIC BLOOD PRESSURE: 97 MMHG | DIASTOLIC BLOOD PRESSURE: 66 MMHG | HEART RATE: 71 BPM | TEMPERATURE: 98.1 F

## 2025-02-20 DIAGNOSIS — K76.0 METALD: ICD-10-CM

## 2025-02-20 DIAGNOSIS — F10.90 METALD: ICD-10-CM

## 2025-02-20 DIAGNOSIS — K74.69 OTHER CIRRHOSIS OF LIVER: Primary | ICD-10-CM

## 2025-02-20 DIAGNOSIS — R79.89 LIVER FUNCTION TEST ABNORMALITY: ICD-10-CM

## 2025-02-20 DIAGNOSIS — Z71.41 ENCOUNTER FOR ALCOHOL CESSATION COUNSELING: ICD-10-CM

## 2025-02-20 PROCEDURE — 3078F DIAST BP <80 MM HG: CPT | Performed by: INTERNAL MEDICINE

## 2025-02-20 PROCEDURE — 99214 OFFICE O/P EST MOD 30 MIN: CPT | Performed by: INTERNAL MEDICINE

## 2025-02-20 PROCEDURE — 3074F SYST BP LT 130 MM HG: CPT | Performed by: INTERNAL MEDICINE

## 2025-02-20 PROCEDURE — G2211 COMPLEX E/M VISIT ADD ON: HCPCS | Performed by: INTERNAL MEDICINE

## 2025-02-20 PROCEDURE — 4010F ACE/ARB THERAPY RXD/TAKEN: CPT | Performed by: INTERNAL MEDICINE

## 2025-02-20 ASSESSMENT — PAIN SCALES - GENERAL: PAINLEVEL_OUTOF10: 0-NO PAIN

## 2025-02-20 NOTE — PATIENT INSTRUCTIONS
Welcome to Dr. P[facility].  Dr. Benson sees patients at the following sites:  [place] [address] [facility] at [facility]  [address], [address] at [facility] at [facility], [facility] [address]    Dr. Benson's hepatology care coordinator, Kirstin PAPPAS, can be reached at [phone].  Dr. Benson's , Stephanie Bryan, can be reached at [phone].     Get labs now.     Get a Liver Ultrasound.  Do not eat or drink anything 6 hours prior to your exam.  Call [phone] to schedule.    Abstain from alcohol use.     Follow up with me in clinic in 6 months.   Schedule your visit on your way out today. If unable, please call [phone] to schedule.

## 2025-02-20 NOTE — PROGRESS NOTES
"Subjective     Follow up visit for cirrhosis.    History of Present Illness:   Dinorah Horn \"Raquel\" is a 59 y.o. female who presents to the [facility] at [facility], for a follow up evaluation of a new diagnosis of cirrhosis.    She was referred by Ratna RIVERA CNP.  Her initial appointment was last year in May 2024.  The reason for visit was for hepatomegaly, elevations in her liver enzymes, suspected chronic liver disease.    Interval history:  She has completed the liver biopsy - which was done in October, (TJ with portal pressure measurements).    Prior history:  The patient recalls that she was told she had fatty liver many years ago.  She never recalls manifesting any symptoms from her liver disease.  She denies a history of jaundice, fluid retention such as her ascites, confusion from hepatic encephalopathy.  She does report some chronic GI symptoms including constipation.    As regards to liver disease risk factors, she reports a history of alcohol excess: that she would drink 50 beers per week (16 ounce beers).  She also has metabolic risk factors including diabetes hypertension and heart disease. She has basically out all alcohol use. Over the past year + since finding out about her liver disease, she stopped all alcohol for a period of time; then began drinking beer, but much less. At this time she reports no regular alcohol use, just a few occasions in which she reports some drinking.    Initial laboratory workup was notable for the following (February 2024):  Albumin 4.6 bilirubin of 0.5 alkaline phosphatase of 102 ALT of 31 AST of 29.  A year ago her liver enzymes were abnormal (May 2023):  AST of 89 ALT of 97 alkaline phosphatase 164 total protein 8.2 albumin 4.6.  She had an GORDON that was checked -positive at 1-160.  Her anti-SSA was positive greater than 8.0. Her ferritin was slightly elevated at 257.  Hepatitis B and C serologies were negative.  HIV was " negative.    Liver imaging was performed: The liver measures 16.9 cm with diffuse heterogenicity of the echotexture of the liver.    She was referred for liver elastography with Fibroscan:  E (median liver stiffness measurement):   21.5   kPa   CAP (controlled attenuation parameter):  227   dB/m   This was consistent with cirrhosis.    Review of Systems  Review of Systems  Negative ROS.    Past Medical History   has a past medical history of Arthritis, Coronary artery disease, Depression, Diabetes mellitus (Multi), GERD (gastroesophageal reflux disease), Headache, Heart disease, Hypertension, and Visual impairment.     She was diagnosed with diabetes about 2 years ago.    She has a history of cardiac surgery.    She last had a colonoscopy in 2021. Recently repeated.    Social History   reports that she quit smoking about 15 years ago. Her smoking use included cigarettes. She started smoking about 30 years ago. She has a 15 pack-year smoking history. She has never used smokeless tobacco. She reports that she does not currently use alcohol. She reports that she does not use drugs.     Family History  family history includes Alcohol abuse in her daughter, mother, paternal grandmother, sister, and sister; Arthritis in her father; Atrial fibrillation in her father; COPD in her father; Cancer in her father, father's brother, father's brother, and mother; Colon cancer in her father's brother; Diabetes in her mother; Drug abuse in her sister; Hearing loss in her father; Heart disease in her father and mother; Stroke in her paternal grandfather.     Allergies  Allergies   Allergen Reactions    Cosentyx [Secukinumab] Rash       Medications  Current Outpatient Medications   Medication Instructions    aspirin 81 mg, Daily    BLACK COHOSH ORAL Take by mouth.    cholecalciferol (VITAMIN D3) 25 mcg, Daily    cyanocobalamin (VITAMIN B-12) 50 mcg, Daily    ferrous sulfate 325 mg, Daily    hydroCHLOROthiazide (MICROZIDE) 12.5 mg,  "oral, Daily    hydroxychloroquine (PLAQUENIL) 200 mg, oral, Daily    lisinopril 20 mg, oral, Daily    metFORMIN  mg 24 hr tablet Take one tablet twice daily    metoprolol tartrate (LOPRESSOR) 25 mg, oral, 2 times daily    multivitamin tablet 1 tablet, Daily    nitroglycerin (NITROSTAT) 0.3 mg, Every 5 min PRN    omeprazole (PRILOSEC) 40 mg, oral, Daily before breakfast, Do not crush or chew.    rosuvastatin (CRESTOR) 20 mg, oral, Daily    traZODone (DESYREL) 25 mg, oral, Nightly    venlafaxine (Effexor) 75 mg tablet Take 75mg in the morning daily.    venlafaxine XR (EFFEXOR-XR) 150 mg, oral, Every 12 hours, Do not crush or chew.        Objective   Visit Vitals  BP 97/66 (BP Location: Left arm)   Pulse 71   Temp 36.7 °C (98.1 °F)          10/3/2024    12:30 PM 10/3/2024     1:00 PM 10/22/2024     3:44 PM 11/6/2024     3:41 PM 11/27/2024     2:10 PM 12/20/2024    11:51 AM 2/20/2025     3:01 PM   Vitals   Systolic 106 107 108 91 108  97   Diastolic 67 67 60 60 60  66   BP Location       Left arm   Heart Rate 72 75 78 94 90  71   Temp   36.1 °C (96.9 °F)    36.7 °C (98.1 °F)   Resp 15 16        Height     1.753 m (5' 9\") 1.753 m (5' 9\")    Weight (lb)   195 197.6 197 194 194   BMI   28.8 kg/m2 29.18 kg/m2 29.09 kg/m2 28.65 kg/m2 28.65 kg/m2   BSA (m2)   2.08 m2 2.09 m2 2.09 m2 2.07 m2 2.07 m2   Visit Report   Report Report Report Report Report     Physical Exam  Vitals and nursing note reviewed.   Constitutional:       Appearance: Normal appearance. She is not ill-appearing.      Comments: In general she appears slightly anxious.   HENT:      Head: Normocephalic and atraumatic.      Mouth/Throat:      Mouth: Mucous membranes are moist.      Pharynx: Oropharynx is clear.   Eyes:      General: No scleral icterus.     Extraocular Movements: Extraocular movements intact.      Pupils: Pupils are equal, round, and reactive to light.   Cardiovascular:      Rate and Rhythm: Normal rate and regular rhythm.      Heart " sounds: No murmur heard.  Pulmonary:      Effort: Pulmonary effort is normal.      Breath sounds: Normal breath sounds.   Abdominal:      General: Abdomen is flat. Bowel sounds are normal.      Palpations: Abdomen is soft.   Musculoskeletal:         General: No swelling. Normal range of motion.      Right lower leg: No edema.      Left lower leg: No edema.   Skin:     Coloration: Skin is not jaundiced.   Neurological:      General: No focal deficit present.      Mental Status: She is alert and oriented to person, place, and time. Mental status is at baseline.   Psychiatric:         Mood and Affect: Mood normal.         Thought Content: Thought content normal.         Judgment: Judgment normal.       Labs    WBC   Date/Time Value Ref Range Status   09/05/2024 04:32 PM 6.8 4.4 - 11.3 x10*3/uL Final     Hemoglobin   Date/Time Value Ref Range Status   09/05/2024 04:32 PM 11.2 (L) 12.0 - 16.0 g/dL Final     Hematocrit   Date/Time Value Ref Range Status   09/05/2024 04:32 PM 33.8 (L) 36.0 - 46.0 % Final     MCV   Date/Time Value Ref Range Status   09/05/2024 04:32 PM 97 80 - 100 fL Final     Platelets   Date/Time Value Ref Range Status   09/05/2024 04:32  150 - 450 x10*3/uL Final        Total Protein   Date/Time Value Ref Range Status   09/05/2024 04:32 PM 7.4 6.4 - 8.2 g/dL Final     Albumin   Date/Time Value Ref Range Status   09/05/2024 04:32 PM 4.3 3.4 - 5.0 g/dL Final     AST   Date/Time Value Ref Range Status   09/05/2024 04:32 PM 18 9 - 39 U/L Final     ALT   Date/Time Value Ref Range Status   09/05/2024 04:32 PM 21 7 - 45 U/L Final     Comment:     Patients treated with Sulfasalazine may generate falsely decreased results for ALT.     Alkaline Phosphatase   Date/Time Value Ref Range Status   09/05/2024 04:32 PM 84 33 - 136 U/L Final     Bilirubin, Total   Date/Time Value Ref Range Status   09/05/2024 04:32 PM 0.4 0.0 - 1.2 mg/dL Final     Bilirubin, Direct   Date/Time Value Ref Range Status   09/05/2024  04:32 PM 0.1 0.0 - 0.3 mg/dL Final        AST   Date/Time Value Ref Range Status   09/05/2024 04:32 PM 18 9 - 39 U/L Final     ALT   Date/Time Value Ref Range Status   09/05/2024 04:32 PM 21 7 - 45 U/L Final     Comment:     Patients treated with Sulfasalazine may generate falsely decreased results for ALT.     Alkaline Phosphatase   Date/Time Value Ref Range Status   09/05/2024 04:32 PM 84 33 - 136 U/L Final     Bilirubin, Total   Date/Time Value Ref Range Status   09/05/2024 04:32 PM 0.4 0.0 - 1.2 mg/dL Final     Bilirubin, Direct   Date/Time Value Ref Range Status   09/05/2024 04:32 PM 0.1 0.0 - 0.3 mg/dL Final     Albumin   Date/Time Value Ref Range Status   09/05/2024 04:32 PM 4.3 3.4 - 5.0 g/dL Final     Total Protein   Date/Time Value Ref Range Status   09/05/2024 04:32 PM 7.4 6.4 - 8.2 g/dL Final        Protime   Date/Time Value Ref Range Status   05/30/2024 05:21 PM 11.7 9.8 - 12.8 seconds Final     INR   Date/Time Value Ref Range Status   05/30/2024 05:21 PM 1.0 0.9 - 1.1 Final     MELD 3.0: 7 at 5/30/2024  5:21 PM  MELD-Na: 6 at 5/30/2024  5:21 PM  Calculated from:  Serum Creatinine: 0.83 mg/dL (Using min of 1 mg/dL) at 5/30/2024  5:21 PM  Serum Sodium: 138 mmol/L (Using max of 137 mmol/L) at 5/30/2024  5:21 PM  Total Bilirubin: 0.5 mg/dL (Using min of 1 mg/dL) at 5/30/2024  5:21 PM  Serum Albumin: 4.5 g/dL (Using max of 3.5 g/dL) at 5/30/2024  5:21 PM  INR(ratio): 1 at 5/30/2024  5:21 PM  Age at listing (hypothetical): 59 years  Sex: Female at 5/30/2024  5:21 PM    LIVER IMAGING    FibroScan  2/16/24  Results:   E (median liver stiffness measurement):   21.5   kPa   CAP (controlled attenuation parameter):  227   dB/m     Interventional Radiology:  10/3/24  HVPG Assessment:    The catheter and wire were advanced into the IVC and a pressure measurement was obtained which measured 8/7 (7)  mmHg.     The right hepatic vein was engaged with the curved catheter and steerable 035 Glidewire.  After selective  catheterization of the  hepatic vein, a selective venogram was performed which demonstrated patency of the selected hepatic vein.     The endhole catheter was wedged in the selected hepatic vein and a wedged pressure was obtained to indirectly estimate the portal pressure.  The wedged hepatic pressure was measured at 11/9 (10) mmHg.     At this point the catheter was retracted and a free hepatic pressure was measured and a value of 10/8 (9) mmHg was obtained.     Therefore, the indirect portosystemic gradient was calculated at 1 mmHg.    PATHOLOGY    GI Pathology  FINAL DIAGNOSIS      A. DUODENUM SECOND PART, ABNORMAL MUCOSA, BIOPSY:               -Small intestinal mucosa with gastric heterotopic mucosa.        B. STOMACH ANTRUM, BIOPSY:               -Gastric mucosa with no significant histopathologic findings.  See note.               -No Helicobacter pylori organisms identified.     Note: Immunohistochemical stain for Helicobacter pylori organisms is negative.        C. COLON, RANDOM BIOPSY:                -Colon with no significant histopathologic findings.               -Melanosis coli.        D. COLON - TRANSVERSE POLYP, BIOPSY:               -Tubular adenoma.         A. LIVER BIOPSY RIGHT:   -Liver with mild steatosis and bridging fibrosis to cirrhosis     Note: The liver tissue is slightly nodular. The hepatocytes demonstrate small-droplet type macrovesicular steatosis (5-10%) without ballooning degeneration or Hilary bodies. The portal tracts are mildly expanded with chronic inflammatory cells, without bile duct injury or interface activity. Scattered pigmented macrophages are present in the portal tracts, indicating healing injury. No cholestasis or hepatocyte necrosis is identified.     Special stains:  Iron                              negative  PAS with digestionno cytoplasmic eosinophilic globules  Trichrome                    periportal fibrosis, bridging fibrosis, and focal cirrhotic  "nodules  Reticulin                      no collapsed liver parenchyma    ENDOSCOPY  8/14/2024    EGD  Impression  Small type I hiatal hernia  The stomach appeared normal. Performed random biopsy to rule out H. pylori.  Mild nodular mucosa in the duodenal bulb; performed cold forceps biopsy  Two grade I varices    COLONOSCOPY  Impression  Subcentimeter polyp in the distal transverse colon  Mild abnormal mucosa  (grade 1) hemorrhoids  The terminal ileum, ileocecal valve and cecum appeared normal. Performed random biopsy using biopsy forceps.       No evidence of old/fresh bleeding terminal ileum.    Assessment/Plan   Dinorah Horn \"Raquel\" is a 59 y.o. female who presents to liver clinic for a follow up evaluation for cirrhosis.    She has multiple risk factors for chronic liver disease. Most notably she has a history of alcohol excess which she self-reported consumption of 50 beers per week.  This level of drinking certainly puts her at risk for significant liver disease.  Fortunately she has cut back and more or less stopper her alcohol use.  She also has significant metabolic risk factors for metabolic associated fatty liver -including hypertension, diabetes, heart disease, and obesity.  She also has a history of autoimmune disease and a positive GORDON.    Based on our overall suspicion of compensated advanced liver disease, based on her clinical, laboratory, and imagine assessment, we decided on further evaluation with a TJ Liver Biopsy, last October. There was no portal gradient on pressure measurements. Thus, at this time, I do not believe she has portal HTN. On my review of her endoscopy pictures from August, I do not believe the findings in her esophagus were consistent with esophageal varices. Her liver histology is consistent with advanced fibrosis and cirrhosis (probably early cirrhosis). There is steatosis on the biopsy (5-10%). There are no other major findings on the biopsy.    I had also recommended some " lab work to complete her workup for concomitant liver disorders, including  testing for genetic hemochromatosis testing, genetic testing for alpha-1 antitrypsin deficiency - these were negative.     I provided counseling regarding her liver disease evaluation to date.  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 She remains compensated at this time. She will obtain update labs to reassess liver function. She will have a liver US for liver cancer screening. We will check a PETH. I have reinforced prior recommendations to strictly abstain from all alcohol use.    Follow up in liver clinic in 6 months.     Instructions      Pasha Benson MD

## 2025-02-20 NOTE — LETTER
"February 25, 2025     NICOLE Johnston-CNP  [address] [facility] [address]    Patient: Raquel Horn   YOB: 1965   Date of Visit: 2/20/2025       Dear NICOLE Lew-CNP:    Thank you for referring Raquel Horn to me for evaluation. Below are my notes for this consultation.  If you have questions, please do not hesitate to call me. I look forward to following your patient along with you.       Sincerely,     Pasha Benson MD      CC: No Recipients  ______________________________________________________________________________________    Subjective     Follow up visit for cirrhosis.    History of Present Illness:   Dinorah Horn \"Pratibha" is a 59 y.o. female who presents to the [facility] at [facility], for a follow up evaluation of a new diagnosis of cirrhosis.    She was referred by Ratna RIVERA CNP.  Her initial appointment was last year in May 2024.  The reason for visit was for hepatomegaly, elevations in her liver enzymes, suspected chronic liver disease.    Interval history:  She has completed the liver biopsy - which was done in October, (TJ with portal pressure measurements).    Prior history:  The patient recalls that she was told she had fatty liver many years ago.  She never recalls manifesting any symptoms from her liver disease.  She denies a history of jaundice, fluid retention such as her ascites, confusion from hepatic encephalopathy.  She does report some chronic GI symptoms including constipation.    As regards to liver disease risk factors, she reports a history of alcohol excess: that she would drink 50 beers per week (16 ounce beers).  She also has metabolic risk factors including diabetes hypertension and heart disease. She has basically out all alcohol use. Over the past year + since finding out about her liver disease, she stopped all alcohol for a period of time; then began drinking beer, but much less. At this time she " reports no regular alcohol use, just a few occasions in which she reports some drinking.    Initial laboratory workup was notable for the following (February 2024):  Albumin 4.6 bilirubin of 0.5 alkaline phosphatase of 102 ALT of 31 AST of 29.  A year ago her liver enzymes were abnormal (May 2023):  AST of 89 ALT of 97 alkaline phosphatase 164 total protein 8.2 albumin 4.6.  She had an GORDON that was checked -positive at 1-160.  Her anti-SSA was positive greater than 8.0. Her ferritin was slightly elevated at 257.  Hepatitis B and C serologies were negative.  HIV was negative.    Liver imaging was performed: The liver measures 16.9 cm with diffuse heterogenicity of the echotexture of the liver.    She was referred for liver elastography with Fibroscan:  E (median liver stiffness measurement):   21.5   kPa   CAP (controlled attenuation parameter):  227   dB/m   This was consistent with cirrhosis.    Review of Systems  Review of Systems  Negative ROS.    Past Medical History   has a past medical history of Arthritis, Coronary artery disease, Depression, Diabetes mellitus (Multi), GERD (gastroesophageal reflux disease), Headache, Heart disease, Hypertension, and Visual impairment.     She was diagnosed with diabetes about 2 years ago.    She has a history of cardiac surgery.    She last had a colonoscopy in 2021. Recently repeated.    Social History   reports that she quit smoking about 15 years ago. Her smoking use included cigarettes. She started smoking about 30 years ago. She has a 15 pack-year smoking history. She has never used smokeless tobacco. She reports that she does not currently use alcohol. She reports that she does not use drugs.     Family History  family history includes Alcohol abuse in her daughter, mother, paternal grandmother, sister, and sister; Arthritis in her father; Atrial fibrillation in her father; COPD in her father; Cancer in her father, father's brother, father's brother, and mother; Colon  "cancer in her father's brother; Diabetes in her mother; Drug abuse in her sister; Hearing loss in her father; Heart disease in her father and mother; Stroke in her paternal grandfather.     Allergies  Allergies   Allergen Reactions   • Cosentyx [Secukinumab] Rash       Medications  Current Outpatient Medications   Medication Instructions   • aspirin 81 mg, Daily   • BLACK COHOSH ORAL Take by mouth.   • cholecalciferol (VITAMIN D3) 25 mcg, Daily   • cyanocobalamin (VITAMIN B-12) 50 mcg, Daily   • ferrous sulfate 325 mg, Daily   • hydroCHLOROthiazide (MICROZIDE) 12.5 mg, oral, Daily   • hydroxychloroquine (PLAQUENIL) 200 mg, oral, Daily   • lisinopril 20 mg, oral, Daily   • metFORMIN  mg 24 hr tablet Take one tablet twice daily   • metoprolol tartrate (LOPRESSOR) 25 mg, oral, 2 times daily   • multivitamin tablet 1 tablet, Daily   • nitroglycerin (NITROSTAT) 0.3 mg, Every 5 min PRN   • omeprazole (PRILOSEC) 40 mg, oral, Daily before breakfast, Do not crush or chew.   • rosuvastatin (CRESTOR) 20 mg, oral, Daily   • traZODone (DESYREL) 25 mg, oral, Nightly   • venlafaxine (Effexor) 75 mg tablet Take 75mg in the morning daily.   • venlafaxine XR (EFFEXOR-XR) 150 mg, oral, Every 12 hours, Do not crush or chew.        Objective   Visit Vitals  BP 97/66 (BP Location: Left arm)   Pulse 71   Temp 36.7 °C (98.1 °F)          10/3/2024    12:30 PM 10/3/2024     1:00 PM 10/22/2024     3:44 PM 11/6/2024     3:41 PM 11/27/2024     2:10 PM 12/20/2024    11:51 AM 2/20/2025     3:01 PM   Vitals   Systolic 106 107 108 91 108  97   Diastolic 67 67 60 60 60  66   BP Location       Left arm   Heart Rate 72 75 78 94 90  71   Temp   36.1 °C (96.9 °F)    36.7 °C (98.1 °F)   Resp 15 16        Height     1.753 m (5' 9\") 1.753 m (5' 9\")    Weight (lb)   195 197.6 197 194 194   BMI   28.8 kg/m2 29.18 kg/m2 29.09 kg/m2 28.65 kg/m2 28.65 kg/m2   BSA (m2)   2.08 m2 2.09 m2 2.09 m2 2.07 m2 2.07 m2   Visit Report   Report Report Report Report " Report     Physical Exam  Vitals and nursing note reviewed.   Constitutional:       Appearance: Normal appearance. She is not ill-appearing.      Comments: In general she appears slightly anxious.   HENT:      Head: Normocephalic and atraumatic.      Mouth/Throat:      Mouth: Mucous membranes are moist.      Pharynx: Oropharynx is clear.   Eyes:      General: No scleral icterus.     Extraocular Movements: Extraocular movements intact.      Pupils: Pupils are equal, round, and reactive to light.   Cardiovascular:      Rate and Rhythm: Normal rate and regular rhythm.      Heart sounds: No murmur heard.  Pulmonary:      Effort: Pulmonary effort is normal.      Breath sounds: Normal breath sounds.   Abdominal:      General: Abdomen is flat. Bowel sounds are normal.      Palpations: Abdomen is soft.   Musculoskeletal:         General: No swelling. Normal range of motion.      Right lower leg: No edema.      Left lower leg: No edema.   Skin:     Coloration: Skin is not jaundiced.   Neurological:      General: No focal deficit present.      Mental Status: She is alert and oriented to person, place, and time. Mental status is at baseline.   Psychiatric:         Mood and Affect: Mood normal.         Thought Content: Thought content normal.         Judgment: Judgment normal.       Labs    WBC   Date/Time Value Ref Range Status   09/05/2024 04:32 PM 6.8 4.4 - 11.3 x10*3/uL Final     Hemoglobin   Date/Time Value Ref Range Status   09/05/2024 04:32 PM 11.2 (L) 12.0 - 16.0 g/dL Final     Hematocrit   Date/Time Value Ref Range Status   09/05/2024 04:32 PM 33.8 (L) 36.0 - 46.0 % Final     MCV   Date/Time Value Ref Range Status   09/05/2024 04:32 PM 97 80 - 100 fL Final     Platelets   Date/Time Value Ref Range Status   09/05/2024 04:32  150 - 450 x10*3/uL Final        Total Protein   Date/Time Value Ref Range Status   09/05/2024 04:32 PM 7.4 6.4 - 8.2 g/dL Final     Albumin   Date/Time Value Ref Range Status   09/05/2024  04:32 PM 4.3 3.4 - 5.0 g/dL Final     AST   Date/Time Value Ref Range Status   09/05/2024 04:32 PM 18 9 - 39 U/L Final     ALT   Date/Time Value Ref Range Status   09/05/2024 04:32 PM 21 7 - 45 U/L Final     Comment:     Patients treated with Sulfasalazine may generate falsely decreased results for ALT.     Alkaline Phosphatase   Date/Time Value Ref Range Status   09/05/2024 04:32 PM 84 33 - 136 U/L Final     Bilirubin, Total   Date/Time Value Ref Range Status   09/05/2024 04:32 PM 0.4 0.0 - 1.2 mg/dL Final     Bilirubin, Direct   Date/Time Value Ref Range Status   09/05/2024 04:32 PM 0.1 0.0 - 0.3 mg/dL Final        AST   Date/Time Value Ref Range Status   09/05/2024 04:32 PM 18 9 - 39 U/L Final     ALT   Date/Time Value Ref Range Status   09/05/2024 04:32 PM 21 7 - 45 U/L Final     Comment:     Patients treated with Sulfasalazine may generate falsely decreased results for ALT.     Alkaline Phosphatase   Date/Time Value Ref Range Status   09/05/2024 04:32 PM 84 33 - 136 U/L Final     Bilirubin, Total   Date/Time Value Ref Range Status   09/05/2024 04:32 PM 0.4 0.0 - 1.2 mg/dL Final     Bilirubin, Direct   Date/Time Value Ref Range Status   09/05/2024 04:32 PM 0.1 0.0 - 0.3 mg/dL Final     Albumin   Date/Time Value Ref Range Status   09/05/2024 04:32 PM 4.3 3.4 - 5.0 g/dL Final     Total Protein   Date/Time Value Ref Range Status   09/05/2024 04:32 PM 7.4 6.4 - 8.2 g/dL Final        Protime   Date/Time Value Ref Range Status   05/30/2024 05:21 PM 11.7 9.8 - 12.8 seconds Final     INR   Date/Time Value Ref Range Status   05/30/2024 05:21 PM 1.0 0.9 - 1.1 Final     MELD 3.0: 7 at 5/30/2024  5:21 PM  MELD-Na: 6 at 5/30/2024  5:21 PM  Calculated from:  Serum Creatinine: 0.83 mg/dL (Using min of 1 mg/dL) at 5/30/2024  5:21 PM  Serum Sodium: 138 mmol/L (Using max of 137 mmol/L) at 5/30/2024  5:21 PM  Total Bilirubin: 0.5 mg/dL (Using min of 1 mg/dL) at 5/30/2024  5:21 PM  Serum Albumin: 4.5 g/dL (Using max of 3.5 g/dL) at  5/30/2024  5:21 PM  INR(ratio): 1 at 5/30/2024  5:21 PM  Age at listing (hypothetical): 59 years  Sex: Female at 5/30/2024  5:21 PM    LIVER IMAGING    FibroScan  2/16/24  Results:   E (median liver stiffness measurement):   21.5   kPa   CAP (controlled attenuation parameter):  227   dB/m     Interventional Radiology:  10/3/24  HVPG Assessment:    The catheter and wire were advanced into the IVC and a pressure measurement was obtained which measured 8/7 (7)  mmHg.     The right hepatic vein was engaged with the curved catheter and steerable 035 Glidewire.  After selective catheterization of the  hepatic vein, a selective venogram was performed which demonstrated patency of the selected hepatic vein.     The endhole catheter was wedged in the selected hepatic vein and a wedged pressure was obtained to indirectly estimate the portal pressure.  The wedged hepatic pressure was measured at 11/9 (10) mmHg.     At this point the catheter was retracted and a free hepatic pressure was measured and a value of 10/8 (9) mmHg was obtained.     Therefore, the indirect portosystemic gradient was calculated at 1 mmHg.    PATHOLOGY    GI Pathology  FINAL DIAGNOSIS      A. DUODENUM SECOND PART, ABNORMAL MUCOSA, BIOPSY:               -Small intestinal mucosa with gastric heterotopic mucosa.        B. STOMACH ANTRUM, BIOPSY:               -Gastric mucosa with no significant histopathologic findings.  See note.               -No Helicobacter pylori organisms identified.     Note: Immunohistochemical stain for Helicobacter pylori organisms is negative.        C. COLON, RANDOM BIOPSY:                -Colon with no significant histopathologic findings.               -Melanosis coli.        D. COLON - TRANSVERSE POLYP, BIOPSY:               -Tubular adenoma.         A. LIVER BIOPSY RIGHT:   -Liver with mild steatosis and bridging fibrosis to cirrhosis     Note: The liver tissue is slightly nodular. The hepatocytes demonstrate small-droplet  "type macrovesicular steatosis (5-10%) without ballooning degeneration or Hilary bodies. The portal tracts are mildly expanded with chronic inflammatory cells, without bile duct injury or interface activity. Scattered pigmented macrophages are present in the portal tracts, indicating healing injury. No cholestasis or hepatocyte necrosis is identified.     Special stains:  Iron                              negative  PAS with digestionno cytoplasmic eosinophilic globules  Trichrome                    periportal fibrosis, bridging fibrosis, and focal cirrhotic nodules  Reticulin                      no collapsed liver parenchyma    ENDOSCOPY  8/14/2024    EGD  Impression  Small type I hiatal hernia  The stomach appeared normal. Performed random biopsy to rule out H. pylori.  Mild nodular mucosa in the duodenal bulb; performed cold forceps biopsy  Two grade I varices    COLONOSCOPY  Impression  Subcentimeter polyp in the distal transverse colon  Mild abnormal mucosa  (grade 1) hemorrhoids  The terminal ileum, ileocecal valve and cecum appeared normal. Performed random biopsy using biopsy forceps.       No evidence of old/fresh bleeding terminal ileum.    Assessment/Plan   Dinorah Horn \"Raquel\" is a 59 y.o. female who presents to liver clinic for a follow up evaluation for cirrhosis.    She has multiple risk factors for chronic liver disease. Most notably she has a history of alcohol excess which she self-reported consumption of 50 beers per week.  This level of drinking certainly puts her at risk for significant liver disease.  Fortunately she has cut back and more or less stopper her alcohol use.  She also has significant metabolic risk factors for metabolic associated fatty liver -including hypertension, diabetes, heart disease, and obesity.  She also has a history of autoimmune disease and a positive GORDON.    Based on our overall suspicion of compensated advanced liver disease, based on her clinical, laboratory, and " imagine assessment, we decided on further evaluation with a TJ Liver Biopsy, last October. There was no portal gradient on pressure measurements. Thus, at this time, I do not believe she has portal HTN. On my review of her endoscopy pictures from August, I do not believe the findings in her esophagus were consistent with esophageal varices. Her liver histology is consistent with advanced fibrosis and cirrhosis (probably early cirrhosis). There is steatosis on the biopsy (5-10%). There are no other major findings on the biopsy.    I had also recommended some lab work to complete her workup for concomitant liver disorders, including  testing for genetic hemochromatosis testing, genetic testing for alpha-1 antitrypsin deficiency - these were negative.     I provided counseling regarding her liver disease evaluation to date.  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 She remains compensated at this time. She will obtain update labs to reassess liver function. She will have a liver US for liver cancer screening. We will check a PETH. I have reinforced prior recommendations to strictly abstain from all alcohol use.    Follow up in liver clinic in 6 months.     Instructions      Pasha Benson MD

## 2025-02-24 LAB
AFP-TM SERPL-MCNC: 4.4 NG/ML
ALBUMIN SERPL-MCNC: 4.8 G/DL (ref 3.6–5.1)
ALBUMIN/GLOB SERPL: 1.8 (CALC) (ref 1–2.5)
ALP SERPL-CCNC: 82 U/L (ref 37–153)
ALT SERPL-CCNC: 42 U/L (ref 6–29)
ANION GAP SERPL CALCULATED.4IONS-SCNC: 10 MMOL/L (CALC) (ref 7–17)
AST SERPL-CCNC: 36 U/L (ref 10–35)
BASOPHILS # BLD AUTO: 20 CELLS/UL (ref 0–200)
BASOPHILS NFR BLD AUTO: 0.4 %
BILIRUB DIRECT SERPL-MCNC: 0.1 MG/DL
BILIRUB INDIRECT SERPL-MCNC: 0.4 MG/DL (CALC) (ref 0.2–1.2)
BILIRUB SERPL-MCNC: 0.5 MG/DL (ref 0.2–1.2)
BUN SERPL-MCNC: 20 MG/DL (ref 7–25)
BUN/CREAT SERPL: NORMAL (CALC) (ref 6–22)
CALCIUM SERPL-MCNC: 9.8 MG/DL (ref 8.6–10.4)
CHLORIDE SERPL-SCNC: 102 MMOL/L (ref 98–110)
CO2 SERPL-SCNC: 28 MMOL/L (ref 20–32)
CREAT SERPL-MCNC: 0.75 MG/DL (ref 0.5–1.05)
EGFRCR SERPLBLD CKD-EPI 2021: 91 ML/MIN/1.73M2
EOSINOPHIL # BLD AUTO: 80 CELLS/UL (ref 15–500)
EOSINOPHIL NFR BLD AUTO: 1.6 %
ERYTHROCYTE [DISTWIDTH] IN BLOOD BY AUTOMATED COUNT: 12.3 % (ref 11–15)
GLOBULIN SER CALC-MCNC: 2.6 G/DL (CALC) (ref 1.9–3.7)
GLUCOSE SERPL-MCNC: 120 MG/DL (ref 65–139)
HCT VFR BLD AUTO: 35 % (ref 35–45)
HGB BLD-MCNC: 11 G/DL (ref 11.7–15.5)
INR PPP: 1
LYMPHOCYTES # BLD AUTO: 1430 CELLS/UL (ref 850–3900)
LYMPHOCYTES NFR BLD AUTO: 28.6 %
MCH RBC QN AUTO: 30.8 PG (ref 27–33)
MCHC RBC AUTO-ENTMCNC: 31.4 G/DL (ref 32–36)
MCV RBC AUTO: 98 FL (ref 80–100)
MONOCYTES # BLD AUTO: 465 CELLS/UL (ref 200–950)
MONOCYTES NFR BLD AUTO: 9.3 %
NEUTROPHILS # BLD AUTO: 3005 CELLS/UL (ref 1500–7800)
NEUTROPHILS NFR BLD AUTO: 60.1 %
PLATELET # BLD AUTO: 220 THOUSAND/UL (ref 140–400)
PLPETH BLD-MCNC: 28 NG/ML
PMV BLD REES-ECKER: 10.8 FL (ref 7.5–12.5)
POPETH BLD-MCNC: 58 NG/ML
POTASSIUM SERPL-SCNC: 4.8 MMOL/L (ref 3.5–5.3)
PROT SERPL-MCNC: 7.4 G/DL (ref 6.1–8.1)
PROTHROMBIN TIME: 10.8 SEC (ref 9–11.5)
QUEST NOTES AND COMMENTS: ABNORMAL
RBC # BLD AUTO: 3.57 MILLION/UL (ref 3.8–5.1)
SERVICE CMNT-IMP: ABNORMAL
SODIUM SERPL-SCNC: 140 MMOL/L (ref 135–146)
WBC # BLD AUTO: 5 THOUSAND/UL (ref 3.8–10.8)

## 2025-02-25 ENCOUNTER — DOCUMENTATION (OUTPATIENT)
Dept: OCCUPATIONAL THERAPY | Facility: HOSPITAL | Age: 60
End: 2025-02-25
Payer: MEDICARE

## 2025-02-25 ENCOUNTER — DOCUMENTATION (OUTPATIENT)
Dept: PHYSICAL THERAPY | Facility: HOSPITAL | Age: 60
End: 2025-02-25
Payer: MEDICARE

## 2025-02-25 ENCOUNTER — APPOINTMENT (OUTPATIENT)
Dept: OCCUPATIONAL THERAPY | Facility: HOSPITAL | Age: 60
End: 2025-02-25
Payer: MEDICARE

## 2025-02-25 ENCOUNTER — APPOINTMENT (OUTPATIENT)
Dept: PHYSICAL THERAPY | Facility: HOSPITAL | Age: 60
End: 2025-02-25
Payer: MEDICARE

## 2025-02-25 ENCOUNTER — APPOINTMENT (OUTPATIENT)
Dept: RADIOLOGY | Facility: HOSPITAL | Age: 60
End: 2025-02-25
Payer: MEDICARE

## 2025-02-25 NOTE — PROGRESS NOTES
"Physical Therapy                 Therapy Communication Note    Patient Name: Dinorah Horn \"Raquel\"  MRN: [id number]  Department:   Room: Room/bed info not found  Today's Date: 2/25/2025     Discipline: Physical Therapy          Missed Visit Reason:  no reason given    Missed Time: Cancel    Comment:  "

## 2025-02-25 NOTE — PROGRESS NOTES
"Occupational Therapy   Therapy Communication Note    Patient Name: Dinorah Horn \"Raquel\"  MRN: [id number]  Department:   Room: Room/bed info not found  Today's Date: 2/25/2025     Discipline: Occupational Therapy    Missed Time: Cancel    Comment: Patient canceled 4pm appt today via my chart. No reason provided.     "

## 2025-02-26 ENCOUNTER — APPOINTMENT (OUTPATIENT)
Dept: RADIOLOGY | Facility: HOSPITAL | Age: 60
End: 2025-02-26
Payer: MEDICARE

## 2025-03-01 ENCOUNTER — APPOINTMENT (OUTPATIENT)
Dept: RADIOLOGY | Facility: HOSPITAL | Age: 60
End: 2025-03-01
Payer: MEDICARE

## 2025-03-03 ENCOUNTER — APPOINTMENT (OUTPATIENT)
Dept: OPHTHALMOLOGY | Facility: CLINIC | Age: 60
End: 2025-03-03
Payer: MEDICARE

## 2025-03-03 DIAGNOSIS — H25.13 AGE-RELATED NUCLEAR CATARACT OF BOTH EYES: ICD-10-CM

## 2025-03-03 DIAGNOSIS — H43.391 VITREOUS SYNERESIS OF RIGHT EYE: ICD-10-CM

## 2025-03-03 DIAGNOSIS — H04.123 DRY EYE SYNDROME OF LACRIMAL GLAND, BILATERAL: ICD-10-CM

## 2025-03-03 DIAGNOSIS — H52.03 HYPEROPIA WITH PRESBYOPIA OF BOTH EYES: ICD-10-CM

## 2025-03-03 DIAGNOSIS — E11.9 TYPE 2 DIABETES MELLITUS WITHOUT RETINOPATHY (MULTI): ICD-10-CM

## 2025-03-03 DIAGNOSIS — H52.4 HYPEROPIA WITH PRESBYOPIA OF BOTH EYES: ICD-10-CM

## 2025-03-03 DIAGNOSIS — Z79.899 LONG-TERM USE OF PLAQUENIL: Primary | ICD-10-CM

## 2025-03-03 DIAGNOSIS — M19.90 INFLAMMATORY ARTHRITIS: ICD-10-CM

## 2025-03-03 PROCEDURE — 92004 COMPRE OPH EXAM NEW PT 1/>: CPT | Performed by: STUDENT IN AN ORGANIZED HEALTH CARE EDUCATION/TRAINING PROGRAM

## 2025-03-03 PROCEDURE — 92015 DETERMINE REFRACTIVE STATE: CPT | Performed by: STUDENT IN AN ORGANIZED HEALTH CARE EDUCATION/TRAINING PROGRAM

## 2025-03-03 PROCEDURE — 92083 EXTENDED VISUAL FIELD XM: CPT | Performed by: STUDENT IN AN ORGANIZED HEALTH CARE EDUCATION/TRAINING PROGRAM

## 2025-03-03 PROCEDURE — 92134 CPTRZ OPH DX IMG PST SGM RTA: CPT | Performed by: STUDENT IN AN ORGANIZED HEALTH CARE EDUCATION/TRAINING PROGRAM

## 2025-03-03 ASSESSMENT — REFRACTION
OS_ADD: +2.50
OD_ADD: +2.50
OD_CYLINDER: -0.50
OD_SPHERE: +1.00
OS_SPHERE: +0.50
OS_AXIS: 155
OD_AXIS: 005
OS_CYLINDER: -0.25

## 2025-03-03 ASSESSMENT — TONOMETRY
OS_IOP_MMHG: 12
IOP_METHOD: GOLDMANN APPLANATION
OD_IOP_MMHG: 12

## 2025-03-03 ASSESSMENT — VISUAL ACUITY
OS_SC+: -1
OD_SC: 20/25
METHOD: SNELLEN - LINEAR
OD_SC+: -2+2
OS_SC: 20/20

## 2025-03-03 ASSESSMENT — ENCOUNTER SYMPTOMS
EYES NEGATIVE: 1
CARDIOVASCULAR NEGATIVE: 0
NEUROLOGICAL NEGATIVE: 0
HEMATOLOGIC/LYMPHATIC NEGATIVE: 0
MUSCULOSKELETAL NEGATIVE: 0
RESPIRATORY NEGATIVE: 0
PSYCHIATRIC NEGATIVE: 0
ALLERGIC/IMMUNOLOGIC NEGATIVE: 0
GASTROINTESTINAL NEGATIVE: 0
ENDOCRINE NEGATIVE: 0
CONSTITUTIONAL NEGATIVE: 0

## 2025-03-03 ASSESSMENT — SLIT LAMP EXAM - LIDS
COMMENTS: NORMAL
COMMENTS: NORMAL

## 2025-03-03 ASSESSMENT — CUP TO DISC RATIO
OS_RATIO: .30
OD_RATIO: .30

## 2025-03-03 ASSESSMENT — EXTERNAL EXAM - RIGHT EYE: OD_EXAM: NORMAL

## 2025-03-03 ASSESSMENT — EXTERNAL EXAM - LEFT EYE: OS_EXAM: NORMAL

## 2025-03-03 NOTE — PROGRESS NOTES
Assessment/Plan   Diagnoses and all orders for this visit:  Long-term use of Plaquenil  -Patient is currently taking 200mg daily; duration 3 years  -Studies reveal that the risk of maculopathy when taking Plaquenil is 0% (0-5 years), 1% (over 5 years), 2% (over 10 years), and 20% cumulative, or 4% annual incidence (over 20 years) respectively.  Annual testing with 10-2 threshold visual field perimetry, as well as spectral domain optical coherence tomography of the macula is recommended. No signs of plaquenil toxicity today od/os, macula OCT today, HVF 10-2 today  monitor 1 year or sooner with any acute vision change. HVF 10-2 and OCT Mac at next comprehensive exam.  Hyperopia with presbyopia of both eyes  -New spec rx released today per patient request. Ocular health wnl for age OU. Monitor 1 year or sooner prn. Refraction billed today.  Dry eye syndrome of lacrimal gland, bilateral  -mild signs, patient is largely asymptomatic   -discussed using OTC AT's Refresh or Systane BID-TID  Age-related nuclear cataract of both eyes  -mild non visually significant; monitor  Vitreous syneresis of right eye  -patient reports noticing an intermittent floater right eye  -healthy retinal exam today  -discussed etiology  -Patient consents to return if they notice new floaters, flashes, curtain or veil covering vision.  Type 2 diabetes mellitus without retinopathy (Multi)  -no retinopathy observed on exam today od/os, pt ed to continue good BGlc, blood pressure and lipid control, rtc with any changes in vision, otherwise monitor 1 year  Chorio-retinal scars right eye  ? Histo etiology-no activity, monitor    RTC 1 year for annual with DFE, MRX, OCT MAC, HVF 10-2

## 2025-03-04 ENCOUNTER — TREATMENT (OUTPATIENT)
Dept: OCCUPATIONAL THERAPY | Facility: HOSPITAL | Age: 60
End: 2025-03-04
Payer: MEDICARE

## 2025-03-04 ENCOUNTER — TREATMENT (OUTPATIENT)
Dept: PHYSICAL THERAPY | Facility: HOSPITAL | Age: 60
End: 2025-03-04
Payer: MEDICARE

## 2025-03-04 DIAGNOSIS — M79.642 BILATERAL HAND PAIN: ICD-10-CM

## 2025-03-04 DIAGNOSIS — M79.641 BILATERAL HAND PAIN: ICD-10-CM

## 2025-03-04 DIAGNOSIS — M54.89 INTERSCAPULAR PAIN: ICD-10-CM

## 2025-03-04 PROCEDURE — 97530 THERAPEUTIC ACTIVITIES: CPT | Mod: GO | Performed by: OCCUPATIONAL THERAPIST

## 2025-03-04 PROCEDURE — 97140 MANUAL THERAPY 1/> REGIONS: CPT | Mod: GP,CQ

## 2025-03-04 PROCEDURE — 97110 THERAPEUTIC EXERCISES: CPT | Mod: GP,CQ

## 2025-03-04 ASSESSMENT — PAIN - FUNCTIONAL ASSESSMENT
PAIN_FUNCTIONAL_ASSESSMENT: 0-10
PAIN_FUNCTIONAL_ASSESSMENT: 0-10

## 2025-03-04 ASSESSMENT — PAIN SCALES - GENERAL
PAINLEVEL_OUTOF10: 6
PAINLEVEL_OUTOF10: 6

## 2025-03-04 NOTE — PROGRESS NOTES
"  Occupational Therapy  Occupational Therapy Treatment, Re-Check/Progress, Discharge Summary    Patient Name: Dinorah Horn \"Pratibha"  MRN: [id number]  : 1965  Today's Date: 3/4/2025  Time Calculation  Start Time: 1604  Stop Time: 1633  Time Calculation (min): 29 min    Today's Charges:  OT Therapeutic Procedures Time Entry  Therapeutic Activity Time Entry: 29    Total Timed Minutes: 29  Total Untimed Minutes: 0      Current Problem  Problem List Items Addressed This Visit             ICD-10-CM    Bilateral hand pain M79.641, M79.642     Assessment  OT Assessment  OT Assessment Results: Decreased upper extremity range of motion, Decreased upper extremity strength, Decreased fine motor control, Decreased gross motor control, Decreased IADLs  Strengths: Ability to acquire knowledge, Attitude of self  Clinical Presentation: Stable and/or uncomplicated characteristics  Progress towards goals: Patient reports there has not been a significant change in functional abilities.  Patient tolerated treatment satisfactorily.  Patient reports she has gone through multiple therapies with her hands and recognizes her arthritis will not go away. After this round of therapy sessions patient reports increased knowledge of modalities, compensatory and joint protection strategies. Plan is for patient to continue implementing strategies through daily activities.     Insurance  Payor: [employer] / Plan: [employer] / Product Type: *No Product type* /     Plan  Outpatient Plan  OT Plan: ;  Frequency: no further visits planned  Duration: 4 weeks  Onset Date: 25  Certification Period Start Date: 25  Certification Period End Date: 25  Number of Treatments Authorized: 5 approved through 2025  Rehab Potential: Good  Plan of Care Agreement: Patient  Planned Interventions: Therapeutic Exercise (19311), Therapeutic Activity (68893), Self-Care/Home Management (95896), Manual Therapy (85072), " "Neuromuscular Re-education (39531), Ultrasound (59715), Kinesiotaping, Hot Packs, and Cold Packs     General  OT Last Visit  OT Received On: 03/04/25  Reason for Referral: Hand pain  Referred By: Dr. Guerrero  Past Medical History Relevant to Rehab: DM, RA, hear disease, HBP OA, RA, liver disease, R arm fracture 4x  Primary Language: English  Preferred Learning Style: auditory, kinesthetic, verbal, visual, written    Subjective  General Comment: \"I don't really feel my hands are going to get any better.\"  Current condition since injury: Same   Performing HEP? Yes    Precautions  Precautions  Medical Precautions: No known precautions/limitation    Pain  Pain Assessment  Pain Assessment: 0-10 (stiff in hands)  0-10 (Numeric) Pain Score: 6  Pain Type: Chronic pain  Pain Location: Back (shoulder)  As a result of session pt. Reported pain remained the same  End of session pain: 6/10    ROM/Strength  Right Hand Strength - Pinch (lbs)  Lateral (lbs): 20.33 lbs (22, 19, 20)    Treatment  This therapist instructed and demonstrated interventions to patient, patient completed the following under direct supervision of this therapist:     Therapeutic Activity  Therapeutic Activity Performed: Yes Re-eval completed this date. QuickDASH completed. Goals discussed. Strength/ROM and fine motor measurements taken. Patient did not progress in QuickDASH score. Patient reports she has had arthritis for quite some time and has little improvements. She has already been utilizing modalities and techniques to decrease discomfort and joint protection techniques. Patient ready for discharge today and advised to contact therapist if she has any questions.     Education  Education  Individual(s) Educated: Patient  Education Provided: Anatomy & Physiology, Diagnosis & Precautions, POC discussed and agreed upon, Risk and benefits of OT discussed with patient or other  Home Program: Handout issued (joint protection)  Risk and Benefits Discussed with " Patient/Caregiver/Other: yes  Patient/Caregiver Demonstrated Understanding: yes  Plan of Care Discussed and Agreed Upon: yes  Patient Response to Education: Patient/Caregiver Verbalized Understanding of Information, Patient/Caregiver Performed Return Demonstration of Exercises/Activities, Patient/Caregiver Asked Appropriate Questions    HEP Provided Today: Yes - Joint protection strategies.     Outcome Measures  OT Adult Other Outcome Measures  Other Outcome Measures: QuickDASH: 33 raw = 50.00    Goals  Resolved       OT Goals       Pt. will decrease QuickDASH score by 8 points to show improved ability to participate in daily tasks with minimal difficulty/pain.  (Not met)       Start:  01/29/25    Expected End:  02/26/25    Resolved:  03/04/25    33 raw = 50.00      Goal Note       3/4: 33 raw = 50.00              Pt. will demonstrate MI with stating and demonstrating home exercise program in order to improve patient's ability to perform self-care, household, and/or leisure tasks.  (Met)       Start:  01/29/25    Expected End:  02/28/25    Resolved:  03/04/25      Goal Note       3/4: completing              Patient will be able to identify 3 modalities and/or techniques to reduce pain by discharge.  (Met)       Start:  01/29/25    Expected End:  02/28/25    Resolved:  03/04/25      Goal Note       3/4: resting, heat, and water therapy/swimming              Pt. will be able to state and apply joint protection techniques with independence during daily activity to increase participation with daily tasks.  (Met)       Start:  01/29/25    Expected End:  02/28/25    Resolved:  03/04/25      Goal Note       3/4: Reviewed joint protection strategies today which she was able to identify her current use of such strategies. Patient was provided with hand out.               Pt. will increase RUE hand strength to increase participation in self-care, household, and leisure tasks. lateral: 19lbs;   (Met)       Start:  01/29/25     Expected End:  02/28/25    Resolved:  03/04/25      Goal Note       3/4: 20.33                  Maricel Olmstead OTR/L

## 2025-03-04 NOTE — PROGRESS NOTES
"  Physical Therapy Treatment    Patient Name: Dinorah Horn  MRN: [id number]  Today's Date: 3/4/2025  Time Calculation  Start Time: 1522  Stop Time: 1600  Time Calculation (min): 38 min        PT Therapeutic Procedures Time Entry  Manual Therapy Time Entry: 10  Therapeutic Exercise Time Entry: 28                Current Problem  1. Interscapular pain  Follow Up In Physical Therapy          General  Reason for Referral: interscapular pain  Referred By: Dr. Camacho  Past Medical History Relevant to Rehab: cardiac catheterization, coronary artery bypass surgery, DM2, depression, RA, fibromyalgia, OA, chronic pain disorder.    Subjective   Current Condition:   Same  Patient reports no change with symptoms. States she tried modifying position while washing dishes, however, still noted neck and upper back pain.     Performing HEP?: Yes    Precautions  Precautions  Medical Precautions: No known precautions/limitation  Pain  Pain Assessment: 0-10  0-10 (Numeric) Pain Score: 6  Pain Location: Neck    Objective         Treatments:    Therapeutic Exercise  Therapeutic Exercise Performed: Yes  Therapeutic Exercise Activity 1: s/l open book x10  Therapeutic Exercise Activity 2: cervical retraction x10 3\"  Therapeutic Exercise Activity 4: scap retraction Green 2x10  Therapeutic Exercise Activity 5: seated thoracic extension 2x10  Therapeutic Exercise Activity 6: shoulder ext Green x20  Therapeutic Exercise Activity 9: B ER Red x10         Manual Therapy  Manual Therapy Activity 1: sub occipital release to decrease mm tension  Manual Therapy Activity 2: c-spine traction to decrease pain                  EDUCATION:   Individual(s) Educated: Patient  Education Provided: supine cervical retraction  Risk and Benefits Discussed with Patient/Caregiver/Other: Yes   Patient/Caregiver Demonstrated Understanding: Yes   Patient Response to Education: Patient/Caregiver verbalized understanding of information    Assessment: Pt had pain initially " with sub occipital release,which decreased after about 2 minutes,with decrease mm tension noted after completing treatment. Pt was able to perform supine cervical retraction with less discomfort compared to seated, so issued for HEP. Pt limited with strengthening postural mm groups d/t h/o R shoulder pain.   PT Assessment  PT Assessment Results: Decreased strength, Pain, Decreased range of motion  Rehab Prognosis: Good    Plan: Continue to progress current POC as tolerated to facilitate ability to perform functional activities.   OP PT Plan  Treatment/Interventions: Education/ Instruction, Manual therapy, Neuromuscular re-education, Therapeutic activities, Therapeutic exercises  PT Plan: Skilled PT  PT Frequency: 2 times per week  Duration: 5 weeks  Onset Date: 12/20/24  Certification Period Start Date: 01/29/25  Certification Period End Date: 03/05/25  Number of Treatments Authorized: 4 of 10    Goals:  Active       PT Problem       Patient will achieve bilateral cervical side bending ROM WFL       Start:  01/29/25    Expected End:  03/05/25            Patient will achieve bilateral cervical rotation ROM WFL.       Start:  01/29/25    Expected End:  03/05/25            Patient will achieve cervical flexion ROM WFL.       Start:  01/29/25    Expected End:  03/05/25            Patient will achieve cervical extension ROM WFL.       Start:  01/29/25    Expected End:  03/05/25            Patient will achieve spinal flexion to WFL.       Start:  01/29/25    Expected End:  03/05/25            Patient will achieve spinal extension to WFL.       Start:  01/29/25    Expected End:  03/05/25            Patient will achieve bilateral spinal side bending ROM WFL.       Start:  01/29/25    Expected End:  03/05/25            Patient will achieve bilateral spinal rotation ROM WFL.       Start:  01/29/25    Expected End:  03/05/25            Patient will demonstrate independence in home program for support of progression        Start:  01/29/25    Expected End:  03/05/25            Patient will report pain of no more than 2/10 demonstrating a reduction of overall pain       Start:  01/29/25    Expected End:  03/05/25            Patient will show a significant change in AVTAR (22% to 10%) patient reported outcome tool to demonstrate subjective imporovement       Start:  01/29/25    Expected End:  03/05/25                 Shola Carrion, PTA

## 2025-03-08 ENCOUNTER — APPOINTMENT (OUTPATIENT)
Dept: RADIOLOGY | Facility: HOSPITAL | Age: 60
End: 2025-03-08
Payer: MEDICARE

## 2025-03-10 ENCOUNTER — APPOINTMENT (OUTPATIENT)
Dept: PHYSICAL THERAPY | Facility: HOSPITAL | Age: 60
End: 2025-03-10
Payer: MEDICARE

## 2025-03-10 ENCOUNTER — DOCUMENTATION (OUTPATIENT)
Dept: PHYSICAL THERAPY | Facility: HOSPITAL | Age: 60
End: 2025-03-10
Payer: MEDICARE

## 2025-03-10 NOTE — PROGRESS NOTES
"Physical Therapy                 Therapy Communication Note    Patient Name: Dinorah Horn \"Raquel\"  MRN: [id number]  Department:   Room: Room/bed info not found  Today's Date: 3/10/2025     Discipline: Physical Therapy          Missed Visit Reason:  cx via my chart, no reason given    Missed Time: Cancel    Comment:  "

## 2025-03-12 ENCOUNTER — APPOINTMENT (OUTPATIENT)
Facility: CLINIC | Age: 60
End: 2025-03-12
Payer: MEDICARE

## 2025-03-12 VITALS
DIASTOLIC BLOOD PRESSURE: 64 MMHG | HEART RATE: 72 BPM | OXYGEN SATURATION: 99 % | SYSTOLIC BLOOD PRESSURE: 95 MMHG | BODY MASS INDEX: 29.03 KG/M2 | WEIGHT: 196 LBS | HEIGHT: 69 IN

## 2025-03-12 DIAGNOSIS — M19.90 INFLAMMATORY ARTHRITIS: ICD-10-CM

## 2025-03-12 PROCEDURE — 3074F SYST BP LT 130 MM HG: CPT | Performed by: INTERNAL MEDICINE

## 2025-03-12 PROCEDURE — 3078F DIAST BP <80 MM HG: CPT | Performed by: INTERNAL MEDICINE

## 2025-03-12 PROCEDURE — 4010F ACE/ARB THERAPY RXD/TAKEN: CPT | Performed by: INTERNAL MEDICINE

## 2025-03-12 PROCEDURE — 3008F BODY MASS INDEX DOCD: CPT | Performed by: INTERNAL MEDICINE

## 2025-03-12 PROCEDURE — 99214 OFFICE O/P EST MOD 30 MIN: CPT | Performed by: INTERNAL MEDICINE

## 2025-03-12 ASSESSMENT — ENCOUNTER SYMPTOMS
LOSS OF SENSATION IN FEET: 0
OCCASIONAL FEELINGS OF UNSTEADINESS: 0
DEPRESSION: 0

## 2025-03-12 ASSESSMENT — PATIENT HEALTH QUESTIONNAIRE - PHQ9
SUM OF ALL RESPONSES TO PHQ9 QUESTIONS 1 & 2: 0
2. FEELING DOWN, DEPRESSED OR HOPELESS: NOT AT ALL
1. LITTLE INTEREST OR PLEASURE IN DOING THINGS: NOT AT ALL

## 2025-03-12 ASSESSMENT — LIFESTYLE VARIABLES
HOW OFTEN DO YOU HAVE SIX OR MORE DRINKS ON ONE OCCASION: NEVER
AUDIT-C TOTAL SCORE: 1
HOW MANY STANDARD DRINKS CONTAINING ALCOHOL DO YOU HAVE ON A TYPICAL DAY: 1 OR 2
HOW OFTEN DO YOU HAVE A DRINK CONTAINING ALCOHOL: MONTHLY OR LESS
SKIP TO QUESTIONS 9-10: 1

## 2025-03-12 ASSESSMENT — PAIN SCALES - GENERAL: PAINLEVEL_OUTOF10: 5

## 2025-03-12 NOTE — PROGRESS NOTES
"Subjective   Patient ID: [id number]  Dinorah Horn \"Raquel\" is a 59 y.o. female who presents for follow up     HPI  PMH/PSH: DM, CAD s/p CABG, GERD, IBS, cervical DJD, Fibromyalgia, PsO, fatty liver disease suspected cirrhosis, R knee torn ligament.   Social: has 4 children. No alcohol intake, ex-smoker quit in 2009, no drug use.   FHx: No family history of autoimmune diseases      was seeing Dr. Ashli Wilson 05/2023:  She was diagnosed with psoriasis in the 1990's, started in left elbow.  Then diagnosed with psoriatic arthritis/rheumatoid arthritis in 2019 based on pain in her hands, wrists, shoulders, knees, ankles, hip. Prior notes also report enthesitis and dactylitis.   Prior treatments: cosentyx (bad skin reaction), enbrel (not effective), humira (not effective)  Current treatment: tremfya (started 1 year ago) - psoriasis cleared, joints improved by 50%  Per A&P:  Continue tremfya and add HCQ 200mg bid for active arthritis and dactylitis of 2nd and 3rd digits.   She also had a R subacromial bursa CSI during that visit.      She then saw Dr. Nation 02/2024:  Was still on HCQ, A&P:  Rheumatologic labs have showed a + GORDON, SSA>8, +RF, neg CCP     Current IS:  HCQ 200mg daily      First visit 09/13/2024:  Psoriasis is doing well overall, only small spot on forehead. Reports that joint pain was an issue prior to diagnosis of PsO (bilateral CMCs, wrists, R middle finger trigger finger PIP>DIP and 2nd and 3rd MCPs), worse with activity. Worst are trigger finger and the thumbs. Associated with joint swelling. Denies dactylitis.   She limps early in the morning first thing after she wakes up because of her L ankle. Gets better after a few hours but then worse again at the end of the day.   Has had plantar fascitis.   Knees are sore and crunch, worse with steps, stairs, and activity.   Elbows are good.   B/l shoulder pain, R>L and R SAB injections help.   She reports no prior difference in symptoms while on/off " "tremfya. Unsure if HCQ is helping.   Lately upper back is hurting at level of shoulder blades, with activity.      She is getting worked up by GI and liver biopsy with early cirrhosis. EGD with very small esophageal varices.     Interval Hx:   joint pains are pretty much the same, most bothersome is R middle finger triggering, CMCs, R shoulder and L foot, worse with activity and \"I end up limping\"  Shoulders hurt when she is pulling up her pants or laying on her sides.   Hasn't tried splinting for more than a few days.   Feels unrefreshed in AM and fatigue.     Ophthalmologic exam without maculopathy   I referred her to medical spine for DDD, had PT without relief and is planned for possible MRI  Dr. Guerrero, and planning CSI of CMC and trigger finger after OT.   Patient reports no relief with PT or OT.      Very seldom feels anxious or depressed.   No fever, chills, weight loss, night sweats or headaches. No dry eyes, blurry vision, redness or pain or photophobia. No dry mouth, dental loss, loss of taste, nasal or oral ulcers, difficulty swallowing. No chest pain. No shortness of breath, cough. No dysphagia, nausea, vomiting, heartburn.  Has alternating constipation and diarrhea. No melena or hematochezia  No genital or anal ulcers. No photosensitivity, rash or lesions, + biphasic Raynaud's phenomenon. + numbness or tingling in hands and feet. Has CTS. No muscle weakness. + urinary incontinence. Denies history of clots (arterial or venous), or abortions/miscarriages.     Patient Active Problem List   Diagnosis    Gastroesophageal reflux disease without esophagitis    Insomnia    Alcoholic cirrhosis (Multi)    Type 2 diabetes mellitus without retinopathy (Multi)    Abnormal cardiovascular stress test    Abnormal findings in cerebrospinal fluid, abnormal level of enzymes    Alcohol intake above recommended sensible limits    Alopecia areata    Arthralgia of wrist    Multiple joint pain    Arteriosclerosis of coronary " artery    Breast implant status    Chest pain    Candidiasis of skin    Capsular contracture of breast implant    Chronic pain disorder    Chronic sinusitis    Fibromyalgia    Closed displaced fracture of base of fourth metacarpal bone of left hand with routine healing    Depressive disorder    Epistaxis    Generalized osteoarthritis    Heart disease    Hiatal hernia    History of cardiac catheterization    Lung nodule    Bilateral hand pain    Mixed hyperlipidemia    Hyperlipidemia    History of coronary artery bypass surgery    Lymphadenopathy    Memory loss    Menopausal and female climacteric states    Neck mass    Osteoarthritis    Pain in unspecified knee    Pain of left lower extremity    Palpitations    Paresthesia of bilateral legs    Pityriasis rosea    Precordial pain    Rheumatoid arthritis    Polyarthritis, inflammatory (Multi)    Prediabetes    Primary hypertension    Prolonged QT interval    Psoriasis    Psoriatic arthritis (Multi)    Ptosis of breast    Radial styloid tenosynovitis    Shortness of breath    Raynaud's phenomenon    Raynaud's disease    Rash and other nonspecific skin eruption    Trigger finger    Tobacco dependence syndrome    Thyroid nodule    Steatosis of liver    Typical angina (CMS-HCC)    Stable angina (CMS-HCC)    Sinus congestion    Unintentional weight loss    Urinary incontinence    Need for influenza vaccination    Encounter for screening mammogram for malignant neoplasm of breast    Vasomotor instability    Depression    Arthritis of carpometacarpal (CMC) joint of left thumb    Bilateral wrist pain    Stenosing tenosynovitis of finger of left hand    Osteoarthritis of both hands    Trigger middle finger    Interscapular pain    Long-term use of Plaquenil    Vitreous syneresis of right eye    Age-related nuclear cataract of both eyes    Dry eye syndrome of lacrimal gland, bilateral    Hyperopia with presbyopia of both eyes       Past Medical History:   Diagnosis Date     Arthritis     Coronary artery disease     Depression     Diabetes mellitus (Multi)     GERD (gastroesophageal reflux disease)     Headache     Heart disease     Hypertension     Visual impairment        Past Surgical History:   Procedure Laterality Date    APPENDECTOMY      BREAST SURGERY  2003    Augmentation    CORONARY ARTERY BYPASS GRAFT      FOREARM FRACTURE SURGERY Right 1992    FOREARM HARDWARE REMOVAL Right 2003    HYSTERECTOMY  Partial, 2000       Social History     Socioeconomic History    Marital status:      Spouse name: Not on file    Number of children: Not on file    Years of education: Not on file    Highest education level: Not on file   Occupational History    Not on file   Tobacco Use    Smoking status: Former     Current packs/day: 0.00     Average packs/day: 1 pack/day for 15.0 years (15.0 ttl pk-yrs)     Types: Cigarettes     Start date: 4/4/1994     Quit date: 4/4/2009     Years since quitting: 15.9    Smokeless tobacco: Never    Tobacco comments:     I smoked on and off through a 15 year period.   Vaping Use    Vaping status: Never Used   Substance and Sexual Activity    Alcohol use: Not Currently     Comment: 0    Drug use: Never    Sexual activity: Not Currently     Partners: Male     Birth control/protection: None     Comment: My cervix has been removed.   Other Topics Concern    Not on file   Social History Narrative    Not on file     Social Drivers of Health     Financial Resource Strain: Low Risk  (7/28/2023)    Received from [facility]    Overall Financial Resource Strain (CARDIA)     Difficulty of Paying Living Expenses: Not hard at all   Food Insecurity: No Food Insecurity (7/28/2023)    Received from [facility]    Hunger Vital Sign     Worried About Running Out of Food in the Last Year: Never true     Ran Out of Food in the Last Year: Never true   Transportation Needs: No Transportation Needs (7/28/2023)    Received from  [facility]    PRAPARE - Transportation     Lack of Transportation (Medical): No     Lack of Transportation (Non-Medical): No   Physical Activity: Unknown (7/28/2023)    Received from [facility]    Exercise Vital Sign     Days of Exercise per Week: 3 days     Minutes of Exercise per Session: Patient declined   Stress: Stress Concern Present (7/28/2023)    Received from [facility]    [employer] [place] [employer] - Occupational Stress Questionnaire     Feeling of Stress : To some extent   Social Connections: Moderately Isolated (7/28/2023)    Received from [facility]    Social Connection and Isolation Panel [NHANES]     Frequency of Communication with Friends and Family: More than three times a week     Frequency of Social Gatherings with Friends and Family: Twice a week     Attends Gnosticist Services: Never     Active Member of Clubs or Organizations: No     Attends Club or Organization Meetings: Never     Marital Status:    Intimate Partner Violence: Not on file   Housing Stability: Not on file       Allergies   Allergen Reactions    Cosentyx [Secukinumab] Rash         Current Outpatient Medications:     aspirin 81 mg EC tablet, Take 1 tablet (81 mg) by mouth once daily., Disp: , Rfl:     BLACK COHOSH ORAL, Take by mouth., Disp: , Rfl:     cholecalciferol (Vitamin D3) 25 MCG (1000 UT) capsule, Take 1 capsule (25 mcg) by mouth once daily., Disp: , Rfl:     cyanocobalamin (Vitamin B-12) 50 mcg tablet, Take 1 tablet (50 mcg) by mouth once daily., Disp: , Rfl:     ferrous sulfate, 325 mg ferrous sulfate, tablet, Take 1 tablet (325 mg) by mouth once daily., Disp: , Rfl:     hydroCHLOROthiazide (Microzide) 12.5 mg tablet, Take 1 tablet (12.5 mg) by mouth once daily., Disp: 90 tablet, Rfl: 1    hydroxychloroquine (Plaquenil) 200 mg tablet, Take 1 tablet (200 mg) by mouth once daily., Disp: 90 tablet, Rfl: 1     lisinopril 20 mg tablet, Take 1 tablet (20 mg) by mouth once daily., Disp: 90 tablet, Rfl: 1    metFORMIN  mg 24 hr tablet, Take one tablet twice daily, Disp: 180 tablet, Rfl: 1    metoprolol tartrate (Lopressor) 25 mg tablet, Take 1 tablet (25 mg) by mouth 2 times a day., Disp: 180 tablet, Rfl: 1    multivitamin tablet, Take 1 tablet by mouth once daily., Disp: , Rfl:     nitroglycerin (Nitrostat) 0.3 mg SL tablet, Place 1 tablet (0.3 mg) under the tongue every 5 minutes if needed for chest pain., Disp: , Rfl:     omeprazole (PriLOSEC) 40 mg DR capsule, Take 1 capsule (40 mg) by mouth once daily in the morning. Take before meals. Do not crush or chew., Disp: 90 capsule, Rfl: 3    rosuvastatin (Crestor) 20 mg tablet, Take 1 tablet (20 mg) by mouth once daily., Disp: 90 tablet, Rfl: 1    traZODone (Desyrel) 50 mg tablet, Take 0.5 tablets (25 mg) by mouth once daily at bedtime., Disp: 90 tablet, Rfl: 0    venlafaxine (Effexor) 75 mg tablet, Take 75mg in the morning daily., Disp: 90 tablet, Rfl: 1    venlafaxine XR (Effexor-XR) 150 mg 24 hr capsule, Take 1 capsule (150 mg) by mouth every 12 hours. Do not crush or chew., Disp: 180 capsule, Rfl: 1    Objective     Visit Vitals  BP 95/64   Pulse 72     Physical Exam  Copied from previous exam unless annotated below  General: AAOx3, Cooperative  Head: normocephalic, atraumatic  Eyes: EOMI, conjunctiva clear, sclera white, anicteric  Throat/Mouth: No oral deformities, no cheek swelling, mucosa appear dry, no oral ulcers noted or loss of dentition   Neck/Lymph: FROM, trachea midline  Skin: No PsO evident on exam today or photosensitive areas  MSK:   Significant diffuse myofascial tenderness.   Upper Extremities:  Hand/Fingers: ttp of L 4th A1 pulley, R 2nd PIP ttp and R 2nd DIP ttp. fullness of 2nd and 3rd MCPs bilaterally. + grind test bilaterally. + ttp of A1 pulley of R 3rd digit.   Wrists: no tenderness, erythema, pain or swelling.   Elbows: No tenderness,  swelling, erythema or warmth at elbows, FROM grossly. No nodules, no enthesitis.   Shoulders: Shoulder ROM limited to 150 deg L and 140 deg R   Lower Extremities:   Knees: No tenderness, deformities, swelling, rashes, or warmth, normal ROM grossly. + crepitus  Ankles: b/l lateral ankle ttp.  No tenderness, edema, erythema of joint line.  Feet: Negative MTP squeeze. Normal ROM grossly.      Lab Results   Component Value Date    WBC 5.0 02/21/2025    HGB 11.0 (L) 02/21/2025    HCT 35.0 02/21/2025    MCV 98.0 02/21/2025     02/21/2025       Chemistry    Lab Results   Component Value Date/Time     02/21/2025 1434    K 4.8 02/21/2025 1434     02/21/2025 1434    CO2 28 02/21/2025 1434    BUN 20 02/21/2025 1434    CREATININE 0.75 02/21/2025 1434    Lab Results   Component Value Date/Time    CALCIUM 9.8 02/21/2025 1434    ALKPHOS 82 02/21/2025 1434    AST 36 (H) 02/21/2025 1434    ALT 42 (H) 02/21/2025 1434    BILITOT 0.5 02/21/2025 1434          Lab Results   Component Value Date    CRP 0.10 09/19/2024    GORDON Positive (A) 02/05/2024    JO1 <0.2 02/05/2024    CALCIUM 9.8 02/21/2025    FERRITIN 305 (H) 09/05/2024     ALKALINE PHOSPHATASE   Date Value Ref Range Status   02/21/2025 82 37 - 153 U/L Final     AST   Date Value Ref Range Status   02/21/2025 36 (H) 10 - 35 U/L Final     UREA NITROGEN (BUN)   Date Value Ref Range Status   02/21/2025 20 7 - 25 mg/dL Final     SODIUM   Date Value Ref Range Status   02/21/2025 140 135 - 146 mmol/L Final     POTASSIUM   Date Value Ref Range Status   02/21/2025 4.8 3.5 - 5.3 mmol/L Final     CARBON DIOXIDE   Date Value Ref Range Status   02/21/2025 28 20 - 32 mmol/L Final     ALT   Date Value Ref Range Status   02/21/2025 42 (H) 6 - 29 U/L Final       OCT, Retina - OU - Both Eyes  Retinal multimodal imaging including photography was completed, and the   findings are described in the examination.  Optical coherence tomography of the macula revealed:   OD: Normal foveal  contour, photoreceptor, retinal pigment epithelium,   IS/OS junction, central field 274 micron.  Findings are normal.  OS:  Normal foveal contour, photoreceptor, retinal pigment epithelium,   IS/OS junction, central field 277 micron.  Findings are normal.  [place] Visual Field - OU - Both Eyes  Right Eye  Threshold was 10-2. Strategy was JOY. Reliability was good.     Left Eye  Threshold was 10-2. Strategy was JOY. Reliability was good.     Notes  OD/OS: baseline scan today-no signs of toxicity-few scattered non specific   defects OS>OD      No echocardiogram results found for the past 12 months  No DXA results found for the past 12 months     12/18/2024  MR imaging of the  right hand was obtained  without administration of  intravenous contrast medium.      FINDINGS:  Suboptimal secondary to motion artifact on multiple sequences.      Tendons:  Mild tendinosis and tenosynovitis of the 3rd digit flexor tendons at  the level of the proximal phalanx. The flexor and extensor tendons of  the hand are otherwise intact. The tendons of the thenar and  hypothenar compartments are normal.      Ligaments:  The major ligamentous structures of the hand are intact.      Joints:  Mild flexion deformity of the 5th proximal interphalangeal joint  which may be due to chronic ligament injury.      Moderate degenerative changes at the 1st metacarpophalangeal and 5th  proximal interphalangeal joints with subchondral edema and  osteophytes. Mild degenerative changes at the 1st interphalangeal  joint with osteophytosis. Severe degenerative changes of the 1st  carpometacarpal joint with edema and erosive change in the proximal  metacarpal and in the trapezium. No erosions.      Osseous structures:  There is no evidence of acute fracture or dislocation. There is no  avascular necrosis. No marrow replacing lesions are seen.      Soft tissues:  There is no evidence of muscular atrophy or tear.  No suspicious soft tissue lesions are  seen.      IMPRESSION:  1. Severe degenerative changes of the 1st CMC joint. Moderate  degenerative changes at the 1st metacarpophalangeal and 5th proximal  interphalangeal joints. Mild degenerative changes of the 1st  interphalangeal joint. This distribution is compatible with  osteoarthritic changes.  2. Mild tendinosis and tenosynovitis of the 3rd digit flexor tendons  at the level of the proximal phalanx.  3. Mild flexion deformity of the 5th proximal interphalangeal joint  which may be due to chronic ligament injury.  4. No erosions.      MRI L hand 12/18/2024  TECHNIQUE:  MR imaging of the  left hand was obtained  without administration of  intravenous contrast medium.      FINDINGS:  Suboptimal secondary to motion artifact.      Tendons:  The flexor and extensor tendons of the hand are intact. The tendons  of the thenar and hypothenar compartments are normal.      Ligaments:  The major ligamentous structures of the hand are intact.      Joints:  Severe degenerative changes at the 1st CMC joint and triscaphe joint  with degenerative subchondral cysts in the proximal metacarpal and  trapezium. Mild degenerative changes at the 1st MCP and at the  2nd-5th distal interphalangeal and 5th proximal interphalangeal  joints. Moderate degenerative changes with subchondral edema and  cystic changes in the 1st interphalangeal joint. No erosions. No  synovitis or abnormal joint effusion.      Osseous structures:  There is no evidence of acute fracture or dislocation. There is no  avascular necrosis. Irregular T2 hyperintense and T1 hypointense  lesion in the distal 3rd metacarpal intramedullary space.      Soft tissues:  There is no evidence of muscular atrophy or tear.  No suspicious soft tissue lesions are seen.      IMPRESSION:  1. Severe degenerative changes of the 1st CMC and triscaphe joint.  Moderate degenerative changes at the 1st interphalangeal joint. Mild  degenerative changes of the 1st MCP and 2nd-5th  distal  interphalangeal and 5th proximal interphalangeal joints. Distribution  is compatible with osteoarthritic disease.  2. Nonaggressive appearing lesion in the distal 3rd metacarpal, may  represent chondroid or cystic lesion.  3. No evidence of inflammatory arthropathy.    [facility] hands 2023 at [facility]  RESULT:   Healing nondisplaced fourth metacarpal base fracture with interval   decreased conspicuity of the fracture and slight interval callus   formation.  No new fracture.  No dislocation.  Severe first CMC and   triscaphe degenerative changes scattered mild degenerative changes of   some of the IP joints.  Diffuse osteopenia.      MR C spine 05/24/2022  MPRESSION:     1. Congenital cervical canal narrowing with superimposed disc/joint   degeneration.     2. Moderate canal narrowing at C5-6 and C6-7.     3.  Significant bony bilateral C4-5 and C5-6 and C6-7 foramina narrowing.     COUNTING REFERENCE: Superior cervical disc is taken as C2-3.  Structural   anomalies: None.        Assessment/Plan   DM, CAD s/p CABG, cervical DDD, Fibromyalgia, PsO, fatty liver disease and early cirrhosis here for follow up.      She's had a prior diagnosis of RA/PsA. She describes her pains mostly as mechanical and her most bothersome joint is her CMCs bilaterally which is mostly likely from OA and also has polyarticular OA. Her RF is low titer. At initial visit, there was evidence of synovitis and further work up was pursued:   ESR, CRP normal, ACPA neg, + low titer RF.   XR thoracic spine DDD  XR knee mild OA  XR hands with OA changes severe at CMCs  MSK US R CMC OA, and OA changes but with some synovial proliferation at the 2nd and 5th MCP of left hand  MSK b/l ankle  1. Possible degree split tearing of the retro malleolar left peroneal  brevis tendon.  2. Mild focal interstitial tear of the inframalleolar peroneal brevis  tendon just distal to the peroneal tubercle.  3. Dorsal left foot lump correlates with region of severe  dorsal  degenerative change appearing to be centered in the region of the  intermediate naviculocuneiform articulation although radiographic  correlation is recommended.    MRI hands tendinosis and tenosynovitis of R 3rd digit flexor tendons, flexion deformity of R 5th proximal IP chronic ligament injury, pattern combaitble with OA mostly, no inflammatory arthritis, severe CMC OA.  There is severe degenerative changes of the 1st  carpometacarpal joint with edema and erosive change in the proximal metacarpal and in the trapezium.    Today there is stenosing tenosynovitis of R third digit.  No uveitis or IBP or IBD or dactylitis.     - Given no improvement with HCQ will discontinue. Reports no prior benefit to TNFi and Tremfya.   There was no evidence of inflamamtory arthritis on MRI but there was tenosynovitis and tendinosis, US with synovial thickening. Will monitor and restart different agent accordingly. Treatment escalation will be challenging given her early liver cirrhosis but we discussed treatment options Will need to discuss with hepatologist prior to adding, SSZ, orencia?   - Continue to follow up with medical spine, no improvement with PT   - continue to follow up with orthopedics for peroneal tendon tear and CSI of stenosing tenosynovitis of L 3rd digit, OT without much help, planning CMC CSI and trigger finger CSI.     DEXA 03/2023 reviewed and normal.      RTC in 3 months.      Ravi Mcqueen MD  Division of Rheumatology   [facility]

## 2025-03-17 ENCOUNTER — TREATMENT (OUTPATIENT)
Dept: PHYSICAL THERAPY | Facility: HOSPITAL | Age: 60
End: 2025-03-17
Payer: MEDICARE

## 2025-03-17 DIAGNOSIS — M54.89 INTERSCAPULAR PAIN: Primary | ICD-10-CM

## 2025-03-17 PROCEDURE — 97140 MANUAL THERAPY 1/> REGIONS: CPT | Mod: GP | Performed by: PHYSICAL THERAPIST

## 2025-03-17 PROCEDURE — 97110 THERAPEUTIC EXERCISES: CPT | Mod: GP | Performed by: PHYSICAL THERAPIST

## 2025-03-17 ASSESSMENT — PAIN - FUNCTIONAL ASSESSMENT: PAIN_FUNCTIONAL_ASSESSMENT: 0-10

## 2025-03-17 ASSESSMENT — PAIN SCALES - GENERAL: PAINLEVEL_OUTOF10: 2

## 2025-03-17 NOTE — PROGRESS NOTES
Physical Therapy Reassessment and Treatment    Patient Name: Dinorah Horn  MRN: [id number]  Today's Date: 3/17/2025  Time Calculation  Start Time: 1518  Stop Time: 1600  Time Calculation (min): 42 min        PT Therapeutic Procedures Time Entry  Manual Therapy Time Entry: 10  Therapeutic Exercise Time Entry: 32                Insurance:  Visit Limit: 10 PT (20 visit max per discipline/yr)  Date Range: 1/29-4/28  Co-Pay: $0    Current Problem  1. Interscapular pain  Follow Up In Physical Therapy        Problem List Items Addressed This Visit             ICD-10-CM    Interscapular pain - Primary M54.89       General  Reason for Referral: interscapular pain  Referred By: Dr. Camacho  Past Medical History Relevant to Rehab: cardiac catheterization, coronary artery bypass surgery, DM2, depression, RA, fibromyalgia, OA, chronic pain disorder.  Preferred Learning Style: auditory, kinesthetic, verbal, visual, written    Subjective   Current Condition:   Better  Patient reports concern that the pain will worsen once she stops physical therapy.    Performing HEP?: Yes    Precautions  Precautions  Medical Precautions: No known precautions/limitation  Pain  Pain Assessment: 0-10  0-10 (Numeric) Pain Score: 2  Pain Type: Chronic pain  Pain Location: Neck    Objective   Cervical Spine    Cervical AROM  Cervical flexion: (80°): 32  Cervical extension: (50°): 36  Cervical rotation right: (80°): 40  Cervical rotation left: (80°): 26  Cervical sidebend right: (45°): 8  Cervical sidebend left: (45°): 14    Lumbar AROM  Lumbar flexion: (60°): min impairment  Lumbar extension (25°): min impairment  Lumbar rotation right (30°): min impairment  Lumbar rotation left (30°): min impairment  Lumbar sidebend right (25°): min impairment  Lumbar sidebend left (25°): min impairment    Specific Lower Extremity MMT  R Iliopsoas: (5/5): 5/5  L Iliopsoas: (5/5): 4/5    Outcome Measures:  Other Measures  Oswestry Disablity Index (AVTAR):  "24%    Treatments:  Therapeutic Exercise  Therapeutic Exercise Performed: Yes  Therapeutic Exercise Activity 1: s/l open book x10  Therapeutic Exercise Activity 2: cervical retraction x10 3\"  Therapeutic Exercise Activity 4: scap retraction Green 2x10  Therapeutic Exercise Activity 6: shoulder ext Green x20  Therapeutic Exercise Activity 7: vertical towel roll under spine snow angels and pec stretch  Therapeutic Exercise Activity 9: B ER Red x15  Therapeutic Exercise Activity 10: SNAG cervical extension x5  Therapeutic Exercise Activity 11: SNAG cervical rotation x5 R/L         Manual Therapy  Manual Therapy Performed: Yes  Manual Therapy Activity 3: TPR levator scap    EDUCATION:   Individual(s) Educated: Patient  Education Provided: yes  Handout(s) Provided: Scanned into chart  Home Program: reviewed home exercise program   Risk and Benefits Discussed with Patient/Caregiver/Other: Yes   Patient/Caregiver Demonstrated Understanding: Yes   Patient Response to Education: Patient/Caregiver verbalized understanding of information, Patient/Caregiver performed return demonstration of exercises/activities, and Patient/Caregiver asked appropriate questions    Assessment: Patient is progressing slowly towards her physical therapy goals. Patient continues to have impaired range of motion and back/neck pain. Patient had no complaints of radicular symptoms today stating that the pain is only in her back/neck. Patient was instructed in SNAG cervical extension and rotation to help improve range of motion and decrease pain. Patient demonstrated good ability to perform without complaints of pain. Skilled physical therapy is warranted to address the above stated impairments, so the patient can perform functional activities and work duties without increased pain or difficulty.   PT Assessment  Rehab Prognosis: Good  Evaluation/Treatment Tolerance: Patient tolerated treatment well    Plan: Continue with POC. Progress exercises as " tolerated.  OP PT Plan  Treatment/Interventions: Education/ Instruction, Manual therapy, Neuromuscular re-education, Therapeutic activities, Therapeutic exercises  Onset Date: 12/20/24  Certification Period Start Date: 01/29/25  Number of Treatments Authorized: 5 of 10  Rehab Potential: Good  Plan of Care Agreement: Patient    Goals:  Active       PT Problem       Patient will achieve bilateral cervical side bending ROM WFL (Not Progressing)       Start:  01/29/25    Expected End:  04/21/25            Patient will achieve bilateral cervical rotation ROM WFL. (Progressing)       Start:  01/29/25    Expected End:  04/21/25            Patient will achieve cervical flexion ROM WFL. (Progressing)       Start:  01/29/25    Expected End:  04/21/25            Patient will achieve cervical extension ROM WFL. (Not Progressing)       Start:  01/29/25    Expected End:  04/21/25            Patient will achieve spinal flexion to WFL. (Not Progressing)       Start:  01/29/25    Expected End:  04/21/25            Patient will achieve spinal extension to WFL. (Not Progressing)       Start:  01/29/25    Expected End:  04/21/25            Patient will achieve bilateral spinal side bending ROM WFL. (Not Progressing)       Start:  01/29/25    Expected End:  04/21/25            Patient will achieve bilateral spinal rotation ROM WFL. (Not Progressing)       Start:  01/29/25    Expected End:  04/21/25            Patient will demonstrate independence in home program for support of progression (Met)       Start:  01/29/25    Expected End:  03/05/25    Resolved:  03/17/25         Patient will report pain of no more than 2/10 demonstrating a reduction of overall pain (Progressing)       Start:  01/29/25    Expected End:  04/21/25            Patient will show a significant change in AVTAR (22% to 10%) patient reported outcome tool to demonstrate subjective imporovement (Not Progressing)       Start:  01/29/25    Expected End:  04/21/25                  Osmin Ko, PT

## 2025-03-21 ENCOUNTER — APPOINTMENT (OUTPATIENT)
Dept: PRIMARY CARE | Facility: CLINIC | Age: 60
End: 2025-03-21
Payer: MEDICARE

## 2025-03-24 ENCOUNTER — TREATMENT (OUTPATIENT)
Dept: PHYSICAL THERAPY | Facility: HOSPITAL | Age: 60
End: 2025-03-24
Payer: MEDICARE

## 2025-03-24 DIAGNOSIS — M54.89 INTERSCAPULAR PAIN: Primary | ICD-10-CM

## 2025-03-24 PROCEDURE — 97110 THERAPEUTIC EXERCISES: CPT | Mod: GP | Performed by: PHYSICAL THERAPIST

## 2025-03-24 ASSESSMENT — PAIN SCALES - GENERAL: PAINLEVEL_OUTOF10: 8

## 2025-03-24 ASSESSMENT — PAIN - FUNCTIONAL ASSESSMENT: PAIN_FUNCTIONAL_ASSESSMENT: 0-10

## 2025-03-24 NOTE — PROGRESS NOTES
Physical Therapy Treatment    Patient Name: Dinorah Horn  MRN: [id number]  Today's Date: 3/24/2025  Time Calculation  Start Time: 1519  Stop Time: 1600  Time Calculation (min): 41 min        PT Therapeutic Procedures Time Entry  Manual Therapy Time Entry: 5  Therapeutic Exercise Time Entry: 36                Insurance:  Visit Limit: 10  Date Range: 1/29-4/28  Co-Pay: $0    Current Problem  1. Interscapular pain  Follow Up In Physical Therapy        Problem List Items Addressed This Visit             ICD-10-CM    Interscapular pain - Primary M54.89       General  Reason for Referral: interscapular pain  Referred By: Dr. Camacho  Past Medical History Relevant to Rehab: cardiac catheterization, coronary artery bypass surgery, DM2, depression, RA, fibromyalgia, OA, chronic pain disorder.  Preferred Learning Style: auditory, kinesthetic, verbal, visual, written    Subjective   Current Condition:   Worse  Patient reports having a really bad week. Patient states that her pain has been really bad.    Performing HEP?: Yes    Precautions  Precautions  Medical Precautions: No known precautions/limitation  Pain  Pain Assessment: 0-10  0-10 (Numeric) Pain Score: 8  Pain Type: Chronic pain  Pain Location: Neck    Objective     Treatments:  Therapeutic Exercise  Therapeutic Exercise Performed: Yes  Therapeutic Exercise Activity 12: Sci Fit UBE lvl1 1' fwd/bwd each  Therapeutic Exercise Activity 13: s/l open book with arm at side x5  Therapeutic Exercise Activity 14: seated side bending with overhead reach x10  Therapeutic Exercise Activity 15: child's pose x3  Therapeutic Exercise Activity 16: seated lumbar flexion x6  Therapeutic Exercise Activity 17: seated trunk rotation x5    Manual Therapy  Manual Therapy Performed: Yes  Manual Therapy Activity 1: gentle STM to bilat paraspinals    EDUCATION:   Individual(s) Educated: Patient  Education Provided: yes  Handout(s) Provided: Scanned into chart  Home Program: reviewed home  exercise program   Risk and Benefits Discussed with Patient/Caregiver/Other: Yes   Patient/Caregiver Demonstrated Understanding: Yes   Patient Response to Education: Patient/Caregiver verbalized understanding of information, Patient/Caregiver performed return demonstration of exercises/activities, and Patient/Caregiver asked appropriate questions    Assessment: Patient demonstrated difficulty performing all exercises today due to elevated back pain. Patient isn't sure if it is her fibromyalgia or not. Patient appeared to relax with gentle soft tissue massage of her bilateral thoracic paraspinals. Patient tolerated seated side bending with improvement in her pain level.   PT Assessment  PT Assessment Results: Decreased strength, Pain, Decreased range of motion  Rehab Prognosis: Good  Evaluation/Treatment Tolerance: Patient limited by pain    Plan: Continue with POC. Progress exercises as tolerated.  OP PT Plan  Treatment/Interventions: Education/ Instruction, Manual therapy, Neuromuscular re-education, Therapeutic activities, Therapeutic exercises  PT Plan: Skilled PT  PT Frequency: 1 time per week  Duration: 5 weeks  Certification Period End Date: 04/21/25  Number of Treatments Authorized: 5 of 10  Rehab Potential: Good  Plan of Care Agreement: Patient    Goals:  Active       PT Problem       Patient will achieve bilateral cervical side bending ROM WFL (Not Progressing)       Start:  01/29/25    Expected End:  04/21/25            Patient will achieve bilateral cervical rotation ROM WFL. (Progressing)       Start:  01/29/25    Expected End:  04/21/25            Patient will achieve cervical flexion ROM WFL. (Progressing)       Start:  01/29/25    Expected End:  04/21/25            Patient will achieve cervical extension ROM WFL. (Not Progressing)       Start:  01/29/25    Expected End:  04/21/25            Patient will achieve spinal flexion to WFL. (Not Progressing)       Start:  01/29/25    Expected End:  04/21/25             Patient will achieve spinal extension to WFL. (Not Progressing)       Start:  01/29/25    Expected End:  04/21/25            Patient will achieve bilateral spinal side bending ROM WFL. (Not Progressing)       Start:  01/29/25    Expected End:  04/21/25            Patient will achieve bilateral spinal rotation ROM WFL. (Not Progressing)       Start:  01/29/25    Expected End:  04/21/25            Patient will demonstrate independence in home program for support of progression (Met)       Start:  01/29/25    Expected End:  03/05/25    Resolved:  03/17/25         Patient will report pain of no more than 2/10 demonstrating a reduction of overall pain (Progressing)       Start:  01/29/25    Expected End:  04/21/25            Patient will show a significant change in AVTAR (22% to 10%) patient reported outcome tool to demonstrate subjective imporovement (Not Progressing)       Start:  01/29/25    Expected End:  04/21/25                 Osmin Ko, PT

## 2025-03-31 ENCOUNTER — APPOINTMENT (OUTPATIENT)
Dept: PHYSICAL THERAPY | Facility: HOSPITAL | Age: 60
End: 2025-03-31
Payer: MEDICARE

## 2025-04-01 ENCOUNTER — DOCUMENTATION (OUTPATIENT)
Dept: PHYSICAL THERAPY | Facility: HOSPITAL | Age: 60
End: 2025-04-01
Payer: MEDICARE

## 2025-04-01 NOTE — PROGRESS NOTES
"Physical Therapy                 Therapy Communication Note    Patient Name: Dinorah Horn \"Raquel\"  MRN: [id number]  Today's Date: 3/31/2025     Discipline: Physical Therapy    Missed Time: Cancel, no reason given.    "

## 2025-04-07 ENCOUNTER — TRANSCRIBE ORDERS (OUTPATIENT)
Dept: ORTHOPEDIC SURGERY | Facility: HOSPITAL | Age: 60
End: 2025-04-07
Payer: MEDICARE

## 2025-04-07 DIAGNOSIS — M47.812 OSTEOARTHRITIS OF CERVICAL SPINE, UNSPECIFIED SPINAL OSTEOARTHRITIS COMPLICATION STATUS: ICD-10-CM

## 2025-04-08 ENCOUNTER — APPOINTMENT (OUTPATIENT)
Dept: PRIMARY CARE | Facility: CLINIC | Age: 60
End: 2025-04-08
Payer: MEDICARE

## 2025-04-08 ENCOUNTER — DOCUMENTATION (OUTPATIENT)
Dept: PHYSICAL THERAPY | Facility: HOSPITAL | Age: 60
End: 2025-04-08

## 2025-04-08 NOTE — PROGRESS NOTES
"Physical Therapy    Discharge Summary    Name: Dinorah Horn \"Pratibha"  MRN: [id number]  : 1965  Date: 25    Discharge Summary: PT    Discharge Information: Date of discharge 2025, Date of last visit 3/24/2025, Date of evaluation 2025, Number of attended visits 5, Referred by Dr. Camacho, and Referred for interscapular pain    Therapy Summary: Patient is a 59 year old female who presents to clinic with complaints of interscapular pain. Patient states that it everything causes the pain. Patient states that the pain does not radiate. Patient states that sitting without movement helps decrease the pain. Patient states that the pain started about a year ago.  Patient has a prior medical history including: cardiac catheterization, coronary artery bypass surgery, DM2, depression, RA, fibromyalgia, OA, chronic pain disorder.     Discharge Status: Patient demonstrated difficulty performing all exercises today due to elevated back pain. Patient isn't sure if it is her fibromyalgia or not. Patient appeared to relax with gentle soft tissue massage of her bilateral thoracic paraspinals. Patient tolerated seated side bending with improvement in her pain level.      Rehab Discharge Reason: per patient request  "

## 2025-04-11 ENCOUNTER — HOSPITAL ENCOUNTER (OUTPATIENT)
Dept: RADIOLOGY | Facility: CLINIC | Age: 60
Discharge: HOME | End: 2025-04-11
Payer: MEDICARE

## 2025-04-11 ENCOUNTER — APPOINTMENT (OUTPATIENT)
Dept: ORTHOPEDIC SURGERY | Facility: CLINIC | Age: 60
End: 2025-04-11
Payer: MEDICARE

## 2025-04-11 DIAGNOSIS — M54.89 INTERSCAPULAR PAIN: ICD-10-CM

## 2025-04-11 DIAGNOSIS — M47.812 OSTEOARTHRITIS OF CERVICAL SPINE, UNSPECIFIED SPINAL OSTEOARTHRITIS COMPLICATION STATUS: ICD-10-CM

## 2025-04-11 DIAGNOSIS — M54.12 CERVICAL RADICULOPATHY: Primary | ICD-10-CM

## 2025-04-11 PROCEDURE — 1036F TOBACCO NON-USER: CPT | Performed by: ORTHOPAEDIC SURGERY

## 2025-04-11 PROCEDURE — 99214 OFFICE O/P EST MOD 30 MIN: CPT | Performed by: ORTHOPAEDIC SURGERY

## 2025-04-11 PROCEDURE — 72050 X-RAY EXAM NECK SPINE 4/5VWS: CPT

## 2025-04-11 PROCEDURE — 4010F ACE/ARB THERAPY RXD/TAKEN: CPT | Performed by: ORTHOPAEDIC SURGERY

## 2025-04-11 NOTE — LETTER
April 11, 2025     Yamilet Frazier DO  [address]  [facility] [address]    Patient: Raquel Horn   YOB: 1965   Date of Visit: 4/11/2025       Dear Dr. Yamilet Frazier DO:    Thank you for referring Raquel Horn to me for evaluation. Below are my notes for this consultation.  If you have questions, please do not hesitate to call me. I look forward to following your patient along with you.       Sincerely,     Bo Camacho MD      CC: No Recipients  ______________________________________________________________________________________    HPI:Dinorah Horn is a 59-year-old woman who comes in today for follow-up.  She has recently completed physical therapy.  She still describes pain in the upper thoracic/intrascapular region.  Occasional symptoms in going to the left upper extremity including numbness and tingling.  She has completed physical therapy [facility].  She has not had an MRI.      ROS:  Reviewed on EMR and patient intake sheet.    PMH/SH:   Reviewed on EMR and patient intake sheet.    Exam:  Physical Exam    Constitutional: Well appearing; no acute distress  Eyes: pupils are equal and round  Psych: normal affect  Respiratory: non-labored breathing  Cardiovascular: regular rate and rhythm  GI: non-distended abdomen  Musculoskeletal: no pain with range of motion of the shoulders bilaterally; no signs of impingement  Neurologic: [4+]/5 strength in the upper extremities bilaterally]; [negative] Beckford's; [no hyper-reflexia]    Radiology:  X-rays demonstrate multilevel cervical spondylosis from C4-C7.  Large anterior osteophyte noted at C4-C5.  Gentle cervical kyphosis with 10 degrees of kyphosis from C2-C7    Diagnosis:  Cervical radiculopathy; cervical kyphosis    Assessment and Plan:   59-year-old woman with persistent cervical radicular symptoms and upper back pain despite recent physical therapy.  At this time, I recommended an MRI of the cervical spine and follow-up to  Pt had no ailments throughout the day requiring interventions. discuss any potential further intervention from surgical standpoint.  If her MRI does not demonstrate any focal neurologic compression, then pain management might be the next step.  I will see her back after the MRI.    The patient was in agreement with the plan. At the end of the visit today, the patient felt that all questions had been answered satisfactorily.  The patient was pleased with the visit and very appreciative for the care rendered.     Thank you very much for the kind referral.  It is a privilege, and a pleasure, to partner with you in the care of your patients.  I would be delighted to assist you with any further consultations as needed.      Bo Camacho MD    Chief of Spine Surgery, [facility]  Director of Spine Service, [facility]  , Department of Orthopaedics  [facility]  [address] [id number]  P: [phone]  [facility]    This note was dictated with voice recognition software.  It has not been proofread for grammatical errors, typographical mistakes or other semantic inconsistencies.

## 2025-04-11 NOTE — PROGRESS NOTES
HPI:Dinorah Horn is a 59-year-old woman who comes in today for follow-up.  She has recently completed physical therapy.  She still describes pain in the upper thoracic/intrascapular region.  Occasional symptoms in going to the left upper extremity including numbness and tingling.  She has completed physical therapy [facility].  She has not had an MRI.      ROS:  Reviewed on EMR and patient intake sheet.    PMH/SH:   Reviewed on EMR and patient intake sheet.    Exam:  Physical Exam    Constitutional: Well appearing; no acute distress  Eyes: pupils are equal and round  Psych: normal affect  Respiratory: non-labored breathing  Cardiovascular: regular rate and rhythm  GI: non-distended abdomen  Musculoskeletal: no pain with range of motion of the shoulders bilaterally; no signs of impingement  Neurologic: [4+]/5 strength in the upper extremities bilaterally]; [negative] Beckford's; [no hyper-reflexia]    Radiology:  X-rays demonstrate multilevel cervical spondylosis from C4-C7.  Large anterior osteophyte noted at C4-C5.  Gentle cervical kyphosis with 10 degrees of kyphosis from C2-C7    Diagnosis:  Cervical radiculopathy; cervical kyphosis    Assessment and Plan:   59-year-old woman with persistent cervical radicular symptoms and upper back pain despite recent physical therapy.  At this time, I recommended an MRI of the cervical spine and follow-up to discuss any potential further intervention from surgical standpoint.  If her MRI does not demonstrate any focal neurologic compression, then pain management might be the next step.  I will see her back after the MRI.    The patient was in agreement with the plan. At the end of the visit today, the patient felt that all questions had been answered satisfactorily.  The patient was pleased with the visit and very appreciative for the care rendered.     Thank you very much for the kind referral.  It is a privilege, and a pleasure, to partner with you in the care of  your patients.  I would be delighted to assist you with any further consultations as needed.      Bo Camacho MD    Chief of Spine Surgery, [facility]  Director of Spine Service, [facility]  , Department of Orthopaedics  [employer] [employer]  [address] [place] [address]  P: [phone]  [facility].[employer]    This note was dictated with voice recognition software.  It has not been proofread for grammatical errors, typographical mistakes or other semantic inconsistencies.

## 2025-04-16 ENCOUNTER — APPOINTMENT (OUTPATIENT)
Dept: RADIOLOGY | Facility: HOSPITAL | Age: 60
End: 2025-04-16
Payer: MEDICARE

## 2025-04-17 DIAGNOSIS — G47.00 INSOMNIA, UNSPECIFIED TYPE: ICD-10-CM

## 2025-04-19 ENCOUNTER — HOSPITAL ENCOUNTER (OUTPATIENT)
Dept: RADIOLOGY | Facility: HOSPITAL | Age: 60
Discharge: HOME | End: 2025-04-19
Payer: MEDICARE

## 2025-04-19 DIAGNOSIS — K74.69 OTHER CIRRHOSIS OF LIVER: ICD-10-CM

## 2025-04-19 DIAGNOSIS — R79.89 LIVER FUNCTION TEST ABNORMALITY: ICD-10-CM

## 2025-04-19 DIAGNOSIS — F10.90 METALD: ICD-10-CM

## 2025-04-19 DIAGNOSIS — K76.0 METALD: ICD-10-CM

## 2025-04-19 PROCEDURE — 76705 ECHO EXAM OF ABDOMEN: CPT

## 2025-04-19 PROCEDURE — 76705 ECHO EXAM OF ABDOMEN: CPT | Performed by: RADIOLOGY

## 2025-04-19 RX ORDER — VENLAFAXINE 75 MG/1
75 TABLET ORAL
Qty: 90 TABLET | Refills: 0 | Status: SHIPPED | OUTPATIENT
Start: 2025-04-19

## 2025-04-21 ENCOUNTER — APPOINTMENT (OUTPATIENT)
Dept: PRIMARY CARE | Facility: CLINIC | Age: 60
End: 2025-04-21
Payer: MEDICARE

## 2025-04-21 VITALS
WEIGHT: 193.4 LBS | SYSTOLIC BLOOD PRESSURE: 126 MMHG | OXYGEN SATURATION: 98 % | DIASTOLIC BLOOD PRESSURE: 70 MMHG | HEIGHT: 69 IN | HEART RATE: 85 BPM | BODY MASS INDEX: 28.64 KG/M2 | TEMPERATURE: 98.3 F

## 2025-04-21 DIAGNOSIS — F32.A DEPRESSION, UNSPECIFIED DEPRESSION TYPE: ICD-10-CM

## 2025-04-21 DIAGNOSIS — Z00.00 WELLNESS EXAMINATION: Primary | ICD-10-CM

## 2025-04-21 PROCEDURE — 3078F DIAST BP <80 MM HG: CPT | Performed by: FAMILY MEDICINE

## 2025-04-21 PROCEDURE — 3074F SYST BP LT 130 MM HG: CPT | Performed by: FAMILY MEDICINE

## 2025-04-21 PROCEDURE — 3008F BODY MASS INDEX DOCD: CPT | Performed by: FAMILY MEDICINE

## 2025-04-21 PROCEDURE — 99396 PREV VISIT EST AGE 40-64: CPT | Performed by: FAMILY MEDICINE

## 2025-04-21 PROCEDURE — 4010F ACE/ARB THERAPY RXD/TAKEN: CPT | Performed by: FAMILY MEDICINE

## 2025-04-21 ASSESSMENT — ENCOUNTER SYMPTOMS
WEAKNESS: 0
VOMITING: 0
BLOOD IN STOOL: 0
NUMBNESS: 0
ACTIVITY CHANGE: 0
MYALGIAS: 0
FEVER: 0
EYE DISCHARGE: 0
NAUSEA: 0
SORE THROAT: 0
FLANK PAIN: 0
SHORTNESS OF BREATH: 0
SLEEP DISTURBANCE: 0
NERVOUS/ANXIOUS: 0
HEADACHES: 0
SINUS PRESSURE: 0
WHEEZING: 0
COUGH: 0
APPETITE CHANGE: 0
ROS GI COMMENTS: LIVER DISEASE
EYE ITCHING: 0
UNEXPECTED WEIGHT CHANGE: 0
DYSPHORIC MOOD: 1
ARTHRALGIAS: 0
RHINORRHEA: 0
PALPITATIONS: 0
LIGHT-HEADEDNESS: 0
DIZZINESS: 0
JOINT SWELLING: 0
DYSURIA: 0
DIARRHEA: 1
HEMATURIA: 0
CONSTIPATION: 1

## 2025-04-21 ASSESSMENT — PROMIS GLOBAL HEALTH SCALE
CARRYOUT_SOCIAL_ACTIVITIES: FAIR
RATE_MENTAL_HEALTH: GOOD
RATE_GENERAL_HEALTH: GOOD
RATE_AVERAGE_FATIGUE: MODERATE
RATE_QUALITY_OF_LIFE: GOOD
RATE_AVERAGE_PAIN: 3
RATE_SOCIAL_SATISFACTION: FAIR
EMOTIONAL_PROBLEMS: SOMETIMES
CARRYOUT_PHYSICAL_ACTIVITIES: MODERATELY
RATE_PHYSICAL_HEALTH: FAIR

## 2025-04-21 ASSESSMENT — PATIENT HEALTH QUESTIONNAIRE - PHQ9
1. LITTLE INTEREST OR PLEASURE IN DOING THINGS: NOT AT ALL
2. FEELING DOWN, DEPRESSED OR HOPELESS: NOT AT ALL
SUM OF ALL RESPONSES TO PHQ9 QUESTIONS 1 AND 2: 0

## 2025-04-21 NOTE — PATIENT INSTRUCTIONS
YOU ARE DOING WELL  FLU VACCINE IN THE FALL     FOLLOW WITH [facility] IN THE FALL   UNLESS DR BEE

## 2025-04-21 NOTE — PROGRESS NOTES
"Subjective   Patient ID: Dinorah Horn \"Pratibha" is a 59 y.o. female who presents for Annual Exam (YEARLY).  HPIhere for wellness   Several stable conditions  of anemia, anxiety depression and diabetes . She has hypertension that is well controlled   HAVING SOME CERVICALGIA AND FOLLOWED WITH DR PHELPS       Review of Systems   Constitutional:  Negative for activity change, appetite change, fever and unexpected weight change.   HENT:  Negative for congestion, ear pain, postnasal drip, rhinorrhea, sinus pressure and sore throat.    Eyes:  Negative for discharge, itching and visual disturbance.   Respiratory:  Negative for cough, shortness of breath and wheezing.    Cardiovascular:  Negative for chest pain, palpitations and leg swelling.   Gastrointestinal:  Positive for constipation and diarrhea. Negative for blood in stool, nausea and vomiting.        LIVER DISEASE    Endocrine: Negative for cold intolerance, heat intolerance and polyuria.   Genitourinary:  Negative for dysuria, flank pain and hematuria.   Musculoskeletal:  Negative for arthralgias, gait problem, joint swelling and myalgias.   Skin:  Negative for rash.   Allergic/Immunologic: Negative for environmental allergies and food allergies.   Neurological:  Negative for dizziness, syncope, weakness, light-headedness, numbness and headaches.   Psychiatric/Behavioral:  Positive for dysphoric mood. Negative for sleep disturbance. The patient is not nervous/anxious.        Objective   Physical Exam  Vitals and nursing note reviewed.   Constitutional:       Appearance: Normal appearance.   HENT:      Head: Normocephalic.   Cardiovascular:      Rate and Rhythm: Normal rate and regular rhythm.      Pulses: Normal pulses.      Heart sounds: Normal heart sounds. No murmur heard.     No friction rub. No gallop.   Pulmonary:      Effort: Pulmonary effort is normal. No respiratory distress.      Breath sounds: Normal breath sounds. No wheezing.   Abdominal:      General: " There is no distension.      Palpations: Abdomen is soft.      Tenderness: There is no abdominal tenderness.   Musculoskeletal:         General: Tenderness and deformity present. Normal range of motion.      Comments: CERVICAL ISSUES AND SEEING ORTHOPEDIST    Skin:     General: Skin is warm and dry.      Capillary Refill: Capillary refill takes less than 2 seconds.   Neurological:      General: No focal deficit present.      Mental Status: She is alert and oriented to person, place, and time.   Psychiatric:         Mood and Affect: Mood normal.       Assessment/Plan   Problem List Items Addressed This Visit    None  Visit Diagnoses         Wellness examination    -  Primary               Yamilet Frazier DO

## 2025-04-22 RX ORDER — VENLAFAXINE HYDROCHLORIDE 150 MG/1
150 CAPSULE, EXTENDED RELEASE ORAL DAILY
Qty: 90 CAPSULE | Refills: 0 | Status: SHIPPED | OUTPATIENT
Start: 2025-04-22

## 2025-04-23 ENCOUNTER — ANCILLARY PROCEDURE (OUTPATIENT)
Facility: HOSPITAL | Age: 60
End: 2025-04-23
Payer: MEDICARE

## 2025-04-23 DIAGNOSIS — M54.89 INTERSCAPULAR PAIN: ICD-10-CM

## 2025-04-23 PROCEDURE — 72141 MRI NECK SPINE W/O DYE: CPT

## 2025-04-23 PROCEDURE — 72141 MRI NECK SPINE W/O DYE: CPT | Performed by: RADIOLOGY

## 2025-05-05 ENCOUNTER — APPOINTMENT (OUTPATIENT)
Dept: CARDIOLOGY | Facility: HOSPITAL | Age: 60
End: 2025-05-05
Payer: MEDICARE

## 2025-05-05 ENCOUNTER — APPOINTMENT (OUTPATIENT)
Dept: RADIOLOGY | Facility: HOSPITAL | Age: 60
End: 2025-05-05
Payer: MEDICARE

## 2025-05-05 ENCOUNTER — TELEPHONE (OUTPATIENT)
Dept: PULMONOLOGY | Facility: CLINIC | Age: 60
End: 2025-05-05

## 2025-05-05 ENCOUNTER — HOSPITAL ENCOUNTER (OUTPATIENT)
Facility: HOSPITAL | Age: 60
Setting detail: OBSERVATION
Discharge: HOME | End: 2025-05-11
Attending: EMERGENCY MEDICINE | Admitting: HOSPITALIST
Payer: MEDICARE

## 2025-05-05 ENCOUNTER — OFFICE VISIT (OUTPATIENT)
Dept: PRIMARY CARE | Facility: CLINIC | Age: 60
End: 2025-05-05
Payer: MEDICARE

## 2025-05-05 VITALS
WEIGHT: 184.2 LBS | HEART RATE: 123 BPM | TEMPERATURE: 97.8 F | BODY MASS INDEX: 27.2 KG/M2 | DIASTOLIC BLOOD PRESSURE: 86 MMHG | OXYGEN SATURATION: 100 % | SYSTOLIC BLOOD PRESSURE: 142 MMHG

## 2025-05-05 DIAGNOSIS — E86.0 DEHYDRATION: ICD-10-CM

## 2025-05-05 DIAGNOSIS — R42 DIZZINESS: ICD-10-CM

## 2025-05-05 DIAGNOSIS — K70.30 ALCOHOLIC CIRRHOSIS, UNSPECIFIED WHETHER ASCITES PRESENT (MULTI): ICD-10-CM

## 2025-05-05 DIAGNOSIS — N30.00 ACUTE CYSTITIS WITHOUT HEMATURIA: ICD-10-CM

## 2025-05-05 DIAGNOSIS — R42 LIGHTHEADEDNESS: ICD-10-CM

## 2025-05-05 DIAGNOSIS — R10.13 EPIGASTRIC PAIN: ICD-10-CM

## 2025-05-05 DIAGNOSIS — R55 POSTURAL DIZZINESS WITH NEAR SYNCOPE: ICD-10-CM

## 2025-05-05 DIAGNOSIS — R11.2 NAUSEA AND VOMITING, UNSPECIFIED VOMITING TYPE: Primary | ICD-10-CM

## 2025-05-05 DIAGNOSIS — R19.7 VOMITING AND DIARRHEA: Primary | ICD-10-CM

## 2025-05-05 DIAGNOSIS — R42 POSTURAL DIZZINESS WITH NEAR SYNCOPE: ICD-10-CM

## 2025-05-05 DIAGNOSIS — R26.81 GAIT INSTABILITY: ICD-10-CM

## 2025-05-05 DIAGNOSIS — R11.10 VOMITING AND DIARRHEA: Primary | ICD-10-CM

## 2025-05-05 PROBLEM — R10.9 ABDOMINAL PAIN: Status: ACTIVE | Noted: 2025-05-05

## 2025-05-05 LAB
ALBUMIN SERPL BCP-MCNC: 5.1 G/DL (ref 3.4–5)
ALP SERPL-CCNC: 78 U/L (ref 33–136)
ALT SERPL W P-5'-P-CCNC: 29 U/L (ref 7–45)
AMMONIA PLAS-SCNC: 19 UMOL/L (ref 16–53)
AMPHETAMINES UR QL SCN: ABNORMAL
ANION GAP SERPL CALC-SCNC: 17 MMOL/L (ref 10–20)
APPEARANCE UR: CLEAR
APTT PPP: 30 SECONDS (ref 26–36)
AST SERPL W P-5'-P-CCNC: 23 U/L (ref 9–39)
BARBITURATES UR QL SCN: ABNORMAL
BASOPHILS # BLD AUTO: 0.01 X10*3/UL (ref 0–0.1)
BASOPHILS NFR BLD AUTO: 0.1 %
BENZODIAZ UR QL SCN: ABNORMAL
BILIRUB SERPL-MCNC: 0.6 MG/DL (ref 0–1.2)
BILIRUB UR STRIP.AUTO-MCNC: NEGATIVE MG/DL
BUN SERPL-MCNC: 24 MG/DL (ref 6–23)
BZE UR QL SCN: ABNORMAL
CALCIUM SERPL-MCNC: 10.6 MG/DL (ref 8.6–10.3)
CANNABINOIDS UR QL SCN: ABNORMAL
CARDIAC TROPONIN I PNL SERPL HS: 6 NG/L (ref 0–13)
CARDIAC TROPONIN I PNL SERPL HS: 7 NG/L (ref 0–13)
CHLORIDE SERPL-SCNC: 98 MMOL/L (ref 98–107)
CO2 SERPL-SCNC: 26 MMOL/L (ref 21–32)
COLOR UR: ABNORMAL
CREAT SERPL-MCNC: 0.96 MG/DL (ref 0.5–1.05)
EGFRCR SERPLBLD CKD-EPI 2021: 68 ML/MIN/1.73M*2
EOSINOPHIL # BLD AUTO: 0.04 X10*3/UL (ref 0–0.7)
EOSINOPHIL NFR BLD AUTO: 0.6 %
ERYTHROCYTE [DISTWIDTH] IN BLOOD BY AUTOMATED COUNT: 11.9 % (ref 11.5–14.5)
ETHANOL SERPL-MCNC: <10 MG/DL
FENTANYL+NORFENTANYL UR QL SCN: ABNORMAL
GLUCOSE BLD MANUAL STRIP-MCNC: 79 MG/DL (ref 74–99)
GLUCOSE SERPL-MCNC: 98 MG/DL (ref 74–99)
GLUCOSE UR STRIP.AUTO-MCNC: NORMAL MG/DL
HCT VFR BLD AUTO: 37 % (ref 36–46)
HGB BLD-MCNC: 12.5 G/DL (ref 12–16)
HYALINE CASTS #/AREA URNS AUTO: ABNORMAL /LPF
IMM GRANULOCYTES # BLD AUTO: 0.02 X10*3/UL (ref 0–0.7)
IMM GRANULOCYTES NFR BLD AUTO: 0.3 % (ref 0–0.9)
INR PPP: 1.1 (ref 0.9–1.1)
KETONES UR STRIP.AUTO-MCNC: ABNORMAL MG/DL
LEUKOCYTE ESTERASE UR QL STRIP.AUTO: ABNORMAL
LIPASE SERPL-CCNC: 23 U/L (ref 9–82)
LYMPHOCYTES # BLD AUTO: 1.15 X10*3/UL (ref 1.2–4.8)
LYMPHOCYTES NFR BLD AUTO: 17.2 %
MCH RBC QN AUTO: 31.8 PG (ref 26–34)
MCHC RBC AUTO-ENTMCNC: 33.8 G/DL (ref 32–36)
MCV RBC AUTO: 94 FL (ref 80–100)
METHADONE UR QL SCN: ABNORMAL
MONOCYTES # BLD AUTO: 0.56 X10*3/UL (ref 0.1–1)
MONOCYTES NFR BLD AUTO: 8.4 %
MUCOUS THREADS #/AREA URNS AUTO: ABNORMAL /LPF
NEUTROPHILS # BLD AUTO: 4.92 X10*3/UL (ref 1.2–7.7)
NEUTROPHILS NFR BLD AUTO: 73.4 %
NITRITE UR QL STRIP.AUTO: NEGATIVE
NRBC BLD-RTO: 0 /100 WBCS (ref 0–0)
OPIATES UR QL SCN: ABNORMAL
OXYCODONE+OXYMORPHONE UR QL SCN: ABNORMAL
PCP UR QL SCN: ABNORMAL
PH UR STRIP.AUTO: 6 [PH]
PLATELET # BLD AUTO: 223 X10*3/UL (ref 150–450)
POTASSIUM SERPL-SCNC: 4.5 MMOL/L (ref 3.5–5.3)
PROT SERPL-MCNC: 8.6 G/DL (ref 6.4–8.2)
PROT UR STRIP.AUTO-MCNC: ABNORMAL MG/DL
PROTHROMBIN TIME: 12.1 SECONDS (ref 9.8–12.4)
RBC # BLD AUTO: 3.93 X10*6/UL (ref 4–5.2)
RBC # UR STRIP.AUTO: NEGATIVE MG/DL
RBC #/AREA URNS AUTO: ABNORMAL /HPF
SODIUM SERPL-SCNC: 136 MMOL/L (ref 136–145)
SP GR UR STRIP.AUTO: >1.05
SQUAMOUS #/AREA URNS AUTO: ABNORMAL /HPF
UROBILINOGEN UR STRIP.AUTO-MCNC: NORMAL MG/DL
WBC # BLD AUTO: 6.7 X10*3/UL (ref 4.4–11.3)
WBC #/AREA URNS AUTO: >50 /HPF

## 2025-05-05 PROCEDURE — 85730 THROMBOPLASTIN TIME PARTIAL: CPT | Performed by: EMERGENCY MEDICINE

## 2025-05-05 PROCEDURE — 87493 C DIFF AMPLIFIED PROBE: CPT | Mod: 59,GENLAB | Performed by: EMERGENCY MEDICINE

## 2025-05-05 PROCEDURE — 2550000001 HC RX 255 CONTRASTS: Mod: JZ | Performed by: EMERGENCY MEDICINE

## 2025-05-05 PROCEDURE — 84166 PROTEIN E-PHORESIS/URINE/CSF: CPT | Mod: GENLAB | Performed by: NURSE PRACTITIONER

## 2025-05-05 PROCEDURE — 83690 ASSAY OF LIPASE: CPT | Performed by: EMERGENCY MEDICINE

## 2025-05-05 PROCEDURE — 82947 ASSAY GLUCOSE BLOOD QUANT: CPT

## 2025-05-05 PROCEDURE — 84484 ASSAY OF TROPONIN QUANT: CPT | Performed by: EMERGENCY MEDICINE

## 2025-05-05 PROCEDURE — G0378 HOSPITAL OBSERVATION PER HR: HCPCS

## 2025-05-05 PROCEDURE — 85025 COMPLETE CBC W/AUTO DIFF WBC: CPT | Performed by: EMERGENCY MEDICINE

## 2025-05-05 PROCEDURE — 3079F DIAST BP 80-89 MM HG: CPT | Performed by: FAMILY MEDICINE

## 2025-05-05 PROCEDURE — 94760 N-INVAS EAR/PLS OXIMETRY 1: CPT

## 2025-05-05 PROCEDURE — 96365 THER/PROPH/DIAG IV INF INIT: CPT | Mod: 59

## 2025-05-05 PROCEDURE — 70450 CT HEAD/BRAIN W/O DYE: CPT

## 2025-05-05 PROCEDURE — 36415 COLL VENOUS BLD VENIPUNCTURE: CPT | Performed by: EMERGENCY MEDICINE

## 2025-05-05 PROCEDURE — 80307 DRUG TEST PRSMV CHEM ANLYZR: CPT | Performed by: EMERGENCY MEDICINE

## 2025-05-05 PROCEDURE — 99214 OFFICE O/P EST MOD 30 MIN: CPT | Performed by: FAMILY MEDICINE

## 2025-05-05 PROCEDURE — 1036F TOBACCO NON-USER: CPT | Performed by: FAMILY MEDICINE

## 2025-05-05 PROCEDURE — 82077 ASSAY SPEC XCP UR&BREATH IA: CPT | Performed by: EMERGENCY MEDICINE

## 2025-05-05 PROCEDURE — 74177 CT ABD & PELVIS W/CONTRAST: CPT

## 2025-05-05 PROCEDURE — 87506 IADNA-DNA/RNA PROBE TQ 6-11: CPT | Mod: GENLAB | Performed by: EMERGENCY MEDICINE

## 2025-05-05 PROCEDURE — 85610 PROTHROMBIN TIME: CPT | Performed by: EMERGENCY MEDICINE

## 2025-05-05 PROCEDURE — 3077F SYST BP >= 140 MM HG: CPT | Performed by: FAMILY MEDICINE

## 2025-05-05 PROCEDURE — 2500000001 HC RX 250 WO HCPCS SELF ADMINISTERED DRUGS (ALT 637 FOR MEDICARE OP): Performed by: HOSPITALIST

## 2025-05-05 PROCEDURE — 70450 CT HEAD/BRAIN W/O DYE: CPT | Performed by: RADIOLOGY

## 2025-05-05 PROCEDURE — 74177 CT ABD & PELVIS W/CONTRAST: CPT | Performed by: INTERNAL MEDICINE

## 2025-05-05 PROCEDURE — 81001 URINALYSIS AUTO W/SCOPE: CPT | Mod: 59 | Performed by: EMERGENCY MEDICINE

## 2025-05-05 PROCEDURE — 84075 ASSAY ALKALINE PHOSPHATASE: CPT | Performed by: EMERGENCY MEDICINE

## 2025-05-05 PROCEDURE — 96361 HYDRATE IV INFUSION ADD-ON: CPT

## 2025-05-05 PROCEDURE — 2500000004 HC RX 250 GENERAL PHARMACY W/ HCPCS (ALT 636 FOR OP/ED): Mod: JZ | Performed by: EMERGENCY MEDICINE

## 2025-05-05 PROCEDURE — 87086 URINE CULTURE/COLONY COUNT: CPT | Mod: GENLAB | Performed by: EMERGENCY MEDICINE

## 2025-05-05 PROCEDURE — 99285 EMERGENCY DEPT VISIT HI MDM: CPT | Mod: 25 | Performed by: EMERGENCY MEDICINE

## 2025-05-05 PROCEDURE — 93005 ELECTROCARDIOGRAM TRACING: CPT

## 2025-05-05 PROCEDURE — 82140 ASSAY OF AMMONIA: CPT | Performed by: EMERGENCY MEDICINE

## 2025-05-05 PROCEDURE — 2500000002 HC RX 250 W HCPCS SELF ADMINISTERED DRUGS (ALT 637 FOR MEDICARE OP, ALT 636 FOR OP/ED): Performed by: HOSPITALIST

## 2025-05-05 PROCEDURE — 4010F ACE/ARB THERAPY RXD/TAKEN: CPT | Performed by: FAMILY MEDICINE

## 2025-05-05 RX ORDER — DEXTROSE 50 % IN WATER (D50W) INTRAVENOUS SYRINGE
25
Status: DISCONTINUED | OUTPATIENT
Start: 2025-05-05 | End: 2025-05-11 | Stop reason: HOSPADM

## 2025-05-05 RX ORDER — ACETAMINOPHEN 500 MG
10 TABLET ORAL DAILY PRN
Status: DISCONTINUED | OUTPATIENT
Start: 2025-05-05 | End: 2025-05-11 | Stop reason: HOSPADM

## 2025-05-05 RX ORDER — METOPROLOL TARTRATE 25 MG/1
25 TABLET, FILM COATED ORAL 2 TIMES DAILY
Status: DISCONTINUED | OUTPATIENT
Start: 2025-05-05 | End: 2025-05-11 | Stop reason: HOSPADM

## 2025-05-05 RX ORDER — FERROUS SULFATE 325(65) MG
65 TABLET ORAL DAILY
Status: DISCONTINUED | OUTPATIENT
Start: 2025-05-06 | End: 2025-05-11 | Stop reason: HOSPADM

## 2025-05-05 RX ORDER — ACETAMINOPHEN 325 MG/1
650 TABLET ORAL EVERY 6 HOURS PRN
Status: DISCONTINUED | OUTPATIENT
Start: 2025-05-05 | End: 2025-05-07

## 2025-05-05 RX ORDER — ONDANSETRON HYDROCHLORIDE 2 MG/ML
4 INJECTION, SOLUTION INTRAVENOUS EVERY 4 HOURS PRN
Status: DISCONTINUED | OUTPATIENT
Start: 2025-05-05 | End: 2025-05-11 | Stop reason: HOSPADM

## 2025-05-05 RX ORDER — ALUMINUM HYDROXIDE, MAGNESIUM HYDROXIDE, AND SIMETHICONE 1200; 120; 1200 MG/30ML; MG/30ML; MG/30ML
20 SUSPENSION ORAL EVERY 4 HOURS PRN
Status: DISCONTINUED | OUTPATIENT
Start: 2025-05-05 | End: 2025-05-11 | Stop reason: HOSPADM

## 2025-05-05 RX ORDER — VENLAFAXINE HYDROCHLORIDE 150 MG/1
150 CAPSULE, EXTENDED RELEASE ORAL DAILY
Status: DISCONTINUED | OUTPATIENT
Start: 2025-05-06 | End: 2025-05-11 | Stop reason: HOSPADM

## 2025-05-05 RX ORDER — HYDROCHLOROTHIAZIDE 12.5 MG/1
12.5 CAPSULE ORAL DAILY
Status: DISCONTINUED | OUTPATIENT
Start: 2025-05-06 | End: 2025-05-11 | Stop reason: HOSPADM

## 2025-05-05 RX ORDER — LISINOPRIL 20 MG/1
20 TABLET ORAL DAILY
Status: DISCONTINUED | OUTPATIENT
Start: 2025-05-06 | End: 2025-05-11 | Stop reason: HOSPADM

## 2025-05-05 RX ORDER — POLYETHYLENE GLYCOL 3350 17 G/17G
17 POWDER, FOR SOLUTION ORAL 2 TIMES DAILY PRN
Status: DISCONTINUED | OUTPATIENT
Start: 2025-05-05 | End: 2025-05-11 | Stop reason: HOSPADM

## 2025-05-05 RX ORDER — PANTOPRAZOLE SODIUM 40 MG/1
40 TABLET, DELAYED RELEASE ORAL
Status: DISCONTINUED | OUTPATIENT
Start: 2025-05-06 | End: 2025-05-11 | Stop reason: HOSPADM

## 2025-05-05 RX ORDER — ROSUVASTATIN CALCIUM 10 MG/1
20 TABLET, COATED ORAL NIGHTLY
Status: DISCONTINUED | OUTPATIENT
Start: 2025-05-05 | End: 2025-05-11 | Stop reason: HOSPADM

## 2025-05-05 RX ORDER — PNV NO.95/FERROUS FUM/FOLIC AC 28MG-0.8MG
50 TABLET ORAL DAILY
Status: DISCONTINUED | OUTPATIENT
Start: 2025-05-06 | End: 2025-05-11 | Stop reason: HOSPADM

## 2025-05-05 RX ORDER — INSULIN LISPRO 100 [IU]/ML
0-10 INJECTION, SOLUTION INTRAVENOUS; SUBCUTANEOUS
Status: DISCONTINUED | OUTPATIENT
Start: 2025-05-05 | End: 2025-05-11 | Stop reason: HOSPADM

## 2025-05-05 RX ORDER — ENOXAPARIN SODIUM 100 MG/ML
40 INJECTION SUBCUTANEOUS DAILY
Status: DISCONTINUED | OUTPATIENT
Start: 2025-05-06 | End: 2025-05-11 | Stop reason: HOSPADM

## 2025-05-05 RX ORDER — VENLAFAXINE 75 MG/1
75 TABLET ORAL NIGHTLY
Status: DISCONTINUED | OUTPATIENT
Start: 2025-05-05 | End: 2025-05-11 | Stop reason: HOSPADM

## 2025-05-05 RX ORDER — DEXTROSE 50 % IN WATER (D50W) INTRAVENOUS SYRINGE
12.5
Status: DISCONTINUED | OUTPATIENT
Start: 2025-05-05 | End: 2025-05-11 | Stop reason: HOSPADM

## 2025-05-05 RX ORDER — LOPERAMIDE HYDROCHLORIDE 2 MG/1
2 CAPSULE ORAL 4 TIMES DAILY PRN
Status: DISCONTINUED | OUTPATIENT
Start: 2025-05-05 | End: 2025-05-11 | Stop reason: HOSPADM

## 2025-05-05 RX ORDER — ROSUVASTATIN CALCIUM 10 MG/1
20 TABLET, COATED ORAL DAILY
Status: DISCONTINUED | OUTPATIENT
Start: 2025-05-06 | End: 2025-05-05

## 2025-05-05 RX ORDER — CHOLECALCIFEROL (VITAMIN D3) 25 MCG
25 TABLET ORAL DAILY
Status: DISCONTINUED | OUTPATIENT
Start: 2025-05-06 | End: 2025-05-11 | Stop reason: HOSPADM

## 2025-05-05 RX ORDER — CEFTRIAXONE 2 G/50ML
2 INJECTION, SOLUTION INTRAVENOUS ONCE
Status: COMPLETED | OUTPATIENT
Start: 2025-05-05 | End: 2025-05-05

## 2025-05-05 RX ORDER — TRAZODONE HYDROCHLORIDE 50 MG/1
25 TABLET ORAL NIGHTLY
Status: DISCONTINUED | OUTPATIENT
Start: 2025-05-05 | End: 2025-05-11 | Stop reason: HOSPADM

## 2025-05-05 RX ORDER — METFORMIN HYDROCHLORIDE 500 MG/1
500 TABLET, EXTENDED RELEASE ORAL
Status: DISCONTINUED | OUTPATIENT
Start: 2025-05-06 | End: 2025-05-11 | Stop reason: HOSPADM

## 2025-05-05 RX ORDER — ASPIRIN 81 MG/1
81 TABLET ORAL DAILY
Status: DISCONTINUED | OUTPATIENT
Start: 2025-05-06 | End: 2025-05-11 | Stop reason: HOSPADM

## 2025-05-05 RX ORDER — CEFTRIAXONE 1 G/50ML
1 INJECTION, SOLUTION INTRAVENOUS EVERY 24 HOURS
Status: DISCONTINUED | OUTPATIENT
Start: 2025-05-06 | End: 2025-05-07

## 2025-05-05 RX ADMIN — ROSUVASTATIN CALCIUM 20 MG: 10 TABLET, FILM COATED ORAL at 22:23

## 2025-05-05 RX ADMIN — TRAZODONE HYDROCHLORIDE 25 MG: 50 TABLET ORAL at 22:18

## 2025-05-05 RX ADMIN — CEFTRIAXONE 2 G: 2 INJECTION, SOLUTION INTRAVENOUS at 19:50

## 2025-05-05 RX ADMIN — SODIUM CHLORIDE 500 ML: 900 INJECTION, SOLUTION INTRAVENOUS at 16:47

## 2025-05-05 RX ADMIN — METOPROLOL TARTRATE 25 MG: 25 TABLET, FILM COATED ORAL at 22:18

## 2025-05-05 RX ADMIN — IOHEXOL 75 ML: 350 INJECTION, SOLUTION INTRAVENOUS at 16:42

## 2025-05-05 RX ADMIN — VENLAFAXINE 75 MG: 75 TABLET ORAL at 22:18

## 2025-05-05 SDOH — SOCIAL STABILITY: SOCIAL NETWORK: HOW OFTEN DO YOU ATTEND MEETINGS OF THE CLUBS OR ORGANIZATIONS YOU BELONG TO?: NEVER

## 2025-05-05 SDOH — SOCIAL STABILITY: SOCIAL NETWORK
DO YOU BELONG TO ANY CLUBS OR ORGANIZATIONS SUCH AS CHURCH GROUPS, UNIONS, FRATERNAL OR ATHLETIC GROUPS, OR SCHOOL GROUPS?: NO

## 2025-05-05 SDOH — SOCIAL STABILITY: SOCIAL INSECURITY: ARE YOU MARRIED, WIDOWED, DIVORCED, SEPARATED, NEVER MARRIED, OR LIVING WITH A PARTNER?: MARRIED

## 2025-05-05 SDOH — SOCIAL STABILITY: SOCIAL INSECURITY: DO YOU FEEL ANYONE HAS EXPLOITED OR TAKEN ADVANTAGE OF YOU FINANCIALLY OR OF YOUR PERSONAL PROPERTY?: NO

## 2025-05-05 SDOH — HEALTH STABILITY: MENTAL HEALTH
DO YOU FEEL STRESS - TENSE, RESTLESS, NERVOUS, OR ANXIOUS, OR UNABLE TO SLEEP AT NIGHT BECAUSE YOUR MIND IS TROUBLED ALL THE TIME - THESE DAYS?: TO SOME EXTENT

## 2025-05-05 SDOH — SOCIAL STABILITY: SOCIAL INSECURITY: ARE YOU OR HAVE YOU BEEN THREATENED OR ABUSED PHYSICALLY, EMOTIONALLY, OR SEXUALLY BY ANYONE?: NO

## 2025-05-05 SDOH — ECONOMIC STABILITY: FOOD INSECURITY: WITHIN THE PAST 12 MONTHS, YOU WORRIED THAT YOUR FOOD WOULD RUN OUT BEFORE YOU GOT THE MONEY TO BUY MORE.: NEVER TRUE

## 2025-05-05 SDOH — SOCIAL STABILITY: SOCIAL INSECURITY
WITHIN THE LAST YEAR, HAVE YOU BEEN KICKED, HIT, SLAPPED, OR OTHERWISE PHYSICALLY HURT BY YOUR PARTNER OR EX-PARTNER?: NO

## 2025-05-05 SDOH — SOCIAL STABILITY: SOCIAL NETWORK: IN A TYPICAL WEEK, HOW MANY TIMES DO YOU TALK ON THE PHONE WITH FAMILY, FRIENDS, OR NEIGHBORS?: THREE TIMES A WEEK

## 2025-05-05 SDOH — ECONOMIC STABILITY: INCOME INSECURITY: IN THE PAST 12 MONTHS HAS THE ELECTRIC, GAS, OIL, OR WATER COMPANY THREATENED TO SHUT OFF SERVICES IN YOUR HOME?: NO

## 2025-05-05 SDOH — HEALTH STABILITY: PHYSICAL HEALTH: ON AVERAGE, HOW MANY DAYS PER WEEK DO YOU ENGAGE IN MODERATE TO STRENUOUS EXERCISE (LIKE A BRISK WALK)?: 0 DAYS

## 2025-05-05 SDOH — ECONOMIC STABILITY: FOOD INSECURITY: WITHIN THE PAST 12 MONTHS, THE FOOD YOU BOUGHT JUST DIDN'T LAST AND YOU DIDN'T HAVE MONEY TO GET MORE.: NEVER TRUE

## 2025-05-05 SDOH — SOCIAL STABILITY: SOCIAL NETWORK: HOW OFTEN DO YOU GET TOGETHER WITH FRIENDS OR RELATIVES?: NEVER

## 2025-05-05 SDOH — SOCIAL STABILITY: SOCIAL INSECURITY: HAS ANYONE EVER THREATENED TO HURT YOUR FAMILY OR YOUR PETS?: NO

## 2025-05-05 SDOH — HEALTH STABILITY: PHYSICAL HEALTH
HOW OFTEN DO YOU NEED TO HAVE SOMEONE HELP YOU WHEN YOU READ INSTRUCTIONS, PAMPHLETS, OR OTHER WRITTEN MATERIAL FROM YOUR DOCTOR OR PHARMACY?: RARELY

## 2025-05-05 SDOH — SOCIAL STABILITY: SOCIAL NETWORK: HOW OFTEN DO YOU ATTEND CHURCH OR RELIGIOUS SERVICES?: NEVER

## 2025-05-05 SDOH — SOCIAL STABILITY: SOCIAL INSECURITY: WITHIN THE LAST YEAR, HAVE YOU BEEN HUMILIATED OR EMOTIONALLY ABUSED IN OTHER WAYS BY YOUR PARTNER OR EX-PARTNER?: NO

## 2025-05-05 SDOH — SOCIAL STABILITY: SOCIAL INSECURITY: DOES ANYONE TRY TO KEEP YOU FROM HAVING/CONTACTING OTHER FRIENDS OR DOING THINGS OUTSIDE YOUR HOME?: NO

## 2025-05-05 SDOH — HEALTH STABILITY: PHYSICAL HEALTH: ON AVERAGE, HOW MANY MINUTES DO YOU ENGAGE IN EXERCISE AT THIS LEVEL?: 0 MIN

## 2025-05-05 SDOH — SOCIAL STABILITY: SOCIAL INSECURITY: ABUSE: ADULT

## 2025-05-05 SDOH — SOCIAL STABILITY: SOCIAL INSECURITY: HAVE YOU HAD ANY THOUGHTS OF HARMING ANYONE ELSE?: YES

## 2025-05-05 SDOH — SOCIAL STABILITY: SOCIAL INSECURITY: WITHIN THE LAST YEAR, HAVE YOU BEEN AFRAID OF YOUR PARTNER OR EX-PARTNER?: NO

## 2025-05-05 SDOH — SOCIAL STABILITY: SOCIAL INSECURITY
WITHIN THE LAST YEAR, HAVE YOU BEEN RAPED OR FORCED TO HAVE ANY KIND OF SEXUAL ACTIVITY BY YOUR PARTNER OR EX-PARTNER?: NO

## 2025-05-05 SDOH — SOCIAL STABILITY: SOCIAL INSECURITY: DO YOU FEEL UNSAFE GOING BACK TO THE PLACE WHERE YOU ARE LIVING?: NO

## 2025-05-05 SDOH — SOCIAL STABILITY: SOCIAL INSECURITY: HAVE YOU HAD THOUGHTS OF HARMING ANYONE ELSE?: NO

## 2025-05-05 SDOH — SOCIAL STABILITY: SOCIAL INSECURITY: WERE YOU ABLE TO COMPLETE ALL THE BEHAVIORAL HEALTH SCREENINGS?: YES

## 2025-05-05 SDOH — SOCIAL STABILITY: SOCIAL INSECURITY: ARE THERE ANY APPARENT SIGNS OF INJURIES/BEHAVIORS THAT COULD BE RELATED TO ABUSE/NEGLECT?: NO

## 2025-05-05 ASSESSMENT — COGNITIVE AND FUNCTIONAL STATUS - GENERAL
MOVING TO AND FROM BED TO CHAIR: A LITTLE
STANDING UP FROM CHAIR USING ARMS: A LITTLE
MOVING FROM LYING ON BACK TO SITTING ON SIDE OF FLAT BED WITH BEDRAILS: A LITTLE
PATIENT BASELINE BEDBOUND: NO
EATING MEALS: A LITTLE
DRESSING REGULAR LOWER BODY CLOTHING: A LITTLE
WALKING IN HOSPITAL ROOM: A LITTLE
HELP NEEDED FOR BATHING: A LITTLE
TURNING FROM BACK TO SIDE WHILE IN FLAT BAD: A LITTLE
PERSONAL GROOMING: A LITTLE
TOILETING: A LITTLE
DRESSING REGULAR UPPER BODY CLOTHING: A LITTLE
DAILY ACTIVITIY SCORE: 18
CLIMB 3 TO 5 STEPS WITH RAILING: A LITTLE
MOBILITY SCORE: 18

## 2025-05-05 ASSESSMENT — ENCOUNTER SYMPTOMS
PALPITATIONS: 0
ABDOMINAL PAIN: 1
FEVER: 0
RHINORRHEA: 0
EYE DISCHARGE: 0
CONSTIPATION: 0
DYSPHORIC MOOD: 0
COUGH: 0
DYSURIA: 0
NAUSEA: 1
NUMBNESS: 0
WEAKNESS: 0
LIGHT-HEADEDNESS: 0
SLEEP DISTURBANCE: 0
SORE THROAT: 0
MYALGIAS: 0
HEMATURIA: 0
NERVOUS/ANXIOUS: 0
JOINT SWELLING: 0
DIARRHEA: 0
VOMITING: 1
BLOOD IN STOOL: 0
WHEEZING: 0
DIZZINESS: 1
SHORTNESS OF BREATH: 0
ACTIVITY CHANGE: 1
SINUS PRESSURE: 0
FATIGUE: 1
EYE ITCHING: 0
UNEXPECTED WEIGHT CHANGE: 1
ARTHRALGIAS: 0
HEADACHES: 0
FLANK PAIN: 0
APPETITE CHANGE: 1

## 2025-05-05 ASSESSMENT — PATIENT HEALTH QUESTIONNAIRE - PHQ9
SUM OF ALL RESPONSES TO PHQ9 QUESTIONS 1 & 2: 2
2. FEELING DOWN, DEPRESSED OR HOPELESS: SEVERAL DAYS
1. LITTLE INTEREST OR PLEASURE IN DOING THINGS: SEVERAL DAYS

## 2025-05-05 ASSESSMENT — COLUMBIA-SUICIDE SEVERITY RATING SCALE - C-SSRS
2. HAVE YOU ACTUALLY HAD ANY THOUGHTS OF KILLING YOURSELF?: NO
6. HAVE YOU EVER DONE ANYTHING, STARTED TO DO ANYTHING, OR PREPARED TO DO ANYTHING TO END YOUR LIFE?: NO
1. IN THE PAST MONTH, HAVE YOU WISHED YOU WERE DEAD OR WISHED YOU COULD GO TO SLEEP AND NOT WAKE UP?: NO
1. IN THE PAST MONTH, HAVE YOU WISHED YOU WERE DEAD OR WISHED YOU COULD GO TO SLEEP AND NOT WAKE UP?: NO
6. HAVE YOU EVER DONE ANYTHING, STARTED TO DO ANYTHING, OR PREPARED TO DO ANYTHING TO END YOUR LIFE?: NO
2. HAVE YOU ACTUALLY HAD ANY THOUGHTS OF KILLING YOURSELF?: NO

## 2025-05-05 ASSESSMENT — ACTIVITIES OF DAILY LIVING (ADL)
GROOMING: INDEPENDENT
ASSISTIVE_DEVICE: EYEGLASSES
DRESSING YOURSELF: INDEPENDENT
LACK_OF_TRANSPORTATION: NO
HEARING - LEFT EAR: FUNCTIONAL
BATHING: INDEPENDENT
TOILETING: INDEPENDENT
PATIENT'S MEMORY ADEQUATE TO SAFELY COMPLETE DAILY ACTIVITIES?: YES
JUDGMENT_ADEQUATE_SAFELY_COMPLETE_DAILY_ACTIVITIES: YES
WALKS IN HOME: INDEPENDENT
HEARING - RIGHT EAR: FUNCTIONAL
ADEQUATE_TO_COMPLETE_ADL: YES
FEEDING YOURSELF: INDEPENDENT

## 2025-05-05 ASSESSMENT — PAIN DESCRIPTION - LOCATION: LOCATION: ABDOMEN

## 2025-05-05 ASSESSMENT — LIFESTYLE VARIABLES
SUBSTANCE_ABUSE_PAST_12_MONTHS: YES
AUDIT-C TOTAL SCORE: 0
HOW OFTEN DO YOU HAVE A DRINK CONTAINING ALCOHOL: NEVER
HOW MANY STANDARD DRINKS CONTAINING ALCOHOL DO YOU HAVE ON A TYPICAL DAY: PATIENT DOES NOT DRINK
SKIP TO QUESTIONS 9-10: 1
PRESCIPTION_ABUSE_PAST_12_MONTHS: NO
AUDIT-C TOTAL SCORE: 0
HOW OFTEN DO YOU HAVE 6 OR MORE DRINKS ON ONE OCCASION: NEVER

## 2025-05-05 ASSESSMENT — PAIN - FUNCTIONAL ASSESSMENT
PAIN_FUNCTIONAL_ASSESSMENT: 0-10
PAIN_FUNCTIONAL_ASSESSMENT: 0-10

## 2025-05-05 ASSESSMENT — PAIN SCALES - GENERAL
PAINLEVEL_OUTOF10: 0 - NO PAIN
PAINLEVEL_OUTOF10: 2
PAINLEVEL_OUTOF10: 0 - NO PAIN

## 2025-05-05 ASSESSMENT — PAIN DESCRIPTION - PAIN TYPE: TYPE: ACUTE PAIN

## 2025-05-05 NOTE — ED TRIAGE NOTES
Dizziness, nausea and vomiting, and abdominal pain with standing, started a week ago. Unable to do anything now because it has gotten so bad.

## 2025-05-05 NOTE — ED PROVIDER NOTES
HPI   Chief Complaint   Patient presents with    Abdominal Pain     Dizziness, nausea and vomiting, and abdominal pain with standing, started a week ago. Unable to do anything now because it has gotten so bad.        59-year-old female with alcoholic cirrhosis diagnosed February 2024 currently abstaining from alcohol presents for evaluation of lightheadedness nausea vomiting diarrhea and abdominal pain.  Patient states symptoms again a week ago.  She reports 2 daily episodes of vomiting and 1-2 daily episodes of diarrhea.  Diarrhea is dark green in color.  She is on iron.  Denies recent travel sick contacts suspicious food intake or antibiotic usage.  She states that the abdominal pain is worse with leaning forward and vomiting.  It is intermittent.  She is having difficulty characterizing the pain but states it is 2 out of 10 at this time.  Denies fevers chills or sweats.  She states that the vomit occurred twice today and is mostly white and gray in color.  She did not tolerate liquids or food today.  Yesterday she was able to tolerate liquids.      History provided by:  Patient and medical records          Patient History   Medical History[1]  Surgical History[2]  Family History[3]  Social History[4]    Physical Exam   ED Triage Vitals [05/05/25 1515]   Temperature Heart Rate Respirations BP   35.7 °C (96.3 °F) 74 18 114/88      Pulse Ox Temp Source Heart Rate Source Patient Position   100 % Temporal -- --      BP Location FiO2 (%)     -- --       Physical Exam  Vitals and nursing note reviewed.   Constitutional:       Appearance: Normal appearance.   HENT:      Head: Normocephalic and atraumatic.      Nose: Nose normal.      Mouth/Throat:      Mouth: Mucous membranes are moist.   Eyes:      Extraocular Movements: Extraocular movements intact.      Pupils: Pupils are equal, round, and reactive to light.   Cardiovascular:      Rate and Rhythm: Normal rate and regular rhythm.   Pulmonary:      Effort: Pulmonary  effort is normal.      Breath sounds: Normal breath sounds.   Abdominal:      Palpations: Abdomen is soft.      Tenderness: There is abdominal tenderness in the epigastric area. There is no guarding or rebound.   Musculoskeletal:         General: No deformity.      Cervical back: Normal range of motion. No rigidity.   Skin:     General: Skin is warm and dry.   Neurological:      General: No focal deficit present.      Mental Status: She is alert and oriented to person, place, and time.      Cranial Nerves: No cranial nerve deficit.      Motor: No weakness.   Psychiatric:         Mood and Affect: Mood normal.           ED Course & MDM   ED Course as of 05/05/25 1942   Mon May 05, 2025   1610 ECG 12 lead  EKG interpreted by me shows sinus rhythm with PVCs incomplete right bundle branch block.  Rate = 77.  No acute injury pattern. [BT]   1618 59-year-old female with lightheadedness abdominal pain nausea vomiting diarrhea.  She has alcoholic cirrhosis.  No asterixis on exam.  Labs including ammonia.  CT head CT abdomen and pelvis.  Saline bolus.  Denies current nausea abdominal pain.  Does not require nausea or pain medication.  Will reevaluate after initial workup, treatment. [BT]   1657 CT head wo IV contrast  CT head with no ICH.  No stroke. [BT]   1740 CBC and Auto Differential(!)  CBC unremarkable [BT]   1740 Lipase  Lipase normal [BT]   1740 Troponin I, High Sensitivity: 7  First troponin 7.  EKG without acute injury pattern.  ACS unlikely.  Will trend the troponin. [BT]   1741 Alcohol: <10  Alcohol negative [BT]   1741 Platelets: 223  Platelets normal [BT]   1741 INR: 1.1  INR normal [BT]   1741 CT abdomen pelvis w IV contrast  CT abdomen pelvis shows mild cirrhosis.  No ascites.  No acute findings. [BT]   1743 Blood Urea Nitrogen(!): 24  BUN elevated at 24 consistent with dehydration [BT]   1743 Ammonia: 19  Ammonia normal [BT]   1755 Troponin I, High Sensitivity: 6  Repeat troponin 6.  ACS excluded. [BT]   1831  Spoke with patient and daughter.  She is becoming very lightheaded at home.  She has lost 10 pounds over the past week.  Will check orthostatic vitals and attempt to ambulate the patient. [BT]   1936 Microscopic Only, Urine(!)  Urine with 250 leukocytes and greater 50 white blood cells.  She was given Rocephin for UTI. [BT]   1936 Patient was ambulated to and from the bathroom and required significant amount of assistance.  She is unsteady on her feet lightheaded and experience abdominal pain causing her to double over in pain. [BT]   1941 Case discussed with medicine hospitalist Dr. King we will hospitalize for gastroenteritis, near syncope, UTI, abdominal pain. [BT]   1941 Orthostatic vitals were negative [BT]      ED Course User Index  [BT] Luis M Hammond,          Diagnoses as of 05/05/25 1942   Epigastric pain   Vomiting and diarrhea   Alcoholic cirrhosis, unspecified whether ascites present (Multi)   Dehydration   Lightheadedness   Gait instability   Acute cystitis without hematuria     CT abdomen pelvis w IV contrast   Final Result   No acute abdominopelvic abnormality to explain patient's pain. Mild   nodular contour of the hepatic capsule, in keeping with history of   cirrhosis. Additional chronic and incidental findings as detailed   above.        MACRO:   None        Signed by: Macy Murray 5/5/2025 4:56 PM   Dictation workstation:   [id number]      CT head wo IV contrast   Final Result   No evidence of acute cortical infarct or intracranial hemorrhage.        No evidence of intracranial hemorrhage or displaced skull fracture.        MACRO:   None        Signed by: Joseph Schoenberger 5/5/2025 4:46 PM   Dictation workstation:   [id number]                      No data recorded     Marcy Coma Scale Score: 15 (05/05/25 1521 : Pari Bell RN)                           Medical Decision Making      Procedure  Procedures         [1]   Past Medical History:  Diagnosis Date    Arthritis      Cirrhosis of liver (Multi)     Coronary artery disease     Depression     Diabetes mellitus (Multi)     GERD (gastroesophageal reflux disease)     Headache     Heart disease     Hypertension     Visual impairment    [2]   Past Surgical History:  Procedure Laterality Date    APPENDECTOMY      BREAST SURGERY  2003    Augmentation    CORONARY ARTERY BYPASS GRAFT      FOREARM FRACTURE SURGERY Right     FOREARM HARDWARE REMOVAL Right     HYSTERECTOMY  Partial, 2000   [3]   Family History  Problem Relation Name Age of Onset    Alcohol abuse Mother Trice     Cancer Mother Trice     Diabetes Mother Trice     Heart disease Mother Trice     Arthritis Father Reed     Atrial fibrillation Father Reed     Cancer Father Reed     COPD Father Reed     Hearing loss Father Reed     Heart disease Father Reed     Stroke Paternal Grandfather Guy     Alcohol abuse Paternal Grandmother Brigette     Alcohol abuse Sister Ioana     Alcohol abuse Sister Alis     Alcohol abuse Daughter Katerine     Cancer Father's Brother Xavier     Cancer Father's Brother Daljit     Colon cancer Father's Brother [place] J     Drug abuse Sister Ioana GARCIA    [4]   Social History  Tobacco Use    Smoking status: Former     Current packs/day: 0.00     Average packs/day: 1 pack/day for 15.0 years (15.0 ttl pk-yrs)     Types: Cigarettes     Start date: 1994     Quit date: 2009     Years since quittin.0    Smokeless tobacco: Never    Tobacco comments:     I smoked on and off through a 15 year period.   Vaping Use    Vaping status: Never Used   Substance Use Topics    Alcohol use: Not Currently     Comment: 0    Drug use: Never        Luis M Hammond DO  25

## 2025-05-05 NOTE — PROGRESS NOTES
"Subjective   Patient ID: Dinorah Horn \"Pratibha" is a 59 y.o. female who presents for Abdominal Pain (CAN'T DESCRIBE SYMPTOM/BEEN GOING ON FOR 4-5 DAYS), Fatigue, Stool Color Change (DARK GREEN/BLACK), and Dizziness (Memory/confusion).  Abdominal Pain  Associated symptoms include nausea and vomiting. Pertinent negatives include no arthralgias, constipation, diarrhea, dysuria, fever, headaches, hematuria or myalgias.   Fatigue  Associated symptoms include abdominal pain, fatigue, nausea and vomiting. Pertinent negatives include no arthralgias, chest pain, congestion, coughing, fever, headaches, joint swelling, myalgias, numbness, rash, sore throat or weakness.   Dizziness  Associated symptoms include abdominal pain, fatigue, nausea and vomiting. Pertinent negatives include no arthralgias, chest pain, congestion, coughing, fever, headaches, joint swelling, myalgias, numbness, rash, sore throat or weakness.   SEE ABOVE  KNOWN ANEMIA   LIVER ISSUES     Review of Systems   Constitutional:  Positive for activity change, appetite change, fatigue and unexpected weight change. Negative for fever.   HENT:  Negative for congestion, ear pain, postnasal drip, rhinorrhea, sinus pressure and sore throat.    Eyes:  Negative for discharge, itching and visual disturbance.   Respiratory:  Negative for cough, shortness of breath and wheezing.    Cardiovascular:  Negative for chest pain, palpitations and leg swelling.   Gastrointestinal:  Positive for abdominal pain, nausea and vomiting. Negative for blood in stool, constipation and diarrhea.        Light colored stools    Endocrine: Negative for cold intolerance, heat intolerance and polyuria.   Genitourinary:  Negative for dysuria, flank pain and hematuria.   Musculoskeletal:  Negative for arthralgias, gait problem, joint swelling and myalgias.   Skin:  Negative for rash.   Allergic/Immunologic: Negative for environmental allergies and food allergies.   Neurological:  Positive for " dizziness and syncope. Negative for weakness, light-headedness, numbness and headaches.   Psychiatric/Behavioral:  Negative for dysphoric mood and sleep disturbance. The patient is not nervous/anxious.        Objective Blood pressure 142/86, pulse (!) 123, temperature 36.6 °C (97.8 °F), weight 83.6 kg (184 lb 3.2 oz), SpO2 100%.   Physical Exam  Vitals and nursing note reviewed.   Constitutional:       General: She is in acute distress.      Appearance: She is ill-appearing and toxic-appearing.   HENT:      Head: Normocephalic.      Mouth/Throat:      Mouth: Mucous membranes are moist.   Cardiovascular:      Rate and Rhythm: Regular rhythm. Tachycardia present.      Pulses: Normal pulses.      Heart sounds: Normal heart sounds. No murmur heard.     No friction rub. No gallop.   Pulmonary:      Effort: Pulmonary effort is normal. No respiratory distress.      Breath sounds: Normal breath sounds. No wheezing.   Abdominal:      General: There is distension.      Tenderness: There is abdominal tenderness. There is guarding.      Comments: Mid epigastric pain and tenderness    Musculoskeletal:         General: No deformity. Normal range of motion.   Skin:     General: Skin is warm and dry.      Capillary Refill: Capillary refill takes less than 2 seconds.   Neurological:      General: No focal deficit present.      Mental Status: She is alert and oriented to person, place, and time.         Assessment/Plan   Problem List Items Addressed This Visit       Nausea and vomiting - Primary    Postural dizziness with near syncope          Yamilet Frazier DO

## 2025-05-06 ENCOUNTER — APPOINTMENT (OUTPATIENT)
Dept: RADIOLOGY | Facility: HOSPITAL | Age: 60
End: 2025-05-06
Payer: MEDICARE

## 2025-05-06 PROBLEM — N39.0 UTI (URINARY TRACT INFECTION): Status: ACTIVE | Noted: 2025-05-06

## 2025-05-06 PROBLEM — M54.6 ACUTE THORACIC BACK PAIN: Status: ACTIVE | Noted: 2025-05-06

## 2025-05-06 PROBLEM — E83.52 HYPERCALCEMIA: Status: ACTIVE | Noted: 2025-05-06

## 2025-05-06 PROBLEM — R19.7 DIARRHEA: Status: ACTIVE | Noted: 2025-05-06

## 2025-05-06 LAB
ALBUMIN SERPL BCP-MCNC: 4.6 G/DL (ref 3.4–5)
ALP SERPL-CCNC: 68 U/L (ref 33–136)
ALT SERPL W P-5'-P-CCNC: 26 U/L (ref 7–45)
ANION GAP SERPL CALC-SCNC: 16 MMOL/L (ref 10–20)
AST SERPL W P-5'-P-CCNC: 25 U/L (ref 9–39)
BILIRUB SERPL-MCNC: 0.4 MG/DL (ref 0–1.2)
BUN SERPL-MCNC: 21 MG/DL (ref 6–23)
C COLI+JEJ+UPSA DNA STL QL NAA+PROBE: NOT DETECTED
C DIF TOX TCDA+TCDB STL QL NAA+PROBE: NOT DETECTED
CALCIUM SERPL-MCNC: 9.8 MG/DL (ref 8.6–10.3)
CHLORIDE SERPL-SCNC: 102 MMOL/L (ref 98–107)
CO2 SERPL-SCNC: 24 MMOL/L (ref 21–32)
CREAT SERPL-MCNC: 0.89 MG/DL (ref 0.5–1.05)
EC STX1 GENE STL QL NAA+PROBE: NOT DETECTED
EC STX2 GENE STL QL NAA+PROBE: NOT DETECTED
EGFRCR SERPLBLD CKD-EPI 2021: 74 ML/MIN/1.73M*2
ERYTHROCYTE [DISTWIDTH] IN BLOOD BY AUTOMATED COUNT: 12.2 % (ref 11.5–14.5)
GLUCOSE BLD MANUAL STRIP-MCNC: 107 MG/DL (ref 74–99)
GLUCOSE BLD MANUAL STRIP-MCNC: 115 MG/DL (ref 74–99)
GLUCOSE BLD MANUAL STRIP-MCNC: 117 MG/DL (ref 74–99)
GLUCOSE BLD MANUAL STRIP-MCNC: 97 MG/DL (ref 74–99)
GLUCOSE SERPL-MCNC: 110 MG/DL (ref 74–99)
HCT VFR BLD AUTO: 35.3 % (ref 36–46)
HGB BLD-MCNC: 11.9 G/DL (ref 12–16)
MCH RBC QN AUTO: 32.5 PG (ref 26–34)
MCHC RBC AUTO-ENTMCNC: 33.7 G/DL (ref 32–36)
MCV RBC AUTO: 96 FL (ref 80–100)
NOROVIRUS GI + GII RNA STL NAA+PROBE: NOT DETECTED
NRBC BLD-RTO: 0 /100 WBCS (ref 0–0)
PLATELET # BLD AUTO: 196 X10*3/UL (ref 150–450)
POTASSIUM SERPL-SCNC: 3.6 MMOL/L (ref 3.5–5.3)
PROT SERPL-MCNC: 7.7 G/DL (ref 6.4–8.2)
PROT UR-ACNC: 16 MG/DL (ref 5–25)
PTH-INTACT SERPL-MCNC: 30 PG/ML (ref 18.5–88)
RBC # BLD AUTO: 3.66 X10*6/UL (ref 4–5.2)
RV RNA STL NAA+PROBE: NOT DETECTED
SALMONELLA DNA STL QL NAA+PROBE: NOT DETECTED
SHIGELLA DNA SPEC QL NAA+PROBE: NOT DETECTED
SODIUM SERPL-SCNC: 138 MMOL/L (ref 136–145)
V CHOLERAE DNA STL QL NAA+PROBE: NOT DETECTED
WBC # BLD AUTO: 4.8 X10*3/UL (ref 4.4–11.3)
Y ENTEROCOL DNA STL QL NAA+PROBE: NOT DETECTED

## 2025-05-06 PROCEDURE — 2500000001 HC RX 250 WO HCPCS SELF ADMINISTERED DRUGS (ALT 637 FOR MEDICARE OP): Performed by: NURSE PRACTITIONER

## 2025-05-06 PROCEDURE — 82652 VIT D 1 25-DIHYDROXY: CPT | Performed by: NURSE PRACTITIONER

## 2025-05-06 PROCEDURE — 72050 X-RAY EXAM NECK SPINE 4/5VWS: CPT | Performed by: RADIOLOGY

## 2025-05-06 PROCEDURE — 2500000004 HC RX 250 GENERAL PHARMACY W/ HCPCS (ALT 636 FOR OP/ED): Mod: JZ | Performed by: HOSPITALIST

## 2025-05-06 PROCEDURE — 96366 THER/PROPH/DIAG IV INF ADDON: CPT

## 2025-05-06 PROCEDURE — 85027 COMPLETE CBC AUTOMATED: CPT | Performed by: NURSE PRACTITIONER

## 2025-05-06 PROCEDURE — 76705 ECHO EXAM OF ABDOMEN: CPT

## 2025-05-06 PROCEDURE — G0378 HOSPITAL OBSERVATION PER HR: HCPCS

## 2025-05-06 PROCEDURE — 2500000002 HC RX 250 W HCPCS SELF ADMINISTERED DRUGS (ALT 637 FOR MEDICARE OP, ALT 636 FOR OP/ED): Performed by: HOSPITALIST

## 2025-05-06 PROCEDURE — 72110 X-RAY EXAM L-2 SPINE 4/>VWS: CPT | Performed by: RADIOLOGY

## 2025-05-06 PROCEDURE — 83970 ASSAY OF PARATHORMONE: CPT | Mod: GENLAB | Performed by: NURSE PRACTITIONER

## 2025-05-06 PROCEDURE — 94760 N-INVAS EAR/PLS OXIMETRY 1: CPT

## 2025-05-06 PROCEDURE — 72050 X-RAY EXAM NECK SPINE 4/5VWS: CPT

## 2025-05-06 PROCEDURE — 2500000004 HC RX 250 GENERAL PHARMACY W/ HCPCS (ALT 636 FOR OP/ED): Mod: JZ | Performed by: NURSE PRACTITIONER

## 2025-05-06 PROCEDURE — 72110 X-RAY EXAM L-2 SPINE 4/>VWS: CPT

## 2025-05-06 PROCEDURE — 96372 THER/PROPH/DIAG INJ SC/IM: CPT | Performed by: HOSPITALIST

## 2025-05-06 PROCEDURE — 36415 COLL VENOUS BLD VENIPUNCTURE: CPT | Performed by: NURSE PRACTITIONER

## 2025-05-06 PROCEDURE — 72072 X-RAY EXAM THORAC SPINE 3VWS: CPT | Performed by: RADIOLOGY

## 2025-05-06 PROCEDURE — 2500000001 HC RX 250 WO HCPCS SELF ADMINISTERED DRUGS (ALT 637 FOR MEDICARE OP): Performed by: HOSPITALIST

## 2025-05-06 PROCEDURE — 76705 ECHO EXAM OF ABDOMEN: CPT | Performed by: RADIOLOGY

## 2025-05-06 PROCEDURE — 84075 ASSAY ALKALINE PHOSPHATASE: CPT | Performed by: NURSE PRACTITIONER

## 2025-05-06 PROCEDURE — 99223 1ST HOSP IP/OBS HIGH 75: CPT | Performed by: NURSE PRACTITIONER

## 2025-05-06 PROCEDURE — 84165 PROTEIN E-PHORESIS SERUM: CPT | Mod: GENLAB | Performed by: NURSE PRACTITIONER

## 2025-05-06 PROCEDURE — 82947 ASSAY GLUCOSE BLOOD QUANT: CPT

## 2025-05-06 PROCEDURE — 96375 TX/PRO/DX INJ NEW DRUG ADDON: CPT

## 2025-05-06 PROCEDURE — 84155 ASSAY OF PROTEIN SERUM: CPT | Mod: GENLAB,59 | Performed by: NURSE PRACTITIONER

## 2025-05-06 PROCEDURE — 97165 OT EVAL LOW COMPLEX 30 MIN: CPT | Mod: GO

## 2025-05-06 PROCEDURE — 72072 X-RAY EXAM THORAC SPINE 3VWS: CPT

## 2025-05-06 RX ORDER — KETOROLAC TROMETHAMINE 15 MG/ML
15 INJECTION, SOLUTION INTRAMUSCULAR; INTRAVENOUS ONCE
Status: COMPLETED | OUTPATIENT
Start: 2025-05-06 | End: 2025-05-06

## 2025-05-06 RX ORDER — HYDROXYZINE HYDROCHLORIDE 25 MG/1
25 TABLET, FILM COATED ORAL NIGHTLY PRN
Status: DISCONTINUED | OUTPATIENT
Start: 2025-05-06 | End: 2025-05-11 | Stop reason: HOSPADM

## 2025-05-06 RX ADMIN — VENLAFAXINE HYDROCHLORIDE 150 MG: 150 CAPSULE, EXTENDED RELEASE ORAL at 11:41

## 2025-05-06 RX ADMIN — KETOROLAC TROMETHAMINE 15 MG: 15 INJECTION, SOLUTION INTRAMUSCULAR; INTRAVENOUS at 19:34

## 2025-05-06 RX ADMIN — METOPROLOL TARTRATE 25 MG: 25 TABLET, FILM COATED ORAL at 11:41

## 2025-05-06 RX ADMIN — FERROUS SULFATE TAB 325 MG (65 MG ELEMENTAL FE) 325 MG: 325 (65 FE) TAB at 11:41

## 2025-05-06 RX ADMIN — PANTOPRAZOLE SODIUM 40 MG: 40 TABLET, DELAYED RELEASE ORAL at 06:08

## 2025-05-06 RX ADMIN — ASPIRIN 81 MG: 81 TABLET, COATED ORAL at 11:41

## 2025-05-06 RX ADMIN — METOPROLOL TARTRATE 25 MG: 25 TABLET, FILM COATED ORAL at 20:35

## 2025-05-06 RX ADMIN — VITAM B12 50 MCG: 100 TAB at 11:41

## 2025-05-06 RX ADMIN — VENLAFAXINE 75 MG: 75 TABLET ORAL at 20:35

## 2025-05-06 RX ADMIN — CEFTRIAXONE 1 G: 1 INJECTION, SOLUTION INTRAVENOUS at 20:36

## 2025-05-06 RX ADMIN — ROSUVASTATIN CALCIUM 20 MG: 10 TABLET, FILM COATED ORAL at 20:35

## 2025-05-06 RX ADMIN — Medication 10 MG: at 00:32

## 2025-05-06 RX ADMIN — LISINOPRIL 20 MG: 20 TABLET ORAL at 11:42

## 2025-05-06 RX ADMIN — ENOXAPARIN SODIUM 40 MG: 40 INJECTION SUBCUTANEOUS at 11:41

## 2025-05-06 RX ADMIN — Medication 25 MCG: at 11:41

## 2025-05-06 RX ADMIN — HYDROXYZINE HYDROCHLORIDE 25 MG: 25 TABLET ORAL at 21:20

## 2025-05-06 RX ADMIN — TRAZODONE HYDROCHLORIDE 25 MG: 50 TABLET ORAL at 20:35

## 2025-05-06 SDOH — ECONOMIC STABILITY: HOUSING INSECURITY
IN THE LAST 12 MONTHS, WAS THERE A TIME WHEN YOU DID NOT HAVE A STEADY PLACE TO SLEEP OR SLEPT IN A SHELTER (INCLUDING NOW)?: NO

## 2025-05-06 SDOH — ECONOMIC STABILITY: FOOD INSECURITY: WITHIN THE PAST 12 MONTHS, THE FOOD YOU BOUGHT JUST DIDN'T LAST AND YOU DIDN'T HAVE MONEY TO GET MORE.: SOMETIMES TRUE

## 2025-05-06 SDOH — ECONOMIC STABILITY: INCOME INSECURITY: IN THE LAST 12 MONTHS, WAS THERE A TIME WHEN YOU WERE NOT ABLE TO PAY THE MORTGAGE OR RENT ON TIME?: YES

## 2025-05-06 SDOH — ECONOMIC STABILITY: TRANSPORTATION INSECURITY
IN THE PAST 12 MONTHS, HAS LACK OF TRANSPORTATION KEPT YOU FROM MEETINGS, WORK, OR FROM GETTING THINGS NEEDED FOR DAILY LIVING?: NO

## 2025-05-06 SDOH — ECONOMIC STABILITY: TRANSPORTATION INSECURITY: IN THE PAST 12 MONTHS, HAS LACK OF TRANSPORTATION KEPT YOU FROM MEDICAL APPOINTMENTS OR FROM GETTING MEDICATIONS?: NO

## 2025-05-06 SDOH — ECONOMIC STABILITY: HOUSING INSECURITY: IN THE PAST 12 MONTHS HAS THE ELECTRIC, GAS, OIL, OR WATER COMPANY THREATENED TO SHUT OFF SERVICES IN YOUR HOME?: YES

## 2025-05-06 SDOH — ECONOMIC STABILITY: GENERAL

## 2025-05-06 SDOH — ECONOMIC STABILITY: TRANSPORTATION INSECURITY
IN THE PAST 12 MONTHS, HAS THE LACK OF TRANSPORTATION KEPT YOU FROM MEDICAL APPOINTMENTS OR FROM GETTING MEDICATIONS?: NO

## 2025-05-06 SDOH — ECONOMIC STABILITY: FOOD INSECURITY: WITHIN THE PAST 12 MONTHS, YOU WORRIED THAT YOUR FOOD WOULD RUN OUT BEFORE YOU GOT MONEY TO BUY MORE.: OFTEN TRUE

## 2025-05-06 SDOH — ECONOMIC STABILITY: TRANSPORTATION INSECURITY

## 2025-05-06 SDOH — ECONOMIC STABILITY: FOOD INSECURITY

## 2025-05-06 SDOH — ECONOMIC STABILITY: HOUSING INSECURITY: IN THE LAST 12 MONTHS, HOW MANY PLACES HAVE YOU LIVED?: 1

## 2025-05-06 SDOH — ECONOMIC STABILITY: HOUSING INSECURITY: IN THE LAST 12 MONTHS, WAS THERE A TIME WHEN YOU WERE NOT ABLE TO PAY THE MORTGAGE OR RENT ON TIME?: YES

## 2025-05-06 SDOH — ECONOMIC STABILITY: HOUSING INSECURITY

## 2025-05-06 SDOH — ECONOMIC STABILITY: FOOD INSECURITY: WITHIN THE PAST 12 MONTHS, YOU WORRIED THAT YOUR FOOD WOULD RUN OUT BEFORE YOU GOT THE MONEY TO BUY MORE.: OFTEN TRUE

## 2025-05-06 ASSESSMENT — ENCOUNTER SYMPTOMS
WOUND: 0
COUGH: 0
VOICE CHANGE: 0
SHORTNESS OF BREATH: 0
WHEEZING: 0
NAUSEA: 1
DIARRHEA: 1
FEVER: 0
CHILLS: 0
PHOTOPHOBIA: 0
PALPITATIONS: 0
SORE THROAT: 0
COLOR CHANGE: 0
HEADACHES: 0
FLANK PAIN: 0
CHEST TIGHTNESS: 0
VOMITING: 1
DIAPHORESIS: 0
BACK PAIN: 1
TREMORS: 0
TROUBLE SWALLOWING: 0
STRIDOR: 0
DIFFICULTY URINATING: 0
ABDOMINAL PAIN: 1
LIGHT-HEADEDNESS: 1
FACIAL ASYMMETRY: 0
DIZZINESS: 1
WEAKNESS: 1
SINUS PAIN: 0

## 2025-05-06 ASSESSMENT — COGNITIVE AND FUNCTIONAL STATUS - GENERAL
DRESSING REGULAR UPPER BODY CLOTHING: A LITTLE
HELP NEEDED FOR BATHING: A LOT
DAILY ACTIVITIY SCORE: 17
TOILETING: A LOT
DRESSING REGULAR LOWER BODY CLOTHING: A LOT

## 2025-05-06 ASSESSMENT — PAIN DESCRIPTION - LOCATION: LOCATION: HEAD

## 2025-05-06 ASSESSMENT — ACTIVITIES OF DAILY LIVING (ADL)
LACK_OF_TRANSPORTATION: NO
BATHING_ASSISTANCE: MODERATE
ADL_ASSISTANCE: INDEPENDENT
LACK_OF_TRANSPORTATION: NO

## 2025-05-06 ASSESSMENT — PAIN SCALES - GENERAL
PAINLEVEL_OUTOF10: 4
PAINLEVEL_OUTOF10: 4
PAINLEVEL_OUTOF10: 0 - NO PAIN
PAINLEVEL_OUTOF10: 6
PAINLEVEL_OUTOF10: 0 - NO PAIN

## 2025-05-06 ASSESSMENT — PAIN - FUNCTIONAL ASSESSMENT
PAIN_FUNCTIONAL_ASSESSMENT: 0-10

## 2025-05-06 ASSESSMENT — SOCIAL DETERMINANTS OF HEALTH (SDOH): IN THE PAST 12 MONTHS, HAS THE ELECTRIC, GAS, OIL, OR WATER COMPANY THREATENED TO SHUT OFF SERVICE IN YOUR HOME?: YES

## 2025-05-06 NOTE — PROGRESS NOTES
05/06/25 1053   Discharge Planning   Living Arrangements Spouse/significant other   Support Systems Spouse/significant other;Children   Assistance Needed Patient alert and oriented;  Patient lives with her  and pets in a 2 story house with basement.  Bedroom and 1/2 bath is upstairs.  Full bath on main floor.    a  and gone a lot.   She says she is independent with ADLS, IADLS, ambulation and drives.  She doesn't use home oxygen/CPAP.   One of her kids should be able to pick her up on dc.   Type of Residence Private residence   Number of Stairs to Enter Residence 2   Number of Stairs Within Residence 12  (Upstairs and basement)   Do you have animals or pets at home? Yes   Type of Animals or Pets dog and bird   Home or Post Acute Services None   Expected Discharge Disposition Home   Does the patient need discharge transport arranged? No   Financial Resource Strain   How hard is it for you to pay for the very basics like food, housing, medical care, and heating? Not hard   Housing Stability   In the last 12 months, was there a time when you were not able to pay the mortgage or rent on time? N   In the past 12 months, how many times have you moved where you were living? 0   At any time in the past 12 months, were you homeless or living in a shelter (including now)? N   Transportation Needs   In the past 12 months, has lack of transportation kept you from medical appointments or from getting medications? no   In the past 12 months, has lack of transportation kept you from meetings, work, or from getting things needed for daily living? No   Stroke Family Assessment   Stroke Family Assessment Needed No   Intensity of Service   Intensity of Service 0-30 min     OBS:  Confirmed address and pharmacy.  PCP is Yamilet Frazier and she saw her 4/21/25.     DC PLAN:  Home

## 2025-05-06 NOTE — NURSING NOTE
Assumed care of pt, pt in bed awake, did request sleep aid, medicated with PRN melatonin, will monitor.

## 2025-05-06 NOTE — H&P
"History Of Present Illness  Dinorah Horn \"Raquel\" is a 59 y.o. female presenting with intermittent abdominal pain, nausea, vomiting, diarrhea, and lightheadedness x 1 week. Patient states that abdominal pain worsens with leaning forward, walking, and with vomiting. Patient is unable to describe the abdominal pain and reports that the abdominal pain feels \"heavy and falling\". While ambulating in the ED patient experienced abdominal pain and felt lightheaded requiring assistance. Patient lying in bed, no distress noted, patient reports that symptoms improve with lying down, states 2 daily episodes of diarrhea, green in color and 2 daily episodes of vomiting,  she denies sick contacts including her dog.  She reports that her last alcohol beverage was months ago and has only had 3 episodes of alcohol use this year.     Past Medical History  Medical History[1]    Surgical History  Surgical History[2]     Social History  She reports that she quit smoking about 16 years ago. Her smoking use included cigarettes. She started smoking about 31 years ago. She has a 15 pack-year smoking history. She has never used smokeless tobacco. She reports that she does not currently use alcohol. She reports that she does not use drugs.    Family History  Family History[3]     Allergies  Cosentyx [secukinumab]    Review of Systems   Constitutional:  Negative for chills, diaphoresis and fever.   HENT:  Negative for congestion, sinus pain, sore throat, trouble swallowing and voice change.    Eyes:  Negative for photophobia and visual disturbance.   Respiratory:  Negative for cough, chest tightness, shortness of breath, wheezing and stridor.    Cardiovascular:  Negative for chest pain, palpitations and leg swelling.   Gastrointestinal:  Positive for abdominal pain, diarrhea, nausea and vomiting.   Endocrine: Negative for cold intolerance and heat intolerance.   Genitourinary:  Negative for difficulty urinating, flank pain and urgency. " "  Musculoskeletal:  Positive for back pain.   Skin:  Negative for color change, pallor, rash and wound.   Neurological:  Positive for dizziness, weakness and light-headedness. Negative for tremors, syncope, facial asymmetry and headaches.        Physical Exam  Constitutional:       General: She is not in acute distress.     Appearance: Normal appearance. She is not ill-appearing, toxic-appearing or diaphoretic.   HENT:      Head: Normocephalic and atraumatic.      Mouth/Throat:      Mouth: Mucous membranes are moist.   Eyes:      Pupils: Pupils are equal, round, and reactive to light.   Cardiovascular:      Rate and Rhythm: Normal rate and regular rhythm.      Pulses: Normal pulses.      Heart sounds: Normal heart sounds.   Pulmonary:      Effort: Pulmonary effort is normal. No respiratory distress.      Breath sounds: Normal breath sounds. No stridor. No wheezing, rhonchi or rales.   Chest:      Chest wall: No tenderness.   Abdominal:      Palpations: Abdomen is soft.      Tenderness: There is abdominal tenderness in the right upper quadrant and epigastric area. Positive signs include Arechiga's sign.   Musculoskeletal:         General: Tenderness present.      Cervical back: No rigidity or tenderness.      Thoracic back: Tenderness present.      Comments: Mid thoracic and lower thoracic spinal pain    Skin:     General: Skin is warm and dry.   Neurological:      General: No focal deficit present.      Mental Status: She is alert and oriented to person, place, and time.      Cranial Nerves: No cranial nerve deficit.      Sensory: No sensory deficit.      Motor: No weakness.   Psychiatric:         Mood and Affect: Mood normal.         Behavior: Behavior normal.          Last Recorded Vitals  Blood pressure 103/62, pulse 70, temperature 36.3 °C (97.3 °F), temperature source Temporal, resp. rate 18, height 1.753 m (5' 9\"), weight 83.5 kg (184 lb), SpO2 95%.    Relevant Results  Current Medications[4]      Latest " Reference Range & Units 05/05/25 15:57 05/05/25 17:23 05/06/25 08:58   GLUCOSE 74 - 99 mg/dL 98  110 (H)   SODIUM 136 - 145 mmol/L 136  138   POTASSIUM 3.5 - 5.3 mmol/L 4.5  3.6   CHLORIDE 98 - 107 mmol/L 98  102   Bicarbonate 21 - 32 mmol/L 26  24   Anion Gap 10 - 20 mmol/L 17  16   Blood Urea Nitrogen 6 - 23 mg/dL 24 (H)  21   Creatinine 0.50 - 1.05 mg/dL 0.96  0.89   EGFR >60 mL/min/1.73m*2 68  74   Calcium 8.6 - 10.3 mg/dL 10.6 (H)  9.8   Albumin 3.4 - 5.0 g/dL 5.1 (H)  4.6   Alkaline Phosphatase 33 - 136 U/L 78  68   ALT 7 - 45 U/L 29  26   AST 9 - 39 U/L 23  25   Bilirubin Total 0.0 - 1.2 mg/dL 0.6  0.4   Ammonia 16 - 53 umol/L 19     Total Protein 6.4 - 8.2 g/dL 8.6 (H)  7.7   LIPASE 9 - 82 U/L 23     Troponin I, High Sensitivity 0 - 13 ng/L 7 6    (H): Data is abnormally high   Latest Reference Range & Units 05/05/25 15:57 05/06/25 08:58   WBC 4.4 - 11.3 x10*3/uL 6.7 4.8   nRBC 0.0 - 0.0 /100 WBCs 0.0 0.0   RBC 4.00 - 5.20 x10*6/uL 3.93 (L) 3.66 (L)   HEMOGLOBIN 12.0 - 16.0 g/dL 12.5 11.9 (L)   HEMATOCRIT 36.0 - 46.0 % 37.0 35.3 (L)   MCV 80 - 100 fL 94 96   MCH 26.0 - 34.0 pg 31.8 32.5   MCHC 32.0 - 36.0 g/dL 33.8 33.7   RED CELL DISTRIBUTION WIDTH 11.5 - 14.5 % 11.9 12.2   Platelets 150 - 450 x10*3/uL 223 196   Neutrophils % 40.0 - 80.0 % 73.4    Immature Granulocytes %, Automated 0.0 - 0.9 % 0.3    Lymphocytes % 13.0 - 44.0 % 17.2    Monocytes % 2.0 - 10.0 % 8.4    Eosinophils % 0.0 - 6.0 % 0.6    Basophils % 0.0 - 2.0 % 0.1    Neutrophils Absolute 1.20 - 7.70 x10*3/uL 4.92    Immature Granulocytes Absolute, Automated 0.00 - 0.70 x10*3/uL 0.02    Lymphocytes Absolute 1.20 - 4.80 x10*3/uL 1.15 (L)    Monocytes Absolute 0.10 - 1.00 x10*3/uL 0.56    Eosinophils Absolute 0.00 - 0.70 x10*3/uL 0.04    Basophils Absolute 0.00 - 0.10 x10*3/uL 0.01    (L): Data is abnormally low  Results for orders placed or performed during the hospital encounter of 05/05/25 (from the past 24 hours)   ECG 12 lead   Result Value  Ref Range    Ventricular Rate 77 BPM    Atrial Rate 77 BPM    MI Interval 174 ms    QRS Duration 100 ms    QT Interval 424 ms    QTC Calculation(Bazett) 479 ms    P Axis 69 degrees    R Axis 51 degrees    T Axis 67 degrees    QRS Count 13 beats    Q Onset 222 ms    P Onset 135 ms    P Offset 198 ms    T Offset 434 ms    QTC Fredericia 460 ms   Ammonia   Result Value Ref Range    Ammonia 19 16 - 53 umol/L   Lipase   Result Value Ref Range    Lipase 23 9 - 82 U/L   Comprehensive metabolic panel   Result Value Ref Range    Glucose 98 74 - 99 mg/dL    Sodium 136 136 - 145 mmol/L    Potassium 4.5 3.5 - 5.3 mmol/L    Chloride 98 98 - 107 mmol/L    Bicarbonate 26 21 - 32 mmol/L    Anion Gap 17 10 - 20 mmol/L    Urea Nitrogen 24 (H) 6 - 23 mg/dL    Creatinine 0.96 0.50 - 1.05 mg/dL    eGFR 68 >60 mL/min/1.73m*2    Calcium 10.6 (H) 8.6 - 10.3 mg/dL    Albumin 5.1 (H) 3.4 - 5.0 g/dL    Alkaline Phosphatase 78 33 - 136 U/L    Total Protein 8.6 (H) 6.4 - 8.2 g/dL    AST 23 9 - 39 U/L    Bilirubin, Total 0.6 0.0 - 1.2 mg/dL    ALT 29 7 - 45 U/L   CBC and Auto Differential   Result Value Ref Range    WBC 6.7 4.4 - 11.3 x10*3/uL    nRBC 0.0 0.0 - 0.0 /100 WBCs    RBC 3.93 (L) 4.00 - 5.20 x10*6/uL    Hemoglobin 12.5 12.0 - 16.0 g/dL    Hematocrit 37.0 36.0 - 46.0 %    MCV 94 80 - 100 fL    MCH 31.8 26.0 - 34.0 pg    MCHC 33.8 32.0 - 36.0 g/dL    RDW 11.9 11.5 - 14.5 %    Platelets 223 150 - 450 x10*3/uL    Neutrophils % 73.4 40.0 - 80.0 %    Immature Granulocytes %, Automated 0.3 0.0 - 0.9 %    Lymphocytes % 17.2 13.0 - 44.0 %    Monocytes % 8.4 2.0 - 10.0 %    Eosinophils % 0.6 0.0 - 6.0 %    Basophils % 0.1 0.0 - 2.0 %    Neutrophils Absolute 4.92 1.20 - 7.70 x10*3/uL    Immature Granulocytes Absolute, Automated 0.02 0.00 - 0.70 x10*3/uL    Lymphocytes Absolute 1.15 (L) 1.20 - 4.80 x10*3/uL    Monocytes Absolute 0.56 0.10 - 1.00 x10*3/uL    Eosinophils Absolute 0.04 0.00 - 0.70 x10*3/uL    Basophils Absolute 0.01 0.00 - 0.10  x10*3/uL   Troponin I, High Sensitivity, Initial   Result Value Ref Range    Troponin I, High Sensitivity 7 0 - 13 ng/L   Ethanol   Result Value Ref Range    Alcohol <10 <=10 mg/dL   Protime-INR   Result Value Ref Range    Protime 12.1 9.8 - 12.4 seconds    INR 1.1 0.9 - 1.1   aPTT   Result Value Ref Range    aPTT 30 26 - 36 seconds   Troponin, High Sensitivity, 1 Hour   Result Value Ref Range    Troponin I, High Sensitivity 6 0 - 13 ng/L   Urinalysis with Reflex Culture and Microscopic   Result Value Ref Range    Color, Urine Light-Yellow Light-Yellow, Yellow, Dark-Yellow    Appearance, Urine Clear Clear    Specific Gravity, Urine >1.050 (N) 1.005 - 1.035    pH, Urine 6.0 5.0, 5.5, 6.0, 6.5, 7.0, 7.5, 8.0    Protein, Urine 20 (TRACE) NEGATIVE, 10 (TRACE), 20 (TRACE) mg/dL    Glucose, Urine Normal Normal mg/dL    Blood, Urine NEGATIVE NEGATIVE mg/dL    Ketones, Urine 20 (1+) (A) NEGATIVE mg/dL    Bilirubin, Urine NEGATIVE NEGATIVE mg/dL    Urobilinogen, Urine Normal Normal mg/dL    Nitrite, Urine NEGATIVE NEGATIVE    Leukocyte Esterase, Urine 250 Nica/uL (A) NEGATIVE   Drug Screen, Urine   Result Value Ref Range    Amphetamine Screen, Urine Presumptive Negative Presumptive Negative    Barbiturate Screen, Urine Presumptive Negative Presumptive Negative    Benzodiazepines Screen, Urine Presumptive Negative Presumptive Negative    Cannabinoid Screen, Urine Presumptive Positive (A) Presumptive Negative    Cocaine Metabolite Screen, Urine Presumptive Negative Presumptive Negative    Fentanyl Screen, Urine Presumptive Negative Presumptive Negative    Opiate Screen, Urine Presumptive Negative Presumptive Negative    Oxycodone Screen, Urine Presumptive Negative Presumptive Negative    PCP Screen, Urine Presumptive Negative Presumptive Negative    Methadone Screen, Urine Presumptive Negative Presumptive Negative   Microscopic Only, Urine   Result Value Ref Range    WBC, Urine >50 (A) 1-5, NONE /HPF    RBC, Urine 3-5 NONE,  1-2, 3-5 /HPF    Squamous Epithelial Cells, Urine 1-9 (SPARSE) Reference range not established. /HPF    Mucus, Urine 2+ Reference range not established. /LPF    Hyaline Casts, Urine 1+ (A) NONE /LPF   C. difficile, PCR    Specimen: Stool   Result Value Ref Range    C. difficile, PCR Not Detected Not Detected   POCT GLUCOSE   Result Value Ref Range    POCT Glucose 79 74 - 99 mg/dL   POCT GLUCOSE   Result Value Ref Range    POCT Glucose 97 74 - 99 mg/dL   CBC   Result Value Ref Range    WBC 4.8 4.4 - 11.3 x10*3/uL    nRBC 0.0 0.0 - 0.0 /100 WBCs    RBC 3.66 (L) 4.00 - 5.20 x10*6/uL    Hemoglobin 11.9 (L) 12.0 - 16.0 g/dL    Hematocrit 35.3 (L) 36.0 - 46.0 %    MCV 96 80 - 100 fL    MCH 32.5 26.0 - 34.0 pg    MCHC 33.7 32.0 - 36.0 g/dL    RDW 12.2 11.5 - 14.5 %    Platelets 196 150 - 450 x10*3/uL   Comprehensive metabolic panel   Result Value Ref Range    Glucose 110 (H) 74 - 99 mg/dL    Sodium 138 136 - 145 mmol/L    Potassium 3.6 3.5 - 5.3 mmol/L    Chloride 102 98 - 107 mmol/L    Bicarbonate 24 21 - 32 mmol/L    Anion Gap 16 10 - 20 mmol/L    Urea Nitrogen 21 6 - 23 mg/dL    Creatinine 0.89 0.50 - 1.05 mg/dL    eGFR 74 >60 mL/min/1.73m*2    Calcium 9.8 8.6 - 10.3 mg/dL    Albumin 4.6 3.4 - 5.0 g/dL    Alkaline Phosphatase 68 33 - 136 U/L    Total Protein 7.7 6.4 - 8.2 g/dL    AST 25 9 - 39 U/L    Bilirubin, Total 0.4 0.0 - 1.2 mg/dL    ALT 26 7 - 45 U/L      ECG 12 lead  Result Date: 5/6/2025  Sinus rhythm with occasional Premature ventricular complexes Incomplete right bundle branch block Borderline ECG No previous ECGs available    CT abdomen pelvis w IV contrast  Result Date: 5/5/2025  Interpreted By:  Macy Murray, STUDY: CT ABDOMEN PELVIS W IV CONTRAST;  5/5/2025 4:41 pm   INDICATION: Signs/Symptoms:cirrhosis, cough, nausea, vomiting, abdominal pain.     COMPARISON: Abdominal ultrasound 04/19/2025   ACCESSION NUMBER(S): [id number]   ORDERING CLINICIAN: SARAH GARCIA   TECHNIQUE: CT of the abdomen and  pelvis was performed.  Standard contiguous axial images were obtained at 3 mm slice thickness through the abdomen and pelvis. Coronal and sagittal reconstructions at 3 mm slice thickness were performed.  75 ML of Omnipaque 350 was administered intravenously without immediate complication.   FINDINGS: LOWER CHEST: The heart is normal in size without evidence of pericardial effusion. Focal atelectasis is seen in the left lower lobe. No consolidation or pleural effusion. No concerning lung nodule. Small hiatal hernia.   ABDOMEN:   LIVER: The liver is within the upper limits of normal in size, measuring 17.5 cm in the craniocaudal dimension. Multiple granulomas noted. No suspicious liver lesion. Mild nodular contour of the hepatic capsule, consistent with history of cirrhosis. Focal hypoattenuation is seen adjacent to the falciform ligament, likely focal hepatic steatosis.   BILE DUCTS: No biliary duct dilatation.   GALLBLADDER: The gallbladder is nondistended and without evidence of radiopaque stones.   PANCREAS: The pancreas appears unremarkable without evidence of ductal dilatation or masses.   SPLEEN: The spleen is normal in size, measuring 9.4 cm in the craniocaudal dimension. Multiple granulomas noted.   ADRENAL GLANDS: No adrenal nodularity or thickening.   KIDNEYS AND URETERS: The kidneys are normal in size. No hydroureteronephrosis or nephroureterolithiasis is identified.   PELVIS:   BLADDER: No significant abnormality.   REPRODUCTIVE ORGANS: Hysterectomy   BOWEL: Small hiatal hernia. The small and large bowel are normal in caliber and demonstrate no wall thickening. The appendix is not definitely visualized. There is however no pericecal stranding or fluid.   VESSELS: Mild to moderate atherosclerotic calcifications of the aortoiliac arteries. No aneurysmal dilatation of the abdominal aorta. The IVC appears normal.   PERITONEUM/RETROPERITONEUM/LYMPH NODES: No ascites or fluid collection. The retroperitoneum is  unremarkable. No abdominopelvic lymphadenopathy.   ABDOMINAL WALL: No significant abnormality.   BONES: No acute fracture. No suspicious osseous lesions. Prominent T11 vertebral body superior endplate Schmorl's node. Discogenic degenerative changes in the lower thoracic and lumbar spine.       No acute abdominopelvic abnormality to explain patient's pain. Mild nodular contour of the hepatic capsule, in keeping with history of cirrhosis. Additional chronic and incidental findings as detailed above.   MACRO: None   Signed by: Macy Murray 5/5/2025 4:56 PM Dictation workstation:   [id number]    CT head wo IV contrast  Result Date: 5/5/2025  Interpreted By:  Schoenberger, Joseph, STUDY: CT HEAD WO IV CONTRAST;  5/5/2025 4:40 pm   INDICATION: Signs/Symptoms:lightheadedness.  A chest CT     COMPARISON: None.   ACCESSION NUMBER(S): [id number]   ORDERING CLINICIAN: SARAH GARCIA   TECHNIQUE: Noncontrast axial CT scan of head was performed. Angled reformats in brain and bone windows were generated. The images were reviewed in bone, brain, blood and soft tissue windows.   FINDINGS: CSF Spaces: The ventricles, sulci and basal cisterns are within normal limits. There is no extraaxial fluid collection.   Parenchyma:  The grey-white differentiation is intact. There is no mass effect or midline shift.  There is no intracranial hemorrhage.   Calvarium: The calvarium is unremarkable.   Paranasal sinuses and mastoids: Visualized paranasal sinuses and mastoids are clear.       No evidence of acute cortical infarct or intracranial hemorrhage.   No evidence of intracranial hemorrhage or displaced skull fracture.   MACRO: None   Signed by: Joseph Schoenberger 5/5/2025 4:46 PM Dictation workstation:   [id number]                  Assessment & Plan  Abdominal pain    Gastroesophageal reflux disease without esophagitis    Nausea and vomiting    Postural dizziness with near syncope    Hyperlipidemia    Type 2 diabetes mellitus  without retinopathy (Multi)    Alcoholic cirrhosis (Multi)    Acute thoracic back pain    Diarrhea    UTI (urinary tract infection)    Primary hypertension    Hypercalcemia      # Abdominal pain  # Nausea and vomiting   # Diarrhea   # Alcoholic cirrhosis  # GERD   - CT Abdomen with IV contrast: No acute abdominopelvic abnormality to explain patient's pain. Mild nodular contour of the hepatic capsule, in keeping with history of  cirrhosis. Additional chronic and incidental findings as detailed above.  - US: Very mildly increased hepatic echogenicity which may indicate hepatic  steatosis.   - C-diff negative   - NS 500cc bolus   -Continue Ferrous sulfate, Vitamin B-12, Vitamin D-3  - K 3.6  - Cryptosporidiosis rule out   - Zofran PRN  - Advance diet as tolerated        # UTI  - UA Leukocyte Esterase 250, WBC >50, 1+ Hyaline Casts  Protein 20  - Urine Protein Electrophoresis pending   - Ceftriaxone   - Urine Culture pending   - WBC 4.8      # Postural dizziness with near syncope  - CTH: No evidence of acute cortical infarct or intracranial hemorrhage. No evidence of intracranial hemorrhage or displaced skull fracture  - Orthostatic vital signs: 110/73, 81 lying, 114/99, 84 sitting, 117/99, 99 standing   - NS 500cc bolus   - Telemetry    # Hyperlipidemia  # Type 2 diabetes mellitus without retinopathy  # Hypertension  - Lispro sliding scale   - Q 4 Accucheck  - Metformin held until 5/7  - Continue Crestor, Lisinopril, ASA, Metoprolol  - Serum protein electrophoresis pending  -  Consistent Carb Control 75 grams      # Acute thoracic back pain  - XR cervical, thoracic and lumbar spine  - Tylenol PRN     # Hypercalcemia   - Vitamin D pending   - PTH pending  - Calcium 10.6 > 9.8      GI ppx: PPI  DVT ppx: Lovenox     Fluids: As needed   Electrolytes: replace as needed  Nutrition: Carb control, advance diet as tolerated   Adjuncts: PIV    Code Status: Full Code   Pt requires inpatient stay at this time.            Alexus DEUTSCH  ALIN Gao  Attending Attestation:    Patient was seen and examined face to face, history and physical was taken personally at bedside the APRN-CNP, was present for the whole duration of the exam who participated in the documentation of this note. I performed the medical decision-making components (assessment and plan of care). I have reviewed the documentation and verified the findings in the note as written with additions or exceptions as stated in the body of this note.  Patient with history of alcohol induced cirrhosis, was brought to emergency room secondary to abdominal pain, lightheadedness in the vertigo and apparently the symptoms has been going on for long time she reports that her abdomen dropping when she stands up and she walked, denies sharp pain no fever or chills she also complaining of vomiting and few episodes of soft stool, workup in the emergency room showing that patient's has some hyper thalassemia to 10.6, and total protein 8.6 she does complain of pain in her back has been going on for long time on exam heart regular, lungs fair air movement no crackles no rhonchi, abdomen soft bowel sounds are present barely slight hepatomegaly, bowel sounds are present, extremity no significant edema pulses are present there is point tenderness over mid thoracic and lower thoracic spine.  CT scan of abdomen unremarkable except for cirrhotic changes of the edge of the liver.  Unclear if patient's abdominal discomfort could be secondary to alcoholic hepatitis, we rule out C. difficile and viral enteritis, continue with antiemetics medication, consult GI, significant concern about patient hyper thalassemia, hyperproteinemia, and thoracic spine pain and tenderness, patient does not have anemia or thrombocytopenia and no renal function abnormality, there is no proteinuria, we will check serum protein electrophoresis and urine protein electrophoresis, check PTH, vitamin D.    Dr. Vera Solorio,  MD  Internal Medicine        [1]   Past Medical History:  Diagnosis Date    Arthritis     Cirrhosis of liver (Multi)     Coronary artery disease     Depression     Diabetes mellitus (Multi)     GERD (gastroesophageal reflux disease)     Headache     Heart disease     Hypertension     Visual impairment    [2]   Past Surgical History:  Procedure Laterality Date    APPENDECTOMY      BREAST SURGERY  2003    Augmentation    CORONARY ARTERY BYPASS GRAFT      FOREARM FRACTURE SURGERY Right 1992    FOREARM HARDWARE REMOVAL Right 2003    HYSTERECTOMY  Partial, 2000   [3]   Family History  Problem Relation Name Age of Onset    Alcohol abuse Mother Trice     Cancer Mother Trice     Diabetes Mother Trice     Heart disease Mother Trice     Arthritis Father Reed     Atrial fibrillation Father Reed     Cancer Father Reed     COPD Father Reed     Hearing loss Father Reed     Heart disease Father Reed     Stroke Paternal Grandfather Guy     Alcohol abuse Paternal Grandmother Brigette     Alcohol abuse Sister Ioana     Alcohol abuse Sister Alis     Alcohol abuse Daughter Katerine     Cancer Father's Brother Xavier     Cancer Father's Brother Daljit     Colon cancer Father's Brother [place] J     Drug abuse Sister Ioana GARCIA    [4]   Current Facility-Administered Medications:     acetaminophen (Tylenol) tablet 650 mg, 650 mg, oral, q6h PRN, Ranjan King DO    alum-mag hydroxide-simeth (Mylanta) 200-200-20 mg/5 mL oral suspension 20 mL, 20 mL, oral, q4h PRN, Ranjan King DO    aspirin EC tablet 81 mg, 81 mg, oral, Daily, Ranjan King DO    benzocaine-menthol (Cepastat Sore Throat) lozenge 1 lozenge, 1 lozenge, Mouth/Throat, q2h PRN, Ranjan King DO    cefTRIAXone (Rocephin) 1 g in dextrose (iso) IV 50 mL, 1 g, intravenous, q24h, Ranjan King DO    cholecalciferol (Vitamin D-3) tablet 25 mcg, 25 mcg, oral, Daily, Ranjan King DO    cyanocobalamin (Vitamin B-12) tablet 50 mcg, 50 mcg, oral,  Daily, Ranjan King DO    dextrose 50 % injection 12.5 g, 12.5 g, intravenous, q15 min PRN, Ranjan King DO    dextrose 50 % injection 25 g, 25 g, intravenous, q15 min PRN, Ranjan King DO    enoxaparin (Lovenox) syringe 40 mg, 40 mg, subcutaneous, Daily, Ranjan King DO    ferrous sulfate tablet 325 mg, 65 mg of iron, oral, Daily, Ranjan King DO    glucagon (Glucagen) injection 1 mg, 1 mg, intramuscular, q15 min PRN, Ranjan King DO    glucagon (Glucagen) injection 1 mg, 1 mg, intramuscular, q15 min PRN, Ranjan King DO    [Held by provider] hydroCHLOROthiazide (Microzide) capsule 12.5 mg, 12.5 mg, oral, Daily, Ranjan King DO    insulin lispro injection 0-10 Units, 0-10 Units, subcutaneous, Before meals & nightly, Ranjan King DO    lisinopril tablet 20 mg, 20 mg, oral, Daily, Ranjan King DO    loperamide (Imodium) capsule 2 mg, 2 mg, oral, 4x daily PRN, Ranjan King DO    melatonin tablet 10 mg, 10 mg, oral, Daily PRN, Ranjan King DO, 10 mg at 05/06/25 0032    [Held by provider] metFORMIN XR (Glucophage-XR) 24 hr tablet 500 mg, 500 mg, oral, BID, Ranjan King DO    metoprolol tartrate (Lopressor) tablet 25 mg, 25 mg, oral, BID, Ranjan King DO, 25 mg at 05/05/25 2218    ondansetron (Zofran) injection 4 mg, 4 mg, intravenous, q4h PRN, Ranjan King DO    pantoprazole (ProtoNix) EC tablet 40 mg, 40 mg, oral, Daily before breakfast, Ranjan King DO, 40 mg at 05/06/25 0608    polyethylene glycol (Glycolax, Miralax) packet 17 g, 17 g, oral, BID PRN, Ranjan King DO    rosuvastatin (Crestor) tablet 20 mg, 20 mg, oral, Nightly, Ranjan King DO, 20 mg at 05/05/25 2223    traZODone (Desyrel) tablet 25 mg, 25 mg, oral, Nightly, Ranjan King DO, 25 mg at 05/05/25 2218    venlafaxine (Effexor) tablet 75 mg, 75 mg, oral, Nightly, Ranjan King DO, 75 mg at 05/05/25 2218    venlafaxine XR (Effexor-XR) 24 hr capsule 150 mg, 150 mg, oral, Daily, Ranjan King DO

## 2025-05-06 NOTE — PROGRESS NOTES
"Occupational Therapy    Evaluation    Patient Name: Dinorah Horn \"Pratibha"  MRN: [id number]  Department: [facility]  Room: [facility] [place]  Today's Date: 5/6/2025  Time Calculation  Start Time: 1053  Stop Time: 1107  Time Calculation (min): 14 min    Assessment  IP OT Assessment  OT Assessment: Pt is a 58 yo F referred to occupational therapy for impaired self-care and functional mobility 2/2 hospitalization for abdominal pain. Pt presented to [place] w/ lightheadedness, N/V/D. Pt w/ limited participation 2/2 increased dizziness and nausea in seated and standing positions. Pt completed STS w/ CGA w/ 1 large LOB at EOB and required mod A to correct. Pt would benefit from continued OT services at the MOD intensity level w/ possible progression to LOW intensity based on increased participation and decreased symptoms.  Prognosis: Good  Barriers to Discharge Home: Caregiver assistance, Physical needs  Caregiver Assistance: Caregiver assistance needed per identified barriers - however, level of patient's required assistance exceeds assistance available at home  Physical Needs: Stair navigation into home limited by function/safety, Stair navigation to access bed limited by function/safety, Ambulating household distances limited by function/safety, 24hr mobility assistance needed, Intermittent ADL assistance needed, High falls risk due to function or environment  Evaluation/Treatment Tolerance: Patient limited by fatigue (Pt limited by dizziness and nausea)  Medical Staff Made Aware: Yes  End of Session Communication: Bedside nurse  End of Session Patient Position: Bed, 3 rail up, Alarm on  Plan:  Treatment Interventions: ADL retraining, Functional transfer training, Endurance training, UE strengthening/ROM, Patient/family training, Equipment evaluation/education, Compensatory technique education  OT Frequency: 3 times per week  OT Discharge Recommendations: Moderate intensity level of continued care  OT Recommended Transfer Status: " Minimal assist, Assist of 1  OT - OK to Discharge: Yes (Based on completed evaluation and care plan recommendations, no barriers to discharge to next site of care)    Subjective   Current Problem:  1. Vomiting and diarrhea        2. Epigastric pain        3. Alcoholic cirrhosis, unspecified whether ascites present (Multi)        4. Dehydration        5. Lightheadedness        6. Gait instability        7. Acute cystitis without hematuria          General:  General  Reason for Referral: Pt is a 60 yo F referred to occupational therapy for impaired self-care and functional mobility 2/2 hospitalization for abdominal pain. Pt presented to [place] w/ stef, N/V/D  Referred By: NICOLE Thomas-CNP  Past Medical History Relevant to Rehab: GERD, HTN, dairrhea, UTI, osteoarthritis, hyperlipidemia, fibromyaligia, alcohol cirrhosis, heart disease  Family/Caregiver Present: No  Prior to Session Communication: Bedside nurse  Patient Position Received: Bed, 3 rail up, Alarm on  Preferred Learning Style: verbal, visual  General Comment: Pt pleasant and agreeable to therapy evaluation. Pt w/ limited participation 2/2 dizziness. Pt cleared by RN prior to session.  Precautions:  Medical Precautions: Fall precautions, Infection precautions (contact plus precautions)  Precautions Comment: teletr, contact plus precautions     Date/Time Vitals Session Patient Position Pulse Resp SpO2 BP MAP (mmHg)    05/06/25 1141 --  --  79  --  --  108/68  --           Vital Signs Comment: Vitals stable throughout, increased dizziness in seated and standing positions    Pain:  Pain Assessment  Pain Assessment: 0-10  0-10 (Numeric) Pain Score: 4  Pain Type: Acute pain  Pain Location: Head  Pain Interventions: Repositioned  Response to Interventions: Content/relaxed    Objective   Cognition:  Overall Cognitive Status: Within Functional Limits  Arousal/Alertness: Appropriate responses to stimuli  Orientation Level: Oriented X4    Home  Living:  Type of Home: House  Lives With: Spouse  Home Adaptive Equipment: None  Home Layout: Two level, Full bath main level, Bed/bath upstairs, Stairs to alternate level with rails  Alternate Level Stairs-Rails: Right  Alternate Level Stairs-Number of Steps: 12  Home Access: Stairs to enter without rails  Entrance Stairs-Rails: None  Entrance Stairs-Number of Steps: 3  Bathroom Shower/Tub: Tub/shower unit  Bathroom Toilet: Standard  Bathroom Equipment: None  Home Living Comments: main bath 1st floor, 12 bath and main bedroom on 2nd floor   Prior Function:  Level of [place]: Independent with ADLs and functional transfers, Independent with homemaking with ambulation  ADL Assistance: Independent  Homemaking Assistance: Independent  Ambulatory Assistance: Independent (no AD)  Prior Function Comments: +drives, pt reports increased difficulty completing dressing/bathing for the past week 2/2 dizziness and nausea symptoms    ADL:  Eating Assistance: Independent  Grooming Assistance: Independent  Bathing Assistance: Moderate  UE Dressing Assistance: Stand by  LE Dressing Assistance: Moderate  Toileting Assistance with Device: Moderate  Functional Assistance: Moderate  ADL Comments: Pt completed bed mobility ANIBAL and STS w/ CGA. Pt w/ one extreme LOB standing at EOB w/ mod A to correct. MOd A for LE dressing 2/2 dizziness. Other ADL anticipated.  Activity Tolerance:  Endurance: Tolerates less than 10 min exercise, no significant change in vital signs  Bed Mobility/Transfers:   Bed Mobility  Bed Mobility: Yes  Bed Mobility 1  Bed Mobility 1: Supine to sitting, Sitting to supine  Level of Assistance 1: Modified independent  Bed Mobility Comments 1: use of bed rails    Transfers  Transfer: Yes  Transfer 1  Transfer From 1: Bed to  Transfer to 1: Stand  Technique 1: Sit to stand, Stand to sit  Transfer Level of Assistance 1: Contact guard, Moderate assistance  Trials/Comments 1: CGA for STS intitally, 1 large LOB at EOB  w/ mod A to correct. Pt w/ increased dizziness upon standing, no further mobility attempted 2/2 pt symptoms and safety    Sitting Balance:  Static Sitting Balance  Static Sitting-Balance Support: Feet supported  Static Sitting-Level of Assistance: Distant supervision  Dynamic Sitting Balance  Dynamic Sitting-Balance Support: Feet supported  Dynamic Sitting-Level of Assistance: Distant supervision  Dynamic Sitting-Balance: Forward lean  Standing Balance:  Static Standing Balance  Static Standing-Balance Support: No upper extremity supported  Static Standing-Level of Assistance: Contact guard  Dynamic Standing Balance  Dynamic Standing-Balance Support: No upper extremity supported  Dynamic Standing-Level of Assistance: Moderate assistance  Dynamic Standing-Balance: Forward lean  Dynamic Standing-Comments: 1 large LOB at EOB w/ mod A to correct    Vision:   Vision - Basic Assessment  Current Vision: Wears glasses only for reading  Sensation:  Light Touch: No apparent deficits  Strength:  Strength Comments: BUE functionally 4+/5  Coordination:  Movements are Fluid and Coordinated: Yes   Hand Function:  Hand Function  Gross Grasp: Functional  Coordination: Functional  Extremities:  RUE: Within Functional Limits   LUE: Within Functional Limits    Outcome Measures:   [facility] Daily Activity  Putting on and taking off regular lower body clothing: A lot  Bathing (including washing, rinsing, drying): A lot  Putting on and taking off regular upper body clothing: A little  Toileting, which includes using toilet, bedpan or urinal: A lot  Taking care of personal grooming such as brushing teeth: None  Eating Meals: None  Daily Activity - Total Score: 17    OT Adult Other Outcome Measures  4AT: 0/12  Patient's 4AT score:  Alertness: 0 - Normal (fully alert, but not agitated, throughout assessment)   AMT4: 0 - No mistakes   Attention: 0 - Achieves 7 months or more correctly   Acute change or fluctuating course: 0 - No    4 AT Score:  0/12     4 AT is a standardized assessment completed with patient to assess delirium. The 4AT assesses 4 items including alertness, abbreviated mental test (AMT4), Attention, and acute change or fluctuating course.     The test is scored out of 12 points. Score indications are as follows:   0: delirium or severe cognitive impairment unlikely   (delirium is still possible if acute change or fluctuating course information is incomplete and prior cognitive status is not obtained)  1-3: possible cognitive impairment  4 or above: possible delirium +/- cognitive impairment     The 4AT is scored as follows:   Alertness:   Normal (fully alert, but not agitated, throughout assessment) 0  Mild Sleepiness for <10 seconds after waking, then normal   0  Clearly Abnormal       4    AMT4: (age, date of birth, place (name of hospital or building), current year)  No mistakes     0  1 mistake    1  2 or more mistakes/untestable 2    Attention: (months of they year in backwards order starting with December)  Achieves 7 months or more correctly     0  Starts but scores <7 months/refuses to start    1  Untestable (cannot start because unwell, drowsy, inattentive) 2    Acute change or fluctuating Course: (change in cognition or mental function in last 2 wks and is still evident in last 24 hrs.   No  0  Yes  4     Education Documentation  Body Mechanics, taught by Nina Arias OT at 5/6/2025 12:48 PM.  Learner: Patient  Readiness: Acceptance  Method: Explanation  Response: Verbalizes Understanding  Comment: Pt educated on POC, DC recs, safety and body mechanics when completing transfers and ADLs    ADL Training, taught by Nina Arias OT at 5/6/2025 12:48 PM.  Learner: Patient  Readiness: Acceptance  Method: Explanation  Response: Verbalizes Understanding  Comment: Pt educated on POC, DC recs, safety and body mechanics when completing transfers and ADLs    Goals:   Encounter Problems       Encounter Problems (Active)       ADLs        Patient will perform UB and LB bathing with set-up level of assistance and PRN bathroom equipment.       Start:  05/06/25    Expected End:  05/20/25            Patient with complete upper body dressing with independent level of assistance donning and doffing all UE clothes with PRN adaptive equipment while edge of bed  and standing       Start:  05/06/25    Expected End:  05/20/25            Patient with complete lower body dressing with independent level of assistance donning and doffing all LE clothes  with PRN adaptive equipment while edge of bed  and standing       Start:  05/06/25    Expected End:  05/20/25            Patient will complete toileting including hygiene clothing management/hygiene with supervision level of assistance and PRN bathroom equipment.       Start:  05/06/25    Expected End:  05/20/25               BALANCE       Patient will maintain static/dynamic standing balance during ADL task with supervision level of assistance in order to demonstrate decreased risk of falling and improved postural control.       Start:  05/06/25    Expected End:  05/20/25               MOBILITY       Patient will perform Functional mobility mod  Household distances/Community Distances with supervision level of assistance and least restrictive device in order to improve safety and functional mobility.       Start:  05/06/25    Expected End:  05/20/25               TRANSFERS       Patient will complete functional transfer to chair/BSC/toilet with least restrictive device with supervision level of assistance.       Start:  05/06/25    Expected End:  05/20/25

## 2025-05-06 NOTE — CARE PLAN
The patient's goals for the shift include      The clinical goals for the shift include pt will use call light appropriately to prevent falls and injuries tonight    Over the shift, the patient did  make progress toward the following goals. Pt did not have any nausea/diarrhea during shift. Melatonin effective for sleep aid.     Problem: Pain - Adult  Goal: Verbalizes/displays adequate comfort level or baseline comfort level  Outcome: Met     Problem: Safety - Adult  Goal: Free from fall injury  Outcome: Met     Problem: Fall/Injury  Goal: Not fall by end of shift  Outcome: Met  Goal: Be free from injury by end of the shift  Outcome: Met  Goal: Verbalize understanding of personal risk factors for fall in the hospital  Outcome: Met  Goal: Verbalize understanding of risk factor reduction measures to prevent injury from fall in the home  Outcome: Met  Goal: Use assistive devices by end of the shift  Outcome: Met  Goal: Pace activities to prevent fatigue by end of the shift  Outcome: Met

## 2025-05-06 NOTE — PROGRESS NOTES
"Pharmacy Medication History Review    Dinorah Horn \"Raquel\" is a 59 y.o. female admitted for Abdominal pain. Pharmacy reviewed the patient's [id number] medications and allergies for accuracy.    Sources used to confirm medication list: Medication Dispense History  Informant: Other  Informant credibility: MDJANIS used  Total number of adjustments: 0     The list below reflectives the updated PTA list. Please review each medication in order reconciliation for additional clarification and justification.  Medications Prior to Admission   Medication Sig Dispense Refill Last Dose/Taking    aspirin 81 mg EC tablet Take 1 tablet (81 mg) by mouth once daily.   5/5/2025    BLACK COHOSH ORAL Take by mouth.   5/5/2025    cholecalciferol (Vitamin D3) 25 MCG (1000 UT) capsule Take 1 capsule (25 mcg) by mouth once daily.   5/5/2025    cyanocobalamin (Vitamin B-12) 50 mcg tablet Take 1 tablet (50 mcg) by mouth once daily.   5/5/2025    ferrous sulfate, 325 mg ferrous sulfate, tablet Take 1 tablet (325 mg) by mouth once daily.   5/5/2025    hydroCHLOROthiazide (Microzide) 12.5 mg tablet Take 1 tablet (12.5 mg) by mouth once daily. 90 tablet 1 5/5/2025    lisinopril 20 mg tablet Take 1 tablet (20 mg) by mouth once daily. 90 tablet 1 5/5/2025    metFORMIN  mg 24 hr tablet Take one tablet twice daily 180 tablet 1 5/5/2025    metoprolol tartrate (Lopressor) 25 mg tablet Take 1 tablet (25 mg) by mouth 2 times a day. 180 tablet 1 5/5/2025    multivitamin tablet Take 1 tablet by mouth once daily.   5/5/2025    nitroglycerin (Nitrostat) 0.3 mg SL tablet Place 1 tablet (0.3 mg) under the tongue every 5 minutes if needed for chest pain.   More than a month    omeprazole (PriLOSEC) 40 mg DR capsule Take 1 capsule (40 mg) by mouth once daily in the morning. Take before meals. Do not crush or chew. 90 capsule 3 5/5/2025    rosuvastatin (Crestor) 20 mg tablet Take 1 tablet (20 mg) by mouth once daily. 90 tablet 1 5/4/2025 Bedtime    " traZODone (Desyrel) 50 mg tablet Take 0.5 tablets (25 mg) by mouth once daily at bedtime. 90 tablet 0 5/4/2025 Bedtime    venlafaxine (Effexor) 75 mg tablet TAKE ONE TABLET BY MOUTH DAILY IN THE MORNING 90 tablet 0 5/4/2025 Bedtime    venlafaxine XR (Effexor-XR) 150 mg 24 hr capsule TAKE ONE CAPSULE BY MOUTH ONCE DAILY. DO NOT CRUSH OR CHEW. 90 capsule 0 5/5/2025 Morning    hydroxychloroquine (Plaquenil) 200 mg tablet Take 1 tablet (200 mg) by mouth once daily. (Patient not taking: Reported on 5/5/2025) 90 tablet 1 Not Taking        The list below reflectives the updated allergy list. Please review each documented allergy for additional clarification and justification.  Allergies  Reviewed by Susan Espinal RN on 5/5/2025        Severity Reactions Comments    Cosentyx [secukinumab] Low Rash             Below are additional concerns with the patient's PTA list.  Pharmacy virtually reviewed medications using Medication Dispense History (MD). Pharmacy virtual review is accurate with nursing medication history, therefore patient not interviewed. Unable to verify OTCs.     Rosemarie Blunt CPhT  Medication History Pharmacy Technician  [facility]IL[facility] 8-4:30  Available via "[employer] Secure Chat  OR  [phone]

## 2025-05-06 NOTE — DISCHARGE INSTR - OTHER ORDERS
Thank you for choosing [facility] for your Health Care needs.  Also, thank you for allowing us to take you and your families preferences into account when determining your discharge plan.  Stay well!     You may receive a survey in the mail within the next couple weeks. Please take the time to complete it and return it. Your input is ALWAYS important to us. Thank you!  Your Care Transition Team - Raquel García  & Samantha      For questions about your medications listed on your discharge instructions, please call the Nurses Station at [phone].

## 2025-05-07 LAB
ALBUMIN SERPL BCP-MCNC: 4.7 G/DL (ref 3.4–5)
ALP SERPL-CCNC: 87 U/L (ref 33–136)
ALT SERPL W P-5'-P-CCNC: 34 U/L (ref 7–45)
ANION GAP SERPL CALC-SCNC: 12 MMOL/L (ref 10–20)
AST SERPL W P-5'-P-CCNC: 32 U/L (ref 9–39)
BACTERIA UR CULT: NORMAL
BILIRUB SERPL-MCNC: 0.4 MG/DL (ref 0–1.2)
BUN SERPL-MCNC: 22 MG/DL (ref 6–23)
CALCIUM SERPL-MCNC: 10.2 MG/DL (ref 8.6–10.3)
CHLORIDE SERPL-SCNC: 103 MMOL/L (ref 98–107)
CO2 SERPL-SCNC: 29 MMOL/L (ref 21–32)
CREAT SERPL-MCNC: 0.95 MG/DL (ref 0.5–1.05)
EGFRCR SERPLBLD CKD-EPI 2021: 69 ML/MIN/1.73M*2
ERYTHROCYTE [DISTWIDTH] IN BLOOD BY AUTOMATED COUNT: 11.9 % (ref 11.5–14.5)
FERRITIN SERPL-MCNC: 481 NG/ML (ref 8–150)
GLUCOSE BLD MANUAL STRIP-MCNC: 147 MG/DL (ref 74–99)
GLUCOSE BLD MANUAL STRIP-MCNC: 79 MG/DL (ref 74–99)
GLUCOSE BLD MANUAL STRIP-MCNC: 91 MG/DL (ref 74–99)
GLUCOSE BLD MANUAL STRIP-MCNC: 94 MG/DL (ref 74–99)
GLUCOSE SERPL-MCNC: 100 MG/DL (ref 74–99)
HCT VFR BLD AUTO: 35.5 % (ref 36–46)
HGB BLD-MCNC: 11.8 G/DL (ref 12–16)
HOLD SPECIMEN: NORMAL
IRON SATN MFR SERPL: 29 % (ref 25–45)
IRON SERPL-MCNC: 81 UG/DL (ref 35–150)
MCH RBC QN AUTO: 32 PG (ref 26–34)
MCHC RBC AUTO-ENTMCNC: 33.2 G/DL (ref 32–36)
MCV RBC AUTO: 96 FL (ref 80–100)
NRBC BLD-RTO: 0 /100 WBCS (ref 0–0)
PLATELET # BLD AUTO: 194 X10*3/UL (ref 150–450)
POTASSIUM SERPL-SCNC: 4.6 MMOL/L (ref 3.5–5.3)
PROT SERPL-MCNC: 7.5 G/DL (ref 6.4–8.2)
PROT SERPL-MCNC: 8 G/DL (ref 6.4–8.2)
RBC # BLD AUTO: 3.69 X10*6/UL (ref 4–5.2)
SODIUM SERPL-SCNC: 139 MMOL/L (ref 136–145)
TIBC SERPL-MCNC: 281 UG/DL (ref 240–445)
UIBC SERPL-MCNC: 200 UG/DL (ref 110–370)
WBC # BLD AUTO: 4.3 X10*3/UL (ref 4.4–11.3)

## 2025-05-07 PROCEDURE — 2500000004 HC RX 250 GENERAL PHARMACY W/ HCPCS (ALT 636 FOR OP/ED): Mod: JZ | Performed by: HOSPITALIST

## 2025-05-07 PROCEDURE — 83550 IRON BINDING TEST: CPT | Performed by: NURSE PRACTITIONER

## 2025-05-07 PROCEDURE — 82728 ASSAY OF FERRITIN: CPT | Performed by: NURSE PRACTITIONER

## 2025-05-07 PROCEDURE — 82947 ASSAY GLUCOSE BLOOD QUANT: CPT | Mod: 59

## 2025-05-07 PROCEDURE — 36415 COLL VENOUS BLD VENIPUNCTURE: CPT | Performed by: NURSE PRACTITIONER

## 2025-05-07 PROCEDURE — 2500000004 HC RX 250 GENERAL PHARMACY W/ HCPCS (ALT 636 FOR OP/ED): Mod: JZ | Performed by: NURSE PRACTITIONER

## 2025-05-07 PROCEDURE — 99233 SBSQ HOSP IP/OBS HIGH 50: CPT | Performed by: NURSE PRACTITIONER

## 2025-05-07 PROCEDURE — 97535 SELF CARE MNGMENT TRAINING: CPT | Mod: GO

## 2025-05-07 PROCEDURE — 85045 AUTOMATED RETICULOCYTE COUNT: CPT | Mod: GENLAB | Performed by: NURSE PRACTITIONER

## 2025-05-07 PROCEDURE — 83010 ASSAY OF HAPTOGLOBIN QUANT: CPT | Mod: GENLAB | Performed by: NURSE PRACTITIONER

## 2025-05-07 PROCEDURE — 82746 ASSAY OF FOLIC ACID SERUM: CPT | Mod: GENLAB | Performed by: NURSE PRACTITIONER

## 2025-05-07 PROCEDURE — 94760 N-INVAS EAR/PLS OXIMETRY 1: CPT

## 2025-05-07 PROCEDURE — 2500000002 HC RX 250 W HCPCS SELF ADMINISTERED DRUGS (ALT 637 FOR MEDICARE OP, ALT 636 FOR OP/ED): Performed by: NURSE PRACTITIONER

## 2025-05-07 PROCEDURE — 96361 HYDRATE IV INFUSION ADD-ON: CPT

## 2025-05-07 PROCEDURE — 85027 COMPLETE CBC AUTOMATED: CPT | Performed by: NURSE PRACTITIONER

## 2025-05-07 PROCEDURE — 2500000001 HC RX 250 WO HCPCS SELF ADMINISTERED DRUGS (ALT 637 FOR MEDICARE OP): Performed by: NURSE PRACTITIONER

## 2025-05-07 PROCEDURE — 87328 CRYPTOSPORIDIUM AG IA: CPT | Performed by: EMERGENCY MEDICINE

## 2025-05-07 PROCEDURE — 97161 PT EVAL LOW COMPLEX 20 MIN: CPT | Mod: GP | Performed by: PHYSICAL THERAPIST

## 2025-05-07 PROCEDURE — 2500000001 HC RX 250 WO HCPCS SELF ADMINISTERED DRUGS (ALT 637 FOR MEDICARE OP): Performed by: HOSPITALIST

## 2025-05-07 PROCEDURE — G0378 HOSPITAL OBSERVATION PER HR: HCPCS

## 2025-05-07 PROCEDURE — 80053 COMPREHEN METABOLIC PANEL: CPT | Performed by: NURSE PRACTITIONER

## 2025-05-07 PROCEDURE — 97116 GAIT TRAINING THERAPY: CPT | Mod: GP | Performed by: PHYSICAL THERAPIST

## 2025-05-07 PROCEDURE — 96372 THER/PROPH/DIAG INJ SC/IM: CPT | Performed by: HOSPITALIST

## 2025-05-07 PROCEDURE — 2500000002 HC RX 250 W HCPCS SELF ADMINISTERED DRUGS (ALT 637 FOR MEDICARE OP, ALT 636 FOR OP/ED): Performed by: HOSPITALIST

## 2025-05-07 PROCEDURE — 87329 GIARDIA AG IA: CPT | Performed by: EMERGENCY MEDICINE

## 2025-05-07 RX ORDER — CAPSAICIN 0.03 G/100G
CREAM TOPICAL 2 TIMES DAILY PRN
Status: DISCONTINUED | OUTPATIENT
Start: 2025-05-07 | End: 2025-05-11 | Stop reason: HOSPADM

## 2025-05-07 RX ORDER — CEFUROXIME AXETIL 250 MG/1
500 TABLET ORAL 2 TIMES DAILY
Status: DISCONTINUED | OUTPATIENT
Start: 2025-05-07 | End: 2025-05-09

## 2025-05-07 RX ORDER — MECLIZINE HCL 12.5 MG 12.5 MG/1
25 TABLET ORAL ONCE
Status: COMPLETED | OUTPATIENT
Start: 2025-05-07 | End: 2025-05-07

## 2025-05-07 RX ADMIN — ASPIRIN 81 MG: 81 TABLET, COATED ORAL at 08:45

## 2025-05-07 RX ADMIN — ENOXAPARIN SODIUM 40 MG: 40 INJECTION SUBCUTANEOUS at 08:46

## 2025-05-07 RX ADMIN — ROSUVASTATIN CALCIUM 20 MG: 10 TABLET, FILM COATED ORAL at 21:22

## 2025-05-07 RX ADMIN — TRAZODONE HYDROCHLORIDE 25 MG: 50 TABLET ORAL at 21:22

## 2025-05-07 RX ADMIN — VENLAFAXINE HYDROCHLORIDE 150 MG: 150 CAPSULE, EXTENDED RELEASE ORAL at 08:45

## 2025-05-07 RX ADMIN — LISINOPRIL 20 MG: 20 TABLET ORAL at 08:47

## 2025-05-07 RX ADMIN — METOPROLOL TARTRATE 25 MG: 25 TABLET, FILM COATED ORAL at 21:22

## 2025-05-07 RX ADMIN — MECLIZINE HYDROCHLORIDE 25 MG: 12.5 TABLET ORAL at 11:23

## 2025-05-07 RX ADMIN — CEFUROXIME AXETIL 500 MG: 250 TABLET, FILM COATED ORAL at 21:23

## 2025-05-07 RX ADMIN — VENLAFAXINE 75 MG: 75 TABLET ORAL at 21:22

## 2025-05-07 RX ADMIN — FERROUS SULFATE TAB 325 MG (65 MG ELEMENTAL FE) 325 MG: 325 (65 FE) TAB at 08:45

## 2025-05-07 RX ADMIN — Medication 10 MG: at 21:23

## 2025-05-07 RX ADMIN — Medication 25 MCG: at 08:46

## 2025-05-07 RX ADMIN — CEFUROXIME AXETIL 500 MG: 250 TABLET, FILM COATED ORAL at 11:23

## 2025-05-07 RX ADMIN — HYDROXYZINE HYDROCHLORIDE 25 MG: 25 TABLET ORAL at 21:22

## 2025-05-07 RX ADMIN — METOPROLOL TARTRATE 25 MG: 25 TABLET, FILM COATED ORAL at 08:47

## 2025-05-07 RX ADMIN — PANTOPRAZOLE SODIUM 40 MG: 40 TABLET, DELAYED RELEASE ORAL at 06:05

## 2025-05-07 RX ADMIN — SODIUM CHLORIDE 500 ML: 900 INJECTION, SOLUTION INTRAVENOUS at 12:54

## 2025-05-07 RX ADMIN — VITAM B12 50 MCG: 100 TAB at 08:46

## 2025-05-07 ASSESSMENT — PAIN - FUNCTIONAL ASSESSMENT
PAIN_FUNCTIONAL_ASSESSMENT: 0-10

## 2025-05-07 ASSESSMENT — PAIN SCALES - GENERAL
PAINLEVEL_OUTOF10: 0 - NO PAIN
PAINLEVEL_OUTOF10: 3
PAINLEVEL_OUTOF10: 3
PAINLEVEL_OUTOF10: 0 - NO PAIN
PAINLEVEL_OUTOF10: 0 - NO PAIN

## 2025-05-07 ASSESSMENT — COGNITIVE AND FUNCTIONAL STATUS - GENERAL
MOVING TO AND FROM BED TO CHAIR: A LITTLE
TOILETING: A LITTLE
MOBILITY SCORE: 17
STANDING UP FROM CHAIR USING ARMS: A LITTLE
HELP NEEDED FOR BATHING: A LITTLE
DAILY ACTIVITIY SCORE: 19
WALKING IN HOSPITAL ROOM: A LOT
DRESSING REGULAR LOWER BODY CLOTHING: A LITTLE
CLIMB 3 TO 5 STEPS WITH RAILING: TOTAL
DRESSING REGULAR UPPER BODY CLOTHING: A LITTLE
PERSONAL GROOMING: A LITTLE

## 2025-05-07 ASSESSMENT — ACTIVITIES OF DAILY LIVING (ADL)
BATHING_LEVEL_OF_ASSISTANCE: MINIMUM ASSISTANCE
HOME_MANAGEMENT_TIME_ENTRY: 32

## 2025-05-07 NOTE — CARE PLAN
The patient's goals for the shift include      The clinical goals for the shift include pt will remain free of fall/injury, will have orthostatic b/p's WNL. will have dewcreased pain with PRN pain med use, will have increased sleep with PRN atarax use, will have blood glucose levels WNL, will have all stool testing results negative    Pt remained safe, no fall or injury occurred. Pt was able to tolerate orthostatic b/p's lying and standing x 1 minute but could not stand 3 minutes due to weakness and tiredness. Pt rested in bed throughout night, did state relief of pain after PRN toradol but the PRN atarax relaxed her but she did not fall asllep until late in the night. HS blood glucose level was at 117, no insulin coverage was needed. Stool results for c-diff and pathogens were negative but additional stool testing has not been completed as of this tme.

## 2025-05-07 NOTE — PROGRESS NOTES
"Occupational Therapy    Occupational Therapy Treatment    Name: Dinorah Horn \"Pratibha"  MRN: [id number]  Department: [facility]  Room: [address] [place] [address]  Date: 05/07/25  Time Calculation  Start Time: 1206  Stop Time: 1238  Time Calculation (min): 32 min    Assessment:  OT Assessment: Good participation in self care with education on safety & use of compensatory techniques for increased ease with self care performance.  Improvement with LE bathing & LE dressing utilizing compensatory techniques, however the pt. required Rosanna & cues for standing balance during the stand phase of self care as she became dizzy & reported feeling nauseated with standing which resulted in her loss of balance at this time in standing. Pt. performed sit to stand transfers during self care with CGA & cues using a FWW.  Recommend moderate intensity level of continued care post discharge from the hospital as the pt. would likely have increased difficulty perofrming ADL's & IADL's in entirety at this time due to her limited tolerance for out of bed activity.  Prognosis: Good  Barriers to Discharge Home: Caregiver assistance, Physical needs  Caregiver Assistance: Caregiver assistance needed per identified barriers - however, level of patient's required assistance exceeds assistance available at home  Physical Needs: Stair navigation into home limited by function/safety, High falls risk due to function or environment  Evaluation/Treatment Tolerance: Limited by c/o nausea & dizziness  Medical Staff Made Aware: Yes  End of Session Communication: Bedside nurse  End of Session Patient Position: Bed, 2 rail up, Alarm on  Plan:  Treatment Interventions: ADL retraining, Functional transfer training, Endurance training, Patient/family training, Equipment evaluation/education, Neuromuscular reeducation, Compensatory technique education  OT Frequency: 3 times per week  OT Discharge Recommendations: Moderate intensity level of continued care  OT Recommended Transfer " Status: Assist of 1  OT - OK to Discharge: Yes Based on completed evaluation and care plan recommendations, no barriers to discharge to next site of care.      Subjective   Previous Visit Info:  OT Last Visit  OT Received On: 05/07/25  General:  General  Reason for Referral: Impaired self care skills & functional transfers due to hospitalization for dizziness, nausea, abdominal pain  Past Medical History Relevant to Rehab: GERD, HTN, UTI, osteoarthritis, hyperlipidemia, fibromyaligia, alcohol cirrhosis, heart disease  Family/Caregiver Present: No  Prior to Session Communication: Bedside nurse  Patient Position Received: Bed, 2 rail up, Alarm on  Preferred Learning Style: verbal, visual  Precautions:  Medical Precautions: Fall precautions  Precautions Comment: safety precautions, telemetry    Vital signs: Pre session: BP:106/70, HR:82 BPM & O2 saturation level:98%       Pain Assessment:  Pain Assessment  Pain Assessment: 0-10  0-10 (Numeric) Pain Score: 3  Pain Type: Chronic pain (in her back)  Pain Interventions: Repositioned  Response to Interventions: No change in pain    Objective   Cognition:  Overall Cognitive Status: Within Functional Limits  Arousal/Alertness: Appropriate responses to stimuli  Orientation Level: Oriented X4  Activities of Daily Living: Grooming  Grooming Level of Assistance: S set up from a sitting position due to the pt.'s impaired tolerance for standing at this time.  Grooming Where Assessed: Edge of bed    UE Bathing  UE Bathing Level of Assistance: S set up  UE Bathing Where Assessed: Edge of bed    LE Bathing  LE Bathing Level of Assistance: Minimum assistance & cues.  Pt. bathed her B LE's with Close SBA & cues for use of the foot to knee positioning technique.  LE Bathing Comments: Saranya & cues for standing balance while the pt. performed perineal hygiene care due to the pt. experiencing increased dizziness with minimal loss of balance during this.  Vc's for consistent use of one UE for  support for increased safety/to reduce her risk for falls.    UE Dressing  UE Dressing Level of Assistance: Minimum assistance to assist with donning a hospital gown  UE Dressing Where Assessed: Edge of bed    LE Dressing  LE Dressing: Yes  Sock Level of Assistance: Close SBA & cues to don B slipepr socks using the foot to knee technique.  Adult Briefs Level of Assistance: Close SBA & cues for pull up brief initiation & Rosanna & cues for balance while donning the brief over her hips due to c/o nausea & dizziness.  LE Dressing Where Assessed: Edge of bed  LE Dressing Comments: Vc's to sit down for increased safety when removing clothing rather than performing using a single leg stance.     Bed Mobility/Transfers: Bed Mobility 1  Bed Mobility 1: Supine to sitting  Level of Assistance 1: Modified independent  Bed Mobility Comments 1: sit to supine:modified independent    Transfers  Transfer: Yes  Transfer 1  Technique 1: Sit to stand, Stand to sit  Transfer Device 1: Walker, Gait belt  Transfer Level of Assistance 1: Contact guard & cues  Trials/Comments 1: mod cues for safety    Standing Balance:  Static Standing Balance  Static Standing-Level of Assistance: Rosanna & cues for safety with standing balance during self care with cues for unilateral support.    Outcome Measures:  [facility] Daily Activity  Putting on and taking off regular lower body clothing: A little  Bathing (including washing, rinsing, drying): A little  Putting on and taking off regular upper body clothing: A little  Toileting, which includes using toilet, bedpan or urinal: A little  Taking care of personal grooming such as brushing teeth: A little  Eating Meals: None  Daily Activity - Total Score: 19         and OT Adult Other Outcome Measures  4AT: 0/12     Patient's 4AT score:  Alertness: 0 - Normal (fully alert, but not agitated, throughout assessment)   AMT4: 0 - No mistakes   Attention: 0 - Achieves 7 months or more correctly   Acute change or  fluctuating course: 0 - No    4 AT Score: 0/12      4 AT is a standardized assessment completed with patient to assess delirium. The 4AT assesses 4 items including alertness, abbreviated mental test (AMT4), Attention, and acute change or fluctuating course.      The test is scored out of 12 points. Score indications are as follows:   0: delirium or severe cognitive impairment unlikely   (delirium is still possible if acute change or fluctuating course information is incomplete and prior cognitive status is not obtained)  1-3: possible cognitive impairment  4 or above: possible delirium +/- cognitive impairment      The 4AT is scored as follows:   Alertness:   Normal (fully alert, but not agitated, throughout assessment)0  Mild Sleepiness for <10 seconds after waking, then normal              0  Clearly Abnormal                                                                                 4     AMT4: (age, date of birth, place (name of hospital or building), current year)  No mistakes                                         0  1 mistake                                             1  2 or more mistakes/untestable2     Attention: (months of they year in backwards order starting with December)  Achieves 7 months or more correctly                                                 0  Starts but scores <7 months/refuses to start                                      1  Untestable (cannot start because unwell, drowsy, inattentive)2     Acute change or fluctuating Course: (change in cognition or mental function in last 2 wks and is still evident in last 24 hrs.   No                    0  Yes                   4     Education Documentation  Body Mechanics, taught by Sonia Hernandez OT at 5/7/2025  2:56 PM.  Learner: Patient  Readiness: Acceptance  Method: Explanation, Demonstration  Response: Needs Reinforcement, Demonstrated Understanding, Verbalizes Understanding  Comment: Education on safety with transfers, safety with  balance & compensation technques for self care.    ADL Training, taught by Sonia Hernandez OT at 5/7/2025  2:56 PM.  Learner: Patient  Readiness: Acceptance  Method: Explanation, Demonstration  Response: Needs Reinforcement, Demonstrated Understanding, Verbalizes Understanding  Comment: Education on safety with transfers, safety with balance & compensation technques for self care.        Goals:  Encounter Problems       Encounter Problems (Active)       ADLs       Patient will perform UB and LB bathing with set-up level of assistance and PRN bathroom equipment. (Progressing)       Start:  05/06/25    Expected End:  05/20/25            Patient with complete upper body dressing with independent level of assistance donning and doffing all UE clothes with PRN adaptive equipment while edge of bed  and standing       Start:  05/06/25    Expected End:  05/20/25            Patient with complete lower body dressing with independent level of assistance donning and doffing all LE clothes  with PRN adaptive equipment while edge of bed  and standing (Progressing)       Start:  05/06/25    Expected End:  05/20/25            Patient will complete toileting including hygiene clothing management/hygiene with supervision level of assistance and PRN bathroom equipment.       Start:  05/06/25    Expected End:  05/20/25               BALANCE       Patient will maintain static/dynamic standing balance during ADL task with supervision level of assistance in order to demonstrate decreased risk of falling and improved postural control.       Start:  05/06/25    Expected End:  05/20/25               MOBILITY       Patient will perform Functional mobility mod  Household distances/Community Distances with supervision level of assistance and least restrictive device in order to improve safety and functional mobility.       Start:  05/06/25    Expected End:  05/20/25               TRANSFERS       Patient will complete functional transfer to  chair/BSC/toilet with least restrictive device with supervision level of assistance.       Start:  05/06/25    Expected End:  05/20/25

## 2025-05-07 NOTE — CARE PLAN
The patient's goals for the shift include  Pt will tolerate sitting in chair today.     The clinical goals for the shift include Pt will deny N/V throughout shift.    Patient remained safe and stable throughout shift. VS were stable and pt denied pain. 500ml fluid bolus was administered for dizziness as well as meclizine. PT/OT worked with pt and pt sat in chair today. No other complaints at this time. Pt resting in bed with call light within reach.

## 2025-05-07 NOTE — PROGRESS NOTES
"Dinorah Horn \"Raquel\" is a 59 y.o. female on day 0 of admission presenting with Abdominal pain.    Subjective   Patient awakened from sleep, no distress noted. Patient reports still feeling dizzy. HA has improved. Mild tenderness to LUQ. Reports improved sensation of abdomen \"falling\" with standing. Patient inquiring when breakfast will be served and is able to tolerate PO intake. Patient unable to tolerate 3 minute orthostatic vital signs this morning and felt dizzy working with PT/OT. Rocephin completed, Ceftin PO, Meclizine, and NS bolus 500cc ordered.        Objective     Physical Exam  Constitutional:       General: She is not in acute distress.     Appearance: Normal appearance. She is not ill-appearing, toxic-appearing or diaphoretic.   HENT:      Head: Normocephalic and atraumatic.   Eyes:      Pupils: Pupils are equal, round, and reactive to light.   Cardiovascular:      Rate and Rhythm: Normal rate and regular rhythm.      Pulses: Normal pulses.      Heart sounds: Normal heart sounds. No murmur heard.     No friction rub. No gallop.   Pulmonary:      Effort: Pulmonary effort is normal. No respiratory distress.      Breath sounds: No stridor. No wheezing, rhonchi or rales.   Abdominal:      General: There is no distension.      Palpations: Abdomen is soft. There is no mass.      Tenderness: There is abdominal tenderness.      Comments: LUQ abdominal tenderness    Musculoskeletal:         General: No swelling, tenderness, deformity or signs of injury.      Cervical back: No rigidity or tenderness.      Right lower leg: No edema.      Left lower leg: No edema.   Skin:     General: Skin is warm and dry.      Capillary Refill: Capillary refill takes less than 2 seconds.   Neurological:      General: No focal deficit present.      Mental Status: She is alert and oriented to person, place, and time.      Cranial Nerves: No cranial nerve deficit.      Motor: No weakness.   Psychiatric:         Behavior: Behavior " "normal.         Last Recorded Vitals  Blood pressure 106/67, pulse 85, temperature 36 °C (96.8 °F), temperature source Temporal, resp. rate 13, height 1.753 m (5' 9\"), weight 83.5 kg (184 lb), SpO2 97%.  Intake/Output last 3 Shifts:  I/O last 3 completed shifts:  In: 740 (8.9 mL/kg) [P.O.:640; IV Piggyback:100]  Out: - (0 mL/kg)   Weight: 83.5 kg     Relevant Results    Current Facility-Administered Medications:     acetaminophen (Tylenol) tablet 650 mg, 650 mg, oral, q6h PRN, Ranjan King DO    alum-mag hydroxide-simeth (Mylanta) 200-200-20 mg/5 mL oral suspension 20 mL, 20 mL, oral, q4h PRN, Ranjan King DO    aspirin EC tablet 81 mg, 81 mg, oral, Daily, Ranjan King DO, 81 mg at 05/07/25 0845    benzocaine-menthol (Cepastat Sore Throat) lozenge 1 lozenge, 1 lozenge, Mouth/Throat, q2h PRN, Ranjan King DO    cefuroxime (Ceftin) tablet 500 mg, 500 mg, oral, BID, Alexus C Murray, APRN-CNP, 500 mg at 05/07/25 1123    cholecalciferol (Vitamin D-3) tablet 25 mcg, 25 mcg, oral, Daily, Ranjan King DO, 25 mcg at 05/07/25 0846    cyanocobalamin (Vitamin B-12) tablet 50 mcg, 50 mcg, oral, Daily, Ranjan King DO, 50 mcg at 05/07/25 0846    dextrose 50 % injection 12.5 g, 12.5 g, intravenous, q15 min PRN, Ranjan King DO    dextrose 50 % injection 25 g, 25 g, intravenous, q15 min PRN, Ranjan King DO    enoxaparin (Lovenox) syringe 40 mg, 40 mg, subcutaneous, Daily, Ranjan King DO, 40 mg at 05/07/25 0846    ferrous sulfate tablet 325 mg, 65 mg of iron, oral, Daily, Ranjan King DO, 325 mg at 05/07/25 0845    glucagon (Glucagen) injection 1 mg, 1 mg, intramuscular, q15 min PRN, Ranjan King DO    glucagon (Glucagen) injection 1 mg, 1 mg, intramuscular, q15 min PRN, Ranjan King DO    [Held by provider] hydroCHLOROthiazide (Microzide) capsule 12.5 mg, 12.5 mg, oral, Daily, Ranjan King DO    hydrOXYzine HCL (Atarax) tablet 25 mg, 25 mg, oral, Nightly PRN, NICOLE Thomas-CNP, 25 mg " at 05/06/25 2120    insulin lispro injection 0-10 Units, 0-10 Units, subcutaneous, Before meals & nightly, Ranjan King DO    lisinopril tablet 20 mg, 20 mg, oral, Daily, Ranjan King DO, 20 mg at 05/07/25 0847    loperamide (Imodium) capsule 2 mg, 2 mg, oral, 4x daily PRN, Ranjan King DO    melatonin tablet 10 mg, 10 mg, oral, Daily PRN, Ranjan King DO, 10 mg at 05/06/25 0032    [Held by provider] metFORMIN XR (Glucophage-XR) 24 hr tablet 500 mg, 500 mg, oral, BID, Ranjan King DO    metoprolol tartrate (Lopressor) tablet 25 mg, 25 mg, oral, BID, Ranjan King DO, 25 mg at 05/07/25 0847    ondansetron (Zofran) injection 4 mg, 4 mg, intravenous, q4h PRN, Ranjan King DO    pantoprazole (ProtoNix) EC tablet 40 mg, 40 mg, oral, Daily before breakfast, Ranjan King DO, 40 mg at 05/07/25 0605    polyethylene glycol (Glycolax, Miralax) packet 17 g, 17 g, oral, BID PRN, Ranjan King DO    rosuvastatin (Crestor) tablet 20 mg, 20 mg, oral, Nightly, Ranjan King DO, 20 mg at 05/06/25 2035    sodium chloride 0.9 % bolus 500 mL, 500 mL, intravenous, Once, Alexus Gao APRN-CNP    traZODone (Desyrel) tablet 25 mg, 25 mg, oral, Nightly, Ranjan King DO, 25 mg at 05/06/25 2035    venlafaxine (Effexor) tablet 75 mg, 75 mg, oral, Nightly, Rnajan King DO, 75 mg at 05/06/25 2035    venlafaxine XR (Effexor-XR) 24 hr capsule 150 mg, 150 mg, oral, Daily, Ranjan King DO, 150 mg at 05/07/25 0845   Latest Reference Range & Units 05/06/25 08:58 05/07/25 07:46   GLUCOSE 74 - 99 mg/dL 110 (H) 100 (H)   SODIUM 136 - 145 mmol/L 138 139   POTASSIUM 3.5 - 5.3 mmol/L 3.6 4.6   CHLORIDE 98 - 107 mmol/L 102 103   Bicarbonate 21 - 32 mmol/L 24 29   Anion Gap 10 - 20 mmol/L 16 12   Blood Urea Nitrogen 6 - 23 mg/dL 21 22   Creatinine 0.50 - 1.05 mg/dL 0.89 0.95   EGFR >60 mL/min/1.73m*2 74 69   Calcium 8.6 - 10.3 mg/dL 9.8 10.2   Albumin 3.4 - 5.0 g/dL 4.6 4.7   Alkaline Phosphatase 33 - 136 U/L 68 87   ALT  7 - 45 U/L 26 34   AST 9 - 39 U/L 25 32   Bilirubin Total 0.0 - 1.2 mg/dL 0.4 0.4   Total Protein 6.4 - 8.2 g/dL 7.5  7.7 8.0   (H): Data is abnormally high   Latest Reference Range & Units 05/05/25 15:57 05/06/25 08:58 05/07/25 07:46   WBC 4.4 - 11.3 x10*3/uL 6.7 4.8 4.3 (L)   nRBC 0.0 - 0.0 /100 WBCs 0.0 0.0 0.0   RBC 4.00 - 5.20 x10*6/uL 3.93 (L) 3.66 (L) 3.69 (L)   HEMOGLOBIN 12.0 - 16.0 g/dL 12.5 11.9 (L) 11.8 (L)   HEMATOCRIT 36.0 - 46.0 % 37.0 35.3 (L) 35.5 (L)   MCV 80 - 100 fL 94 96 96   MCH 26.0 - 34.0 pg 31.8 32.5 32.0   MCHC 32.0 - 36.0 g/dL 33.8 33.7 33.2   RED CELL DISTRIBUTION WIDTH 11.5 - 14.5 % 11.9 12.2 11.9   Platelets 150 - 450 x10*3/uL 223 196 194   Neutrophils % 40.0 - 80.0 % 73.4     Immature Granulocytes %, Automated 0.0 - 0.9 % 0.3     Lymphocytes % 13.0 - 44.0 % 17.2     Monocytes % 2.0 - 10.0 % 8.4     Eosinophils % 0.0 - 6.0 % 0.6     Basophils % 0.0 - 2.0 % 0.1     Neutrophils Absolute 1.20 - 7.70 x10*3/uL 4.92     Immature Granulocytes Absolute, Automated 0.00 - 0.70 x10*3/uL 0.02     Lymphocytes Absolute 1.20 - 4.80 x10*3/uL 1.15 (L)     Monocytes Absolute 0.10 - 1.00 x10*3/uL 0.56     Eosinophils Absolute 0.00 - 0.70 x10*3/uL 0.04     Basophils Absolute 0.00 - 0.10 x10*3/uL 0.01     (L): Data is abnormally low   XR cervical spine complete 4-5 views  Result Date: 5/6/2025  Mild-to-moderate spondylosis similar to the previous exam.   Signed by: Reji Chang 5/6/2025 5:07 PM Dictation workstation:   [id number]    XR thoracic spine 3 views  Result Date: 5/6/2025  Mild-to-moderate spondylosis similar to the prior exam, otherwise no acute findings.   Signed by: Reji Chang 5/6/2025 5:02 PM Dictation workstation:   [id number]    XR lumbar spine complete 4+ views  Result Date: 5/6/2025  Mild-to-moderate spondylosis.   Signed by: Reji Chang 5/6/2025 5:01 PM Dictation workstation:   [id number]    US right upper quadrant  Result Date: 5/6/2025  Very mildly increased hepatic echogenicity  which may indicate hepatic steatosis.   MACRO: None     Signed by: Francisoc Macias 5/6/2025 12:05 PM Dictation workstation:   [id number]    CT abdomen pelvis w IV contrast  Result Date: 5/5/2025  No acute abdominopelvic abnormality to explain patient's pain. Mild nodular contour of the hepatic capsule, in keeping with history of cirrhosis. Additional chronic and incidental findings as detailed above.   MACRO: None   Signed by: Macy Murray 5/5/2025 4:56 PM Dictation workstation:   [id number]    CT head wo IV contrast  Result Date: 5/5/2025  No evidence of acute cortical infarct or intracranial hemorrhage.   No evidence of intracranial hemorrhage or displaced skull fracture.   MACRO: None   Signed by: Joseph Schoenberger 5/5/2025 4:46 PM Dictation workstation:   [id number]                                      Assessment & Plan  Abdominal pain    Gastroesophageal reflux disease without esophagitis    Nausea and vomiting    Postural dizziness with near syncope    Hyperlipidemia    Type 2 diabetes mellitus without retinopathy (Multi)    Alcoholic cirrhosis (Multi)    Acute thoracic back pain    Diarrhea    UTI (urinary tract infection)    Primary hypertension    Hypercalcemia    # Abdominal pain  # Nausea and vomiting   # Diarrhea   # Alcoholic cirrhosis  # GERD   - CT Abdomen with IV contrast: No acute abdominopelvic abnormality to explain patient's pain. Mild nodular contour of the hepatic capsule, in keeping with history of  cirrhosis. Additional chronic and incidental findings as detailed above.  - US: Very mildly increased hepatic echogenicity which may indicate hepatic  steatosis.   - C-diff/ Pathogens negative   - NS 500cc bolus   -Continue Ferrous sulfate, Vitamin B-12, Vitamin D-3  - K 3.6  - Ova/ Parasite, Giardia, Cryptosporidiosis pending  - Zofran PRN  - Advance diet as tolerated   - GI consult appreciate recs        # UTI  - UA Leukocyte Esterase 250, WBC >50, 1+ Hyaline Casts  Protein 20  - Urine  Protein Electrophoresis pending   - Ceftriaxone discontinued   - Urine Culture contaminated  - WBC 4.8 > 4.3  - Ceftin 500mg BID x 3 days         # Postural dizziness with near syncope  - CTH: No evidence of acute cortical infarct or intracranial hemorrhage. No evidence of intracranial hemorrhage or displaced skull fracture  - Orthostatic vital signs: 110/73, 81 lying, 114/99, 84 sitting, 117/99, 99 standing   - NS 500cc bolus   - Telemetry  - Unable to tolerate 3 min orthostatic VS  - Dizziness with PT/OT  - NS bolus 500cc  - Meclizine           # Hyperlipidemia  # Type 2 diabetes mellitus without retinopathy  # Hypertension  - Lispro sliding scale   - Q 4 Accucheck  - Metformin held until 5/7  - Continue Crestor, Lisinopril, ASA, Metoprolol  - Serum protein electrophoresis pending  -  Consistent Carb Control 75 grams        # Acute thoracic back pain  - XR cervical, thoracic and lumbar spine  - Tylenol PRN      # Hypercalcemia   - Vitamin D pending   - PTH 30  - Calcium 10.6 > 9.8 > 10.2         GI ppx: PPI  DVT ppx: Lovenox      Fluids: As needed   Electrolytes: replace as needed  Nutrition: Carb control, advance diet as tolerated   Adjuncts: MALKA Gao, APRN-CNP

## 2025-05-07 NOTE — CONSULTS
"Nutrition Initial Assessment:   Nutrition Assessment    Reason for Assessment: Admission nursing screening    Patient is a 59 y.o. female presenting with abdominal pain, nausea, vomiting, diarrhea, and lightheadedness x 1 week. PMH of cirrhosis, CAD, depression, DM, GERD, HTN.    Patient was assessed virtually.    Nutrition History:  Food and Nutrient History: Pt reports she did not eat breakfast today. She ate half of a salad and soup for lunch today. She states over the past week she was only eating approximately once daily. At baseline, she eats 2 meals/day. Over the past several months she had been trying to eat healthier since getting a diagnosis of cirrhosis. She reports this had contributed to weight loss, but that she had noted a rapid loss of 10 lbs over the past week. UBW is 194 lbs approximately 6 months ago. She is receptive to trying Ensure while admitted. We discussed trying to get some nutrition in every 3-4 hours even if it is small.       Anthropometrics:  Height: 175.3 cm (5' 9\")   Weight: 83.5 kg (184 lb)   BMI (Calculated): 27.16  IBW/kg (Dietitian Calculated): 65.91 kg  Percent of IBW: 127 %                      Weight History:   Wt Readings from Last 10 Encounters:   05/05/25 83.5 kg (184 lb)   05/05/25 83.6 kg (184 lb 3.2 oz)   04/21/25 87.7 kg (193 lb 6.4 oz)   03/12/25 88.9 kg (196 lb)   02/20/25 88 kg (194 lb)   12/20/24 88 kg (194 lb)   11/27/24 89.4 kg (197 lb)   11/06/24 89.6 kg (197 lb 9.6 oz)   10/22/24 88.5 kg (195 lb)   10/03/24 87.5 kg (193 lb)         Weight Change %:  Weight History / % Weight Change: Noted a 6.1 kg LOSS (6.8% change) over the past 6 months    Nutrition Focused Physical Exam Findings:  Defer: remote assessment  Subcutaneous Fat Loss:      Muscle Wasting:     Edema:     Physical Findings:       Nutrition Significant Labs:  BMP Trend:   Results from last 7 days   Lab Units 05/07/25  0746 05/06/25  0858 05/05/25  1557   GLUCOSE mg/dL 100* 110* 98   CALCIUM mg/dL 10.2 " 9.8 10.6*   SODIUM mmol/L 139 138 136   POTASSIUM mmol/L 4.6 3.6 4.5   CO2 mmol/L 29 24 26   CHLORIDE mmol/L 103 102 98   BUN mg/dL 22 21 24*   CREATININE mg/dL 0.95 0.89 0.96        Nutrition Specific Medications:  Noted vitamin D3, vitamin B12, FeSO4, SSI, protonix, crestor    I/O:   Last BM Date: 05/05/25;      Dietary Orders (From admission, onward)       Start     Ordered    05/07/25 1400  Oral nutritional supplements  Until discontinued        Question Answer Comment   Deliver with Dinner    Select supplement: Ensure Plus High Protein        05/07/25 1359    05/06/25 1221  Adult diet Consistent Carb; CCD 75 gm/meal  Diet effective now        Question Answer Comment   Diet type Consistent Carb    Carb diet selection: CCD 75 gm/meal        05/06/25 1220    05/05/25 2100  May Participate in Room Service  ( ROOM SERVICE MAY PARTICIPATE)  Once        Question:  .  Answer:  Yes    05/05/25 2059                     Estimated Needs:   Total Energy Estimated Needs in 24 hours (kCal): 1977 kCal  Method for Estimating Needs: IBW  Total Protein Estimated Needs in 24 Hours (g): 65 g  Method for Estimating 24 Hour Protein Needs: IBW  Total Fluid Estimated Needs in 24 Hours (mL): 1977 mL  Method for Estimating 24 Hour Fluid Needs: IBW  Patient on Order Fluid Restriction: No        Nutrition Diagnosis        Nutrition Diagnosis  Patient has Nutrition Diagnosis: Yes  Diagnosis Status (1): New  Nutrition Diagnosis 1: Unintended weight loss  Related to (1): lighteheadedness, dizziness  As Evidenced by (1): pt reports decreased PO intakes due to upset stomach from lighteheadedness/dizziness, 6.8% weight loss over the past 6 months       Nutrition Interventions/Recommendations   Nutrition prescription for oral nutrition    Nutrition Recommendations:  Individualized Nutrition Prescription Provided for : Provide consistent carb diet. Provide Ensure Plus daily.    Nutrition Interventions/Goals:   Meals and Snacks:  Carbohydrate-modified diet  Goal: Continue current consistent carb diet as ordered  Medical Food Supplement: Commercial beverage medical food supplement therapy  Goal: Initiate orders for Ensure Plus daily (350 kcal, 20 gm protein total)      Education Documentation  Nutrition Related Education, taught by Erendira Correia RDN, GORDO at 5/7/2025  1:58 PM.  Learner: Patient  Readiness: Acceptance  Method: Explanation  Response: Verbalizes Understanding, Needs Reinforcement  Comment: Encouraged pt to eat every 3-4 hours to better meet her estimated nutrition needs. Encouraged protein intake.              Nutrition Monitoring and Evaluation   Food/Nutrient Related History Monitoring  Monitoring and Evaluation Plan: Intake / amount of food  Intake / Amount of food: Consumes at least 75% or more of meals/snacks/supplements    Anthropometric Measurements  Monitoring and Evaluation Plan: Body weight  Body Weight: Body weight - Maintain stable weight              Goal Status: New goal(s) identified    Time Spent (min): 60 minutes         05/07/25 at 2:00 PM - ERENDIRA CORREIA RDN, LD

## 2025-05-07 NOTE — PROGRESS NOTES
05/07/25 1202   Discharge Planning   Living Arrangements Spouse/significant other  ( is a  and gone during the week)   Support Systems Spouse/significant other;Children   Assistance Needed Patient alert and oriented; Patient lives with her  and pets in a 2 story house with basement. Bedroom and 1/2 bath is upstairs. Full bath on main floor.  a  and gone a lot. She says she is independent with ADLS, IADLS, ambulation and drives. She doesn't use home oxygen/CPAP. One of her kids should be able to pick her up on dc.   Type of Residence Private residence   Home or Post Acute Services Post acute facilities (Rehab/SNF/etc)  (Discussed MOD intensity recommendation- patient will consider it-she wants to discuss with her children prior to making a decision.)   Type of Post Acute Facility Services Rehab   Expected Discharge Disposition Home  (Home vs Rehab)   Does the patient need discharge transport arranged? No   Intensity of Service   Intensity of Service 0-30 min

## 2025-05-07 NOTE — PROGRESS NOTES
Physical Therapy    Physical Therapy Evaluation & Treatment    Patient Name: Raquel Horn  MRN: [id number]  [facility]  [facility]  Today's Date: 5/7/2025   Time Calculation  Start Time: 0945  Stop Time: 1020  Time Calculation (min): 35 min    Assessment/Plan   PT Assessment  PT Assessment Results: Decreased strength, Impaired balance, Decreased mobility, Decreased endurance  Rehab Prognosis: Good  Barriers to Discharge Home: Caregiver assistance, Physical needs  Caregiver Assistance: Caregiver assistance needed per identified barriers - however, level of patient's required assistance exceeds assistance available at home  Physical Needs: Stair navigation into home limited by function/safety, Ambulating household distances limited by function/safety  Evaluation/Treatment Tolerance:  (limited 2/2 dizziness)  Medical Staff Made Aware: Yes  Strengths: Ability to acquire knowledge  Barriers to Participation: Comorbidities  End of Session Communication: Bedside nurse  Assessment Comment: Pleasant  59 y.o presents with weakness, dizziness and abdominal discomfort. Pt. lives with  and is normally IND.  Evelyn Spicer (-). Pt. currently requires CGA and would benefit from additional PT to address above noted limitations and prevent further decline.  End of Session Patient Position: Alarm on   IP OR SWING BED PT PLAN  Inpatient or Swing Bed: Inpatient  PT Plan  Treatment/Interventions: Transfer training, Bed mobility, Gait training, Stair training, Balance training, Strengthening, Endurance training, Therapeutic exercise, Therapeutic activity, Home exercise program, Neuromuscular re-education  PT Plan: Ongoing PT  PT Frequency: 3 times per week  PT Discharge Recommendations: Moderate intensity level of continued care  Equipment Recommended upon Discharge: Wheeled walker  PT Recommended Transfer Status: Assist x1  PT - OK to Discharge: Yes Based on completed evaluation and care plan recommendations, no barriers  to discharge to next site of care         Subjective     General Visit Information:  General  Reason for Referral: impaired mobility, abdominal pain  Referred By: NICOLE Thomas-CNP  Past Medical History Relevant to Rehab: GERD, HTN, UTI, osteoarthritis, hyperlipidemia, fibromyaligia, alcohol cirrhosis, heart disease  Family/Caregiver Present: No  Prior to Session Communication: Bedside nurse  Patient Position Received: Bed, 3 rail up, Alarm on  General Comment: tr  Xray: Mild-to-moderate spondylosis   Home Living:  Home Living  Type of Home: House  Lives With: Spouse  Home Adaptive Equipment: None  Home Layout: Two level, Full bath main level, Bed/bath upstairs, Stairs to alternate level with rails  Alternate Level Stairs-Rails: Right  Alternate Level Stairs-Number of Steps: 12  Home Access: Stairs to enter without rails  Entrance Stairs-Rails: None  Entrance Stairs-Number of Steps: 3  Bathroom Shower/Tub: Tub/shower unit  Bathroom Toilet: Standard  Bathroom Equipment: None  Prior Level of Function:  Prior Function Per Pt/Caregiver Report  Level of [place]: Independent with ADLs and functional transfers, Independent with homemaking with ambulation  Ambulatory Assistance: Independent  Prior Function Comments:  (+drives, pt reports increased difficulty completing dressing/bathing for the past week 2/2 dizziness and nausea symptoms)  Precautions:  Precautions  Medical Precautions: Fall precautions     Date/Time Vitals Session Patient Position Pulse Resp SpO2 BP MAP (mmHg)    05/07/25 0846 --  --  --  --  --  106/67  --     05/07/25 0945 --  --  85  --  97 %  --  --              Objective   Pain:  Pain Assessment  Pain Assessment: 0-10  0-10 (Numeric) Pain Score: 3  Pain Type:  (chronic back pain)  Cognition:  Cognition  Overall Cognitive Status: Within Functional Limits  Arousal/Alertness: Appropriate responses to stimuli    General Assessments:  General Observation  General Observation: Evelyn Spicer  test completed with (-) results     Activity Tolerance  Endurance: Decreased tolerance for upright activites (limited 2/2 dizziness)    Sensation  Sensation Comment: denies deficits    Strength  Strength Comments: BLE: grossly 4/5  Strength  Strength Comments: BLE: grossly 4/5    Coordination  Movements are Fluid and Coordinated: Yes  Static Standing Balance  Static Standing-Comment/Number of Minutes: F+; with BUE support  Dynamic Standing Balance  Dynamic Standing-Comments: F+; with BUE support; recommend WW at this time 2/2 dizziness  Functional Assessments:  Bed Mobility 1  Bed Mobility 1: Supine to sitting, Sitting to supine  Level of Assistance 1: Modified independent    Transfers  Transfer: Yes  Transfer 1  Technique 1: Sit to stand, Stand to sit  Transfer Device 1: Walker, Gait belt  Transfer Level of Assistance 1: Contact guard    Ambulation/Gait Training  Ambulation/Gait Training Performed: Yes  Ambulation/Gait Training 1  Surface 1: Level tile  Device 1: Rolling walker  Gait Support Devices: Gait belt  Assistance 1: Contact guard  Quality of Gait 1: Decreased step length, Diminished heel strike  Comments/Distance (ft) 1: 20' x 2 (pt. had 1 episode of having to stop while amb. and almost collapsed. Pt. stated her stomach felt like it dropped and she became extremely dizzy. VC's for sequencing with WW.)  Extremity/Trunk Assessments:  RLE   RLE : Within Functional Limits  LLE   LLE : Within Functional Limits  Treatments:  Ambulation/Gait Training  Ambulation/Gait Training Performed: Yes  Ambulation/Gait Training 1  Surface 1: Level tile  Device 1: Rolling walker  Gait Support Devices: Gait belt  Assistance 1: Contact guard  Quality of Gait 1: Decreased step length, Diminished heel strike  Comments/Distance (ft) 1: 20' x 2 (pt. had 1 episode of having to stop while amb. and almost collapsed. Pt. stated her stomach felt like it dropped and she became extremely dizzy. VC's for sequencing with WW.)  Outcome  Measures:  [facility] Basic Mobility  Turning from your back to your side while in a flat bed without using bedrails: None  Moving from lying on your back to sitting on the side of a flat bed without using bedrails: None  Moving to and from bed to chair (including a wheelchair): A little  Standing up from a chair using your arms (e.g. wheelchair or bedside chair): A little  To walk in hospital room: A lot  Climbing 3-5 steps with railing: Total  Basic Mobility - Total Score: 17    Other Measures  5x Sit to Stand: difficulty performing 2/2 dizziness    Encounter Problems       Encounter Problems (Active)       Balance       STG - Maintains dynamic standing balance without upper extremity support with good balance        Start:  05/07/25    Expected End:  05/21/25               Mobility       LTG - Patient will ambulate community distance IND       Start:  05/07/25    Expected End:  05/21/25            STG - Patient will ascend and descend a flight of stairs with 1 rail IND       Start:  05/07/25    Expected End:  05/21/25               PT Transfers       Pt. will complete 5 STS <15 sec without UE support        Start:  05/07/25    Expected End:  05/21/25               Pain - Adult              Education Documentation  Mobility Training, taught by Massiel Hall, PT at 5/7/2025 10:35 AM.  Learner: Patient  Readiness: Acceptance  Method: Explanation  Response: Verbalizes Understanding  Comment: Educated pt. on PT POC    Education Comments  No comments found.

## 2025-05-08 ENCOUNTER — APPOINTMENT (OUTPATIENT)
Dept: GASTROENTEROLOGY | Facility: CLINIC | Age: 60
End: 2025-05-08
Payer: MEDICARE

## 2025-05-08 LAB
1,25(OH)2D SERPL-MCNC: 39.4 PG/ML (ref 19.9–79.3)
ALBUMIN MFR UR ELPH: 47.7 %
ALPHA1 GLOB MFR UR ELPH: 6.1 %
ALPHA2 GLOB MFR UR ELPH: 10.6 %
B-GLOBULIN MFR UR ELPH: 19.5 %
FOLATE SERPL-MCNC: >24 NG/ML
GAMMA GLOB MFR UR ELPH: 16.1 %
GLUCOSE BLD MANUAL STRIP-MCNC: 120 MG/DL (ref 74–99)
GLUCOSE BLD MANUAL STRIP-MCNC: 75 MG/DL (ref 74–99)
GLUCOSE BLD MANUAL STRIP-MCNC: 96 MG/DL (ref 74–99)
GLUCOSE BLD MANUAL STRIP-MCNC: 98 MG/DL (ref 74–99)
HAPTOGLOB SERPL NEPH-MCNC: 213 MG/DL (ref 30–200)
HGB RETIC QN: 34 PG (ref 28–38)
IMMATURE RETIC FRACTION: 6.4 %
PATH REVIEW-URINE PROTEIN ELECTROPHORESIS: NORMAL
RETICS #: 0.03 X10*6/UL (ref 0.02–0.08)
RETICS/RBC NFR AUTO: 0.8 % (ref 0.5–2)
URINE ELECTROPHORESIS COMMENT: NORMAL

## 2025-05-08 PROCEDURE — 96372 THER/PROPH/DIAG INJ SC/IM: CPT | Performed by: HOSPITALIST

## 2025-05-08 PROCEDURE — 99232 SBSQ HOSP IP/OBS MODERATE 35: CPT | Performed by: PHYSICIAN ASSISTANT

## 2025-05-08 PROCEDURE — 97112 NEUROMUSCULAR REEDUCATION: CPT | Mod: GP,CQ

## 2025-05-08 PROCEDURE — 97116 GAIT TRAINING THERAPY: CPT | Mod: GP,CQ

## 2025-05-08 PROCEDURE — G0378 HOSPITAL OBSERVATION PER HR: HCPCS

## 2025-05-08 PROCEDURE — 2500000002 HC RX 250 W HCPCS SELF ADMINISTERED DRUGS (ALT 637 FOR MEDICARE OP, ALT 636 FOR OP/ED): Performed by: HOSPITALIST

## 2025-05-08 PROCEDURE — 2500000001 HC RX 250 WO HCPCS SELF ADMINISTERED DRUGS (ALT 637 FOR MEDICARE OP): Performed by: NURSE PRACTITIONER

## 2025-05-08 PROCEDURE — 97530 THERAPEUTIC ACTIVITIES: CPT | Mod: GP,CQ

## 2025-05-08 PROCEDURE — 94760 N-INVAS EAR/PLS OXIMETRY 1: CPT

## 2025-05-08 PROCEDURE — 82947 ASSAY GLUCOSE BLOOD QUANT: CPT | Mod: 59

## 2025-05-08 PROCEDURE — 2500000004 HC RX 250 GENERAL PHARMACY W/ HCPCS (ALT 636 FOR OP/ED): Mod: JZ | Performed by: HOSPITALIST

## 2025-05-08 PROCEDURE — 2500000001 HC RX 250 WO HCPCS SELF ADMINISTERED DRUGS (ALT 637 FOR MEDICARE OP): Performed by: HOSPITALIST

## 2025-05-08 RX ADMIN — LISINOPRIL 20 MG: 20 TABLET ORAL at 08:27

## 2025-05-08 RX ADMIN — ENOXAPARIN SODIUM 40 MG: 40 INJECTION SUBCUTANEOUS at 08:27

## 2025-05-08 RX ADMIN — VENLAFAXINE HYDROCHLORIDE 150 MG: 150 CAPSULE, EXTENDED RELEASE ORAL at 08:27

## 2025-05-08 RX ADMIN — HYDROXYZINE HYDROCHLORIDE 25 MG: 25 TABLET ORAL at 21:02

## 2025-05-08 RX ADMIN — ASPIRIN 81 MG: 81 TABLET, COATED ORAL at 08:27

## 2025-05-08 RX ADMIN — METOPROLOL TARTRATE 25 MG: 25 TABLET, FILM COATED ORAL at 08:27

## 2025-05-08 RX ADMIN — CEFUROXIME AXETIL 500 MG: 250 TABLET, FILM COATED ORAL at 21:02

## 2025-05-08 RX ADMIN — ROSUVASTATIN CALCIUM 20 MG: 10 TABLET, FILM COATED ORAL at 21:02

## 2025-05-08 RX ADMIN — Medication 25 MCG: at 08:27

## 2025-05-08 RX ADMIN — CEFUROXIME AXETIL 500 MG: 250 TABLET, FILM COATED ORAL at 08:27

## 2025-05-08 RX ADMIN — Medication 10 MG: at 21:02

## 2025-05-08 RX ADMIN — TRAZODONE HYDROCHLORIDE 25 MG: 50 TABLET ORAL at 21:02

## 2025-05-08 RX ADMIN — VITAM B12 50 MCG: 100 TAB at 08:27

## 2025-05-08 RX ADMIN — PANTOPRAZOLE SODIUM 40 MG: 40 TABLET, DELAYED RELEASE ORAL at 06:12

## 2025-05-08 RX ADMIN — METOPROLOL TARTRATE 25 MG: 25 TABLET, FILM COATED ORAL at 21:02

## 2025-05-08 RX ADMIN — VENLAFAXINE 75 MG: 75 TABLET ORAL at 21:02

## 2025-05-08 RX ADMIN — FERROUS SULFATE TAB 325 MG (65 MG ELEMENTAL FE) 325 MG: 325 (65 FE) TAB at 08:27

## 2025-05-08 ASSESSMENT — COGNITIVE AND FUNCTIONAL STATUS - GENERAL
DAILY ACTIVITIY SCORE: 19
HELP NEEDED FOR BATHING: A LITTLE
TOILETING: A LITTLE
WALKING IN HOSPITAL ROOM: A LITTLE
MOVING TO AND FROM BED TO CHAIR: A LITTLE
CLIMB 3 TO 5 STEPS WITH RAILING: A LITTLE
DRESSING REGULAR LOWER BODY CLOTHING: A LITTLE
WALKING IN HOSPITAL ROOM: A LOT
HELP NEEDED FOR BATHING: A LITTLE
STANDING UP FROM CHAIR USING ARMS: A LITTLE
EATING MEALS: A LITTLE
DRESSING REGULAR UPPER BODY CLOTHING: A LITTLE
DRESSING REGULAR LOWER BODY CLOTHING: A LITTLE
WALKING IN HOSPITAL ROOM: A LITTLE
CLIMB 3 TO 5 STEPS WITH RAILING: A LOT
PERSONAL GROOMING: A LITTLE
TOILETING: A LITTLE
DAILY ACTIVITIY SCORE: 18
MOVING TO AND FROM BED TO CHAIR: A LITTLE
PERSONAL GROOMING: A LITTLE
MOBILITY SCORE: 20
DRESSING REGULAR UPPER BODY CLOTHING: A LITTLE
STANDING UP FROM CHAIR USING ARMS: A LITTLE
MOVING TO AND FROM BED TO CHAIR: A LITTLE
STANDING UP FROM CHAIR USING ARMS: A LITTLE
MOBILITY SCORE: 18
MOBILITY SCORE: 20
CLIMB 3 TO 5 STEPS WITH RAILING: A LITTLE

## 2025-05-08 ASSESSMENT — PAIN SCALES - GENERAL
PAINLEVEL_OUTOF10: 0 - NO PAIN

## 2025-05-08 ASSESSMENT — PAIN - FUNCTIONAL ASSESSMENT: PAIN_FUNCTIONAL_ASSESSMENT: 0-10

## 2025-05-08 NOTE — PROGRESS NOTES
"Subjective Data:  Dizziness improved s/p bolus.   No further diarrhea.   All other ROS negative    Overnight Events:    No acute events reported overnight.      Objective Data:  Last Recorded Vitals:  Vitals:    05/08/25 0400 05/08/25 0644 05/08/25 1103 05/08/25 1414   BP:  130/73 119/77 103/71   BP Location:  Right arm Left arm Left arm   Patient Position:  Lying  Lying   Pulse: 75 70 71 77   Resp:  16 17 18   Temp:  36.1 °C (97 °F)  36.4 °C (97.5 °F)   TempSrc:  Temporal  Temporal   SpO2: 97% 100% 97% 98%   Weight:       Height:           Last Labs:  CBC - 5/7/2025:  7:46 AM  4.3 11.8 194    35.5      CMP - 5/7/2025:  7:46 AM  10.2 8.0 32 --- 0.4   _ 4.7 34 87      PTT - 5/5/2025:  4:11 PM  1.1   12.1 30     TROPHS   Date/Time Value Ref Range Status   05/05/2025 05:23 PM 6 0 - 13 ng/L Final   05/05/2025 03:57 PM 7 0 - 13 ng/L Final     HGBA1C   Date/Time Value Ref Range Status   09/13/2024 11:04 AM 6.2 4.2 - 6.5 % Final   03/08/2024 11:29 AM 5.8 4.2 - 6.5 % Final      Last I/O:  I/O last 3 completed shifts:  In: 2290 (27.4 mL/kg) [P.O.:1740; IV Piggyback:550]  Out: - (0 mL/kg)   Weight: 83.5 kg     Past Cardiology Tests (Last 3 Years):  EKG:  ECG 12 lead 05/05/2025 (Preliminary)    Echo:  No results found for this or any previous visit from the past 1095 days.    Ejection Fractions:  No results found for: \"EF\"  Cath:  No results found for this or any previous visit from the past 1095 days.    Stress Test:  No results found for this or any previous visit from the past 1095 days.    Cardiac Imaging:  No results found for this or any previous visit from the past 1095 days.    No results found.  Results for orders placed or performed during the hospital encounter of 05/05/25 (from the past 24 hours)   POCT GLUCOSE   Result Value Ref Range    POCT Glucose 91 74 - 99 mg/dL   POCT GLUCOSE   Result Value Ref Range    POCT Glucose 96 74 - 99 mg/dL   POCT GLUCOSE   Result Value Ref Range    POCT Glucose 120 (H) 74 - 99 " mg/dL   POCT GLUCOSE   Result Value Ref Range    POCT Glucose 75 74 - 99 mg/dL       Inpatient Medications:  Scheduled Medications[1]  PRN Medications[2]  Continuous Medications[3]    Physical Exam:  Physical Exam  Constitutional:       General: She is not in acute distress.  HENT:      Head: Normocephalic.      Nose: Nose normal.      Mouth/Throat:      Mouth: Mucous membranes are moist.   Eyes:      Conjunctiva/sclera: Conjunctivae normal.   Neck:      Vascular: No carotid bruit.   Cardiovascular:      Rate and Rhythm: Normal rate and regular rhythm.      Pulses: Normal pulses.      Heart sounds: Normal heart sounds. No murmur heard.  Pulmonary:      Effort: Pulmonary effort is normal. No respiratory distress.      Breath sounds: Normal breath sounds.   Abdominal:      General: Abdomen is flat. Bowel sounds are normal.      Palpations: Abdomen is soft.   Musculoskeletal:         General: No swelling.      Cervical back: Neck supple.   Skin:     General: Skin is warm and dry.   Neurological:      General: No focal deficit present.      Mental Status: She is alert. Mental status is at baseline.   Psychiatric:         Mood and Affect: Mood normal.         Behavior: Behavior normal.            Assessment/Plan   # Abdominal pain, resolved  # Nausea and vomiting, resolved  # Diarrhea, resolved   # Alcoholic cirrhosis  # GERD   - CT Abdomen with IV contrast: No acute abdominopelvic abnormality to explain patient's pain. Mild nodular contour of the hepatic capsule, in keeping with history of  cirrhosis. Additional chronic and incidental findings as detailed above.  - US: Very mildly increased hepatic echogenicity which may indicate hepatic  steatosis.   - C-diff/ Pathogens negative   - s/p NS 500cc bolus   -Continue Ferrous sulfate, Vitamin B-12, Vitamin D-3  - K 3.6  - Ova/ Parasite, Giardia, Cryptosporidiosis pending  - Zofran PRN  - Advance diet as tolerated   - GI consult appreciate recs        # UTI  - UA Leukocyte  Esterase 250, WBC >50, 1+ Hyaline Casts  Protein 20  - Urine Protein Electrophoresis pending   - Ceftriaxone discontinued   - Urine Culture contaminated  - WBC 4.8 > 4.3  - Ceftin 500mg BID x 3 days         # Postural dizziness with near syncope  - CTH: No evidence of acute cortical infarct or intracranial hemorrhage. No evidence of intracranial hemorrhage or displaced skull fracture  - Orthostatic vital signs: 110/73, 81 lying, 114/99, 84 sitting, 117/99, 99 standing   - NS 500cc bolus   - Telemetry  - Unable to tolerate 3 min orthostatic VS  - Dizziness with PT/OT  - NS bolus 500cc  - Meclizine  - repeat orthos pending to ensure resolution    # Hyperlipidemia  # Type 2 diabetes mellitus without retinopathy  # Hypertension  - Lispro sliding scale   - Q 4 Accucheck  - Metformin held until 5/7  - Continue Crestor, Lisinopril, ASA, Metoprolol  - Serum protein electrophoresis pending  -  Consistent Carb Control 75 grams        # Acute thoracic back pain  - XR cervical, thoracic and lumbar spine  - Tylenol PRN      # Hypercalcemia   - Vitamin D pending   - PTH 30  - Calcium 10.6 > 9.8 > 10.2         GI ppx: PPI  DVT ppx: Lovenox      Fluids: As needed   Electrolytes: replace as needed  Nutrition: Carb control, advance diet as tolerated   Adjuncts: PIV   Dispo: d/c if orthostatic negative, O&P negative.       Reviewed and approved by CESILIA BOATENG on 5/8/25 at 6:34 PM.       Peripheral IV 05/05/25 20 G Right Antecubital (Active)   Site Assessment Clean;Dry;Intact 05/08/25 0900   Dressing Status Dry;Clean 05/08/25 0900   Number of days: 3       Code Status:  No Order    I spent  minutes in the professional and overall care of this patient.        Cesilia Boateng PA-C       [1]   Scheduled medications   Medication Dose Route Frequency    aspirin  81 mg oral Daily    cefuroxime  500 mg oral BID    cholecalciferol  25 mcg oral Daily    cyanocobalamin  50 mcg oral Daily    enoxaparin  40 mg subcutaneous Daily     ferrous sulfate  65 mg of iron oral Daily    [Held by provider] hydroCHLOROthiazide  12.5 mg oral Daily    insulin lispro  0-10 Units subcutaneous Before meals & nightly    lisinopril  20 mg oral Daily    [Held by provider] metFORMIN XR  500 mg oral BID    metoprolol tartrate  25 mg oral BID    pantoprazole  40 mg oral Daily before breakfast    rosuvastatin  20 mg oral Nightly    traZODone  25 mg oral Nightly    venlafaxine  75 mg oral Nightly    venlafaxine XR  150 mg oral Daily   [2]   PRN medications   Medication    alum-mag hydroxide-simeth    benzocaine-menthol    capsaicin    dextrose    dextrose    glucagon    glucagon    hydrOXYzine HCL    loperamide    melatonin    ondansetron    polyethylene glycol   [3]   Continuous Medications   Medication Dose Last Rate

## 2025-05-08 NOTE — PROGRESS NOTES
"Physical Therapy    Physical Therapy Treatment    Patient Name: Dinorah Horn \"Pratibha"  MRN: [id number]  Department: [facility]  [facility]  Today's Date: 5/8/2025  Time Calculation  Start Time: 1103  Stop Time: 1135  Time Calculation (min): 32 min         Assessment/Plan   PT Assessment  PT Assessment Results: Decreased strength, Impaired balance, Decreased mobility, Decreased endurance  Rehab Prognosis: Good  Barriers to Discharge Home: Caregiver assistance, Physical needs  Caregiver Assistance: Caregiver assistance needed per identified barriers - however, level of patient's required assistance exceeds assistance available at home  Physical Needs: Stair navigation into home limited by function/safety, Ambulating household distances limited by function/safety  Evaluation/Treatment Tolerance: Patient limited by fatigue  Medical Staff Made Aware: Yes  Strengths: Ability to acquire knowledge, Premorbid level of function, Rehab experience  Barriers to Participation: Comorbidities, Support of Caregivers  End of Session Communication: Bedside nurse  Assessment Comment: Pt cont to have episodes of dizziness during gait with unsafe NBOS and feet crossing midline during gait trials with mod a x 2 needed to find and regain midline control. Pt required min a x 1 standing up from bed due to lob and absent righting reactions noted during all episodes of lob.  Heavy reliance on FWW this date.  Pt BP taken with some drop and space like lights reported during periods of dizziness.  Nurse notified. Pt contto require skilled PT to improve b le strength and functional safety gains up on feet, reduce lob episodes and reduce risk for fallsand prevent further decline while here in hospital. Pt lef tup in chair, alarm on, call bell within reach and all needs addressed.  End of Session Patient Position: Alarm on, Up in chair     PT Plan  Treatment/Interventions: Bed mobility, Transfer training, Gait training, Neurodevelopmental " intervention, Endurance training, Therapeutic exercise, Therapeutic activity  PT Plan: Ongoing PT  PT Frequency: 3 times per week  PT Discharge Recommendations: Moderate intensity level of continued care  Equipment Recommended upon Discharge: Wheeled walker  PT Recommended Transfer Status: Assist x1  PT - OK to Discharge: Yes      General Visit Information:   PT  Visit  PT Received On: 05/08/25  Response to Previous Treatment: Patient reporting fatigue but able to participate.  General  Reason for Referral: impaired mobility, abdominal pain  Referred By: NICOLE Thomas-CNP  Past Medical History Relevant to Rehab: GERD, HTN, UTI, osteoarthritis, hyperlipidemia, fibromyaligia, alcohol cirrhosis, heart disease  Family/Caregiver Present: No  Prior to Session Communication: Bedside nurse  Patient Position Received: Bed, 2 rail up, Alarm on  Preferred Learning Style: verbal, visual  General Comment: Pt waking up and agreeable to therapy. Reports no pain just very tired. Pt cleared by nursing prior to session    Subjective I am feeling off still.   Precautions:  Precautions  Medical Precautions: Fall precautions  Precautions Comment: safety precautions, telemetry     Date/Time Vitals Session Patient Position Pulse Resp SpO2 BP MAP (mmHg)    05/08/25 1103 During PT  --  71  17  97 %  119/77  --           Vital Signs Comment: Orthostatic BP taken  88/66 once standing, 105/73 after 1 min of standing     Objective   Pain:  Pain Assessment  Pain Assessment: 0-10  0-10 (Numeric) Pain Score: 0 - No pain  Cognition:  Cognition  Overall Cognitive Status: Within Functional Limits  Arousal/Alertness: Appropriate responses to stimuli  Orientation Level: Oriented X4  Coordination:  Movements are Fluid and Coordinated: Yes      Activity Tolerance:  Activity Tolerance  Endurance: Tolerates 10 - 20 min exercise with multiple rests  Treatments:            Balance/Neuromuscular Re-Education  Balance/Neuromuscular Re-Education  Activity Performed: Yes  Balance/Neuromuscular Re-Education Activity 1: 3 trials of static standing b ue on fww- orthostatic BP taken in sitting then in standing .... able to stand 2-3 min with SUP this date    Bed Mobility  Bed Mobility: Yes  Bed Mobility 1  Bed Mobility 1: Supine to sitting  Level of Assistance 1: Modified independent  Bed Mobility Comments 1: good use of bedrail if needed.    Ambulation/Gait Training  Ambulation/Gait Training Performed: Yes  Ambulation/Gait Training 1  Surface 1: Level tile  Device 1: Rolling walker  Gait Support Devices: Gait belt  Assistance 1: Contact guard, Moderate assistance  Quality of Gait 1: Narrow base of support, Diminished heel strike, Inconsistent stride length, Decreased step length, Foot drop/steppage gait, Forward flexed posture, Listing, Ataxic  Comments/Distance (ft) 1: 40-50 ft gait x 2 trials with CGA at times and varying to mod A x 1 with NBOS and feet crosssing over midline x 2 with reports of severe dizziness during second walk and required mod a x 2 to regain midline after listing and feet crossing midline lob with absent righting reactions noted  Transfers  Transfer: Yes  Transfer 1  Transfer From 1: Bed to  Transfer to 1: Stand  Technique 1: Sit to stand  Transfer Device 1: Walker, Gait belt  Transfer Level of Assistance 1: Minimum assistance  Trials/Comments 1: good ue push off, unsafe retro lean and lob to R  min a to regain midline.  Transfers 2  Transfer From 2: Chair with arms to  Transfer to 2: Stand  Technique 2: Sit to stand, Stand to sit  Transfer Device 2: Walker, Gait belt  Transfer Level of Assistance 2: Contact guard  Trials/Comments 2: good ue sequencing but dizziness and delayed righting reactions         Outcome Measures:  [facility] Basic Mobility  Turning from your back to your side while in a flat bed without using bedrails: None  Moving from lying on your back to sitting on the side of a flat bed without using bedrails: None  Moving to and  from bed to chair (including a wheelchair): A little  Standing up from a chair using your arms (e.g. wheelchair or bedside chair): A little  To walk in hospital room: A lot  Climbing 3-5 steps with railing: A lot  Basic Mobility - Total Score: 18    Education Documentation  Mobility Training, taught by Sondra Cunningham PTA at 5/8/2025 12:03 PM.  Learner: Patient  Readiness: Acceptance  Method: Explanation  Response: Verbalizes Understanding  Comment: Education on safer gait mechanics and righting reactions to reduce risk for falls .    Education Comments  No comments found.        Encounter Problems       Encounter Problems (Active)       Balance       STG - Maintains dynamic standing balance without upper extremity support with good balance  (Progressing)       Start:  05/07/25    Expected End:  05/21/25               Mobility       LTG - Patient will ambulate community distance IND (Progressing)       Start:  05/07/25    Expected End:  05/21/25            STG - Patient will ascend and descend a flight of stairs with 1 rail IND       Start:  05/07/25    Expected End:  05/21/25               PT Transfers       Pt. will complete 5 STS <15 sec without UE support  (Progressing)       Start:  05/07/25    Expected End:  05/21/25               Pain - Adult

## 2025-05-08 NOTE — CARE PLAN
The patient's goals for the shift include      The clinical goals for the shift include no N/V this shift    Over the shift, the patient did  make progress toward the following goals. No n/V this shift and pt denies pain

## 2025-05-09 ENCOUNTER — APPOINTMENT (OUTPATIENT)
Dept: RADIOLOGY | Facility: HOSPITAL | Age: 60
End: 2025-05-09
Payer: MEDICARE

## 2025-05-09 ENCOUNTER — APPOINTMENT (OUTPATIENT)
Dept: ORTHOPEDIC SURGERY | Facility: CLINIC | Age: 60
End: 2025-05-09
Payer: MEDICARE

## 2025-05-09 LAB
ALBUMIN SERPL BCP-MCNC: 4.7 G/DL (ref 3.4–5)
ALBUMIN: 4.4 G/DL (ref 3.4–5)
ALP SERPL-CCNC: 89 U/L (ref 33–136)
ALPHA 1 GLOBULIN: 0.3 G/DL (ref 0.2–0.6)
ALPHA 2 GLOBULIN: 0.8 G/DL (ref 0.4–1.1)
ALT SERPL W P-5'-P-CCNC: 57 U/L (ref 7–45)
ANION GAP SERPL CALC-SCNC: 12 MMOL/L (ref 10–20)
AST SERPL W P-5'-P-CCNC: 51 U/L (ref 9–39)
ATRIAL RATE: 77 BPM
BETA GLOBULIN: 1 G/DL (ref 0.5–1.2)
BILIRUB SERPL-MCNC: 0.3 MG/DL (ref 0–1.2)
BUN SERPL-MCNC: 16 MG/DL (ref 6–23)
CALCIUM SERPL-MCNC: 10.1 MG/DL (ref 8.6–10.3)
CHLORIDE SERPL-SCNC: 105 MMOL/L (ref 98–107)
CO2 SERPL-SCNC: 29 MMOL/L (ref 21–32)
CREAT SERPL-MCNC: 0.87 MG/DL (ref 0.5–1.05)
EGFRCR SERPLBLD CKD-EPI 2021: 76 ML/MIN/1.73M*2
ERYTHROCYTE [DISTWIDTH] IN BLOOD BY AUTOMATED COUNT: 12 % (ref 11.5–14.5)
GAMMA GLOBULIN: 1.1 G/DL (ref 0.5–1.4)
GLUCOSE BLD MANUAL STRIP-MCNC: 106 MG/DL (ref 74–99)
GLUCOSE BLD MANUAL STRIP-MCNC: 111 MG/DL (ref 74–99)
GLUCOSE BLD MANUAL STRIP-MCNC: 77 MG/DL (ref 74–99)
GLUCOSE BLD MANUAL STRIP-MCNC: 80 MG/DL (ref 74–99)
GLUCOSE SERPL-MCNC: 97 MG/DL (ref 74–99)
HCT VFR BLD AUTO: 35.8 % (ref 36–46)
HGB BLD-MCNC: 11.7 G/DL (ref 12–16)
IMMUNOFIXATION COMMENT: NORMAL
MCH RBC QN AUTO: 31.8 PG (ref 26–34)
MCHC RBC AUTO-ENTMCNC: 32.7 G/DL (ref 32–36)
MCV RBC AUTO: 97 FL (ref 80–100)
NRBC BLD-RTO: 0 /100 WBCS (ref 0–0)
P AXIS: 69 DEGREES
P OFFSET: 198 MS
P ONSET: 135 MS
PATH REVIEW - SERUM IMMUNOFIXATION: NORMAL
PATH REVIEW-SERUM PROTEIN ELECTROPHORESIS: NORMAL
PLATELET # BLD AUTO: 196 X10*3/UL (ref 150–450)
POTASSIUM SERPL-SCNC: 4.5 MMOL/L (ref 3.5–5.3)
PR INTERVAL: 174 MS
PROT SERPL-MCNC: 7.6 G/DL (ref 6.4–8.2)
PROTEIN ELECTROPHORESIS COMMENT: NORMAL
Q ONSET: 222 MS
QRS COUNT: 13 BEATS
QRS DURATION: 100 MS
QT INTERVAL: 424 MS
QTC CALCULATION(BAZETT): 479 MS
QTC FREDERICIA: 460 MS
R AXIS: 51 DEGREES
RBC # BLD AUTO: 3.68 X10*6/UL (ref 4–5.2)
SODIUM SERPL-SCNC: 141 MMOL/L (ref 136–145)
T AXIS: 67 DEGREES
T OFFSET: 434 MS
VENTRICULAR RATE: 77 BPM
WBC # BLD AUTO: 4.2 X10*3/UL (ref 4.4–11.3)

## 2025-05-09 PROCEDURE — 85027 COMPLETE CBC AUTOMATED: CPT | Performed by: PHYSICIAN ASSISTANT

## 2025-05-09 PROCEDURE — 70551 MRI BRAIN STEM W/O DYE: CPT | Performed by: RADIOLOGY

## 2025-05-09 PROCEDURE — 96361 HYDRATE IV INFUSION ADD-ON: CPT

## 2025-05-09 PROCEDURE — 96372 THER/PROPH/DIAG INJ SC/IM: CPT | Performed by: HOSPITALIST

## 2025-05-09 PROCEDURE — G0378 HOSPITAL OBSERVATION PER HR: HCPCS

## 2025-05-09 PROCEDURE — 93880 EXTRACRANIAL BILAT STUDY: CPT

## 2025-05-09 PROCEDURE — 93880 EXTRACRANIAL BILAT STUDY: CPT | Performed by: STUDENT IN AN ORGANIZED HEALTH CARE EDUCATION/TRAINING PROGRAM

## 2025-05-09 PROCEDURE — 2500000004 HC RX 250 GENERAL PHARMACY W/ HCPCS (ALT 636 FOR OP/ED): Mod: JZ | Performed by: HOSPITALIST

## 2025-05-09 PROCEDURE — 2500000002 HC RX 250 W HCPCS SELF ADMINISTERED DRUGS (ALT 637 FOR MEDICARE OP, ALT 636 FOR OP/ED): Performed by: HOSPITALIST

## 2025-05-09 PROCEDURE — 2500000001 HC RX 250 WO HCPCS SELF ADMINISTERED DRUGS (ALT 637 FOR MEDICARE OP): Performed by: NURSE PRACTITIONER

## 2025-05-09 PROCEDURE — 80053 COMPREHEN METABOLIC PANEL: CPT | Performed by: PHYSICIAN ASSISTANT

## 2025-05-09 PROCEDURE — 97535 SELF CARE MNGMENT TRAINING: CPT | Mod: GO

## 2025-05-09 PROCEDURE — 2500000001 HC RX 250 WO HCPCS SELF ADMINISTERED DRUGS (ALT 637 FOR MEDICARE OP): Performed by: HOSPITALIST

## 2025-05-09 PROCEDURE — 70551 MRI BRAIN STEM W/O DYE: CPT

## 2025-05-09 PROCEDURE — 36415 COLL VENOUS BLD VENIPUNCTURE: CPT | Performed by: PHYSICIAN ASSISTANT

## 2025-05-09 PROCEDURE — 99233 SBSQ HOSP IP/OBS HIGH 50: CPT | Performed by: NURSE PRACTITIONER

## 2025-05-09 PROCEDURE — 82947 ASSAY GLUCOSE BLOOD QUANT: CPT

## 2025-05-09 PROCEDURE — 94760 N-INVAS EAR/PLS OXIMETRY 1: CPT

## 2025-05-09 PROCEDURE — 2500000004 HC RX 250 GENERAL PHARMACY W/ HCPCS (ALT 636 FOR OP/ED): Mod: JZ

## 2025-05-09 RX ORDER — CEFUROXIME AXETIL 250 MG/1
500 TABLET ORAL 2 TIMES DAILY
Status: DISCONTINUED | OUTPATIENT
Start: 2025-05-09 | End: 2025-05-11 | Stop reason: HOSPADM

## 2025-05-09 RX ORDER — SODIUM CHLORIDE 9 MG/ML
INJECTION, SOLUTION INTRAVENOUS
Status: COMPLETED
Start: 2025-05-09 | End: 2025-05-09

## 2025-05-09 RX ADMIN — HYDROXYZINE HYDROCHLORIDE 25 MG: 25 TABLET ORAL at 20:58

## 2025-05-09 RX ADMIN — ROSUVASTATIN CALCIUM 20 MG: 10 TABLET, FILM COATED ORAL at 20:51

## 2025-05-09 RX ADMIN — TRAZODONE HYDROCHLORIDE 25 MG: 50 TABLET ORAL at 20:49

## 2025-05-09 RX ADMIN — LISINOPRIL 20 MG: 20 TABLET ORAL at 08:13

## 2025-05-09 RX ADMIN — PANTOPRAZOLE SODIUM 40 MG: 40 TABLET, DELAYED RELEASE ORAL at 06:40

## 2025-05-09 RX ADMIN — FERROUS SULFATE TAB 325 MG (65 MG ELEMENTAL FE) 325 MG: 325 (65 FE) TAB at 08:13

## 2025-05-09 RX ADMIN — CEFUROXIME AXETIL 500 MG: 250 TABLET, FILM COATED ORAL at 20:49

## 2025-05-09 RX ADMIN — ASPIRIN 81 MG: 81 TABLET, COATED ORAL at 08:13

## 2025-05-09 RX ADMIN — SODIUM CHLORIDE 500 ML: 900 INJECTION, SOLUTION INTRAVENOUS at 11:22

## 2025-05-09 RX ADMIN — VITAM B12 50 MCG: 100 TAB at 08:13

## 2025-05-09 RX ADMIN — METOPROLOL TARTRATE 25 MG: 25 TABLET, FILM COATED ORAL at 08:13

## 2025-05-09 RX ADMIN — CEFUROXIME AXETIL 500 MG: 250 TABLET, FILM COATED ORAL at 08:12

## 2025-05-09 RX ADMIN — VENLAFAXINE HYDROCHLORIDE 150 MG: 150 CAPSULE, EXTENDED RELEASE ORAL at 08:12

## 2025-05-09 RX ADMIN — VENLAFAXINE 75 MG: 75 TABLET ORAL at 20:50

## 2025-05-09 RX ADMIN — METOPROLOL TARTRATE 25 MG: 25 TABLET, FILM COATED ORAL at 20:50

## 2025-05-09 RX ADMIN — ENOXAPARIN SODIUM 40 MG: 40 INJECTION SUBCUTANEOUS at 08:13

## 2025-05-09 RX ADMIN — Medication 25 MCG: at 08:13

## 2025-05-09 RX ADMIN — Medication 10 MG: at 20:59

## 2025-05-09 ASSESSMENT — PAIN - FUNCTIONAL ASSESSMENT
PAIN_FUNCTIONAL_ASSESSMENT: 0-10

## 2025-05-09 ASSESSMENT — COGNITIVE AND FUNCTIONAL STATUS - GENERAL
DAILY ACTIVITIY SCORE: 18
DRESSING REGULAR LOWER BODY CLOTHING: A LITTLE
TOILETING: A LITTLE
DRESSING REGULAR UPPER BODY CLOTHING: A LITTLE
DRESSING REGULAR UPPER BODY CLOTHING: A LITTLE
MOBILITY SCORE: 20
TOILETING: A LITTLE
PERSONAL GROOMING: A LITTLE
HELP NEEDED FOR BATHING: A LITTLE
STANDING UP FROM CHAIR USING ARMS: A LITTLE
WALKING IN HOSPITAL ROOM: A LITTLE
CLIMB 3 TO 5 STEPS WITH RAILING: A LITTLE
EATING MEALS: A LITTLE
DRESSING REGULAR LOWER BODY CLOTHING: A LITTLE
PERSONAL GROOMING: A LITTLE
HELP NEEDED FOR BATHING: A LITTLE
DAILY ACTIVITIY SCORE: 19
MOVING TO AND FROM BED TO CHAIR: A LITTLE

## 2025-05-09 ASSESSMENT — ACTIVITIES OF DAILY LIVING (ADL)
HOME_MANAGEMENT_TIME_ENTRY: 30
BATHING_LEVEL_OF_ASSISTANCE: MINIMUM ASSISTANCE

## 2025-05-09 ASSESSMENT — ENCOUNTER SYMPTOMS
NAUSEA: 1
ANAL BLEEDING: 0
DIARRHEA: 1
VOMITING: 1
ABDOMINAL PAIN: 0
CONSTIPATION: 1
DIZZINESS: 1
BLOOD IN STOOL: 0

## 2025-05-09 ASSESSMENT — PAIN SCALES - GENERAL
PAINLEVEL_OUTOF10: 0 - NO PAIN

## 2025-05-09 NOTE — CARE PLAN
The patient's goals for the shift include to get some sleep.     The clinical goals for the shift include Patient to remain free of N/V/D by end of shift    Over the shift, the patient did not make progress toward the following goals.       Problem: Discharge Planning  Goal: Discharge to home or other facility with appropriate resources  Outcome: Not Progressing     Problem: Chronic Conditions and Co-morbidities  Goal: Patient's chronic conditions and co-morbidity symptoms are monitored and maintained or improved  Outcome: Not Progressing     Problem: Fall/Injury  Goal: Verbalize understanding of risk factor reduction measures to prevent injury from fall in the home  Outcome: Not Progressing

## 2025-05-09 NOTE — PROGRESS NOTES
"Occupational Therapy    Occupational Therapy Treatment    Name: Dinorah Horn \"Pratibha"  MRN: [id number]  Department: [facility]  Room: [address] [place] [address]  Date: 05/09/25  Time Calculation  Start Time: 0940  Stop Time: 1010  Time Calculation (min): 30 min    Assessment:  OT Assessment: Pt is a 60 yo F referred to occupational therapy for impaired self-care and functional mobility 2/2 hospitalization for abdominal pain. Pt demonstrates some progress towards goals this date. Pt completed UE dressing and bathing w/ setup and required CGA for standing balance to complete LE dressing and bathing. Pt w/ some small LOB in standing 2/2 dizziness and requiring min A to correct. Pt required CGA for STS from bed/chair/toilet. Pt would continue to benefit from OT services at the MOD intensity level to increase safety and functional independence.  Prognosis: Good  Barriers to Discharge Home: Caregiver assistance, Physical needs  Caregiver Assistance: Caregiver assistance needed per identified barriers - however, level of patient's required assistance exceeds assistance available at home  Physical Needs: Stair navigation into home limited by function/safety, High falls risk due to function or environment, 24hr ADL assistance needed, 24hr mobility assistance needed  Evaluation/Treatment Tolerance: Patient tolerated treatment well  Medical Staff Made Aware: Yes  End of Session Communication: Bedside nurse  End of Session Patient Position: Up in chair, Alarm on  Plan:  Treatment Interventions: ADL retraining, Functional transfer training, UE strengthening/ROM, Endurance training, Patient/family training, Equipment evaluation/education, Compensatory technique education  OT Frequency: 3 times per week  OT Discharge Recommendations: Moderate intensity level of continued care  OT Recommended Transfer Status: Stand by assist, Assist of 1  OT - OK to Discharge: Yes (Based on completed evaluation and care plan recommendations, no barriers to discharge to " next site of care)    Subjective   Previous Visit Info:  OT Last Visit  OT Received On: 05/09/25  General:  General  Reason for Referral: Pt is a 60 yo F referred to occupational therapy for impaired self-care and functional mobility 2/2 hospitalization for abdominal pain. Pt presented to [facility] w/ lightheadedvitaly, N/V/D  Referred By: Alexus Gao, APRN-CNP  Past Medical History Relevant to Rehab: GERD, HTN, UTI, osteoarthritis, hyperlipidemia, fibromyaligia, alcohol cirrhosis, heart disease  Family/Caregiver Present: No  Prior to Session Communication: Bedside nurse  Patient Position Received: Bed, 2 rail up, Alarm on  Preferred Learning Style: verbal, visual  General Comment: Pt pleasant and agreeable to therapy session. Pt w/ dizziness during session but good participation. Pt cleared by RN prior to session.  Precautions:  Medical Precautions: Fall precautions  Precautions Comment: safety precautions, tele, masimo     Date/Time Vitals Session Patient Position Pulse Resp SpO2 BP MAP (mmHg)    05/09/25 1224 --  --  69  16  98 %  112/69  83           Vital Signs Comment:   Orthostatic BP taken during session -   Supine 108/68   EOB - 125/71   Standing - 105/77    Pain Assessment:  Pain Assessment  Pain Assessment: 0-10  0-10 (Numeric) Pain Score: 0 - No pain    Objective   Cognition:  Overall Cognitive Status: Within Functional Limits  Arousal/Alertness: Appropriate responses to stimuli  Orientation Level: Oriented X4  Activities of Daily Living:    Grooming  Grooming Level of Assistance: Setup  Grooming Where Assessed: Chair  Grooming Comments: Setup assist to wash face and hands. Completed from seated position 2/2 increased dizziness in standing position. Pt also applied deodorant in seated position w/ setup    UE Bathing  UE Bathing Level of Assistance: Setup  UE Bathing Where Assessed:  (Chair)  UE Bathing Comments: Setup assist to wash arms/armpits/chest/stomach and min A to wash back    LE Bathing  LE  Bathing Level of Assistance: Minimum assistance  LE Bathing Where Assessed:  (Standing at chair)  LE Bathing Comments: Min A for standing balance to complete perineal hygiene standing at chair w/ FWW for balance. Minimal LOB during standing w/ min A to correct    UE Dressing  UE Dressing Level of Assistance: Setup  UE Dressing Where Assessed: Chair  UE Dressing Comments: Setup to doff/don hospital gown and min A to tying back of gown    LE Dressing  Adult Briefs Level of Assistance: Contact guard  LE Dressing Where Assessed: Chair  LE Dressing Comments: CGA to don brief and pull up over hips in standing. Small LOB in standing position 2/2 dizziness    Toileting  Toileting Level of Assistance: Contact guard  Where Assessed: Toilet  Toileting Comments: CGA for perienal hygiene on toilet and CGA for standing balance at toilet to pull brief up over hips    Bed Mobility/Transfers:   Bed Mobility  Bed Mobility: Yes  Bed Mobility 1  Bed Mobility 1: Supine to sitting  Level of Assistance 1: Modified independent    Transfers  Transfer: Yes  Transfer 1  Transfer From 1: Bed to  Transfer to 1: Stand  Technique 1: Sit to stand, Stand to sit  Transfer Device 1: Walker, Gait belt  Transfer Level of Assistance 1: Contact guard  Transfers 2  Transfer From 2: Chair with arms to  Transfer to 2: Stand  Technique 2: Sit to stand, Stand to sit  Transfer Device 2: Walker, Gait belt  Transfer Level of Assistance 2: Contact guard  Transfers 3  Transfer From 3: Toilet to  Transfer to 3: Stand  Technique 3: Sit to stand, Stand to sit  Transfer Device 3: Walker, Gait belt  Transfer Level of Assistance 3: Contact guard  Trials/Comments 3: 1 LOB noted upon standing form toilet w/ min A to correct 2/2 dizziness    Functional Mobility:  Functional Mobility  Functional Mobility Performed: Yes  Functional Mobility 1  Surface 1: Level tile  Device 1: Rolling walker  Assistance 1: Contact guard  Comments 1: Short functional mobility in room to  bathroom. NO LOB noted during functional mobility, pt reports some dizziness upon standing, resolved w/ rest breaks  Sitting Balance:  Static Sitting Balance  Static Sitting-Balance Support: Feet supported  Static Sitting-Level of Assistance: Distant supervision  Dynamic Sitting Balance  Dynamic Sitting-Balance Support: Feet supported  Dynamic Sitting-Level of Assistance: Distant supervision  Dynamic Sitting-Balance: Forward lean  Standing Balance:  Static Standing Balance  Static Standing-Balance Support: Bilateral upper extremity supported  Static Standing-Level of Assistance: Contact guard  Dynamic Standing Balance  Dynamic Standing-Balance Support: Bilateral upper extremity supported  Dynamic Standing-Level of Assistance: Contact guard  Dynamic Standing-Balance: Turning    Strength:  Strength  Strength Comments: BUE functionally 4+/5  RUE: Within Functional Limits   LUE: Within Functional Limits    Outcome Measures:  [facility] Daily Activity  Putting on and taking off regular lower body clothing: A little  Bathing (including washing, rinsing, drying): A little  Putting on and taking off regular upper body clothing: A little  Toileting, which includes using toilet, bedpan or urinal: A little  Taking care of personal grooming such as brushing teeth: A little  Eating Meals: None  Daily Activity - Total Score: 19    Education Documentation  Body Mechanics, taught by Nina Arias OT at 5/9/2025 12:56 PM.  Learner: Patient  Readiness: Acceptance  Method: Explanation  Response: Verbalizes Understanding  Comment: Pt educated on POC, DC recs, safety and body mechanics when completing transfers and ADLs    ADL Training, taught by Nina Arias OT at 5/9/2025 12:56 PM.  Learner: Patient  Readiness: Acceptance  Method: Explanation  Response: Verbalizes Understanding  Comment: Pt educated on POC, DC recs, safety and body mechanics when completing transfers and ADLs    Goals:  Encounter Problems       Encounter Problems  (Active)       ADLs       Patient will perform UB and LB bathing with set-up level of assistance and PRN bathroom equipment. (Progressing)       Start:  05/06/25    Expected End:  05/20/25            Patient with complete upper body dressing with independent level of assistance donning and doffing all UE clothes with PRN adaptive equipment while edge of bed  and standing (Progressing)       Start:  05/06/25    Expected End:  05/20/25            Patient with complete lower body dressing with independent level of assistance donning and doffing all LE clothes  with PRN adaptive equipment while edge of bed  and standing (Progressing)       Start:  05/06/25    Expected End:  05/20/25            Patient will complete toileting including hygiene clothing management/hygiene with supervision level of assistance and PRN bathroom equipment. (Progressing)       Start:  05/06/25    Expected End:  05/20/25               BALANCE       Patient will maintain static/dynamic standing balance during ADL task with supervision level of assistance in order to demonstrate decreased risk of falling and improved postural control. (Progressing)       Start:  05/06/25    Expected End:  05/20/25               MOBILITY       Patient will perform Functional mobility mod  Household distances/Community Distances with supervision level of assistance and least restrictive device in order to improve safety and functional mobility. (Progressing)       Start:  05/06/25    Expected End:  05/20/25               TRANSFERS       Patient will complete functional transfer to chair/BSC/toilet with least restrictive device with supervision level of assistance. (Progressing)       Start:  05/06/25    Expected End:  05/20/25

## 2025-05-09 NOTE — PROGRESS NOTES
"Dinorah Horn \"Raquel\" is a 59 y.o. female on day 0 of admission presenting with Abdominal pain.    Subjective   Patient lying in bed, no distress noted. Denies neck pain, denies CP, SOB, denies abdominal pain, denies diarrhea, denies N/V.  Continues to complain of dizziness and increased abdominal pain with movement and standing. Symptoms are relieved with lying down.        Objective     Physical Exam  Constitutional:       General: She is not in acute distress.     Appearance: Normal appearance. She is not ill-appearing or toxic-appearing.   HENT:      Head: Normocephalic and atraumatic.   Eyes:      General:         Right eye: No discharge.         Left eye: No discharge.      Pupils: Pupils are equal, round, and reactive to light.   Cardiovascular:      Rate and Rhythm: Normal rate and regular rhythm.      Pulses: Normal pulses.      Heart sounds: Normal heart sounds. No murmur heard.     No friction rub. No gallop.   Pulmonary:      Effort: Pulmonary effort is normal. No respiratory distress.      Breath sounds: Normal breath sounds. No stridor. No wheezing or rales.   Abdominal:      General: Abdomen is flat.      Palpations: Abdomen is soft.      Tenderness: There is abdominal tenderness.      Comments: Epigastric abdominal pain with palpation   Musculoskeletal:         General: No swelling, tenderness, deformity or signs of injury.      Cervical back: No rigidity or tenderness.      Right lower leg: No edema.      Left lower leg: No edema.   Skin:     General: Skin is warm and dry.      Capillary Refill: Capillary refill takes less than 2 seconds.   Neurological:      General: No focal deficit present.      Mental Status: She is alert and oriented to person, place, and time.   Psychiatric:         Mood and Affect: Mood normal.         Behavior: Behavior normal.         Last Recorded Vitals  Blood pressure 112/69, pulse 69, temperature 36.4 °C (97.5 °F), temperature source Temporal, resp. rate 16, height " "1.753 m (5' 9\"), weight 83.5 kg (184 lb), SpO2 98%.  Intake/Output last 3 Shifts:  I/O last 3 completed shifts:  In: 1840 (22 mL/kg) [P.O.:1840]  Out: 2 (0 mL/kg) [Urine:2 (0 mL/kg/hr)]  Weight: 83.5 kg     Relevant Results    Current Facility-Administered Medications:     alum-mag hydroxide-simeth (Mylanta) 200-200-20 mg/5 mL oral suspension 20 mL, 20 mL, oral, q4h PRN, Ranjan King DO    aspirin EC tablet 81 mg, 81 mg, oral, Daily, Ranjan King DO, 81 mg at 05/09/25 0813    benzocaine-menthol (Cepastat Sore Throat) lozenge 1 lozenge, 1 lozenge, Mouth/Throat, q2h PRN, Ranjan King DO    capsaicin (Zostrix) 0.025 % cream, , Topical, BID PRN, Alexus C Murray, APRN-CNP    cefuroxime (Ceftin) tablet 500 mg, 500 mg, oral, BID, Alexus C Murray, APRN-CNP, 500 mg at 05/09/25 0812    cholecalciferol (Vitamin D-3) tablet 25 mcg, 25 mcg, oral, Daily, Ranjan King DO, 25 mcg at 05/09/25 0813    cyanocobalamin (Vitamin B-12) tablet 50 mcg, 50 mcg, oral, Daily, Ranjan King DO, 50 mcg at 05/09/25 0813    dextrose 50 % injection 12.5 g, 12.5 g, intravenous, q15 min PRN, Ranjan King DO    dextrose 50 % injection 25 g, 25 g, intravenous, q15 min PRN, Ranjan King DO    enoxaparin (Lovenox) syringe 40 mg, 40 mg, subcutaneous, Daily, Ranjan King DO, 40 mg at 05/09/25 0813    ferrous sulfate tablet 325 mg, 65 mg of iron, oral, Daily, Ranjan King DO, 325 mg at 05/09/25 0813    glucagon (Glucagen) injection 1 mg, 1 mg, intramuscular, q15 min PRN, Ranjan King DO    glucagon (Glucagen) injection 1 mg, 1 mg, intramuscular, q15 min PRN, Ranjan King DO    [Held by provider] hydroCHLOROthiazide (Microzide) capsule 12.5 mg, 12.5 mg, oral, Daily, Ranjan King DO    hydrOXYzine HCL (Atarax) tablet 25 mg, 25 mg, oral, Nightly PRN, NICOLE Thomas-CNP, 25 mg at 05/08/25 2102    insulin lispro injection 0-10 Units, 0-10 Units, subcutaneous, Before meals & nightly, Ranjan King DO    lisinopril " tablet 20 mg, 20 mg, oral, Daily, Ranjan King DO, 20 mg at 05/09/25 0813    loperamide (Imodium) capsule 2 mg, 2 mg, oral, 4x daily PRN, Ranjan King DO    melatonin tablet 10 mg, 10 mg, oral, Daily PRN, Ranjan King DO, 10 mg at 05/08/25 2102    [Held by provider] metFORMIN XR (Glucophage-XR) 24 hr tablet 500 mg, 500 mg, oral, BID, Ranajn King DO    metoprolol tartrate (Lopressor) tablet 25 mg, 25 mg, oral, BID, Ranjan King DO, 25 mg at 05/09/25 0813    ondansetron (Zofran) injection 4 mg, 4 mg, intravenous, q4h PRN, Ranjan King DO    pantoprazole (ProtoNix) EC tablet 40 mg, 40 mg, oral, Daily before breakfast, Ranjan King DO, 40 mg at 05/09/25 0640    polyethylene glycol (Glycolax, Miralax) packet 17 g, 17 g, oral, BID PRN, Ranjan King DO    rosuvastatin (Crestor) tablet 20 mg, 20 mg, oral, Nightly, Ranjan King DO, 20 mg at 05/08/25 2102    traZODone (Desyrel) tablet 25 mg, 25 mg, oral, Nightly, Ranjan King DO, 25 mg at 05/08/25 2102    venlafaxine (Effexor) tablet 75 mg, 75 mg, oral, Nightly, Ranjan King DO, 75 mg at 05/08/25 2102    venlafaxine XR (Effexor-XR) 24 hr capsule 150 mg, 150 mg, oral, Daily, Ranjan King DO, 150 mg at 05/09/25 0812       Latest Reference Range & Units 05/07/25 07:46 05/07/25 08:04 05/09/25 06:24   GLUCOSE 74 - 99 mg/dL 100 (H)  97   SODIUM 136 - 145 mmol/L 139  141   POTASSIUM 3.5 - 5.3 mmol/L 4.6  4.5   CHLORIDE 98 - 107 mmol/L 103  105   Bicarbonate 21 - 32 mmol/L 29  29   Anion Gap 10 - 20 mmol/L 12  12   Blood Urea Nitrogen 6 - 23 mg/dL 22  16   Creatinine 0.50 - 1.05 mg/dL 0.95  0.87   EGFR >60 mL/min/1.73m*2 69  76   Calcium 8.6 - 10.3 mg/dL 10.2  10.1   Albumin 3.4 - 5.0 g/dL 4.7  4.7   Alkaline Phosphatase 33 - 136 U/L 87  89   ALT 7 - 45 U/L 34  57 (H)   AST 9 - 39 U/L 32  51 (H)   Bilirubin Total 0.0 - 1.2 mg/dL 0.4  0.3   FERRITIN 8 - 150 ng/mL 481 (H)     FOLATE >5.0 ng/mL  >24.0    Haptoglobin 30 - 200 mg/dL  213 (H)    Total  Protein 6.4 - 8.2 g/dL 8.0  7.6   IRON 35 - 150 ug/dL 81     TIBC 240 - 445 ug/dL 281     % Saturation 25 - 45 % 29     UIBC 110 - 370 ug/dL 200     (H): Data is abnormally high   Latest Reference Range & Units 05/07/25 07:46 05/07/25 08:04 05/09/25 06:24   FERRITIN 8 - 150 ng/mL 481 (H)     FOLATE >5.0 ng/mL  >24.0    Haptoglobin 30 - 200 mg/dL  213 (H)    Total Protein 6.4 - 8.2 g/dL 8.0  7.6   IRON 35 - 150 ug/dL 81     TIBC 240 - 445 ug/dL 281     % Saturation 25 - 45 % 29     UIBC 110 - 370 ug/dL 200     (H): Data is abnormally high  Carotid duplex bilateral  Result Date: 5/9/2025  Preliminary Cardiology Report           [place] [address]  Tel [phone] and Fax [phone]      Preliminary Vascular Lab Report  [facility] US CAROTID ARTERY DUPLEX BILATERAL  Patient Name:      AMANDA THOMPSON Reading Physician:  [id number] Olivier Purvis MD Study Date:        5/9/2025      Ordering Physician: [id number] DONTAE SHAVER MRN/PID:           [id number]      Technologist:       Kelsie Do RDMS, RVT Accession#:        [id number]  Technologist 2: Date of Birth/Age: 1965     Encounter#:         [id number] Gender:            F Admission Status:  Inpatient     Location Performed: [facility]  Diagnosis/ICD: Dizziness and giddiness-R42; Syncope and collapse-R55 Procedure/CPT: 91228 Cerebrovascular Carotid Duplex scan complete  Patient History: Syncope and HTN. Smoker:          Former. Diabetes:        Yes. 1-5 years.  PRELIMINARY CONCLUSIONS: Right Carotid: Findings are consistent with less than 50% stenosis of the right proximal internal carotid artery. Laminar flow seen by color Doppler. Right external carotid artery appears patent with no evidence of stenosis. No evidence of hemodynamically significant stenosis of the right common carotid artery. The right vertebral artery is patent with antegrade flow. No evidence of hemodynamically significant stenosis in the right  subclavian artery. Left Carotid: Findings are consistent with less than 50% stenosis of the left proximal internal carotid artery. Laminar flow seen by color Doppler. Left external carotid artery appears patent with no evidence of stenosis. No evidence of hemodynamically significant stenosis of the left common carotid artery. The left vertebral artery is patent with antegrade flow. No evidence of hemodynamically significant stenosis in the left subclavian artery.  Imaging & Doppler Findings: Right Plaque Morph: The proximal right internal carotid artery demonstrates calcified plaque. The mid right internal carotid artery demonstrates heterogenous and calcified plaque. The mid right common carotid artery demonstrates homogenous plaque. Left Plaque Morph: The proximal left internal carotid artery demonstrates calcified plaque.   Right                        Left   PSV      EDV                PSV       cm/s 20 cm/s   CCA P    109 cm/s 44 cm/s 89 cm/s  39 cm/s   CCA D    100 cm/s 39 cm/s 63 cm/s  22 cm/s   ICA P    83 cm/s  34 cm/s 96 cm/s  47 cm/s   ICA M    88 cm/s  43 cm/s 109 cm/s 59 cm/s   ICA D    93 cm/s  45 cm/s 88 cm/s  21 cm/s    ECA     84 cm/s  13 cm/s 37 cm/s  15 cm/s Vertebral  47 cm/s  18 cm/s 117 cm/s         Subclavian 110 cm/s                Right Left ICA/CCA Ratio  0.7  0.8   VASCULAR PRELIMINARY REPORT completed by Kelsie Do RDMS, RVT on 5/9/2025 at 11:33:21 AM  ** Final **     MR brain wo IV contrast  Result Date: 5/9/2025  Interpreted By:  Massiel Mac, STUDY: MR BRAIN WO IV CONTRAST;  5/9/2025 10:51 am   INDICATION: Signs/Symptoms:Dizziness.     COMPARISON: CT head 05/05/2025   ACCESSION NUMBER(S): [id number]   ORDERING CLINICIAN: DONTAE SHAVER   TECHNIQUE: Axial T2, FLAIR, DWI, gradient echo T2 and sagittal and coronal T1 weighted images of brain were acquired.   FINDINGS: CSF Spaces: The ventricles, sulci and basal cisterns are within normal limits. There is no extra-axial fluid  collection.   Parenchyma: There is no diffusion restriction abnormality to suggest acute infarct.  Mild degree of patchy subcortical and periventricular T2 and FLAIR hyperintense signal is compatible with microangiopathy. Signal within the wyatt is also compatible with microangiopathy. There is no mass effect or midline shift. Cerebellar tonsils are above the foramen magnum. Pituitary and sella are not enlarged. No abnormal susceptibility artifact.   Paranasal Sinuses and Mastoids: Visualized paranasal sinuses and mastoid air cells are predominantly clear. Major intracranial flow voids at the skull base are unremarkable.       No evidence of acute infarct, intracranial mass effect or midline shift.   Mild degree of nonspecific white matter signal compatible with microangiopathy.   MACRO: None   Signed by: Massiel Mac 5/9/2025 10:58 AM Dictation workstation:   [id number]                            Assessment & Plan  Abdominal pain    Gastroesophageal reflux disease without esophagitis    Nausea and vomiting    Postural dizziness with near syncope    Hyperlipidemia    Type 2 diabetes mellitus without retinopathy (Multi)    Alcoholic cirrhosis (Multi)    Acute thoracic back pain    Diarrhea    UTI (urinary tract infection)    Primary hypertension    Hypercalcemia    # Abdominal pain, resolved  # Nausea and vomiting, resolved  # Diarrhea, resolved   # Alcoholic cirrhosis  # GERD   - CT Abdomen with IV contrast: No acute abdominopelvic abnormality to explain patient's pain. Mild nodular contour of the hepatic capsule, in keeping with history of  cirrhosis. Additional chronic and incidental findings as detailed above.  - US: Very mildly increased hepatic echogenicity which may indicate hepatic  steatosis.   - C-diff/ Pathogens negative   - s/p NS 500cc bolus   -Continue Ferrous sulfate, Vitamin B-12, Vitamin D-3  - K 3.6  - Ova/ Parasite, Giardia, Cryptosporidiosis pending  - Zofran PRN  - Advance diet as tolerated   - GI  consult appreciate recs, f/u with Dr. Benson for liver needs,   - ALT 26 > 34 > 57 continue to monitor   - AST 25 > 32 > 51 continue to monitor         # UTI  - UA Leukocyte Esterase 250, WBC >50, 1+ Hyaline Casts  Protein 20  - Urine Protein Electrophoresis pending   - Ceftriaxone discontinued   - Urine Culture contaminated  - WBC 4.8 > 4.3 > 4.2  - Ceftin 500mg BID x 3 days         # Postural dizziness with near syncope  - CTH: No evidence of acute cortical infarct or intracranial hemorrhage. No evidence of intracranial hemorrhage or displaced skull fracture  - Orthostatic vital signs: 110/73, 81 lying, 114/99, 84 sitting, 117/99, 99 standing   - NS 500cc bolus   - Telemetry  - Unable to tolerate 3 min orthostatic VS  - Dizziness with PT/OT  - NS bolus 500cc  - Meclizine  - repeat orthos pending to ensure resolution  - NS 500cc bolus  - repeat orthos  - Retic% 0.8  - Ferritin 481  - Folate >24.0  - Iron 81  - TIBC 281  - UIBC 200  - MRI: No evidence of acute infarct, intracranial mass effect or midline  Shift. Mild degree of nonspecific white matter signal compatible with  microangiopathy.  - Vascular US Carotid Artery Duplex: Right Carotid: Findings are consistent with less than 50% stenosis of the right proximal internal carotid artery. Laminar flow seen by color Doppler. Right external carotid artery appears patent with no evidence of stenosis. No evidence of hemodynamically significant stenosis of the right common carotid artery. The right vertebral artery is patent with antegrade flow. No evidence of hemodynamically significant stenosis in the right subclavian artery.  Left Carotid: Findings are consistent with less than 50% stenosis of the left proximal internal carotid artery. Laminar flow seen by color Doppler. Left external carotid artery appears patent with no evidence of stenosis. No evidence of hemodynamically significant stenosis of the left common carotid artery. The left vertebral artery is patent  with antegrade flow. No evidence of hemodynamically significant stenosis in the left subclavian artery.  -PT/OT            # Hyperlipidemia  # Type 2 diabetes mellitus without retinopathy  # Hypertension  - Lispro sliding scale   - Q 4 Accucheck  - Metformin held until 5/7  - Continue Crestor, Lisinopril, ASA, Metoprolol  - Serum protein electrophoresis Normal  -Albumin 4.4  - Consistent Carb Control 75 grams        # Acute thoracic back pain  - XR cervical spine: Mild-to-moderate spondylosis similar to the previous exam.   - XR thoracic spine: Mild-to-moderate spondylosis similar to the prior exam, otherwise no  acute findings.   - XR lumbar spine: Mild-to-moderate spondylosis   - Capsaicin       # Hypercalcemia   - Vitamin D 39.4   - PTH 30  - Calcium 10.6 > 9.8 > 10.2 > 10.1  - Resolved, continue to monitor          GI ppx: PPI  DVT ppx: Lovenox      Fluids: As needed   Electrolytes: replace as needed  Nutrition: Carb control, advance diet as tolerated   Adjuncts: NICOLE Hinojosa-CNP

## 2025-05-09 NOTE — PROGRESS NOTES
05/09/25 1227   Discharge Planning   Living Arrangements Spouse/significant other   Support Systems Spouse/significant other;Children   Assistance Needed Patient alert and oriented; Patient lives with her  and pets in a 2 story house with basement. Bedroom and 1/2 bath is upstairs. Full bath on main floor.  a  and gone a lot. She says she is independent with ADLS, IADLS, ambulation and drives. She doesn't use home oxygen/CPAP. One of her kids should be able to pick her up on dc.   Type of Residence Private residence   Home or Post Acute Services None  (MOD intensity recommended; patient declining-said she will stay with her daughter for awhile.  Encouraged therapy either home care, outpt or SNF, still declines.)   Expected Discharge Disposition Home  (Declining MOD recommendation and home therapy)   Does the patient need discharge transport arranged? No   Intensity of Service   Intensity of Service 0-30 min

## 2025-05-09 NOTE — CONSULTS
"Inpatient consult to Gastroenterology  Consult performed by: ALIN Nicholson  Consult ordered by: ALIN Thomas  Reason for consult: abd pain        History Of Present Illness  Dinorah Horn \"Raquel\" is a 59 y.o. female who presented to [facility] ED with a chief complaint of intermittent abdominal pain, nausea, vomiting, diarrhea, and lightheadedness x1 week.  She was admitted for further evaluation.    Patient reports over the last year, she has had intermittent episodes of dizziness.  She has associated abdominal discomfort, not pain, more so nausea with the dizziness.  The dizziness occurs when she is up moving around and resolves with rest.  Dizziness does not always occur right away after getting up.  Today, she is feeling a little better.  She denies nausea or abdominal pain.  She has not had diarrhea today, last BM was yesterday.  She feels a little constipated and plans on drinking prune juice to help get her bowels moving today.  Denies BRBPR, melena, hematochezia.  No vomiting, chest pain.    She is established with hepatology, Dr. Benson, and she has stopped regular alcohol use.  She has drank 3 times since January 1.     Medical History[1]    Surgical History[2]     Social history  She reports that she quit smoking about 16 years ago. Her smoking use included cigarettes. She started smoking about 31 years ago. She has a 15 pack-year smoking history. She has never used smokeless tobacco. She reports that she does not currently use alcohol. She reports that she does not use drugs.    Family History[3]     Allergies  Cosentyx [secukinumab]    Review of Systems   Gastrointestinal:  Positive for constipation, diarrhea, nausea and vomiting. Negative for abdominal pain, anal bleeding and blood in stool.   Neurological:  Positive for dizziness.       Last Recorded Vitals  Blood pressure 105/77, pulse 70, temperature 36.3 °C (97.3 °F), temperature source Temporal, resp. rate 16, height " "1.753 m (5' 9\"), weight 83.5 kg (184 lb), SpO2 99%.    Physical Exam  Constitutional: well nourished, well appearing. NAD. Alert and cooperative  Skin: no jaundice   Eyes: anicteric, normal conjunctiva  ENT: MMM  Pulmonary: easy and nonlabored on RA  Abdomen: overweight, soft, mild epigastric TTP, ND. No ascites.  MSK: MAEx4  Extremities: no edema  Neuro: aaox3. No asterixis.   Psych: appropriate mood and behavior    Intake/Output last 3 Shifts:  I/O last 3 completed shifts:  In: 1840 (22 mL/kg) [P.O.:1840]  Out: 2 (0 mL/kg) [Urine:2 (0 mL/kg/hr)]  Weight: 83.5 kg      Current medications during hospitalization  Scheduled medications  Scheduled Medications[4]  Continuous medications  Continuous Medications[5]  PRN medications  PRN Medications[6]    Relevant Results  BMP:  Lab Results   Component Value Date     05/09/2025     05/07/2025     05/06/2025    K 4.5 05/09/2025    K 4.6 05/07/2025    K 3.6 05/06/2025     05/09/2025     05/07/2025     05/06/2025    CO2 29 05/09/2025    CO2 29 05/07/2025    CO2 24 05/06/2025    BUN 16 05/09/2025    BUN 22 05/07/2025    BUN 21 05/06/2025    CREATININE 0.87 05/09/2025    CREATININE 0.95 05/07/2025    CREATININE 0.89 05/06/2025       CBC:  Lab Results   Component Value Date    WBC 4.2 (L) 05/09/2025    WBC 4.3 (L) 05/07/2025    WBC 4.8 05/06/2025    RBC 3.68 (L) 05/09/2025    RBC 3.69 (L) 05/07/2025    RBC 3.66 (L) 05/06/2025    HGB 11.7 (L) 05/09/2025    HGB 11.8 (L) 05/07/2025    HGB 11.9 (L) 05/06/2025    HCT 35.8 (L) 05/09/2025    HCT 35.5 (L) 05/07/2025    HCT 35.3 (L) 05/06/2025    MCV 97 05/09/2025    MCV 96 05/07/2025    MCV 96 05/06/2025    MCH 31.8 05/09/2025    MCH 32.0 05/07/2025    MCH 32.5 05/06/2025    MCHC 32.7 05/09/2025    MCHC 33.2 05/07/2025    MCHC 33.7 05/06/2025    RDW 12.0 05/09/2025    RDW 11.9 05/07/2025    RDW 12.2 05/06/2025     05/09/2025     05/07/2025     05/06/2025    MPV 10.8 02/21/2025 " "      Lactate Level:  No results found for: \"LACTATE\"    CRP/ESR Level:  Lab Results   Component Value Date    CRP 0.10 09/19/2024    SEDRATE 8 09/19/2024       Hepatic Function Panel:  Lab Results   Component Value Date    ALKPHOS 89 05/09/2025    ALKPHOS 87 05/07/2025    ALKPHOS 68 05/06/2025    ALT 57 (H) 05/09/2025    ALT 34 05/07/2025    ALT 26 05/06/2025    AST 51 (H) 05/09/2025    AST 32 05/07/2025    AST 25 05/06/2025    PROT 7.6 05/09/2025    PROT 8.0 05/07/2025    PROT 7.7 05/06/2025    PROT 7.5 05/06/2025    BILITOT 0.3 05/09/2025    BILITOT 0.4 05/07/2025    BILITOT 0.4 05/06/2025    BILIDIR 0.1 02/21/2025    BILIDIR 0.1 09/05/2024    BILIDIR 0.0 05/30/2024       Magnesium:  No results found for: \"MG\"    INR:  Lab Results   Component Value Date    PROTIME 12.1 05/05/2025    PROTIME 10.8 02/21/2025    PROTIME 11.7 05/30/2024    INR 1.1 05/05/2025    INR 1.0 02/21/2025    INR 1.0 05/30/2024       TSH/T4:  No results found for: \"TSH\"    Lipid Profile:  No results found for: \"CHLPL\", \"TRIG\", \"HDL\", \"LDLCALC\", \"LDLDIRECT\"    Amylase/Lipase:  Lab Results   Component Value Date    LIPASE 23 05/05/2025       Calcium Level  Lab Results   Component Value Date    CALCIUM 10.1 05/09/2025    CALCIUM 10.2 05/07/2025    CALCIUM 9.8 05/06/2025        Radiology:   Carotid duplex bilateral         MR brain wo IV contrast   Final Result   No evidence of acute infarct, intracranial mass effect or midline   shift.        Mild degree of nonspecific white matter signal compatible with   microangiopathy.        MACRO:   None        Signed by: Massiel Mac 5/9/2025 10:58 AM   Dictation workstation:   [id number]      US right upper quadrant   Final Result   Very mildly increased hepatic echogenicity which may indicate hepatic   steatosis.        MACRO:   None             Signed by: Francisco Macias 5/6/2025 12:05 PM   Dictation workstation:   [id number]      XR thoracic spine 3 views   Final Result   Mild-to-moderate spondylosis " similar to the prior exam, otherwise no   acute findings.        Signed by: Reji Chang 5/6/2025 5:02 PM   Dictation workstation:   [id number]      XR cervical spine complete 4-5 views   Final Result   Mild-to-moderate spondylosis similar to the previous exam.        Signed by: Reji Chang 5/6/2025 5:07 PM   Dictation workstation:   [id number]      XR lumbar spine complete 4+ views   Final Result   Mild-to-moderate spondylosis.        Signed by: Reji Chang 5/6/2025 5:01 PM   Dictation workstation:   [id number]      CT abdomen pelvis w IV contrast   Final Result   No acute abdominopelvic abnormality to explain patient's pain. Mild   nodular contour of the hepatic capsule, in keeping with history of   cirrhosis. Additional chronic and incidental findings as detailed   above.        MACRO:   None        Signed by: Macy Murray 5/5/2025 4:56 PM   Dictation workstation:   [id number]      CT head wo IV contrast   Final Result   No evidence of acute cortical infarct or intracranial hemorrhage.        No evidence of intracranial hemorrhage or displaced skull fracture.        MACRO:   None        Signed by: Joseph Schoenberger 5/5/2025 4:46 PM   Dictation workstation:   [id number]           ASSESSMENT/RECS  59 y.o. female on day 0 of admission presenting with Abdominal pain. GI consulted for abdominal pain.  Patient reports that she has intermittent abdominal discomfort with nausea which is associated with dizziness episodes, denies abdominal pain.  CT abdomen pelvis, ultrasound RUQ without acute abnormalities.  Stool studies unremarkable.    Since dizziness exacerbates symptoms, recommend further workup of dizziness  Continue PPI daily  Monitor CBC, BMP daily  Continue Zofran as needed nausea  Continue prune juice, MiraLAX as needed for constipation  Continue follow-up with Dr. Benson for her liver needs    At this time, GI will sign off.  Thank you for the consult.    Ratna Calderón, NICOLE-CNP          [1]    Past Medical History:  Diagnosis Date    Arthritis     Cirrhosis of liver (Multi)     Coronary artery disease     Depression     Diabetes mellitus (Multi)     GERD (gastroesophageal reflux disease)     Headache     Heart disease     Hypertension     Visual impairment    [2]   Past Surgical History:  Procedure Laterality Date    APPENDECTOMY      BREAST SURGERY  2003    Augmentation    CORONARY ARTERY BYPASS GRAFT      FOREARM FRACTURE SURGERY Right 1992    FOREARM HARDWARE REMOVAL Right 2003    HYSTERECTOMY  Partial, 2000   [3]   Family History  Problem Relation Name Age of Onset    Alcohol abuse Mother Trice     Cancer Mother Trice     Diabetes Mother Trice     Heart disease Mother Trice     Arthritis Father Reed     Atrial fibrillation Father Reed     Cancer Father Reed     COPD Father Reed     Hearing loss Father Reed     Heart disease Father Reed     Stroke Paternal Grandfather Guy     Alcohol abuse Paternal Grandmother Datil     Alcohol abuse Sister Ioana     Alcohol abuse Sister Alis     Alcohol abuse Daughter Katerine     Cancer Father's Brother Xavier     Cancer Father's Brother Daljit     Colon cancer Father's Brother Daljit J     Drug abuse Sister Ioana GARCIA    [4] aspirin, 81 mg, oral, Daily  cefuroxime, 500 mg, oral, BID  cholecalciferol, 25 mcg, oral, Daily  cyanocobalamin, 50 mcg, oral, Daily  enoxaparin, 40 mg, subcutaneous, Daily  ferrous sulfate, 65 mg of iron, oral, Daily  [Held by provider] hydroCHLOROthiazide, 12.5 mg, oral, Daily  insulin lispro, 0-10 Units, subcutaneous, Before meals & nightly  lisinopril, 20 mg, oral, Daily  [Held by provider] metFORMIN XR, 500 mg, oral, BID  metoprolol tartrate, 25 mg, oral, BID  pantoprazole, 40 mg, oral, Daily before breakfast  rosuvastatin, 20 mg, oral, Nightly  sodium chloride, 500 mL, intravenous, Once  traZODone, 25 mg, oral, Nightly  venlafaxine, 75 mg, oral, Nightly  venlafaxine XR, 150 mg, oral, Daily    [5]    [6] PRN  medications: alum-mag hydroxide-simeth, benzocaine-menthol, capsaicin, dextrose, dextrose, glucagon, glucagon, hydrOXYzine HCL, loperamide, melatonin, ondansetron, polyethylene glycol

## 2025-05-10 LAB
ANION GAP SERPL CALC-SCNC: 11 MMOL/L (ref 10–20)
BUN SERPL-MCNC: 17 MG/DL (ref 6–23)
CALCIUM SERPL-MCNC: 9.9 MG/DL (ref 8.6–10.3)
CHLORIDE SERPL-SCNC: 105 MMOL/L (ref 98–107)
CO2 SERPL-SCNC: 27 MMOL/L (ref 21–32)
CREAT SERPL-MCNC: 0.87 MG/DL (ref 0.5–1.05)
CRYPTOSP AG STL QL IA: NEGATIVE
EGFRCR SERPLBLD CKD-EPI 2021: 76 ML/MIN/1.73M*2
ERYTHROCYTE [DISTWIDTH] IN BLOOD BY AUTOMATED COUNT: 11.8 % (ref 11.5–14.5)
G LAMBLIA AG STL QL IA: NEGATIVE
GLUCOSE BLD MANUAL STRIP-MCNC: 114 MG/DL (ref 74–99)
GLUCOSE BLD MANUAL STRIP-MCNC: 90 MG/DL (ref 74–99)
GLUCOSE BLD MANUAL STRIP-MCNC: 97 MG/DL (ref 74–99)
GLUCOSE BLD MANUAL STRIP-MCNC: 98 MG/DL (ref 74–99)
GLUCOSE SERPL-MCNC: 103 MG/DL (ref 74–99)
HCT VFR BLD AUTO: 34.5 % (ref 36–46)
HGB BLD-MCNC: 11.2 G/DL (ref 12–16)
MCH RBC QN AUTO: 32.2 PG (ref 26–34)
MCHC RBC AUTO-ENTMCNC: 32.5 G/DL (ref 32–36)
MCV RBC AUTO: 99 FL (ref 80–100)
NRBC BLD-RTO: ABNORMAL /100{WBCS}
PLATELET # BLD AUTO: 176 X10*3/UL (ref 150–450)
POTASSIUM SERPL-SCNC: 4.3 MMOL/L (ref 3.5–5.3)
RBC # BLD AUTO: 3.48 X10*6/UL (ref 4–5.2)
SODIUM SERPL-SCNC: 139 MMOL/L (ref 136–145)
WBC # BLD AUTO: 4.6 X10*3/UL (ref 4.4–11.3)

## 2025-05-10 PROCEDURE — 82947 ASSAY GLUCOSE BLOOD QUANT: CPT | Mod: 59

## 2025-05-10 PROCEDURE — 85027 COMPLETE CBC AUTOMATED: CPT

## 2025-05-10 PROCEDURE — 80048 BASIC METABOLIC PNL TOTAL CA: CPT

## 2025-05-10 PROCEDURE — 2500000004 HC RX 250 GENERAL PHARMACY W/ HCPCS (ALT 636 FOR OP/ED): Mod: JZ | Performed by: HOSPITALIST

## 2025-05-10 PROCEDURE — 99233 SBSQ HOSP IP/OBS HIGH 50: CPT | Performed by: NURSE PRACTITIONER

## 2025-05-10 PROCEDURE — 94760 N-INVAS EAR/PLS OXIMETRY 1: CPT

## 2025-05-10 PROCEDURE — 2500000001 HC RX 250 WO HCPCS SELF ADMINISTERED DRUGS (ALT 637 FOR MEDICARE OP): Performed by: NURSE PRACTITIONER

## 2025-05-10 PROCEDURE — G0378 HOSPITAL OBSERVATION PER HR: HCPCS

## 2025-05-10 PROCEDURE — 2500000002 HC RX 250 W HCPCS SELF ADMINISTERED DRUGS (ALT 637 FOR MEDICARE OP, ALT 636 FOR OP/ED): Performed by: HOSPITALIST

## 2025-05-10 PROCEDURE — 96372 THER/PROPH/DIAG INJ SC/IM: CPT | Performed by: HOSPITALIST

## 2025-05-10 PROCEDURE — 2500000001 HC RX 250 WO HCPCS SELF ADMINISTERED DRUGS (ALT 637 FOR MEDICARE OP): Performed by: HOSPITALIST

## 2025-05-10 PROCEDURE — 36415 COLL VENOUS BLD VENIPUNCTURE: CPT

## 2025-05-10 RX ADMIN — ASPIRIN 81 MG: 81 TABLET, COATED ORAL at 09:11

## 2025-05-10 RX ADMIN — VITAM B12 50 MCG: 100 TAB at 09:10

## 2025-05-10 RX ADMIN — Medication 10 MG: at 20:54

## 2025-05-10 RX ADMIN — METOPROLOL TARTRATE 25 MG: 25 TABLET, FILM COATED ORAL at 20:45

## 2025-05-10 RX ADMIN — ENOXAPARIN SODIUM 40 MG: 40 INJECTION SUBCUTANEOUS at 09:10

## 2025-05-10 RX ADMIN — CEFUROXIME AXETIL 500 MG: 250 TABLET, FILM COATED ORAL at 09:10

## 2025-05-10 RX ADMIN — PANTOPRAZOLE SODIUM 40 MG: 40 TABLET, DELAYED RELEASE ORAL at 06:15

## 2025-05-10 RX ADMIN — VENLAFAXINE HYDROCHLORIDE 150 MG: 150 CAPSULE, EXTENDED RELEASE ORAL at 09:10

## 2025-05-10 RX ADMIN — Medication 25 MCG: at 09:10

## 2025-05-10 RX ADMIN — TRAZODONE HYDROCHLORIDE 25 MG: 50 TABLET ORAL at 20:45

## 2025-05-10 RX ADMIN — METOPROLOL TARTRATE 25 MG: 25 TABLET, FILM COATED ORAL at 09:10

## 2025-05-10 RX ADMIN — LISINOPRIL 20 MG: 20 TABLET ORAL at 09:10

## 2025-05-10 RX ADMIN — VENLAFAXINE 75 MG: 75 TABLET ORAL at 20:45

## 2025-05-10 RX ADMIN — CEFUROXIME AXETIL 500 MG: 250 TABLET, FILM COATED ORAL at 20:45

## 2025-05-10 RX ADMIN — FERROUS SULFATE TAB 325 MG (65 MG ELEMENTAL FE) 325 MG: 325 (65 FE) TAB at 09:10

## 2025-05-10 RX ADMIN — HYDROXYZINE HYDROCHLORIDE 25 MG: 25 TABLET ORAL at 20:54

## 2025-05-10 RX ADMIN — ROSUVASTATIN CALCIUM 20 MG: 10 TABLET, FILM COATED ORAL at 20:45

## 2025-05-10 ASSESSMENT — PAIN SCALES - GENERAL
PAINLEVEL_OUTOF10: 0 - NO PAIN
PAINLEVEL_OUTOF10: 0 - NO PAIN

## 2025-05-10 ASSESSMENT — PAIN - FUNCTIONAL ASSESSMENT: PAIN_FUNCTIONAL_ASSESSMENT: 0-10

## 2025-05-10 NOTE — CARE PLAN
The patient's goals for the shift include      The clinical goals for the shift include Pt will remain free from falls this shift    Over the shift pt did not experience nausea, diarrhea or vomiting. Still waiting ova and parasite results and patient aware. DC probable tomorrow.

## 2025-05-10 NOTE — PROGRESS NOTES
"Dinorah Horn \"Raquel\" is a 59 y.o. female on day 0 of admission presenting with Abdominal pain.    Subjective   Patient still report stomach pain with every movement. Denied vomiting had episode of diarrhea this am. Does not want to eat reports eating very little since arrival  Reports that she can only eat if she has zofran before eating   + Mj on tox screen  Denied hyperemesis          Objective     Physical Exam  Vitals and nursing note reviewed.   Constitutional:       Appearance: Normal appearance.   HENT:      Head: Normocephalic.      Right Ear: Tympanic membrane normal.      Left Ear: Tympanic membrane normal.      Nose: Nose normal.      Mouth/Throat:      Mouth: Mucous membranes are moist.   Eyes:      Extraocular Movements: Extraocular movements intact.      Pupils: Pupils are equal, round, and reactive to light.   Cardiovascular:      Rate and Rhythm: Normal rate and regular rhythm.      Pulses: Normal pulses.      Heart sounds: Normal heart sounds.   Pulmonary:      Effort: Pulmonary effort is normal.      Breath sounds: Normal breath sounds.   Abdominal:      General: Bowel sounds are normal.      Palpations: Abdomen is soft.      Tenderness: There is abdominal tenderness.   Musculoskeletal:         General: Normal range of motion.      Cervical back: Normal range of motion and neck supple.   Skin:     General: Skin is warm and dry.   Neurological:      Mental Status: She is alert and oriented to person, place, and time.   Psychiatric:         Behavior: Behavior normal.         Last Recorded Vitals  Blood pressure 111/69, pulse 65, temperature 36.4 °C (97.5 °F), temperature source Temporal, resp. rate 20, height 1.753 m (5' 9\"), weight 83.5 kg (184 lb), SpO2 99%.  Intake/Output last 3 Shifts:  I/O last 3 completed shifts:  In: 2320 (27.8 mL/kg) [P.O.:2320]  Out: 1 (0 mL/kg) [Urine:1 (0 mL/kg/hr)]  Weight: 83.5 kg     Relevant Results                              Assessment & Plan  Abdominal " pain    Gastroesophageal reflux disease without esophagitis    Nausea and vomiting    Postural dizziness with near syncope    Hyperlipidemia    Type 2 diabetes mellitus without retinopathy (Multi)    Alcoholic cirrhosis (Multi)    Acute thoracic back pain    Diarrhea    UTI (urinary tract infection)    Primary hypertension    Hypercalcemia    # Abdominal pain,   # Nausea + and vomiting, resolved  # Diarrhea, one episode this am   # Alcoholic cirrhosis  # GERD   - CT Abdomen with IV contrast: No acute abdominopelvic abnormality to explain patient's pain. Mild nodular contour of the hepatic capsule, in keeping with history of  cirrhosis. Additional chronic and incidental findings as detailed above.  - US: Very mildly increased hepatic echogenicity which may indicate hepatic  steatosis.   - C-diff/ Pathogens negative   - s/p NS 500cc bolus   -Continue Ferrous sulfate, Vitamin B-12, Vitamin D-3  - K 3.6  - Ova/ Parasite, Giardia, Cryptosporidiosis pending  - Zofran PRN  - Advance diet as tolerated   - GI signed off, f/u with Dr. Benson for liver needs,   - ALT 26 > 34 > 57 not done today check in am   - AST 25 > 32 > 51 not done today check in am         # UTI  - UA Leukocyte Esterase 250, WBC >50, 1+ Hyaline Casts  Protein 20  - Urine Protein Electrophoresis pending   - Ceftriaxone discontinued   - Urine Culture contaminated  - WBC 4.8 > 4.3 > 4.2 >4.6  - Ceftin 500mg BID x 3 days         # Postural dizziness with near syncope  - CTH: No evidence of acute cortical infarct or intracranial hemorrhage. No evidence of intracranial hemorrhage or displaced skull fracture  - Orthostatic vital signs: 110/73, 81 lying, 114/99, 84 sitting, 117/99, 99 standing   - NS 500cc bolus   - Telemetry  - Unable to tolerate 3 min orthostatic VS  - Dizziness with PT/OT  - NS bolus 500cc  - Meclizine  - repeat orthos pending to ensure resolution  - NS 500cc bolus  - repeat orthos  - Retic% 0.8  - Ferritin 481  - Folate >24.0  - Iron 81  -  TIBC 281  - UIBC 200  - MRI: No evidence of acute infarct, intracranial mass effect or midline  Shift. Mild degree of nonspecific white matter signal compatible with  microangiopathy.  - Vascular US Carotid Artery Duplex: Right Carotid: Findings are consistent with less than 50% stenosis of the right proximal internal carotid artery. Laminar flow seen by color Doppler. Right external carotid artery appears patent with no evidence of stenosis. No evidence of hemodynamically significant stenosis of the right common carotid artery. The right vertebral artery is patent with antegrade flow. No evidence of hemodynamically significant stenosis in the right subclavian artery.  Left Carotid: Findings are consistent with less than 50% stenosis of the left proximal internal carotid artery. Laminar flow seen by color Doppler. Left external carotid artery appears patent with no evidence of stenosis. No evidence of hemodynamically significant stenosis of the left common carotid artery. The left vertebral artery is patent with antegrade flow. No evidence of hemodynamically significant stenosis in the left subclavian artery.  -PT/OT             # Hyperlipidemia  # Type 2 diabetes mellitus without retinopathy  # Hypertension  - Lispro sliding scale   - Q 4 Accucheck  - Metformin held until 5/7  - Continue Crestor, Lisinopril, ASA, Metoprolol  - Serum protein electrophoresis Normal  -Albumin 4.4  - Consistent Carb Control 75 grams        # Acute thoracic back pain  - XR cervical spine: Mild-to-moderate spondylosis similar to the previous exam.   - XR thoracic spine: Mild-to-moderate spondylosis similar to the prior exam, otherwise no  acute findings.   - XR lumbar spine: Mild-to-moderate spondylosis   - Capsaicin        # Hypercalcemia   - Vitamin D 39.4   - PTH 30  - Calcium 10.6 > 9.8 > 10.2 > 10.1 > 9.9  - Resolved, continue to monitor           GI ppx: PPI  DVT ppx: Lovenox      Fluids: As needed   Electrolytes: replace as  needed  Nutrition: Carb control, advance diet as tolerated   Adjuncts: PIV      Fany Billings, APRN-CNP

## 2025-05-10 NOTE — CARE PLAN
The patient's goals for the shift include      The clinical goals for the shift include pt will be free of falls/ injuries this shift    Pt free of falls/ injuries this shift

## 2025-05-11 VITALS
HEIGHT: 69 IN | WEIGHT: 184 LBS | TEMPERATURE: 97.5 F | BODY MASS INDEX: 27.25 KG/M2 | OXYGEN SATURATION: 98 % | RESPIRATION RATE: 18 BRPM | HEART RATE: 64 BPM | DIASTOLIC BLOOD PRESSURE: 80 MMHG | SYSTOLIC BLOOD PRESSURE: 119 MMHG

## 2025-05-11 PROBLEM — R19.7 DIARRHEA: Status: RESOLVED | Noted: 2025-05-06 | Resolved: 2025-05-11

## 2025-05-11 PROBLEM — E83.52 HYPERCALCEMIA: Status: RESOLVED | Noted: 2025-05-06 | Resolved: 2025-05-11

## 2025-05-11 LAB
ALBUMIN SERPL BCP-MCNC: 4.8 G/DL (ref 3.4–5)
ALP SERPL-CCNC: 96 U/L (ref 33–136)
ALT SERPL W P-5'-P-CCNC: 65 U/L (ref 7–45)
ANION GAP SERPL CALC-SCNC: 14 MMOL/L (ref 10–20)
AST SERPL W P-5'-P-CCNC: 56 U/L (ref 9–39)
BASOPHILS # BLD AUTO: 0.02 X10*3/UL (ref 0–0.1)
BASOPHILS NFR BLD AUTO: 0.3 %
BILIRUB SERPL-MCNC: 0.4 MG/DL (ref 0–1.2)
BUN SERPL-MCNC: 16 MG/DL (ref 6–23)
CALCIUM SERPL-MCNC: 10.1 MG/DL (ref 8.6–10.3)
CHLORIDE SERPL-SCNC: 103 MMOL/L (ref 98–107)
CO2 SERPL-SCNC: 24 MMOL/L (ref 21–32)
CREAT SERPL-MCNC: 0.76 MG/DL (ref 0.5–1.05)
EGFRCR SERPLBLD CKD-EPI 2021: 90 ML/MIN/1.73M*2
EOSINOPHIL # BLD AUTO: 0.09 X10*3/UL (ref 0–0.7)
EOSINOPHIL NFR BLD AUTO: 1.4 %
ERYTHROCYTE [DISTWIDTH] IN BLOOD BY AUTOMATED COUNT: 11.8 % (ref 11.5–14.5)
GLUCOSE BLD MANUAL STRIP-MCNC: 99 MG/DL (ref 74–99)
GLUCOSE SERPL-MCNC: 103 MG/DL (ref 74–99)
HCT VFR BLD AUTO: 37.7 % (ref 36–46)
HGB BLD-MCNC: 12.4 G/DL (ref 12–16)
IMM GRANULOCYTES # BLD AUTO: 0.01 X10*3/UL (ref 0–0.7)
IMM GRANULOCYTES NFR BLD AUTO: 0.2 % (ref 0–0.9)
LYMPHOCYTES # BLD AUTO: 1.74 X10*3/UL (ref 1.2–4.8)
LYMPHOCYTES NFR BLD AUTO: 27.3 %
MCH RBC QN AUTO: 32.2 PG (ref 26–34)
MCHC RBC AUTO-ENTMCNC: 32.9 G/DL (ref 32–36)
MCV RBC AUTO: 98 FL (ref 80–100)
MONOCYTES # BLD AUTO: 0.52 X10*3/UL (ref 0.1–1)
MONOCYTES NFR BLD AUTO: 8.2 %
NEUTROPHILS # BLD AUTO: 3.99 X10*3/UL (ref 1.2–7.7)
NEUTROPHILS NFR BLD AUTO: 62.6 %
NRBC BLD-RTO: 0 /100 WBCS (ref 0–0)
PLATELET # BLD AUTO: 220 X10*3/UL (ref 150–450)
POTASSIUM SERPL-SCNC: 3.9 MMOL/L (ref 3.5–5.3)
PROT SERPL-MCNC: 8 G/DL (ref 6.4–8.2)
RBC # BLD AUTO: 3.85 X10*6/UL (ref 4–5.2)
SODIUM SERPL-SCNC: 137 MMOL/L (ref 136–145)
WBC # BLD AUTO: 6.4 X10*3/UL (ref 4.4–11.3)

## 2025-05-11 PROCEDURE — G0378 HOSPITAL OBSERVATION PER HR: HCPCS

## 2025-05-11 PROCEDURE — 82947 ASSAY GLUCOSE BLOOD QUANT: CPT | Mod: 59

## 2025-05-11 PROCEDURE — 99238 HOSP IP/OBS DSCHRG MGMT 30/<: CPT | Performed by: NURSE PRACTITIONER

## 2025-05-11 PROCEDURE — 2500000001 HC RX 250 WO HCPCS SELF ADMINISTERED DRUGS (ALT 637 FOR MEDICARE OP): Performed by: NURSE PRACTITIONER

## 2025-05-11 PROCEDURE — 85025 COMPLETE CBC W/AUTO DIFF WBC: CPT | Performed by: NURSE PRACTITIONER

## 2025-05-11 PROCEDURE — 36415 COLL VENOUS BLD VENIPUNCTURE: CPT | Performed by: NURSE PRACTITIONER

## 2025-05-11 PROCEDURE — 2500000001 HC RX 250 WO HCPCS SELF ADMINISTERED DRUGS (ALT 637 FOR MEDICARE OP): Performed by: HOSPITALIST

## 2025-05-11 PROCEDURE — 2500000004 HC RX 250 GENERAL PHARMACY W/ HCPCS (ALT 636 FOR OP/ED): Mod: JZ | Performed by: HOSPITALIST

## 2025-05-11 PROCEDURE — 96372 THER/PROPH/DIAG INJ SC/IM: CPT | Performed by: HOSPITALIST

## 2025-05-11 PROCEDURE — 80053 COMPREHEN METABOLIC PANEL: CPT | Performed by: NURSE PRACTITIONER

## 2025-05-11 RX ORDER — MECLIZINE HCL 12.5 MG 12.5 MG/1
12.5 TABLET ORAL 3 TIMES DAILY PRN
Qty: 30 TABLET | Refills: 0 | Status: SHIPPED | OUTPATIENT
Start: 2025-05-11

## 2025-05-11 RX ORDER — CEFUROXIME AXETIL 500 MG/1
500 TABLET ORAL 2 TIMES DAILY
Qty: 6 TABLET | Refills: 0 | Status: SHIPPED | OUTPATIENT
Start: 2025-05-11 | End: 2025-05-14

## 2025-05-11 RX ADMIN — CEFUROXIME AXETIL 500 MG: 250 TABLET, FILM COATED ORAL at 09:23

## 2025-05-11 RX ADMIN — METOPROLOL TARTRATE 25 MG: 25 TABLET, FILM COATED ORAL at 09:23

## 2025-05-11 RX ADMIN — VENLAFAXINE HYDROCHLORIDE 150 MG: 150 CAPSULE, EXTENDED RELEASE ORAL at 09:23

## 2025-05-11 RX ADMIN — LISINOPRIL 20 MG: 20 TABLET ORAL at 09:23

## 2025-05-11 RX ADMIN — ASPIRIN 81 MG: 81 TABLET, COATED ORAL at 09:23

## 2025-05-11 RX ADMIN — PANTOPRAZOLE SODIUM 40 MG: 40 TABLET, DELAYED RELEASE ORAL at 06:21

## 2025-05-11 RX ADMIN — ENOXAPARIN SODIUM 40 MG: 40 INJECTION SUBCUTANEOUS at 09:24

## 2025-05-11 RX ADMIN — FERROUS SULFATE TAB 325 MG (65 MG ELEMENTAL FE) 325 MG: 325 (65 FE) TAB at 09:23

## 2025-05-11 RX ADMIN — VITAM B12 50 MCG: 100 TAB at 09:23

## 2025-05-11 RX ADMIN — Medication 25 MCG: at 09:24

## 2025-05-11 ASSESSMENT — PAIN SCALES - GENERAL: PAINLEVEL_OUTOF10: 0 - NO PAIN

## 2025-05-11 ASSESSMENT — PAIN - FUNCTIONAL ASSESSMENT: PAIN_FUNCTIONAL_ASSESSMENT: 0-10

## 2025-05-11 NOTE — DISCHARGE SUMMARY
Discharge Diagnosis  Abdominal pain           Issues Requiring Follow-Up  Referral placed for neurology    Discharge Meds     Medication List      START taking these medications     cefuroxime 500 mg tablet; Commonly known as: Ceftin; Take 1 tablet (500   mg) by mouth 2 times a day for 3 days.   meclizine 12.5 mg tablet; Commonly known as: Antivert; Take 1 tablet   (12.5 mg) by mouth 3 times a day as needed for dizziness.     CONTINUE taking these medications     aspirin 81 mg EC tablet   cyanocobalamin 50 mcg tablet; Commonly known as: Vitamin B-12   ferrous sulfate 325 mg (65 mg elemental) tablet   lisinopril 20 mg tablet; Take 1 tablet (20 mg) by mouth once daily.   metFORMIN  mg 24 hr tablet; Commonly known as: Glucophage-XR; Take   one tablet twice daily   metoprolol tartrate 25 mg tablet; Commonly known as: Lopressor; Take 1   tablet (25 mg) by mouth 2 times a day.   multivitamin tablet   nitroglycerin 0.3 mg SL tablet; Commonly known as: Nitrostat   omeprazole 40 mg DR capsule; Commonly known as: PriLOSEC; Take 1 capsule   (40 mg) by mouth once daily in the morning. Take before meals. Do not   crush or chew.   rosuvastatin 20 mg tablet; Commonly known as: Crestor; Take 1 tablet (20   mg) by mouth once daily.   traZODone 50 mg tablet; Commonly known as: Desyrel; Take 0.5 tablets (25   mg) by mouth once daily at bedtime.   * venlafaxine 75 mg tablet; Commonly known as: Effexor; TAKE ONE TABLET   BY MOUTH DAILY IN THE MORNING   * venlafaxine  mg 24 hr capsule; Commonly known as: Effexor-XR;   TAKE ONE CAPSULE BY MOUTH ONCE DAILY. DO NOT CRUSH OR CHEW.   Vitamin D3 25 mcg (1,000 units) capsule; Generic drug: cholecalciferol  * This list has 2 medication(s) that are the same as other medications   prescribed for you. Read the directions carefully, and ask your doctor or   other care provider to review them with you.     STOP taking these medications     BLACK COHOSH ORAL   hydroCHLOROthiazide 12.5 mg  "tablet; Commonly known as: Microzide   hydroxychloroquine 200 mg tablet; Commonly known as: Plaquenil       Test Results Pending At Discharge  Pending Labs       Order Current Status    CBC and Auto Differential Collected (05/11/25 0703)    Comprehensive metabolic panel Collected (05/11/25 0703)    Extra Urine Gray Tube Collected (05/05/25 1849)    Ova and Parasite Examination In process    Ova/Para + Giardia/Cryptosporidium Antigen In process    Urinalysis with Reflex Culture and Microscopic In process            Hospital Course   Dinorah Horn \"Raquel\" is a 59 y.o. female presenting with intermittent abdominal pain, nausea, vomiting, diarrhea, and lightheadedness x 1 week. Patient states that abdominal pain worsens with leaning forward, walking, and with vomiting. Patient is unable to describe the abdominal pain and reports that the abdominal pain feels \"heavy and falling\". While ambulating in the ED patient experienced abdominal pain and felt lightheaded requiring assistance.   # Abdominal pain,   # Nausea + and vomiting, resolved  # Diarrhea, one episode this am   # Alcoholic cirrhosis  # GERD   - CT Abdomen with IV contrast: No acute abdominopelvic abnormality to explain patient's pain. Mild nodular contour of the hepatic capsule, in keeping with history of  cirrhosis. Additional chronic and incidental findings as detailed above.  - US: Very mildly increased hepatic echogenicity which may indicate hepatic  steatosis.   - C-diff/ Pathogens negative   # UTI  - UA Leukocyte Esterase 250, WBC >50, 1+ Hyaline Casts  Protein 20  # Postural dizziness with near syncope  - CTH: No evidence of acute cortical infarct or intracranial hemorrhage. No evidence of intracranial hemorrhage or displaced skull fracture  - Orthostatic vital signs: 110/73, 81 lying, 114/99, 84 sitting, 117/99, 99 standing   - MRI: No evidence of acute infarct, intracranial mass effect or midline  Shift. Mild degree of nonspecific white matter signal " compatible with  microangiopathy.  - Vascular US Carotid Artery Duplex: Right Carotid: Findings are consistent with less than 50% stenosis of the right proximal internal carotid artery. Laminar flow seen by color Doppler. Right external carotid artery appears patent with no evidence of stenosis. No evidence of hemodynamically significant stenosis of the right common carotid artery. The right vertebral artery is patent with antegrade flow. No evidence of hemodynamically significant stenosis in the right subclavian artery.  Left Carotid: Findings are consistent with less than 50% stenosis of the left proximal internal carotid artery. Laminar flow seen by color Doppler. Left external carotid artery appears patent with no evidence of stenosis. No evidence of hemodynamically significant stenosis of the left common carotid artery. The left vertebral artery is patent with antegrade flow. No evidence of hemodynamically significant stenosis in the left subclavian artery.  # Acute thoracic back pain  - XR cervical spine: Mild-to-moderate spondylosis similar to the previous exam.   - XR thoracic spine: Mild-to-moderate spondylosis similar to the prior exam, otherwise no  acute findings.   - XR lumbar spine: Mild-to-moderate spondylosis   -discharge with referral to neurology a outpatient.   Prescription meclizine as needed provided.     Pertinent Physical Exam At Time of Discharge  Physical Exam  Vitals and nursing note reviewed.   Constitutional:       Appearance: Normal appearance.   HENT:      Head: Normocephalic.      Right Ear: Tympanic membrane normal.      Left Ear: Tympanic membrane normal.      Nose: Nose normal.      Mouth/Throat:      Mouth: Mucous membranes are moist.   Eyes:      Extraocular Movements: Extraocular movements intact.      Pupils: Pupils are equal, round, and reactive to light.   Cardiovascular:      Rate and Rhythm: Normal rate and regular rhythm.      Pulses: Normal pulses.      Heart sounds:  Normal heart sounds.   Pulmonary:      Effort: Pulmonary effort is normal.      Breath sounds: Normal breath sounds.   Abdominal:      General: Bowel sounds are normal.      Palpations: Abdomen is soft.      Tenderness: There is no abdominal pain.  Musculoskeletal:         General: Normal range of motion.      Cervical back: Normal range of motion and neck supple.   Skin:     General: Skin is warm and dry.   Neurological:      Mental Status: She is alert and oriented to person, place, and time.   Psychiatric:         Behavior: Behavior normal.     Outpatient Follow-Up  Future Appointments   Date Time Provider Department Center   5/20/2025  2:10 PM Bo Camacho MD [id number] [facility]   6/13/2025  3:40 PM Ravi Mcqueen MD [id number] [facility]   7/24/2025  3:40 PM Pasha Benson MD [id number] [facility]   10/21/2025  3:00 PM Yamilet Frazier DO [id number] [facility]   3/6/2026  1:45 PM Jae Garvey OD [id number] [facility]         Imelda Gaines APRN-CNP   Amara Munoz  (RN)  2020 22:33:41 Socorro Matos  (RN)  2020 09:27:10

## 2025-05-11 NOTE — CARE PLAN
The patient's goals for the shift include      The clinical goals for the shift include Pt will remain free from falls this shift    Problem: Fall/Injury  Goal: Verbalize understanding of personal risk factors for fall in the hospital  Outcome: Progressing  Goal: Verbalize understanding of risk factor reduction measures to prevent injury from fall in the home  Outcome: Progressing  Goal: Use assistive devices by end of the shift  Outcome: Progressing  Goal: Pace activities to prevent fatigue by end of the shift  Outcome: Progressing

## 2025-05-12 LAB — O+P STL MICRO: NEGATIVE

## 2025-05-20 ENCOUNTER — APPOINTMENT (OUTPATIENT)
Dept: PRIMARY CARE | Facility: CLINIC | Age: 60
End: 2025-05-20
Payer: MEDICARE

## 2025-05-20 ENCOUNTER — OFFICE VISIT (OUTPATIENT)
Dept: ORTHOPEDIC SURGERY | Facility: CLINIC | Age: 60
End: 2025-05-20
Payer: MEDICARE

## 2025-05-20 DIAGNOSIS — G95.9 CERVICAL MYELOPATHY WITH CERVICAL RADICULOPATHY: ICD-10-CM

## 2025-05-20 DIAGNOSIS — M54.12 CERVICAL RADICULOPATHY: Primary | ICD-10-CM

## 2025-05-20 DIAGNOSIS — M48.02 CERVICAL SPINAL STENOSIS: ICD-10-CM

## 2025-05-20 DIAGNOSIS — M54.12 CERVICAL MYELOPATHY WITH CERVICAL RADICULOPATHY: ICD-10-CM

## 2025-05-20 DIAGNOSIS — G95.20 CERVICAL SPINAL CORD COMPRESSION: ICD-10-CM

## 2025-05-20 PROCEDURE — 99215 OFFICE O/P EST HI 40 MIN: CPT | Performed by: ORTHOPAEDIC SURGERY

## 2025-05-20 PROCEDURE — 4010F ACE/ARB THERAPY RXD/TAKEN: CPT | Performed by: ORTHOPAEDIC SURGERY

## 2025-05-20 NOTE — PROGRESS NOTES
HPI:Dinorah Horn is a 59-year-old woman, who comes in today for follow-up.  She has been having intractable intrascapular back pain.  She also gets numbness and tingling into her upper extremities and hands.  She also has subjective problems with balance.      ROS:  Reviewed on EMR and patient intake sheet.    PMH/SH:   Reviewed on EMR and patient intake sheet.    Exam:  Physical Exam    Constitutional: Well appearing; no acute distress  Eyes: pupils are equal and round  Psych: normal affect  Respiratory: non-labored breathing  Cardiovascular: regular rate and rhythm  GI: non-distended abdomen  Musculoskeletal: no pain with range of motion of the shoulders bilaterally; no signs of impingement  Neurologic: [4+]/5 strength in the upper extremities bilaterally]; [negative] Beckford's; [no hyper-reflexia]    Radiology:  MRI of the cervical spine was personally reviewed.  It demonstrates moderately severe cervical stenosis with spinal cord compression from C5-C7 and moderately severe to severe foraminal stenosis at C4-5, C5-C6 and C6-C7.    Diagnosis:  Cervical spondylotic myelopathy and radiculopathy; cervical radiculopathy; cervical stenosis; cervical spinal cord compression    Assessment and Plan:   59-year-old woman, with intractable radicular and subtle myelopathic symptoms secondary to multilevel cervical stenosis.  She has had symptoms despite recent physical therapy at [facility] where she was discharged from therapy on April 8, 2025.  At this time her symptoms are intolerable and her pain intractable.  She would like to consider surgical intervention.  She would need a C4-C7 anterior cervical decompression and fusion, CPT codes: 16747 followed by 22552 x 2 for the interbody fusions followed by 20931 and 20930 for the MTF allograft spacer and demineralized bone matrix followed by 43888 for the anterior cervical plate    The patient is a non-smoker.    We discussed surgery today at length, including the  specifics of the actual proposed procedure, the projected hospital course and post-operative recovery or rehabilitation, and the expected results of this surgery.  The potential benefits and risks of the proposed surgery include, but are not limited to, those of infection, spinal fluid leaks, nerve injury or paralysis, whether that be complete or partial paralysis, C5 palsy, dysphagia, hoarseness or vocal cord paralysis, instrumentation failure, non-union, future adjacent segment disease, epidural hematoma, wound dehiscence, esophageal injury, vascular injury, and the general risks of anaesthesia including, but not limited to those of stroke, heart attack, respiratory difficulties and death.  The patient understands that there are no guarantees in regard to outcome or potential complications associated with the proposed procedure. After this discussion, the patient articulated understanding of the topics covered and felt that all questions had been covered and answered satisfactorily.      She would like to proceed in their future.    At the end of the visit, I asked the patient if there were any further questions.  Although no further questions were provided at this time, I would be happy to see the patient back in the future to discuss any further questions or concerns.    Bo Camacho MD    Chief of Spine Surgery, [facility]  Director of Spine Service, [facility]  , Department of Orthopaedics  [facility] [employer]  [id number] [place] [address]  P: [phone]  [facility]    This note was dictated with voice recognition software.  It has not been proofread for grammatical errors, typographical mistakes or other semantic inconsistencies.

## 2025-05-20 NOTE — H&P (VIEW-ONLY)
HPI:Dinorah Horn is a 59-year-old woman, who comes in today for follow-up.  She has been having intractable intrascapular back pain.  She also gets numbness and tingling into her upper extremities and hands.  She also has subjective problems with balance.      ROS:  Reviewed on EMR and patient intake sheet.    PMH/SH:   Reviewed on EMR and patient intake sheet.    Exam:  Physical Exam    Constitutional: Well appearing; no acute distress  Eyes: pupils are equal and round  Psych: normal affect  Respiratory: non-labored breathing  Cardiovascular: regular rate and rhythm  GI: non-distended abdomen  Musculoskeletal: no pain with range of motion of the shoulders bilaterally; no signs of impingement  Neurologic: [4+]/5 strength in the upper extremities bilaterally]; [negative] Beckford's; [no hyper-reflexia]    Radiology:  MRI of the cervical spine was personally reviewed.  It demonstrates moderately severe cervical stenosis with spinal cord compression from C5-C7 and moderately severe to severe foraminal stenosis at C4-5, C5-C6 and C6-C7.    Diagnosis:  Cervical spondylotic myelopathy and radiculopathy; cervical radiculopathy; cervical stenosis; cervical spinal cord compression    Assessment and Plan:   59-year-old woman, with intractable radicular and subtle myelopathic symptoms secondary to multilevel cervical stenosis.  She has had symptoms despite recent physical therapy at [facility] where she was discharged from therapy on April 8, 2025.  At this time her symptoms are intolerable and her pain intractable.  She would like to consider surgical intervention.  She would need a C4-C7 anterior cervical decompression and fusion, CPT codes: 46816 followed by 22552 x 2 for the interbody fusions followed by 20931 and 20930 for the MTF allograft spacer and demineralized bone matrix followed by 98294 for the anterior cervical plate    The patient is a non-smoker.    We discussed surgery today at length, including the  specifics of the actual proposed procedure, the projected hospital course and post-operative recovery or rehabilitation, and the expected results of this surgery.  The potential benefits and risks of the proposed surgery include, but are not limited to, those of infection, spinal fluid leaks, nerve injury or paralysis, whether that be complete or partial paralysis, C5 palsy, dysphagia, hoarseness or vocal cord paralysis, instrumentation failure, non-union, future adjacent segment disease, epidural hematoma, wound dehiscence, esophageal injury, vascular injury, and the general risks of anaesthesia including, but not limited to those of stroke, heart attack, respiratory difficulties and death.  The patient understands that there are no guarantees in regard to outcome or potential complications associated with the proposed procedure. After this discussion, the patient articulated understanding of the topics covered and felt that all questions had been covered and answered satisfactorily.      She would like to proceed in their future.    At the end of the visit, I asked the patient if there were any further questions.  Although no further questions were provided at this time, I would be happy to see the patient back in the future to discuss any further questions or concerns.    Bo Camacho MD    Chief of Spine Surgery, [facility]  Director of Spine Service, [facility]  , Department of Orthopaedics  [facility] [employer]  [id number] [place] [address]  P: [phone]  [facility]    This note was dictated with voice recognition software.  It has not been proofread for grammatical errors, typographical mistakes or other semantic inconsistencies.

## 2025-05-21 ENCOUNTER — TRANSCRIBE ORDERS (OUTPATIENT)
Dept: ORTHOPEDIC SURGERY | Facility: HOSPITAL | Age: 60
End: 2025-05-21
Payer: MEDICARE

## 2025-05-21 DIAGNOSIS — G95.20 SPINAL CORD COMPRESSION (MULTI): ICD-10-CM

## 2025-05-21 DIAGNOSIS — M47.12 CERVICAL SPONDYLOSIS WITH MYELOPATHY: ICD-10-CM

## 2025-05-21 DIAGNOSIS — M48.02 SPINAL STENOSIS IN CERVICAL REGION: ICD-10-CM

## 2025-05-23 NOTE — PROGRESS NOTES
"Verbal consent of the patient and/or verbal parental consent for patients under the age of 18 have been obtained to conduct a physical examination at this office visit.    Established patient  History Of Present Illness  05/23/25 Dinorah Horn \"Raquel\" is a 59 y.o. female who presents for MRI review of their BILATERAL hand with possible cortisone injection      All previous Progress Notes and imaging results related to this patients chief complaint have been reviewed in preparation for this examination.    Past Medical History  She has a past medical history of Arthritis, Cirrhosis of liver (Multi), Coronary artery disease, Depression, Diabetes mellitus (Multi), GERD (gastroesophageal reflux disease), Headache, Heart disease, Hypertension, and Visual impairment.    Surgical History  She has a past surgical history that includes Appendectomy; Breast surgery (2003); Coronary artery bypass graft; Hysterectomy (Partial, 2000); Forearm fracture surgery (Right, 1992); and Forearm hardware removal (Right, 2003).     Social History  She reports that she quit smoking about 16 years ago. Her smoking use included cigarettes. She started smoking about 31 years ago. She has a 15 pack-year smoking history. She has never used smokeless tobacco. She reports that she does not currently use alcohol. She reports that she does not use drugs.    Family History  Family History   Problem Relation Name Age of Onset    Alcohol abuse Mother Trice     Cancer Mother Trice     Diabetes Mother Trice     Heart disease Mother Trice     Arthritis Father Reed     Atrial fibrillation Father Reed     Cancer Father Reed     COPD Father Reed     Hearing loss Father Reed     Heart disease Father Reed     Stroke Paternal Grandfather Brooks     Alcohol abuse Paternal Grandmother [place]     Alcohol abuse Sister Ioana     Alcohol abuse Sister Alis     Alcohol abuse Daughter Katerine     Cancer Father's Brother Xavier     Cancer " "Father's Brother Daljit     Colon cancer Father's Brother Daljit J     Drug abuse Sister Ioana GARCIA      Historical Clinical Intake:  11/27/24 Dinorah Horn \"Raquel\" is a 59 y.o. female who presents for an evaluation of their BILATERAL hand. States that she has been dealing with hand pain for years. She was referred in by her Rheumatologist Dr. Ravi Mcqueen for referall to sports medicine/orthopedics for peroneal tendon tear and CSI of stenosing tenosynovitis of L 3rd digit especially trigger finger.  States that she has inconsistent 5/10 pain. States that rainy weather and cold weather are what flare her up the most. States that when flare ups occur she tries to tolerate it and tough it out as much as she can. States that she feels numbness tingling pins needles all the time in both hands right worse than left. States that she feels cracks and pops in both hands right worse than left, states she has history of carpel tunnel in both hands. She states that the trigger finger for the middle finger of the right hand is her biggest concern.  Additionally she had some diagnostic ultrasound done on top of her x-rays that showed synovitis and synovial thickening of multiple peripheral joints especially throughout the fingers with tenosynovitis noted of multiple areas especially 2nd through 5th digits at the MCP joint consistent with synovial proliferation most likely related to inflammatory and/or crystal arthropathies.  Rheumatology has her on hydroxychloroquine  AKA Plaquenil 200 mg daily.      Allergies  Cosentyx [secukinumab]    Review of Systems  CONSTITUTIONAL:   Negative for weight change, loss of appetite, fatigue, weakness, fever, chills, night sweats, headaches .           HEENT:   Negative for cold, cough, sore throat, sinus pain, swollen lymph nodes.           OPHTHALMOLOGY:   Negative for diminished vision, blurred vision, loss of vision, double vision.           ALLERGY:   Negative for runny nose, scratchy " throat, sinus congestion, rash, facial pressure, nasal congestion, post-nasal drip.           CARDIOLOGY:   Negative for chest pain, palpitations, murmurs, irregular heart beat, shortness of breath, leg edema, dyspnea on exertion, fatigue, dizziness.           RESPIRATORY:   Negative for chest pain, shortness of breath, swelling of the legs, asthma/copd, chest congestion, pain with breathing .           GASTROENTEROLOGY:   Negative for nausea, vomitting, heartburn, constipation, diarrhea, blood in stool, change in bowel habits, black stool.           HEMATOLOGY/LYMPH:   Negative for fatigue, loss of appetitie, easy bruising, easy bleeding, anemia, abnormal bleeding, slow healing.           ENDOCRINOLOGY:   Negative for polyuria, polydipsia, polyphagia, fatigue, weight loss, weight gain, cold intolerance, heat intolerance, diabetes.           MUSCULOSKELETAL:   Positive  for BILATERAL hand/wrists        DERMATOLOGY:   Negative for rash, bruising.           NEUROLOGY:   Negative for tingling, numbness, gait abnormality, paresthesias, weakness, sciatica.        Examination:i  BILATERAL  Thumb, 3rd Digit, and MCP  Erythema: Negative.   Edema: Positive all of her fingers sausage style fingers mixed with edema  Effusion: Negative.   Warmth: Negative.   Ecchymosis/Bruising: Negative.   Percussion Test: Negative.   Tuning Fork Test: Negative.   Abrasions: Negative.   Orientation: Positive all of her fingers sausage style fingers mixed with edema            ROM:   Wrist Flexion (80 degrees)  Wrist Extension (70 degrees)  Wrist Supination (90 degrees)  Wrist Pronation (90 degrees)         Wrist Ulnar Deviation (30 degrees)  Wrist Radial Deviation (20 degrees)          Finger MCP (100 degrees)/ PIP (100 degrees)/ DIP (90 degrees) Flexion  Finger MCP (30 degrees) /PIP (0 degrees) / DIP (20 degrees) Extension          Fingers ABduction (20 degrees)  Fingers ADduction (0 degrees)          Thumb & Fingertip Opposition Positive   unable to perform right side  Thumb Circumduction.    Positive all of her fingers sausage style fingers mixed with edema and square thumb at MCP joint        Muscle Strength:   Positive Decreased muscle strength  +5/+5 Wrist Flexion  +5/+5 Wrist Extension        +5/+5 Wrist Supination  +5/+5 Wrist Pronation          +5/+5 Wrist Ulnar Deviation  +5/+5 Wrist Radial Deviation           Positive +4/+5 Finger MCP/PIP/DIP Flexion    Positive +4/+5 Finger MCP/PIP/DIP Extension            Positive +4/+5 Finger Abduction    Positive +4/+5 Finger Adduction            Positive +4/+5 Finger/Thumb Opposition    Positive +4/+5 Thumb Circumduction.            Motor/Neurological:    Normal  Tip of Thumb (Median Nerve)  Tip of 5th Finger (Ulnar Nerve)  Flex and Extend Thumb (Median and Radial Nerve)  Scissor Fingers (Ulnar Nerve)  Raise Thumb Against Resistance on Flat Surface (Median Nerve).          Sensation/Neurological:   Negative, Sensation Intact, 2 Point Discrimination Test Negative         C6: Lateral forearms, thumbs, anterior index finger, lateral half of the middle finger  C7: Some of posterior index finger, medial half of middle finger, upper posterior back, back of arms  C8: Ring finger, little finger, medial forearm, posterior upper back.     Palpation:   Positive Tenderness to Palpation BILATERAL Thumb, 3rd Digit, and MCP   at 3rd finger A1 pulley         Vascular:   +2/+4 Radial Pulse  +2/+4 Ulnar Pulse  Capillary Refill < 2 seconds.               Wrist/Hand/Finger - Motor Function:  Vincenzo-Littler Test: Negative.   Retinacular Test: Negative.   Princh  Test:   Positive   Key  Test: Negative.   Power  Test:   Positive   Trell  Test: Negative.         Wrist/Hand/Finger - Nerve Lesions/Compression Neuropathies: Right Side clinically worse than left  Carpal Tunnel Sign Test: Negative.  .   Phalen's Sign Test:Negative.    Reverse Phalen's Sign Test: Negative.   Wartenberg Sign Test: Negative.   Tinel's  Sign Test: Negative.   Opposition Test: Positive right sided.   Pinch Test: Positive btwn 3rd finger and thumb.   Carpal/Digital Compression Test:Negative.   Ochsner Test: Negative.   Froment's Sign Ulnar Nerve Test: Negative.   Intrinsic Ulnar Nerve Test: Negative.   O Test:Positive btwn 3rd finger and thumb.       Wrist/Hand/Finger - Stability:  Valgus Stess Test: Negative.   Varus Stess Test: Negative.   Dumont Scaphoid Shift Test: Negative.   Scapholunate Ballotment Test: Negative.   Pérez Test: Negative.   Dorsal Capitate Displacement Apprehension Test: Negative.   Shuck Test: Negative.         Wrist/Hand/Finger - Tenosynovitis:  Flexor Digitorum Profundus Test:  Positive btwn 3rd finger    Flexor Digitorum Superficialis Test:  Positive btwn 3rd finger   Flexor Pollicis Longus Test:  Negative.   Flexor Pollicis Brevis Test:  Negative.   Flexor Radial Longus Test:  Negative.   Flexor Radial Brevis Test:  Negative.   Extensor Digitorum Profundus Test:  Negative.   Extensor Digitorum Superficialis Test:  Negative.   Extensor Pollicis Longus Test:  Negative.   Extensor Pollicis Brevis (EPB)[Dequervain's):  Negative.   Extensor Radial Longus Test:  Negative.   Extensor Radial Brevis Test:  Negative.   Muckard Test:  Positive .   Finklestein Test:  Positive .   Kanavel Sign:  Negative.   Flexor and Extensor Longus Test:  Negative.         Wrist/Hand/Finger - TFCC:  Supination Lift Test: Negative.   Grind Test: Negative.             Imaging and Diagnostics Review:  MRI of the  left hand without IV contrast;  12/18/2024 1:22 pm   Joints:  Severe degenerative changes at the 1st CMC joint and triscaphe joint  with degenerative subchondral cysts in the proximal metacarpal and  trapezium. Mild degenerative changes at the 1st MCP and at the  2nd-5th distal interphalangeal and 5th proximal interphalangeal  joints. Moderate degenerative changes with subchondral edema and  cystic changes in the 1st interphalangeal joint. No  erosions. No  synovitis or abnormal joint effusion.      Osseous structures:  There is no evidence of acute fracture or dislocation. There is no  avascular necrosis. Irregular T2 hyperintense and T1 hypointense  lesion in the distal 3rd metacarpal intramedullary space.      Soft tissues:  There is no evidence of muscular atrophy or tear.  No suspicious soft tissue lesions are seen.      IMPRESSION:  1. Severe degenerative changes of the 1st CMC and triscaphe joint.  Moderate degenerative changes at the 1st interphalangeal joint. Mild  degenerative changes of the 1st MCP and 2nd-5th distal  interphalangeal and 5th proximal interphalangeal joints. Distribution  is compatible with osteoarthritic disease.  2. Nonaggressive appearing lesion in the distal 3rd metacarpal, may  represent chondroid or cystic lesion.  3. No evidence of inflammatory arthropathy.      Signed by: Tara Otero 12/19/2024 10:01 AM  Dictation workstation:   [id number]   --------------------------------------------------------------------------    MRI of the  right hand without IV contrast;  12/18/2024 1:01 pm   FINDINGS:  Suboptimal secondary to motion artifact on multiple sequences.      Tendons:  Mild tendinosis and tenosynovitis of the 3rd digit flexor tendons at  the level of the proximal phalanx. The flexor and extensor tendons of  the hand are otherwise intact. The tendons of the thenar and  hypothenar compartments are normal.      Ligaments:  The major ligamentous structures of the hand are intact.      Joints:  Mild flexion deformity of the 5th proximal interphalangeal joint  which may be due to chronic ligament injury.      Moderate degenerative changes at the 1st metacarpophalangeal and 5th  proximal interphalangeal joints with subchondral edema and  osteophytes. Mild degenerative changes at the 1st interphalangeal  joint with osteophytosis. Severe degenerative changes of the 1st  carpometacarpal joint with edema and erosive change  in the proximal  metacarpal and in the trapezium. No erosions.      Osseous structures:  There is no evidence of acute fracture or dislocation. There is no  avascular necrosis. No marrow replacing lesions are seen.      Soft tissues:  There is no evidence of muscular atrophy or tear.  No suspicious soft tissue lesions are seen.      IMPRESSION:  1. Severe degenerative changes of the 1st CMC joint. Moderate  degenerative changes at the 1st metacarpophalangeal and 5th proximal  interphalangeal joints. Mild degenerative changes of the 1st  interphalangeal joint. This distribution is compatible with  osteoarthritic changes.  2. Mild tendinosis and tenosynovitis of the 3rd digit flexor tendons  at the level of the proximal phalanx.  3. Mild flexion deformity of the 5th proximal interphalangeal joint  which may be due to chronic ligament injury.  4. No erosions.    Signed by: Tara Otero 12/19/2024 9:57 AM  Dictation workstation:   [id number]     --------------------------------------------------------------------------  US MSK upper extremity joints tendons muscles  US MSK upper extremity joints tendons muscles  Study Result  Narrative & Impression   Interpreted By:  Reed Patrick,   STUDY:  Musculoskeletal ultrasound of the bilateral hand and wrist dated  10/22/2024.  INDICATION:  Hand pain. Synovial screen.  COMPARISON:  None. Correlation is made with 09/18/2024 radiographs.   ACCESSION NUMBER(S):  [id number]; [id number]  ORDERING CLINICIAN:  MORENO LANE  TECHNIQUE:  Multiplanar color and grayscale ultrasound of the bilateral hand and  wrist was performed. Exam was performed according to a synovial  screening protocol.      FINDINGS:  RIGHT:      No fluid is evident in the flexor or extensor tendon sheaths as seen  on this exam.      Marginal osteophytes are seen of the 1st digit carpometacarpal  articulation without joint effusion synovial proliferation or  increased color signal intensity evident.       No significant no joint effusion synovial proliferation or increased  color signal intensity is evident at the 1st digit  metacarpophalangeal joint. There is mild marginal osteophyte  formation at the interphalangeal joint of the 1st digit without joint  effusion synovial proliferation or increased color signal intensity  evident.      No significant joint effusion synovial proliferation or increased  color signal intensity is evident at the 2nd metacarpophalangeal  joint. Marginal micro osteophyte formation is seen of the proximal  distal interphalangeal joints of the 2nd digit without joint effusion  increased color signal intensity or synovial proliferation evident.      There is a trace volume effusion at the 3rd digit metacarpophalangeal  joint without synovial proliferation or increased color signal  intensity evident. Marginal micro osteophyte formation is seen of the  proximal distal interphalangeal joint of the 3rd digit without joint  effusion synovial proliferation or increased color signal intensity  evident.      No joint effusion synovial proliferation or increased color signal  intensity is evident at the 4th digit metacarpophalangeal joint.  There is a trace volume effusion of the 4th digit proximal  interphalangeal joint possibly with some mild capsular hyperemia  without synovial proliferation evident. No significant degenerative  change joint effusion are increased color signal intensity is evident  at the distal interphalangeal joint of the 4th digit.      No joint effusion synovial proliferation or increased color signal  intensity is evident at the 5th digit metacarpophalangeal joint.  Marginal osteophyte formation and small effusion is seen of the  proximal interphalangeal joint of the 5th digit without synovial  proliferation or increased color signal intensity evident. There is a  degree of flexion deformity at this joint. No significant  degenerative change joint effusion or synovial  proliferation is  evident at the distal interphalangeal joint of the 5th digit.      LEFT:      No fluid is evident in the flexor or extensor tendon sheaths as seen  on this examination.      No joint effusion synovial proliferation or increased color signal  intensity is evident at the metacarpophalangeal joint of the 1st  digit. Marginal osteophyte formation is seen of the interphalangeal  joint of the 1st digit without joint effusion synovial proliferation  or increased color signal intensity evident.      There is synovial proliferation with trace joint effusion and mild  hyperemia at the 2nd digit metacarpophalangeal joint. Micro  osteophyte formation is seen at the proximal distal interphalangeal  joint of the 2nd digit without joint effusion synovial proliferation  or increased color signal intensity evident.      No joint effusion synovial proliferation or increased color signal  intensity is evident at the 3rd digit metacarpophalangeal joint.  Micro osteophyte formation is seen of the proximal distal  interphalangeal joint of the 3rd digit without joint effusion  synovial proliferation or increased color signal intensity evident.      No joint effusion synovial proliferation or increased color signal  intensity is evident at the 4th digit metacarpophalangeal joint. No  joint effusion synovial proliferation or increased color signal  intensity is evident at the 4th digit interphalangeal joints.      There is a small volume joint effusion with mild synovial  proliferation without increased color signal intensity of the 5th  digit metacarpophalangeal joint. There is osteophyte formation with  trace joint effusion of the proximal interphalangeal joint of the 5th  digit. No synovial proliferation or increased color signal intensity  is evident. Micro osteophyte formation is seen of the distal  interphalangeal joint of the 5th digit without joint effusion  synovial proliferation or increased color signal  intensity evident.      IMPRESSION DX U/S of B/L upper extremities:  Polyarticular degenerative change of the bilateral hand and wrist in  a pattern overall likely related to osteoarthrosis although there are  some joints that have trace effusions, described above which are  nonspecific and possibly reactive. Given this, there does appear to  be some synovial proliferation at the 2nd and 5th digit  metacarpophalangeal joint of the left hand which could be related to  crystal and/or inflammatory arthropathy.      Signed by: Reed Patrick 10/22/2024 2:42 PM  Dictation workstation:   [id number]       Assessment   No diagnosis found.      Treatment or Intervention:  May continue alternating ice and moist heat therapy as needed   Stressed the importance of trying to keep her hands warm because of the amount of arthritis she has especially in the winter,   Start into Hand/Physical Therapy 1-2 times a week for 8-10 weeks with manual therapy as well as dry needling and IASTM  Reviewed home exercises to be performed by the patient routinely   Recommendation over-the-counter vitamin-D 2 -3000+ milligrams a day, as well as  a daily multivitamin.  Recommendation over-the-counter curcumin, turmeric, boswellia,  as directed to aid with joint inflammation.   Recommendation over-the-counter Move Free ULTRA for joint health.    Continue hydroxychloroquine  AKA Plaquenil 200 mg daily per her Rheumatologist Dr. Ravi Mcqueen .  Patient advised regarding the risks and/or potential adverse reactions and/or side effects of any prescribed medications along with any over-the-counter medications or any supplements used. Patient advised to seek immediate medical care if any adverse reactions occur. The patient and/or patient(s) parent(s) verbalized their understanding  MRI bilateral hands to further evaluate A1 pulley system for trigger fingers as well as further evaluate ligaments and tendons and further evaluate patient's bone  structures  Continue follow-ups regularly as instructed by her Rheumatologist Dr. Ravi Mcqueen   Most likely in the future corticosteroid injections not only for the thumb but also trigger fingers  Also possibility in the future regenerative injections versus viscosupplementation injections versus a combination of both for fingers and thumbs  Possibility referral to orthopedics for surgical evaluation for trigger fingers if injections do not work however patient would like to try to avoid surgical intervention only for her fingers but also her thumbs  Follow-up       Please note that this report has been produced using speech recognition software.  It may contain errors related to grammar, punctuation or spelling.  Electronically signed, but not reviewed.  YAZMIN Flores, Director of Sports Medicine      RISSA MARTÍNEZ on 5/23/25 at 12:59 PM.     DEYSI Flores DO

## 2025-05-27 ENCOUNTER — APPOINTMENT (OUTPATIENT)
Dept: ORTHOPEDIC SURGERY | Facility: CLINIC | Age: 60
End: 2025-05-27
Payer: MEDICARE

## 2025-05-29 ENCOUNTER — CLINICAL SUPPORT (OUTPATIENT)
Dept: PREADMISSION TESTING | Facility: HOSPITAL | Age: 60
End: 2025-05-29
Payer: MEDICARE

## 2025-05-29 NOTE — CPM/PAT NURSE NOTE
"[facility] Nurse Note      Name: Dinorah Horn (Dinorah Horn \"Raquel\")  /Age: 1965/60 y.o.       Medical History[1]    Surgical History[2]    Patient  reports that she is not currently sexually active and has had partner(s) who are male. She reports using the following method of birth control/protection: None.    Family History[3]    Allergies[4]    Prior to Admission medications    Medication Sig Start Date End Date Taking? Authorizing Provider   aspirin 81 mg EC tablet Take 1 tablet (81 mg) by mouth once daily.    Historical Provider, MD   cholecalciferol (Vitamin D3) 25 MCG (1000 UT) capsule Take 1 capsule (25 mcg) by mouth once daily.    Historical Provider, MD   cyanocobalamin (Vitamin B-12) 50 mcg tablet Take 1 tablet (50 mcg) by mouth once daily.    Historical Provider, MD   ferrous sulfate, 325 mg ferrous sulfate, tablet Take 1 tablet (325 mg) by mouth once daily.    Historical Provider, MD   lisinopril 20 mg tablet Take 1 tablet (20 mg) by mouth once daily. 24   Yamilet Frazier DO   meclizine (Antivert) 12.5 mg tablet Take 1 tablet (12.5 mg) by mouth 3 times a day as needed for dizziness. 25   Imelda Gaines APRN-CNP   metFORMIN  mg 24 hr tablet Take one tablet twice daily 25   Yamilet Frazier DO   metoprolol tartrate (Lopressor) 25 mg tablet Take 1 tablet (25 mg) by mouth 2 times a day. 24   Yamilet Frazier DO   multivitamin tablet Take 1 tablet by mouth once daily.    Historical Provider, MD   nitroglycerin (Nitrostat) 0.3 mg SL tablet Place 1 tablet (0.3 mg) under the tongue every 5 minutes if needed for chest pain.    Historical Provider, MD   omeprazole (PriLOSEC) 40 mg DR capsule Take 1 capsule (40 mg) by mouth once daily in the morning. Take before meals. Do not crush or chew. 24   Yamilet Frazier DO   rosuvastatin (Crestor) 20 mg tablet Take 1 tablet (20 mg) by mouth once daily. 11/15/24   Yamilet Frazier DO   traZODone (Desyrel) 50 mg " tablet Take 0.5 tablets (25 mg) by mouth once daily at bedtime. 12/16/24   Yamilet Frazier, DO   venlafaxine (Effexor) 75 mg tablet TAKE ONE TABLET BY MOUTH DAILY IN THE MORNING 4/19/25   Yamilet Frazier, DO   venlafaxine XR (Effexor-XR) 150 mg 24 hr capsule TAKE ONE CAPSULE BY MOUTH ONCE DAILY. DO NOT CRUSH OR CHEW. 4/22/25   Yamilet Frazier DO        PAT ROS     DASI Risk Score      Flowsheet Row Questionnaire Series Submission from 5/21/2025 in [facility] OR with Generic Provider Jean   Can you take care of yourself (eat, dress, bathe, or use toilet)?  2.75  filed at 05/21/2025 1231   Can you walk indoors, such as around your house? 1.75  filed at 05/21/2025 1231   Can you walk a block or two on level ground?  2.75  filed at 05/21/2025 1231   Can you climb a flight of stairs or walk up a hill? 5.5  filed at 05/21/2025 1231   Can you run a short distance? 8  filed at 05/21/2025 1231   Can you do light work around the house like dusting or washing dishes? 2.7  filed at 05/21/2025 1231   Can you do moderate work around the house like vacuuming, sweeping floors or carrying groceries? 3.5  filed at 05/21/2025 1231   Can you do heavy work around the house like scrubbing floors or lifting and moving heavy furniture?  8  filed at 05/21/2025 1231   Can you do yard work like raking leaves, weeding or pushing a mower? 4.5  filed at 05/21/2025 1231   Can you have sexual relations? 5.25  filed at 05/21/2025 1231   Can you participate in moderate recreational activities like golf, bowling, dancing, doubles tennis or throwing a baseball or football? 6  filed at 05/21/2025 1231   Can you participate in strenous sports like swimming, singles tennis, football, basketball, or skiing? 7.5  filed at 05/21/2025 1231   DASI SCORE 58.2  filed at 05/21/2025 1231   METS Score (Will be calculated only when all the questions are answered) 9.9  filed at 05/21/2025 1231          Caprini DVT Assessment    No data to  display       Modified Frailty Index    No data to display       [id number] Stroke Risk Points  Current as of just now        N/A 0 to 9 Points:      Last Change: N/A          The [id number] risk score (Lip CHANDLER, et al. 2009. © 2010 [employer]) quantifies the risk of stroke for a patient with atrial fibrillation. For patients without atrial fibrillation or under the age of 18 this score appears as N/A. Higher score values generally indicate higher risk of stroke.        This score is not applicable to this patient. Components are not calculated.          Revised Cardiac Risk Index    No data to display       Apfel Simplified Score    No data to display       Risk Analysis Index Results This Encounter    No data found in the last 10 encounters.       Stop Bang Score      Flowsheet Row Questionnaire Series Submission from 5/21/2025 in [facility] OR with Generic Provider Jean   Do you snore loudly? 0  filed at 05/21/2025 1231   Do you often feel tired or fatigued after your sleep? 0  filed at 05/21/2025 1231   Has anyone ever observed you stop breathing in your sleep? 0  filed at 05/21/2025 1231   Do you have or are you being treated for high blood pressure? 1  filed at 05/21/2025 1231   Recent BMI (Calculated) 27.2  filed at 05/21/2025 1231   Is BMI greater than 35 kg/m2? 0=No  filed at 05/21/2025 1231   Age older than 50 years old? 1=Yes  filed at 05/21/2025 1231   Gender - Male 0=No  filed at 05/21/2025 1231          Prodigy: High Risk  Total Score: 8              Prodigy Age Score           ARISCAT Score for Postoperative Pulmonary Complications    No data to display       Carter Perioperative Risk for Myocardial Infarction or Cardiac Arrest (MAGGIE)    No data to display         Nurse Plan of Action:     RN screening call complete.  Reviewed allergies, medications and pharmacy, medical, surgical and social history with patient.  Chart updated.  Instructed patient to stop  Vitamins/supplements 1 week prior to surgery.             [1]   Past Medical History:  Diagnosis Date    Alopecia     Arthritis     Cataract     bilateral    Cervical spinal cord compression     C5-7    Cervical spinal stenosis     Cervical spondylosis with myelopathy     Chronic back pain     Chronic sinusitis     Cirrhosis of liver (Multi)     former alcohol abuse    Coronary artery disease     CABG in 2010, no recent follow up with cardiology, Apt for cardiac risk assessment made with Dr. Dubon at [place] 5/29/25 at 10am    Depression     Diabetes mellitus (Multi)     A1C= 6.05 on 9/6/22    Fibromyalgia     GERD (gastroesophageal reflux disease)     Headache     Hiatal hernia     History of stress test 10/24/2016    HLD (hyperlipidemia)     Hypertension     Insomnia     Lung nodule     Lymphadenopathy     Multiple joint pain     Numbness and tingling     arms, hands, feet and legs    Palpitations     Still experiences on/off, did wear heart monitor 6/15/21-results in [facility], seeing dr. Dubon 5/30/25 at 10AM    Postural dizziness with near syncope     recent ED admit 5/11/25; Carotid US, head CT and MRI all WNL, patient found to have a UTI, discharged on ATB's [id number], per patient no further issues    Pre-operative clearance     Apt for cardiac risk assessment made with Dr. Dubon at [place] 5/29/25 at 10am    Prolonged QT interval     Psoriasis     Psoriatic arthritis (Multi)     follows with Rhematology    RA (rheumatoid arthritis)     Raynaud's disease     cold fingertips witth pain    Thyroid nodule     Trigger finger     UTI (urinary tract infection) 05/11/2025    treated with 10 day coarse of ATB's    Visual impairment    [2]   Past Surgical History:  Procedure Laterality Date    APPENDECTOMY      BREAST SURGERY  2003    Augmentation    CARDIAC CATHETERIZATION      COLONOSCOPY      CORONARY ARTERY BYPASS GRAFT  10/2010    FOREARM FRACTURE SURGERY Right 1992    FOREARM HARDWARE REMOVAL Right 2003     HYSTERECTOMY  Partial, 2000    KNEE ARTHROSCOPY W/ LASER Left     LIVER BIOPSY     [3]   Family History  Problem Relation Name Age of Onset    Alcohol abuse Mother Trice     Cancer Mother Trice     Diabetes Mother Trice     Heart disease Mother Trice     Arthritis Father Reed     Atrial fibrillation Father Reed     Cancer Father Reed     COPD Father Reed     Hearing loss Father Reed     Heart disease Father Reed     Stroke Paternal Grandfather Guy     Alcohol abuse Paternal Grandmother [place]     Alcohol abuse Sister Ioana     Alcohol abuse Sister Alis     Alcohol abuse Daughter Katerine     Cancer Father's Brother Xavier     Cancer Father's Brother [place]     Colon cancer Father's Brother Daljit J     Drug abuse Sister Ioana GARCIA    [4]   Allergies  Allergen Reactions    Cosentyx [Secukinumab] Rash

## 2025-05-29 NOTE — PROGRESS NOTES
"Verbal consent of the patient and/or verbal parental consent for patients under the age of 18 have been obtained to conduct a physical examination at this office visit.    Established patient  History Of Present Illness  05/29/25 Dinorah Horn \"Pratibha" is a 60 y.o. female who presents for MRI review of their BILATERAL hand with possible cortisone injection      All previous Progress Notes and imaging results related to this patients chief complaint have been reviewed in preparation for this examination.    Past Medical History  She has a past medical history of Alopecia, Arthritis, Cataract, Cervical spinal cord compression, Cervical spinal stenosis, Cervical spondylosis with myelopathy, Chronic back pain, Chronic sinusitis, Cirrhosis of liver (Multi), Coronary artery disease, Depression, Diabetes mellitus (Multi), Fibromyalgia, GERD (gastroesophageal reflux disease), Headache, Hiatal hernia, History of stress test (10/24/2016), HLD (hyperlipidemia), Hypertension, Insomnia, Lung nodule, Lymphadenopathy, Multiple joint pain, Numbness and tingling, Palpitations, Postural dizziness with near syncope, Pre-operative clearance, Prolonged QT interval, Psoriasis, Psoriatic arthritis (Multi), RA (rheumatoid arthritis), Raynaud's disease, Thyroid nodule, Trigger finger, UTI (urinary tract infection) (05/11/2025), and Visual impairment.    Surgical History  She has a past surgical history that includes Appendectomy; Breast surgery (2003); Coronary artery bypass graft (10/2010); Hysterectomy (Partial, 2000); Forearm fracture surgery (Right, 1992); Forearm hardware removal (Right, 2003); Liver biopsy; Cardiac catheterization; Colonoscopy; and Knee arthroscopy w/ laser (Left).     Social History  She reports that she quit smoking about 16 years ago. Her smoking use included cigarettes. She started smoking about 31 years ago. She has a 15 pack-year smoking history. She has never used smokeless tobacco. She reports current alcohol " "use. She reports that she does not use drugs.    Family History  Family History   Problem Relation Name Age of Onset    Alcohol abuse Mother Trice     Cancer Mother Trice     Diabetes Mother Trice     Heart disease Mother Trice     Arthritis Father Reed     Atrial fibrillation Father Reed     Cancer Father Reed     COPD Father Reed     Hearing loss Father Reed     Heart disease Father Reed     Stroke Paternal Grandfather Guy     Alcohol abuse Paternal Grandmother Brigette     Alcohol abuse Sister Ioana     Alcohol abuse Sister Alis     Alcohol abuse Daughter Katerine     Cancer Father's Brother Xavier     Cancer Father's Brother Daljit     Colon cancer Father's Brother Daljit J     Drug abuse Sister Ioana GARCIA      Historical Clinical Intake:  11/27/24 Dinorah Horn \"Raquel\" is a 59 y.o. female who presents for an evaluation of their BILATERAL hand. States that she has been dealing with hand pain for years. She was referred in by her Rheumatologist Dr. Ravi Mcqueen for referall to sports medicine/orthopedics for peroneal tendon tear and CSI of stenosing tenosynovitis of L 3rd digit especially trigger finger.  States that she has inconsistent 5/10 pain. States that rainy weather and cold weather are what flare her up the most. States that when flare ups occur she tries to tolerate it and tough it out as much as she can. States that she feels numbness tingling pins needles all the time in both hands right worse than left. States that she feels cracks and pops in both hands right worse than left, states she has history of carpel tunnel in both hands. She states that the trigger finger for the middle finger of the right hand is her biggest concern.  Additionally she had some diagnostic ultrasound done on top of her x-rays that showed synovitis and synovial thickening of multiple peripheral joints especially throughout the fingers with tenosynovitis noted of multiple areas especially 2nd " through 5th digits at the MCP joint consistent with synovial proliferation most likely related to inflammatory and/or crystal arthropathies.  Rheumatology has her on hydroxychloroquine  AKA Plaquenil 200 mg daily.      Allergies  Cosentyx [secukinumab]    Review of Systems  CONSTITUTIONAL:   Negative for weight change, loss of appetite, fatigue, weakness, fever, chills, night sweats, headaches .           HEENT:   Negative for cold, cough, sore throat, sinus pain, swollen lymph nodes.           OPHTHALMOLOGY:   Negative for diminished vision, blurred vision, loss of vision, double vision.           ALLERGY:   Negative for runny nose, scratchy throat, sinus congestion, rash, facial pressure, nasal congestion, post-nasal drip.           CARDIOLOGY:   Negative for chest pain, palpitations, murmurs, irregular heart beat, shortness of breath, leg edema, dyspnea on exertion, fatigue, dizziness.           RESPIRATORY:   Negative for chest pain, shortness of breath, swelling of the legs, asthma/copd, chest congestion, pain with breathing .           GASTROENTEROLOGY:   Negative for nausea, vomitting, heartburn, constipation, diarrhea, blood in stool, change in bowel habits, black stool.           HEMATOLOGY/LYMPH:   Negative for fatigue, loss of appetitie, easy bruising, easy bleeding, anemia, abnormal bleeding, slow healing.           ENDOCRINOLOGY:   Negative for polyuria, polydipsia, polyphagia, fatigue, weight loss, weight gain, cold intolerance, heat intolerance, diabetes.           MUSCULOSKELETAL:   Positive  for BILATERAL hand/wrists        DERMATOLOGY:   Negative for rash, bruising.           NEUROLOGY:   Negative for tingling, numbness, gait abnormality, paresthesias, weakness, sciatica.        Examination:i  BILATERAL  Thumb, 3rd Digit, and MCP  Erythema: Negative.   Edema: Positive all of her fingers sausage style fingers mixed with edema  Effusion: Negative.   Warmth: Negative.   Ecchymosis/Bruising: Negative.    Percussion Test: Negative.   Tuning Fork Test: Negative.   Abrasions: Negative.   Orientation: Positive all of her fingers sausage style fingers mixed with edema            ROM:   Wrist Flexion (80 degrees)  Wrist Extension (70 degrees)  Wrist Supination (90 degrees)  Wrist Pronation (90 degrees)         Wrist Ulnar Deviation (30 degrees)  Wrist Radial Deviation (20 degrees)          Finger MCP (100 degrees)/ PIP (100 degrees)/ DIP (90 degrees) Flexion  Finger MCP (30 degrees) /PIP (0 degrees) / DIP (20 degrees) Extension          Fingers ABduction (20 degrees)  Fingers ADduction (0 degrees)          Thumb & Fingertip Opposition Positive  unable to perform right side  Thumb Circumduction.    Positive all of her fingers sausage style fingers mixed with edema and square thumb at MCP joint        Muscle Strength:   Positive Decreased muscle strength  +5/+5 Wrist Flexion  +5/+5 Wrist Extension        +5/+5 Wrist Supination  +5/+5 Wrist Pronation          +5/+5 Wrist Ulnar Deviation  +5/+5 Wrist Radial Deviation           Positive +4/+5 Finger MCP/PIP/DIP Flexion    Positive +4/+5 Finger MCP/PIP/DIP Extension            Positive +4/+5 Finger Abduction    Positive +4/+5 Finger Adduction            Positive +4/+5 Finger/Thumb Opposition    Positive +4/+5 Thumb Circumduction.            Motor/Neurological:    Normal  Tip of Thumb (Median Nerve)  Tip of 5th Finger (Ulnar Nerve)  Flex and Extend Thumb (Median and Radial Nerve)  Scissor Fingers (Ulnar Nerve)  Raise Thumb Against Resistance on Flat Surface (Median Nerve).          Sensation/Neurological:   Negative, Sensation Intact, 2 Point Discrimination Test Negative         C6: Lateral forearms, thumbs, anterior index finger, lateral half of the middle finger  C7: Some of posterior index finger, medial half of middle finger, upper posterior back, back of arms  C8: Ring finger, little finger, medial forearm, posterior upper back.     Palpation:   Positive Tenderness to  Palpation BILATERAL Thumb, 3rd Digit, and MCP   at 3rd finger A1 pulley         Vascular:   +2/+4 Radial Pulse  +2/+4 Ulnar Pulse  Capillary Refill < 2 seconds.               Wrist/Hand/Finger - Motor Function:  Vincenzo-Littler Test: Negative.   Retinacular Test: Negative.   Princh  Test:   Positive   Key  Test: Negative.   Power  Test:   Positive   Trell  Test: Negative.         Wrist/Hand/Finger - Nerve Lesions/Compression Neuropathies: Right Side clinically worse than left  Carpal Tunnel Sign Test: Negative.  .   Phalen's Sign Test:Negative.    Reverse Phalen's Sign Test: Negative.   Wartenberg Sign Test: Negative.   Tinel's Sign Test: Negative.   Opposition Test: Positive right sided.   Pinch Test: Positive btwn 3rd finger and thumb.   Carpal/Digital Compression Test:Negative.   Ochsner Test: Negative.   Froment's Sign Ulnar Nerve Test: Negative.   Intrinsic Ulnar Nerve Test: Negative.   O Test:Positive btwn 3rd finger and thumb.       Wrist/Hand/Finger - Stability:  Valgus Stess Test: Negative.   Varus Stess Test: Negative.   Dumont Scaphoid Shift Test: Negative.   Scapholunate Ballotment Test: Negative.   [place] Test: Negative.   Dorsal Capitate Displacement Apprehension Test: Negative.   Shuck Test: Negative.         Wrist/Hand/Finger - Tenosynovitis:  Flexor Digitorum Profundus Test:  Positive btwn 3rd finger    Flexor Digitorum Superficialis Test:  Positive btwn 3rd finger   Flexor Pollicis Longus Test:  Negative.   Flexor Pollicis Brevis Test:  Negative.   Flexor Radial Longus Test:  Negative.   Flexor Radial Brevis Test:  Negative.   Extensor Digitorum Profundus Test:  Negative.   Extensor Digitorum Superficialis Test:  Negative.   Extensor Pollicis Longus Test:  Negative.   Extensor Pollicis Brevis (EPB)[Dequervain's):  Negative.   Extensor Radial Longus Test:  Negative.   Extensor Radial Brevis Test:  Negative.   Muckard Test:  Positive .   Finklestein Test:  Positive .   Kanavel Sign:   Negative.   Flexor and Extensor Longus Test:  Negative.         Wrist/Hand/Finger - TFCC:  Supination Lift Test: Negative.   Grind Test: Negative.             Imaging and Diagnostics Review:  MRI of the  left hand without IV contrast;  12/18/2024 1:22 pm   Joints:  Severe degenerative changes at the 1st CMC joint and triscaphe joint  with degenerative subchondral cysts in the proximal metacarpal and  trapezium. Mild degenerative changes at the 1st MCP and at the  2nd-5th distal interphalangeal and 5th proximal interphalangeal  joints. Moderate degenerative changes with subchondral edema and  cystic changes in the 1st interphalangeal joint. No erosions. No  synovitis or abnormal joint effusion.      Osseous structures:  There is no evidence of acute fracture or dislocation. There is no  avascular necrosis. Irregular T2 hyperintense and T1 hypointense  lesion in the distal 3rd metacarpal intramedullary space.      Soft tissues:  There is no evidence of muscular atrophy or tear.  No suspicious soft tissue lesions are seen.      IMPRESSION:  1. Severe degenerative changes of the 1st CMC and triscaphe joint.  Moderate degenerative changes at the 1st interphalangeal joint. Mild  degenerative changes of the 1st MCP and 2nd-5th distal  interphalangeal and 5th proximal interphalangeal joints. Distribution  is compatible with osteoarthritic disease.  2. Nonaggressive appearing lesion in the distal 3rd metacarpal, may  represent chondroid or cystic lesion.  3. No evidence of inflammatory arthropathy.      Signed by: Tara Otero 12/19/2024 10:01 AM  Dictation workstation:   [id number]   --------------------------------------------------------------------------    MRI of the  right hand without IV contrast;  12/18/2024 1:01 pm   FINDINGS:  Suboptimal secondary to motion artifact on multiple sequences.      Tendons:  Mild tendinosis and tenosynovitis of the 3rd digit flexor tendons at  the level of the proximal phalanx. The  flexor and extensor tendons of  the hand are otherwise intact. The tendons of the thenar and  hypothenar compartments are normal.      Ligaments:  The major ligamentous structures of the hand are intact.      Joints:  Mild flexion deformity of the 5th proximal interphalangeal joint  which may be due to chronic ligament injury.      Moderate degenerative changes at the 1st metacarpophalangeal and 5th  proximal interphalangeal joints with subchondral edema and  osteophytes. Mild degenerative changes at the 1st interphalangeal  joint with osteophytosis. Severe degenerative changes of the 1st  carpometacarpal joint with edema and erosive change in the proximal  metacarpal and in the trapezium. No erosions.      Osseous structures:  There is no evidence of acute fracture or dislocation. There is no  avascular necrosis. No marrow replacing lesions are seen.      Soft tissues:  There is no evidence of muscular atrophy or tear.  No suspicious soft tissue lesions are seen.      IMPRESSION:  1. Severe degenerative changes of the 1st CMC joint. Moderate  degenerative changes at the 1st metacarpophalangeal and 5th proximal  interphalangeal joints. Mild degenerative changes of the 1st  interphalangeal joint. This distribution is compatible with  osteoarthritic changes.  2. Mild tendinosis and tenosynovitis of the 3rd digit flexor tendons  at the level of the proximal phalanx.  3. Mild flexion deformity of the 5th proximal interphalangeal joint  which may be due to chronic ligament injury.  4. No erosions.    Signed by: Tara Otero 12/19/2024 9:57 AM  Dictation workstation:   [id number]     --------------------------------------------------------------------------  US MSK upper extremity joints tendons muscles  US MSK upper extremity joints tendons muscles  Study Result  Narrative & Impression   Interpreted By:  Reed Patrick,   STUDY:  Musculoskeletal ultrasound of the bilateral hand and wrist  dated  10/22/2024.    ORDERING CLINICIAN:  MORENO LANE      FINDINGS:  RIGHT:      No fluid is evident in the flexor or extensor tendon sheaths as seen  on this exam.      Marginal osteophytes are seen of the 1st digit carpometacarpal  articulation without joint effusion synovial proliferation or  increased color signal intensity evident.      No significant no joint effusion synovial proliferation or increased  color signal intensity is evident at the 1st digit  metacarpophalangeal joint. There is mild marginal osteophyte  formation at the interphalangeal joint of the 1st digit without joint  effusion synovial proliferation or increased color signal intensity  evident.      No significant joint effusion synovial proliferation or increased  color signal intensity is evident at the 2nd metacarpophalangeal  joint. Marginal micro osteophyte formation is seen of the proximal  distal interphalangeal joints of the 2nd digit without joint effusion  increased color signal intensity or synovial proliferation evident.      There is a trace volume effusion at the 3rd digit metacarpophalangeal  joint without synovial proliferation or increased color signal  intensity evident. Marginal micro osteophyte formation is seen of the  proximal distal interphalangeal joint of the 3rd digit without joint  effusion synovial proliferation or increased color signal intensity  evident.      No joint effusion synovial proliferation or increased color signal  intensity is evident at the 4th digit metacarpophalangeal joint.  There is a trace volume effusion of the 4th digit proximal  interphalangeal joint possibly with some mild capsular hyperemia  without synovial proliferation evident. No significant degenerative  change joint effusion are increased color signal intensity is evident  at the distal interphalangeal joint of the 4th digit.      No joint effusion synovial proliferation or increased color signal  intensity is evident at the 5th  digit metacarpophalangeal joint.  Marginal osteophyte formation and small effusion is seen of the  proximal interphalangeal joint of the 5th digit without synovial  proliferation or increased color signal intensity evident. There is a  degree of flexion deformity at this joint. No significant  degenerative change joint effusion or synovial proliferation is  evident at the distal interphalangeal joint of the 5th digit.      LEFT:      No fluid is evident in the flexor or extensor tendon sheaths as seen  on this examination.      No joint effusion synovial proliferation or increased color signal  intensity is evident at the metacarpophalangeal joint of the 1st  digit. Marginal osteophyte formation is seen of the interphalangeal  joint of the 1st digit without joint effusion synovial proliferation  or increased color signal intensity evident.      There is synovial proliferation with trace joint effusion and mild  hyperemia at the 2nd digit metacarpophalangeal joint. Micro  osteophyte formation is seen at the proximal distal interphalangeal  joint of the 2nd digit without joint effusion synovial proliferation  or increased color signal intensity evident.      No joint effusion synovial proliferation or increased color signal  intensity is evident at the 3rd digit metacarpophalangeal joint.  Micro osteophyte formation is seen of the proximal distal  interphalangeal joint of the 3rd digit without joint effusion  synovial proliferation or increased color signal intensity evident.      No joint effusion synovial proliferation or increased color signal  intensity is evident at the 4th digit metacarpophalangeal joint. No  joint effusion synovial proliferation or increased color signal  intensity is evident at the 4th digit interphalangeal joints.      There is a small volume joint effusion with mild synovial  proliferation without increased color signal intensity of the 5th  digit metacarpophalangeal joint. There is  osteophyte formation with  trace joint effusion of the proximal interphalangeal joint of the 5th  digit. No synovial proliferation or increased color signal intensity  is evident. Micro osteophyte formation is seen of the distal  interphalangeal joint of the 5th digit without joint effusion  synovial proliferation or increased color signal intensity evident.      IMPRESSION DX U/S of B/L upper extremities:  Polyarticular degenerative change of the bilateral hand and wrist in  a pattern overall likely related to osteoarthrosis although there are  some joints that have trace effusions, described above which are  nonspecific and possibly reactive. Given this, there does appear to  be some synovial proliferation at the 2nd and 5th digit  metacarpophalangeal joint of the left hand which could be related to  crystal and/or inflammatory arthropathy.      Signed by: Reed Patrick 10/22/2024 2:42 PM  Dictation workstation:   [id number]       Assessment   No diagnosis found.      Treatment or Intervention:  May continue alternating ice and moist heat therapy as needed   Stressed the importance of trying to keep her hands warm because of the amount of arthritis she has especially in the winter,   Continue Hand/Physical Therapy 1-2 times a week for 8-10 weeks with manual therapy as well as dry needling and IASTM  Reviewed home exercises to be performed by the patient routinely   Recommendation over-the-counter vitamin-D 2 -3000+ milligrams a day, as well as  a daily multivitamin.  Recommendation over-the-counter curcumin, turmeric, boswellia,  as directed to aid with joint inflammation.   Recommendation over-the-counter Move Free ULTRA for joint health.    Continue hydroxychloroquine  AKA Plaquenil 200 mg daily per her Rheumatologist Dr. Ravi Mcqueen .  Patient advised regarding the risks and/or potential adverse reactions and/or side effects of any prescribed medications along with any over-the-counter medications or any  supplements used. Patient advised to seek immediate medical care if any adverse reactions occur. The patient and/or patient(s) parent(s) verbalized their understanding  Continue follow-ups regularly as instructed by her Rheumatologist Dr. Ravi Mcqueen   Most likely in the future corticosteroid injections not only for the thumb but also trigger fingers  Also possibility in the future regenerative injections versus viscosupplementation injections versus a combination of both for fingers and thumbs  Possibility referral to orthopedics for surgical evaluation for trigger fingers if injections do not work however patient would like to try to avoid surgical intervention only for her fingers but also her thumbs  Follow-up       Please note that this report has been produced using speech recognition software.  It may contain errors related to grammar, punctuation or spelling.  Electronically signed, but not reviewed.  YAZMIN Flores, Director of Sports Medicine      NAVNEET PRATT on 5/29/25 at 3:29 PM.     DEYSI Flores DO

## 2025-05-30 ENCOUNTER — PRE-ADMISSION TESTING (OUTPATIENT)
Dept: PREADMISSION TESTING | Facility: HOSPITAL | Age: 60
End: 2025-05-30
Payer: MEDICARE

## 2025-05-30 ENCOUNTER — OFFICE VISIT (OUTPATIENT)
Dept: CARDIOLOGY | Facility: CLINIC | Age: 60
End: 2025-05-30
Payer: MEDICARE

## 2025-05-30 ENCOUNTER — LAB (OUTPATIENT)
Dept: LAB | Facility: HOSPITAL | Age: 60
End: 2025-05-30
Payer: MEDICARE

## 2025-05-30 VITALS
SYSTOLIC BLOOD PRESSURE: 98 MMHG | BODY MASS INDEX: 27.11 KG/M2 | HEART RATE: 81 BPM | OXYGEN SATURATION: 98 % | WEIGHT: 183 LBS | DIASTOLIC BLOOD PRESSURE: 62 MMHG | HEIGHT: 69 IN

## 2025-05-30 VITALS
HEIGHT: 69 IN | HEART RATE: 82 BPM | OXYGEN SATURATION: 98 % | RESPIRATION RATE: 16 BRPM | WEIGHT: 182.98 LBS | BODY MASS INDEX: 27.1 KG/M2 | DIASTOLIC BLOOD PRESSURE: 70 MMHG | TEMPERATURE: 97.3 F | SYSTOLIC BLOOD PRESSURE: 107 MMHG

## 2025-05-30 DIAGNOSIS — R06.02 SHORTNESS OF BREATH: ICD-10-CM

## 2025-05-30 DIAGNOSIS — Z01.818 PREOP TESTING: Primary | ICD-10-CM

## 2025-05-30 DIAGNOSIS — M06.00 SERONEGATIVE RHEUMATOID ARTHRITIS (MULTI): ICD-10-CM

## 2025-05-30 DIAGNOSIS — I20.9 TYPICAL ANGINA: ICD-10-CM

## 2025-05-30 DIAGNOSIS — I25.10 ARTERIOSCLEROSIS OF CORONARY ARTERY: Primary | ICD-10-CM

## 2025-05-30 DIAGNOSIS — Z01.810 PREOP CARDIOVASCULAR EXAM: ICD-10-CM

## 2025-05-30 DIAGNOSIS — Z01.818 ENCOUNTER FOR OTHER PREPROCEDURAL EXAMINATION: Primary | ICD-10-CM

## 2025-05-30 DIAGNOSIS — I25.810: ICD-10-CM

## 2025-05-30 DIAGNOSIS — E11.9 TYPE 2 DIABETES MELLITUS WITHOUT RETINOPATHY (MULTI): ICD-10-CM

## 2025-05-30 DIAGNOSIS — L40.50 PSORIATIC ARTHRITIS (MULTI): ICD-10-CM

## 2025-05-30 DIAGNOSIS — L40.59 POLYARTICULAR PSORIATIC ARTHRITIS (MULTI): ICD-10-CM

## 2025-05-30 DIAGNOSIS — E78.2 MIXED HYPERLIPIDEMIA: ICD-10-CM

## 2025-05-30 LAB
ABO GROUP (TYPE) IN BLOOD: NORMAL
ANTIBODY SCREEN: NORMAL
APTT PPP: 29 SECONDS (ref 26–36)
EST. AVERAGE GLUCOSE BLD GHB EST-MCNC: 128 MG/DL
HBA1C MFR BLD: 6.1 % (ref ?–5.7)
INR PPP: 1 (ref 0.9–1.1)
PROTHROMBIN TIME: 10.9 SECONDS (ref 9.8–12.4)
RH FACTOR (ANTIGEN D): NORMAL

## 2025-05-30 PROCEDURE — 36415 COLL VENOUS BLD VENIPUNCTURE: CPT

## 2025-05-30 PROCEDURE — 85610 PROTHROMBIN TIME: CPT

## 2025-05-30 PROCEDURE — 3078F DIAST BP <80 MM HG: CPT | Performed by: STUDENT IN AN ORGANIZED HEALTH CARE EDUCATION/TRAINING PROGRAM

## 2025-05-30 PROCEDURE — 3008F BODY MASS INDEX DOCD: CPT | Performed by: STUDENT IN AN ORGANIZED HEALTH CARE EDUCATION/TRAINING PROGRAM

## 2025-05-30 PROCEDURE — 86900 BLOOD TYPING SEROLOGIC ABO: CPT

## 2025-05-30 PROCEDURE — 87081 CULTURE SCREEN ONLY: CPT | Mod: AHULAB | Performed by: PHYSICIAN ASSISTANT

## 2025-05-30 PROCEDURE — 4010F ACE/ARB THERAPY RXD/TAKEN: CPT | Performed by: STUDENT IN AN ORGANIZED HEALTH CARE EDUCATION/TRAINING PROGRAM

## 2025-05-30 PROCEDURE — 99215 OFFICE O/P EST HI 40 MIN: CPT | Performed by: STUDENT IN AN ORGANIZED HEALTH CARE EDUCATION/TRAINING PROGRAM

## 2025-05-30 PROCEDURE — 3074F SYST BP LT 130 MM HG: CPT | Performed by: STUDENT IN AN ORGANIZED HEALTH CARE EDUCATION/TRAINING PROGRAM

## 2025-05-30 PROCEDURE — 85730 THROMBOPLASTIN TIME PARTIAL: CPT

## 2025-05-30 PROCEDURE — 86901 BLOOD TYPING SEROLOGIC RH(D): CPT

## 2025-05-30 PROCEDURE — 99205 OFFICE O/P NEW HI 60 MIN: CPT | Performed by: STUDENT IN AN ORGANIZED HEALTH CARE EDUCATION/TRAINING PROGRAM

## 2025-05-30 PROCEDURE — 83036 HEMOGLOBIN GLYCOSYLATED A1C: CPT

## 2025-05-30 PROCEDURE — 86850 RBC ANTIBODY SCREEN: CPT

## 2025-05-30 PROCEDURE — 1036F TOBACCO NON-USER: CPT | Performed by: STUDENT IN AN ORGANIZED HEALTH CARE EDUCATION/TRAINING PROGRAM

## 2025-05-30 PROCEDURE — 99204 OFFICE O/P NEW MOD 45 MIN: CPT | Performed by: PHYSICIAN ASSISTANT

## 2025-05-30 RX ORDER — REGADENOSON 0.08 MG/ML
0.4 INJECTION, SOLUTION INTRAVENOUS
OUTPATIENT
Start: 2025-05-30

## 2025-05-30 RX ORDER — CHLORHEXIDINE GLUCONATE ORAL RINSE 1.2 MG/ML
SOLUTION DENTAL
Qty: 473 ML | Refills: 0 | Status: ON HOLD | OUTPATIENT
Start: 2025-05-30 | End: 2025-06-04 | Stop reason: ALTCHOICE

## 2025-05-30 RX ORDER — AMINOPHYLLINE 25 MG/ML
125 INJECTION, SOLUTION INTRAVENOUS ONCE AS NEEDED
OUTPATIENT
Start: 2025-05-30

## 2025-05-30 ASSESSMENT — ENCOUNTER SYMPTOMS
ALLERGIC/IMMUNOLOGIC NEGATIVE: 1
ENDOCRINE NEGATIVE: 1
HEMATOLOGIC/LYMPHATIC NEGATIVE: 1
NECK PAIN: 1
ARTHRALGIAS: 1
CARDIOVASCULAR NEGATIVE: 1
NUMBNESS: 1
CONSTITUTIONAL NEGATIVE: 1
GASTROINTESTINAL NEGATIVE: 1
RESPIRATORY NEGATIVE: 1
WEAKNESS: 1
PSYCHIATRIC NEGATIVE: 1
EYES NEGATIVE: 1

## 2025-05-30 NOTE — PATIENT INSTRUCTIONS
We would plan to continue aspirin 81 mg daily, lisinopril 20 mg daily, metoprolol tartrate 25 mg twice daily, rosuvastatin 20 mg daily throughout surgery.    We will obtain a transthoracic echocardiogram for structural evaluation including ejection fraction, assessment of regional wall motion abnormalities or valvular disease, and further evaluation of hemodynamics.    We will obtain a nuclear pharmacologic stress test for further ischemic evaluation. Please avoid caffeine the day prior to and the day of the stress test. Do not eat the morning of the stress test or 12 hours prior.    Please followup in Cardiology clinic within the next 4 to 5 months .  Please return to clinic sooner or seek emergent care if your symptoms reoccur or worsen.

## 2025-05-30 NOTE — PROGRESS NOTES
"Referred by Dr. Fields preoperative risk stratification with underlying coronary artery disease    HPI:    Dinorah Horn \"Raquel\" is a 60 y.o. female with pertinent history of hyperlipidemia, Raynaud's disease, hypertension, sinusitis, osteoarthritis, cervical spine disease, type 2 diabetes, history of GERD, history of alcoholic cirrhosis, rheumatoid arthritis, fibromyalgia, lymphadenopathy, history of tobacco abuse, history of obstructive coronary artery disease status post coronary artery bypass in 2010, history of prolonged tachyarrhythmia in 2021 presents to cardiology clinic to establish care and for preoperative risk stratification.     We reviewed and discussed her prior cardiac history.  She has had minimal chest discomfort recently.  She has no dyspnea on exertion is relatively stable and she is limited by severe neck pain.  We discussed natural history of coronary artery disease.  She is compliant with her medications.  No exacerbating or relieving factors. Pt denies orthopnea, and paroxysmal nocturnal dyspnea.  Pt denies worsening lower extremity edema.  Pt denies palpitations or syncope.  No recent falls.  No fever or chills.  No cough.  No change in bowel or bladder habits.  No sick contacts.  No recent travel.    12 point review of systems including (Constitutional, Eyes, ENMT, Respiratory, Cardiac, Gastrointestinal, Neurological, Psychiatric, and Hematologic) was performed and is otherwise negative.    Past medical history reviewed:   has a past medical history of Alopecia, Arthritis, Cataract, Cervical spinal cord compression, Cervical spinal stenosis, Cervical spondylosis with myelopathy, Chronic back pain, Chronic sinusitis, Cirrhosis of liver (Multi), Coronary artery disease, Depression, Diabetes mellitus (Multi), Fibromyalgia, GERD (gastroesophageal reflux disease), Headache, Hiatal hernia, History of stress test (10/24/2016), HLD (hyperlipidemia), Hypertension, Insomnia, Lung nodule, " Lymphadenopathy, Multiple joint pain, Numbness and tingling, Palpitations, Postural dizziness with near syncope, Pre-operative clearance, Prolonged QT interval, Psoriasis, Psoriatic arthritis (Multi), RA (rheumatoid arthritis), Raynaud's disease, Thyroid nodule, Trigger finger, UTI (urinary tract infection) (05/11/2025), and Visual impairment.    Past surgical history reviewed:   has a past surgical history that includes Appendectomy; Breast surgery (2003); Coronary artery bypass graft (10/2010); Hysterectomy (Partial, 2000); Forearm fracture surgery (Right, 1992); Forearm hardware removal (Right, 2003); Liver biopsy; Cardiac catheterization; Colonoscopy; and Knee arthroscopy w/ laser (Left).    Social history reviewed:   reports that she quit smoking about 16 years ago. Her smoking use included cigarettes. She started smoking about 31 years ago. She has a 15 pack-year smoking history. She has never used smokeless tobacco. She reports current alcohol use. She reports that she does not use drugs.     Family history reviewed:  Family History[1]    Allergies reviewed: Cosentyx [secukinumab]     Medications reviewed:   Current Outpatient Medications   Medication Instructions    aspirin 81 mg, Daily    cholecalciferol (VITAMIN D3) 25 mcg, Daily    cyanocobalamin (VITAMIN B-12) 50 mcg, Daily    ferrous sulfate 325 mg, Daily    lisinopril 20 mg, oral, Daily    meclizine (ANTIVERT) 12.5 mg, oral, 3 times daily PRN    metFORMIN  mg 24 hr tablet Take one tablet twice daily    metoprolol tartrate (LOPRESSOR) 25 mg, oral, 2 times daily    multivitamin tablet 1 tablet, Daily    nitroglycerin (NITROSTAT) 0.3 mg, Every 5 min PRN    omeprazole (PRILOSEC) 40 mg, oral, Daily before breakfast, Do not crush or chew.    rosuvastatin (CRESTOR) 20 mg, oral, Daily    traZODone (DESYREL) 25 mg, oral, Nightly    venlafaxine (EFFEXOR) 75 mg, oral, Daily before breakfast    venlafaxine XR (EFFEXOR-XR) 150 mg, oral, Daily        Vitals  "reviewed: Visit Vitals  BP 98/62   Pulse 81       Physical Exam:   General:  Patient is awake, alert, and oriented.  Patient is in no acute distress.  HEENT:  Pupils equal and reactive.  Normocephalic.  Moist mucosa.    Neck:  No thyromegaly.  Normal Jugular Venous Pressure.  Cardiovascular:  Regular rate and rhythm.  Normal S1 and S2.  1/6 SHANDA.  Midline scar.  Pulmonary:  Clear to auscultation bilaterally.  Abdomen:  Soft. Non-tender.   Non-distended.  Positive bowel sounds.  Lower Extremities:  2+ pedal pulses. No LE edema.  Neurologic:  Cranial nerves intact.  No focal deficit.   Skin: Skin warm and dry, normal skin turgor.   Psychiatric: Normal affect.    Last Labs:  CBC -      Lab Results   Component Value Date    WBC 6.4 05/11/2025    WBC 5.0 02/21/2025    HGB 12.4 05/11/2025    HGB 11.0 (L) 02/21/2025    HCT 37.7 05/11/2025    HCT 35.0 02/21/2025     05/11/2025     02/21/2025        CMP-  Lab Results   Component Value Date    GLUCOSE 103 (H) 05/11/2025    GLUCOSE 120 02/21/2025     05/11/2025     02/21/2025    K 3.9 05/11/2025    K 4.8 02/21/2025     05/11/2025     02/21/2025    CO2 24 05/11/2025    CO2 28 02/21/2025    ANIONGAP 14 05/11/2025    ANIONGAP 10 02/21/2025    BUN 16 05/11/2025    BUN 20 02/21/2025    CREATININE 0.76 05/11/2025    CREATININE 0.75 02/21/2025    EGFR 90 05/11/2025    EGFR 91 02/21/2025    CALCIUM 10.1 05/11/2025    CALCIUM 9.8 02/21/2025    PROT 8.0 05/11/2025    PROT 7.4 02/21/2025    ALBUMIN 4.8 05/11/2025    ALBUMIN 4.8 02/21/2025    AST 56 (H) 05/11/2025    AST 36 (H) 02/21/2025    ALT 65 (H) 05/11/2025    ALT 42 (H) 02/21/2025    ALKPHOS 96 05/11/2025    ALKPHOS 82 02/21/2025    BILITOT 0.4 05/11/2025    BILITOT 0.5 02/21/2025        LIPIDS-  No results found for: \"CHOL\", \"TRIG\", \"HDL\", \"CHHDL\", \"VLDL\"     OTHERS-  Lab Results   Component Value Date    HGBA1C 6.2 09/13/2024        I personally reviewed the patient's recent vitals, labs, " "medications, orders, EKGs, pertinent cardiac imaging/ echocardiography and ischemic evaluations including stress testing/ cardiac catheterization.    Assessment and Plan:    Dinorah Horn \"Pratibha" is a 60 y.o. female with pertinent history of hyperlipidemia, Raynaud's disease, hypertension, sinusitis, osteoarthritis, cervical spine disease, type 2 diabetes, history of GERD, history of alcoholic cirrhosis, rheumatoid arthritis, fibromyalgia, lymphadenopathy, history of tobacco abuse, history of obstructive coronary artery disease status post coronary artery bypass in 2010, history of prolonged tachyarrhythmia in 2021 presents to cardiology clinic to establish care and for preoperative risk stratification.   We reviewed and discussed her prior cardiac history.  She has had minimal chest discomfort recently.  She has no dyspnea on exertion is relatively stable and she is limited by severe neck pain.  We discussed natural history of coronary artery disease.  She is compliant with her medications.     If her ejection fraction is preserved and there is no significant ischemia, the patient would be low to intermediate risk for moderate risk surgery and can proceed.    We would plan to continue aspirin 81 mg daily, lisinopril 20 mg daily, metoprolol tartrate 25 mg twice daily, rosuvastatin 20 mg daily throughout surgery.    We will obtain a transthoracic echocardiogram for structural evaluation including ejection fraction, assessment of regional wall motion abnormalities or valvular disease, and further evaluation of hemodynamics.    The patient is unable to complete treadmill excercise stress testing with less than 4 METS of functional capacity due to comorbidities including significant osteoarthritis.  Given the patient's risk factors and symptoms, we will obtain a nuclear pharmacologic stress test for further ischemic evaluation. Please avoid caffeine the day prior to and the day of the stress test. Do not eat the " morning of the stress test or 12 hours prior.    Please followup in Cardiology clinic within the next 4 to 5 months .  Please return to clinic sooner or seek emergent care if your symptoms reoccur or worsen.    Thank you for allowing me to participate in their care.  Please feel free to call me with any further questions or concerns.        Iván Dubon MD, FACC, VENESSA FRANKS  Division of Cardiovascular Medicine  System Director, Nuclear Cardiology   Medical Director, [facility]   Clinical , [employer]  [email or web address]   Office:  [phone]            [1]   Family History  Problem Relation Name Age of Onset    Alcohol abuse Mother Trice     Cancer Mother Trice     Diabetes Mother Trice     Heart disease Mother Trice     Arthritis Father Reed     Atrial fibrillation Father Reed     Cancer Father Reed     COPD Father Reed     Hearing loss Father Reed     Heart disease Father Reed     Stroke Paternal Grandfather Guy     Alcohol abuse Paternal Grandmother Brigette     Alcohol abuse Sister Ioana     Alcohol abuse Sister Alis     Alcohol abuse Daughter Katerine     Cancer Father's Brother Xavier     Cancer Father's Brother Daljit     Colon cancer Father's Brother Daljit JAFFE     Drug abuse Sister Ioana GARCIA

## 2025-05-30 NOTE — PREPROCEDURE INSTRUCTIONS
Medication List            Accurate as of May 30, 2025  2:09 PM. Always use your most recent med list.                aspirin 81 mg EC tablet  Medication Adjustments for Surgery: Take on the morning of surgery     cyanocobalamin 50 mcg tablet  Commonly known as: Vitamin B-12  Medication Adjustments for Surgery: Do Not take on the morning of surgery     ferrous sulfate 325 mg (65 mg elemental) tablet  Medication Adjustments for Surgery: Do Not take on the morning of surgery     lisinopril 20 mg tablet  Take 1 tablet (20 mg) by mouth once daily.  Medication Adjustments for Surgery: Take last dose 1 day (24 hours) before surgery     meclizine 12.5 mg tablet  Commonly known as: Antivert  Take 1 tablet (12.5 mg) by mouth 3 times a day as needed for dizziness.  Medication Adjustments for Surgery: Take/Use as prescribed     metFORMIN  mg 24 hr tablet  Commonly known as: Glucophage-XR  Take one tablet twice daily  Medication Adjustments for Surgery: Do Not take on the morning of surgery     metoprolol tartrate 25 mg tablet  Commonly known as: Lopressor  Take 1 tablet (25 mg) by mouth 2 times a day.  Medication Adjustments for Surgery: Take on the morning of surgery     multivitamin tablet  Notes to patient: HOLD 7 Days prior to surgery - Last dose 5/27 Stop NOW if not already stopped      nitroglycerin 0.3 mg SL tablet  Commonly known as: Nitrostat  Medication Adjustments for Surgery: Take/Use as prescribed     omeprazole 40 mg DR capsule  Commonly known as: PriLOSEC  Take 1 capsule (40 mg) by mouth once daily in the morning. Take before meals. Do not crush or chew.  Medication Adjustments for Surgery: Take on the morning of surgery     rosuvastatin 20 mg tablet  Commonly known as: Crestor  Take 1 tablet (20 mg) by mouth once daily.  Medication Adjustments for Surgery: Take/Use as prescribed     traZODone 50 mg tablet  Commonly known as: Desyrel  Take 0.5 tablets (25 mg) by mouth once daily at bedtime.  Medication  Adjustments for Surgery: Take/Use as prescribed     * venlafaxine 75 mg tablet  Commonly known as: Effexor  TAKE ONE TABLET BY MOUTH DAILY IN THE MORNING  Medication Adjustments for Surgery: Take/Use as prescribed     * venlafaxine  mg 24 hr capsule  Commonly known as: Effexor-XR  TAKE ONE CAPSULE BY MOUTH ONCE DAILY. DO NOT CRUSH OR CHEW.  Medication Adjustments for Surgery: Take/Use as prescribed     Vitamin D3 25 mcg (1,000 units) capsule  Generic drug: cholecalciferol  Medication Adjustments for Surgery: Do Not take on the morning of surgery           * This list has 2 medication(s) that are the same as other medications prescribed for you. Read the directions carefully, and ask your doctor or other care provider to review them with you.                     Concerning above medication instructions - If medication is normally taken at night continue normal schedule - do not take night prior and morning of surgery.       Preoperative Deep Breathing Exercises  Why it is important to do deep breathing exercises before my surgery?  Deep breathing exercises strengthen your breathing muscles.  This helps you to recover after your surgery and decreases the chance of breathing complications.  How are the deep breathing exercises done?  Sit straight with your back supported.  Breathe in deeply and slowly through your nose. Your lower rib cage should expand and your abdomen may move forward.  Hold that breath for 3 to 5 seconds.  Breathe out through pursed lips, slowly and completely.  Rest and repeat 10 times every hour while awake.  Rest longer if you become dizzy or lightheaded.      CONTACT SURGEON'S OFFICE IF YOU DEVELOP:  * Fever = 100.4 F   * New respiratory symptoms (e.g. cough, shortness of breath, respiratory distress, sore throat)  * Recent loss of taste or smell  *Flu like symptoms such as headache, fatigue or gastrointestinal symptoms  * You develop any open sores, shingles, burning or painful urination    AND/OR:  * You no longer wish to have the surgery.  * Any other personal circumstances change that may lead to the need to cancel or defer this surgery.  *You were admitted to any hospital within one week of your planned procedure.    SMOKING:  *Quitting smoking can make a huge difference to your health and recovery from surgery.    *If you need help with quitting, call 4-267-QUIT-NOW.    THE DAY OF SURGERY:  *Do not eat any food after midnight the night before surgery/procedure.   *YOU MUST DRINK 14 oz drink clear liquids (i.e. water, black coffee/tea, (no milk or cream) apple juice, and electrolyte drinks (Gatorade)  2 hours before your instructed arrival time to the hospital.  *You may chew gum until  2 hours before your surgery/procedure.    SURGICAL TIME  *You will be contacted between 2 p.m. and 6 p.m. the business day before your surgery with your arrival time.  *If you haven't received a call by 6pm, call [phone].  *Scheduled surgery times may change and you will be notified if this occurs-check your personal voicemail for any updates.    ON THE MORNING OF SURGERY:  *Wear comfortable, loose fitting clothing.   *Do not use moisturizers, creams, lotions or perfume.  *All jewelry and valuables should be left at home.  *Prosthetic devices such as contact lenses, hearing aids, dentures, eyelash extensions, hairpins and body piercing must be removed before surgery.    BRING WITH YOU:  *Photo ID and insurance card  *Current list of medicines and allergies  *Pacemaker/Defibrillator/Heart stent cards  *CPAP machine and mask  *Slings/splints/crutches  *Copy of your complete Advanced Directive/DHPOA-if applicable  *Neurostimulator implant remote    PARKING AND ARRIVAL:  *Check in at the Main Entrance desk and let them know you are here for surgery.  *You will be directed to the 2nd floor surgical waiting area.    AFTER OUTPATIENT SURGERY:  *A responsible adult MUST accompany you at the time of discharge and stay  with you for 24 hours after your surgery.  *You may NOT drive yourself home after surgery.  *You may use a taxi or ride sharing service ([employer], Uber) to return home ONLY if you are accompanied by a friend or family member.  *Instructions for resuming your medications will be provided by your surgeon.      Home Preoperative Antibacterial Shower     What is a home preoperative antibacterial shower?  This shower is a way of cleaning the skin with a germ killing soap before surgery.  The soap contains chlorhexidine, commonly known as CHG.  CHG is a soap for your skin with germ killing ability.  Let your doctor know if you are allergic to chlorhexidine.    Why do I need to take a preoperative antibacterial shower?  Skin is not sterile.  It is best to try to make your skin as free of germs as possible before surgery.  Proper cleansing with a germ killing soap before surgery can lower the number of germs on your skin.  This helps to reduce the risk of infection at the surgical site.  Following the instructions listed below will help you prepare your skin for surgery.      How do I use the CHG skin cleanser?  Steps:  Begin using your CHG soap five days before your scheduled surgery on ________________________.    Days 1-4 Shower before bed:  Wash your face and genitals with your normal soap and rinse.           2.    Apply the CHG soap to a clean wet washcloth.  Turn the water off or move away                From the water spray to avoid premature rinsing of the CHG soap as you are applying.     3.   Lather your entire body from the neck down.  Do not use on your face or genitals.  4. Pay special attention to the area(s) where your incision(s) will be located unless they are on your face.  Avoid scrubbing your skin too hard.  The important point is to have the CHG soap sit on your skin for 3 minutes.    When the 3 minutes are up, turn on the water and rinse the CHG soap off your body completely.   Pat yourself dry with a  clean, freshly-laundered towel.  Dress in clean, freshly laundered night clothes.    Be sure to change bed sheets and blankets at least on the first night of CHG body wash use. May change linens every night of the above protocol for maximum benefit.   Day 5:  Last shower is the morning of surgery: Follow above Instructions.    NOTE:        *Keep CHG soap out of eyes and ear canals   *DO NOT wash with regular soap on your body after you have used the CHG soap solution  *DO NOT apply powders, lotions, or perfume.  *Deodorant may be used days 1-4, BUT NOT the day of surgery    Who should I contact if I have any questions regarding the use of CHG soap?  Call the [facility], Preadmission Testing at [phone] if you have any questions.              Patient Information: Pre-Operative Infection Prevention Measures     Why did I have my nose, under my arms and groin swabbed?  The purpose of the swab is to identify Staphylococcus aureus inside your nose or on your skin.  The swab was sent to the laboratory for culture.  A positive swab/culture for Staphylococcus aureus is called colonization or carriage.      What is Staphylococcus aureus?  Staphylococcus aureus, also known as “staph”, is a germ found on the skin or in the nose of healthy people.  Sometimes Staphylococcus aureus can get into the body and cause an infection.  This can be minor (such as pimples, boils or other skin problems).  It might also be serious (such as blood infection, pneumonia or a surgical site infection).    What is Staphylococcus aureus colonization or carriage?  Colonization or carriage means that a person has the germ but is not sick from it.  These bacteria can be spread on the hands or when breathing or sneezing.    How is Staphylococcus aureus spread?  It is most often spread by close contact with a person or item that carries it.    What happens if my culture is positive for Staphylococcus aureus?  Your  doctor/medical team will use this information to guide any antibiotic treatment which may be necessary.  Regardless of the culture results, we will clean the inside of your nose with a betadine swab just before you have your surgery.      Will I get an infection if I have Staphylococcus aureus in my nose or on my skin?  Anyone can get an infection with Staphylococcus aureus.  However, the best way to reduce your risk of infection is to follow the instructions provided to you for the use of your CHG soap and dental rinse.        Who should I contact if I have any questions?  Call the [facility], Preadmission Testing at [phone] if you have any questions.          Patient Information: Oral/Dental Rinse  **This is a prescription; pick it up at your preferred local pharmacy **  What is oral/dental rinse?   It is a mouthwash. It is a way of cleaning the mouth with a germ killing solution before your surgery.  The solution contains chlorhexidine, commonly known as CHG.   It is used inside the mouth to kill a bacteria known as Staphylococcus aureus.  Let your doctor know if you are allergic to Chlorhexidine.    Why do I need to use CHG oral/dental rinse?  The CHG oral/dental rinse helps to kill a bacteria in your mouth known a Staphylococcus aureus.     This reduces the risk of infection at the surgical site.      Using your CHG oral/dental rinse  STEPS:  Use your CHG oral/dental rinse after you brush your teeth the night before (at bedtime) and the morning of your surgery.  Follow all directions on your prescription label.    Use the cap on the container to measure 15ml (fill cap to fill line)  Swish (gargle if you can) the mouthwash in your mouth for at least 30 seconds, (do not to swallow) spit out  After you use your CHG rinse, do not rinse your mouth with water, drink or eat.  Please refer to prescription label for the appropriate time to resume oral intake  Dental rinse comes in one  size bottle: 473ml ~16oz.  You will have leftover    rinse, discard after this use.    What side effects might I have using the CHG oral/dental rinse?  CHG rinse will stick to plaque on the teeth.  Brush and floss just before use.  Teeth brushing will help avoid staining of plaque during use.    Who should I contact if I have questions about the CHG oral/dental rinse?  Please call [facility], Preadmission Testing at [phone] if you have any questions           Patient Information: Oral/Dental Rinse  **This is a prescription; pick it up at your preferred local pharmacy **  What is oral/dental rinse?   It is a mouthwash. It is a way of cleaning the mouth with a germ killing solution before your surgery.  The solution contains chlorhexidine, commonly known as CHG.   It is used inside the mouth to kill a bacteria known as Staphylococcus aureus.  Let your doctor know if you are allergic to Chlorhexidine.    Why do I need to use CHG oral/dental rinse?  The CHG oral/dental rinse helps to kill a bacteria in your mouth known a Staphylococcus aureus.     This reduces the risk of infection at the surgical site.      Using your CHG oral/dental rinse  STEPS:  Use your CHG oral/dental rinse after you brush your teeth the night before (at bedtime) and the morning of your surgery.  Follow all directions on your prescription label.    Use the cap on the container to measure 15ml (fill cap to fill line)  Swish (gargle if you can) the mouthwash in your mouth for at least 30 seconds, (do not to swallow) spit out  After you use your CHG rinse, do not rinse your mouth with water, drink or eat.  Please refer to prescription label for the appropriate time to resume oral intake  Dental rinse comes in one size bottle: 473ml ~16oz.  You will have leftover    rinse, discard after this use.    What side effects might I have using the CHG oral/dental rinse?  CHG rinse will stick to plaque on the teeth.  Brush  and floss just before use.  Teeth brushing will help avoid staining of plaque during use.    Who should I contact if I have questions about the CHG oral/dental rinse?  Please call [facility], Preadmission Testing at [phone] if you have any questions

## 2025-05-30 NOTE — CPM/PAT NURSE NOTE
"[facility] Nurse Note      Name: Dinorah Horn (Dinorah Horn \"Raquel\")  /Age: 1965/60 y.o.       Medical History[1]    Surgical History[2]    Patient  reports that she is not currently sexually active and has had partner(s) who are male. She reports using the following method of birth control/protection: None.    Family History[3]    Allergies[4]    Prior to Admission medications    Medication Sig Start Date End Date Taking? Authorizing Provider   aspirin 81 mg EC tablet Take 1 tablet (81 mg) by mouth once daily.   Yes Historical Provider, MD   cholecalciferol (Vitamin D3) 25 MCG (1000 UT) capsule Take 1 capsule (25 mcg) by mouth once daily.   Yes Historical Provider, MD   cyanocobalamin (Vitamin B-12) 50 mcg tablet Take 1 tablet (50 mcg) by mouth once daily.   Yes Historical Provider, MD   ferrous sulfate, 325 mg ferrous sulfate, tablet Take 1 tablet by mouth once daily.   Yes Historical Provider, MD   lisinopril 20 mg tablet Take 1 tablet (20 mg) by mouth once daily. 24  Yes Yamilet Frazier DO   meclizine (Antivert) 12.5 mg tablet Take 1 tablet (12.5 mg) by mouth 3 times a day as needed for dizziness. 25  Yes Imelda Gaines APRN-CNP   metFORMIN  mg 24 hr tablet Take one tablet twice daily 25  Yes Yamilet Frazier DO   metoprolol tartrate (Lopressor) 25 mg tablet Take 1 tablet (25 mg) by mouth 2 times a day. 24  Yes Yamilet Frazier DO   multivitamin tablet Take 1 tablet by mouth once daily.   Yes Historical Provider, MD   omeprazole (PriLOSEC) 40 mg DR capsule Take 1 capsule (40 mg) by mouth once daily in the morning. Take before meals. Do not crush or chew. 24  Yes Yamilet Frazier DO   rosuvastatin (Crestor) 20 mg tablet Take 1 tablet (20 mg) by mouth once daily. 11/15/24  Yes Yamilet Frazier DO   traZODone (Desyrel) 50 mg tablet Take 0.5 tablets (25 mg) by mouth once daily at bedtime. 24  Yes Yamielt L Frazier, DO   venlafaxine (Effexor) 75 mg " tablet TAKE ONE TABLET BY MOUTH DAILY IN THE MORNING 4/19/25  Yes Yamilet Frazier, DO   venlafaxine XR (Effexor-XR) 150 mg 24 hr capsule TAKE ONE CAPSULE BY MOUTH ONCE DAILY. DO NOT CRUSH OR CHEW. 4/22/25  Yes Yamilet Frazier, DO   nitroglycerin (Nitrostat) 0.3 mg SL tablet Place 1 tablet (0.3 mg) under the tongue every 5 minutes if needed for chest pain.  Patient not taking: Reported on 5/30/2025    Historical Provider, MD AIDA REDD Risk Score      Flowsheet Row Questionnaire Series Submission from 5/21/2025 in [facility] [place] with Generic Provider Jean   Can you take care of yourself (eat, dress, bathe, or use toilet)?  2.75  filed at 05/21/2025 1231   Can you walk indoors, such as around your house? 1.75  filed at 05/21/2025 1231   Can you walk a block or two on level ground?  2.75  filed at 05/21/2025 1231   Can you climb a flight of stairs or walk up a hill? 5.5  filed at 05/21/2025 1231   Can you run a short distance? 8  filed at 05/21/2025 1231   Can you do light work around the house like dusting or washing dishes? 2.7  filed at 05/21/2025 1231   Can you do moderate work around the house like vacuuming, sweeping floors or carrying groceries? 3.5  filed at 05/21/2025 1231   Can you do heavy work around the house like scrubbing floors or lifting and moving heavy furniture?  8  filed at 05/21/2025 1231   Can you do yard work like raking leaves, weeding or pushing a mower? 4.5  filed at 05/21/2025 1231   Can you have sexual relations? 5.25  filed at 05/21/2025 1231   Can you participate in moderate recreational activities like golf, bowling, dancing, doubles tennis or throwing a baseball or football? 6  filed at 05/21/2025 1231   Can you participate in strenous sports like swimming, singles tennis, football, basketball, or skiing? 7.5  filed at 05/21/2025 1231   DASI SCORE 58.2  filed at 05/21/2025 1231   METS Score (Will be calculated only when all the questions are answered)  9.9  filed at 05/21/2025 1231          Caprini DVT Assessment    No data to display       Modified Frailty Index    No data to display       [id number] Stroke Risk Points  Current as of just now        N/A 0 to 9 Points:      Last Change: N/A          The [id number]-AICp risk score (Nancy ARTEAGA, et al. 2009. © 2010 American College [employer]) quantifies the risk of stroke for a patient with atrial fibrillation. For patients without atrial fibrillation or under the age of 18 this score appears as N/A. Higher score values generally indicate higher risk of stroke.        This score is not applicable to this patient. Components are not calculated.          Revised Cardiac Risk Index    No data to display       Apfel Simplified Score    No data to display       Risk Analysis Index Results This Encounter    No data found in the last 10 encounters.       Stop Bang Score      Flowsheet Row Questionnaire Series Submission from 5/21/2025 in [facility] OR with Generic Provider Jean   Do you snore loudly? 0  filed at 05/21/2025 1231   Do you often feel tired or fatigued after your sleep? 0  filed at 05/21/2025 1231   Has anyone ever observed you stop breathing in your sleep? 0  filed at 05/21/2025 1231   Do you have or are you being treated for high blood pressure? 1  filed at 05/21/2025 1231   Recent BMI (Calculated) 27.2  filed at 05/21/2025 1231   Is BMI greater than 35 kg/m2? 0=No  filed at 05/21/2025 1231   Age older than 50 years old? 1=Yes  filed at 05/21/2025 1231   Gender - Male 0=No  filed at 05/21/2025 1231          Prodigy: High Risk  Total Score: 8              Prodigy Age Score           ARISCAT Score for Postoperative Pulmonary Complications    No data to display       Carter Perioperative Risk for Myocardial Infarction or Cardiac Arrest (MAGGIE)    No data to display         Nurse Plan of Action:       After Visit Summary (AVS) reviewed and patient verbalized good understanding of medications  and NPO instructions.  Pre-op infection prevention measures:  CHG showers and mouthwash reviewed, understanding voiced.  CHG soap given and patient verbalized need to pick CHG mouthwash at their preferred local pharmacy.            [1]   Past Medical History:  Diagnosis Date    Alopecia     Cataract     bilateral    Cervical spinal cord compression     C5-7    Chronic back pain     Chronic sinusitis     Cirrhosis of liver (Multi)     former alcohol abuse    Coronary artery disease     CABG in 2010, no recent follow up with cardiology, Apt for cardiac risk assessment made with Dr. Dubon at [place] 5/29/25 at 10am    Depression     Diabetes mellitus (Multi)     A1C= 6.05 on 9/6/22    Fibromyalgia     GERD (gastroesophageal reflux disease)     Hiatal hernia     HLD (hyperlipidemia)     Hypertension     Insomnia     Lung nodule     Lymphadenopathy     Numbness and tingling     arms, hands, feet and legs    Palpitations     Still experiences on/off, did wear heart monitor 6/15/21-results in [facility], seeing dr. Dubon 5/30/25 at 10AM    Postural dizziness with near syncope     recent ED admit 5/11/25; Carotid US, head CT and MRI all WNL, patient found to have a UTI, discharged on ATB's [id number], per patient no further issues    Prolonged QT interval     Psoriasis     Psoriatic arthritis (Multi)     follows with Rhematology    RA (rheumatoid arthritis)     Raynaud's disease     cold fingertips witth pain    Thyroid nodule     Trigger finger     UTI (urinary tract infection) 05/11/2025    treated with 10 day coarse of ATB's   [2]   Past Surgical History:  Procedure Laterality Date    APPENDECTOMY      BREAST SURGERY  2003    Augmentation    CARDIAC CATHETERIZATION      COLONOSCOPY      CORONARY ARTERY BYPASS GRAFT  10/2010    FOREARM FRACTURE SURGERY Right 1992    FOREARM HARDWARE REMOVAL Right 2003    HYSTERECTOMY  Partial, 2000    KNEE ARTHROSCOPY W/ LASER Left     LIVER BIOPSY     [3]   Family History  Problem Relation Name  Age of Onset    Alcohol abuse Mother Trice     Cancer Mother Trice     Diabetes Mother Trice     Heart disease Mother Trice     Arthritis Father Reed     Atrial fibrillation Father Reed     Cancer Father Reed     COPD Father Reed     Hearing loss Father Reed     Heart disease Father Reed     Stroke Paternal Grandfather Guy     Alcohol abuse Paternal Grandmother Brigette     Alcohol abuse Sister Ioana     Alcohol abuse Sister Alis     Alcohol abuse Daughter Katerine     Cancer Father's Brother Xavier     Cancer Father's Brother Pearson     Colon cancer Father's Brother Daljit J     Drug abuse Sister Ioana GARCIA    [4]   Allergies  Allergen Reactions    Cosentyx [Secukinumab] Rash

## 2025-05-30 NOTE — CPM/PAT H&P
"[facility]       Name: Dinorah Horn (Dinorah Horn \"Raquel\")  /Age: 1965/60 y.o.     In-Person       Chief Complaint: Spinal stenosis in cervical region [M48.02]  Cervical spondylosis with myelopathy [M47.12]  Spinal cord compression (Multi) [G95.20]     HPI      Date of Consult: 25    Referring Provider: Dr. Camacho     Surgery, Date, and Length: C4-C7 Anterior Cervical Spine Discectomy and Fusion ; 25; 90 minutes     Dinorah Horn  is a 60 year-old female who presents to the [facility] for perioperative risk assessment prior to surgery. She has been having intractable intrascapular back pain. She also gets numbness and tingling into her upper extremities and hands. She also has subjective problems with balance.   PT offered no relief symptoms.  MRI cervical 25: There is reversal of the normal lordotic curvature extending from the C4/5 level cranially.  There is minimal 1 mm anterolisthesis of C3 on C4.  There is multilevel spondylosis with varying degrees of spinal canal and neural foraminal narrowing as described above. The most  pronounced spinal canal narrowing is noted at the C5/6 and C6/7 levels.  Xray cervical 25: Mild-to-moderate spondylosis       This note was created in part upon personal review of patient's medical records.      Patient is scheduled to have C4-C7 Anterior Cervical Spine Discectomy and Fusion     Medical History  Past Medical History:   Diagnosis Date    Alopecia     Cervical spinal cord compression     C5-7    Chronic back pain     Chronic sinusitis     Cirrhosis of liver (Multi)     former alcohol abuse    Coronary artery disease     CABG in , no recent follow up with cardiology, Apt for cardiac risk assessment made with Dr. Dubon    Depression     Diabetes mellitus (Multi)     Fibromyalgia     GERD (gastroesophageal reflux disease)     under control with medication    Hiatal hernia     HLD (hyperlipidemia)     Hypertension     Insomnia     " Irritable bowel syndrome     Lung nodule     CT 10/2021 calcified granulomas in the right  lower lobe.    Lymphadenopathy     US 1/2021 Sonographically benign-appearing lymph node redemonstrated superior  and lateral to the left thyroid lobe    Numbness and tingling     arms, hands, feet and legs    Palpitations     occasional, did wear heart monitor 6/15/21-results in [facility], seeing dr. Dubon 5/30/25    Postural dizziness with near syncope     recent ED admit 5/11/25; Carotid US, head CT and MRI all WNL, patient found to have a UTI, discharged on ATB's [id number], per patient no further issues    Prolonged QT interval     Psoriasis     Psoriatic arthritis (Multi)     follows with Rhematology    RA (rheumatoid arthritis)     Raynaud's disease     cold fingertips witth pain    Thyroid nodule     US 1/2021 Nonenlarged thyroid gland with scattered subcentimeter nodules. No  routine follow-up is required per ACR TI-RADS guidelines.    Trigger finger     UTI (urinary tract infection) 05/11/2025    treated with 10 day coarse of ATB's            Surgical History  Past Surgical History:   Procedure Laterality Date    APPENDECTOMY      BREAST SURGERY  2003    Augmentation    CARDIAC CATHETERIZATION      COLONOSCOPY      CORONARY ARTERY BYPASS GRAFT  10/2010    FOREARM FRACTURE SURGERY Right 1992    FOREARM HARDWARE REMOVAL Right 2003    HYSTERECTOMY  Partial, 2000    KNEE ARTHROSCOPY W/ LASER Left     LIVER BIOPSY               Family history:  Family History   Problem Relation Name Age of Onset    Alcohol abuse Mother Trice     Cancer Mother Trice     Diabetes Mother Trice     Heart disease Mother Trice     Arthritis Father Reed     Atrial fibrillation Father Reed     Cancer Father Reed     COPD Father Reed     Hearing loss Father Reed     Heart disease Father Reed     Stroke Paternal Grandfather Guy     Alcohol abuse Paternal Grandmother Brigette     Alcohol abuse Sister Ioana     Alcohol abuse  Sister Alis     Alcohol abuse Daughter Katerine     Cancer Father's Brother Xavier     Cancer Father's Brother Daljit     Colon cancer Father's Brother Daljit J     Drug abuse Sister Ioana GARCIA         Social history:  Social History     Socioeconomic History    Marital status:      Spouse name: Not on file    Number of children: Not on file    Years of education: Not on file    Highest education level: Not on file   Occupational History    Not on file   Tobacco Use    Smoking status: Former     Current packs/day: 0.00     Average packs/day: 1 pack/day for 15.0 years (15.0 ttl pk-yrs)     Types: Cigarettes     Start date: 1994     Quit date: 2009     Years since quittin.1    Smokeless tobacco: Never    Tobacco comments:     I smoked on and off through a 15 year period.   Vaping Use    Vaping status: Never Used   Substance and Sexual Activity    Alcohol use: Yes     Comment: occassional  drink now - total  drinks so far this week    Drug use: Never    Sexual activity: Not Currently     Partners: Male     Birth control/protection: None     Comment: My cervix has been removed.   Other Topics Concern    Not on file   Social History Narrative    Not on file     Social Drivers of Health     Financial Resource Strain: Low Risk  (2025)    Overall Financial Resource Strain (CARDIA)     Difficulty of Paying Living Expenses: Not hard at all   Food Insecurity: No Food Insecurity (2025)    Hunger Vital Sign     Worried About Running Out of Food in the Last Year: Never true     Ran Out of Food in the Last Year: Never true   Transportation Needs: No Transportation Needs (2025)    PRAPARE - Transportation     Lack of Transportation (Medical): No     Lack of Transportation (Non-Medical): No   Physical Activity: Inactive (2025)    Exercise Vital Sign     Days of Exercise per Week: 0 days     Minutes of Exercise per Session: 0 min   Stress: Stress Concern Present (2025)    [employer]  [employer] - Occupational Stress Questionnaire     Feeling of Stress : To some extent   Social Connections: Moderately Isolated (5/5/2025)    Social Connection and Isolation Panel [NHANES]     Frequency of Communication with Friends and Family: Three times a week     Frequency of Social Gatherings with Friends and Family: Never     Attends Faith Services: Never     Active Member of Clubs or Organizations: No     Attends Club or Organization Meetings: Never     Marital Status:    Intimate Partner Violence: Not At Risk (5/5/2025)    Humiliation, Afraid, Rape, and Kick questionnaire     Fear of Current or Ex-Partner: No     Emotionally Abused: No     Physically Abused: No     Sexually Abused: No   Housing Stability: High Risk (5/6/2025)    Housing Stability Vital Sign     Unable to Pay for Housing in the Last Year: Yes     Number of Times Moved in the Last Year: 0     Homeless in the Last Year: No        Current Outpatient Medications   Medication Instructions    aspirin 81 mg, Daily    chlorhexidine (Peridex) 0.12 % solution 15 ml swish and spit for 30 seconds night prior to surgery and morning of surgery    cholecalciferol (VITAMIN D3) 25 mcg, Daily    cyanocobalamin (VITAMIN B-12) 50 mcg, Daily    ferrous sulfate 325 mg, Daily    lisinopril 20 mg, oral, Daily    meclizine (ANTIVERT) 12.5 mg, oral, 3 times daily PRN    metFORMIN  mg 24 hr tablet Take one tablet twice daily    metoprolol tartrate (LOPRESSOR) 25 mg, oral, 2 times daily    multivitamin tablet 1 tablet, Daily    nitroglycerin (NITROSTAT) 0.3 mg, Every 5 min PRN    omeprazole (PRILOSEC) 40 mg, oral, Daily before breakfast, Do not crush or chew.    rosuvastatin (CRESTOR) 20 mg, oral, Daily    traZODone (DESYREL) 25 mg, oral, Nightly    venlafaxine (EFFEXOR) 75 mg, oral, Daily before breakfast    venlafaxine XR (EFFEXOR-XR) 150 mg, oral, Daily          Visit Vitals  /70   Pulse 82   Temp 36.3 °C (97.3 °F)   Resp 16   Ht 1.753  "m (5' 9.02\")   Wt 83 kg (182 lb 15.7 oz)   SpO2 98%   BMI 27.01 kg/m²   OB Status Hysterectomy   Smoking Status Former   BSA 2.01 m²         Review of Systems   Constitutional: Negative.    HENT: Negative.     Eyes: Negative.    Respiratory: Negative.     Cardiovascular: Negative.         METS 4    +stairs / walk in from lot Without chest pain / SOB - limited by pain the last  5 months    Gastrointestinal: Negative.    Endocrine: Negative.    Genitourinary: Negative.    Musculoskeletal:  Positive for arthralgias and neck pain.   Skin: Negative.    Allergic/Immunologic: Negative.    Neurological:  Positive for weakness and numbness.   Hematological: Negative.    Psychiatric/Behavioral: Negative.          Physical Exam  Vitals reviewed.   Constitutional:       Appearance: Normal appearance.   HENT:      Head: Normocephalic and atraumatic.      Right Ear: External ear normal.      Left Ear: External ear normal.      Nose: Nose normal.      Mouth/Throat:      Pharynx: Oropharynx is clear.   Eyes:      Extraocular Movements: Extraocular movements intact.      Conjunctiva/sclera: Conjunctivae normal.      Pupils: Pupils are equal, round, and reactive to light.   Neck:      Comments: Limited ROM neck   Cardiovascular:      Rate and Rhythm: Normal rate and regular rhythm.      Heart sounds: Normal heart sounds.   Pulmonary:      Effort: Pulmonary effort is normal.      Breath sounds: Normal breath sounds.   Abdominal:      Palpations: Abdomen is soft.   Musculoskeletal:         General: Normal range of motion.   Skin:     General: Skin is warm and dry.   Neurological:      General: No focal deficit present.      Mental Status: She is alert and oriented to person, place, and time.   Psychiatric:         Mood and Affect: Mood normal.         Behavior: Behavior normal.          PAT AIRWAY:   Airway:     Mallampati::  II    Neck ROM::  Limited      Lab Results   Component Value Date    WBC 6.4 05/11/2025    HGB 12.4 05/11/2025 "    HCT 37.7 05/11/2025    MCV 98 05/11/2025     05/11/2025      Lab Results   Component Value Date    GLUCOSE 103 (H) 05/11/2025    CALCIUM 10.1 05/11/2025     05/11/2025    K 3.9 05/11/2025    CO2 24 05/11/2025     05/11/2025    BUN 16 05/11/2025    CREATININE 0.76 05/11/2025      Lab Results   Component Value Date    ALT 65 (H) 05/11/2025    AST 56 (H) 05/11/2025    ALKPHOS 96 05/11/2025    BILITOT 0.4 05/11/2025      COAG  PT 10.9  INR 1.0  aPTT 29      Encounter Date: 05/05/25   ECG 12 lead   Result Value    Ventricular Rate 77    Atrial Rate 77    NM Interval 174    QRS Duration 100    QT Interval 424    QTC Calculation(Bazett) 479    P Axis 69    R Axis 51    T Axis 67    QRS Count 13    Q Onset 222    P Onset 135    P Offset 198    T Offset 434    QTC Fredericia 460    Narrative    Sinus rhythm with occasional Premature ventricular complexes  Incomplete right bundle branch block  Borderline ECG  No previous ECGs available  See ED provider note for full interpretation and clinical correlation  Confirmed by Julieta Garcia ([id number]) on 5/9/2025 8:39:11 PM        Patient is scheduled to have C4-C7 Anterior Cervical Spine Discectomy and Fusion     Cardiac Risk Assessment Dr. Dubon pending - NST / TTE scheduled 6/3/25  If her ejection fraction is preserved and there is no significant ischemia, the patient would be low to intermediate risk for moderate risk surgery and can proceed.       Cardiovascular  METS: 4   RCRI: 1  , 6 % Risk of MACE  Caprini: 6    CAD- ASA Continue DOS   HTN- lisinopril HOLD 24 hours prior to surgery             Metoprolol Continue DOS   HLD- rosuvastatin Continue DOS     Pulmonary:  Stop Bang score is 2 placing patient at low risk for TAO  ARISCAT: <26 points, 1.6% risk of in-hospital postoperative pulmonary complication  PRODIGY: Moderate risk for opioid induced respiratory depression       Neurology   No neurologic diagnosis or significant findings on chart review,  clinical presentation and evaluation.  No grossly apparent neurologic perioperative risk.    Patient is not at increased risk for perioperative CVA       Renal  Recommendations to avoid nephrotoxic drugs and carefully monitor fluid status to maintain euvolemia. Use dose adjusted medications as needed for the underlying level of renal function.     Hematology       Patient instructed to ambulate as soon as possible postoperatively to decrease thromboembolic risk.      Initiate mechanical DVT prophylaxis as soon as possible and initiate chemical prophylaxis when deemed safe from a bleeding standpoint post surgery.       VTE prophylaxis per surgical team     GI  GERD- omeprazole Continue DOS     Tests ordered in PAT: cbc, cmp done 5/11, coag, A1c, t&s,mrsa   LABS REVIEWED: unremarkable     Risk assessment complete.  Patient is scheduled for a intermediate surgical risk procedure.        Preoperative medication instructions were provided and reviewed with the patient.  Any additional testing or evaluation was explained to the patient.  Nothing by mouth instructions were discussed and patient's questions were answered prior to conclusion to this encounter.  Patient verbalized understanding of preoperative instructions given in preadmission testing; discharge instructions available in EMR.

## 2025-06-01 DIAGNOSIS — G47.00 INSOMNIA, UNSPECIFIED TYPE: ICD-10-CM

## 2025-06-01 LAB — STAPHYLOCOCCUS SPEC CULT: NORMAL

## 2025-06-02 ENCOUNTER — APPOINTMENT (OUTPATIENT)
Dept: RADIOLOGY | Facility: HOSPITAL | Age: 60
End: 2025-06-02
Payer: MEDICARE

## 2025-06-02 ENCOUNTER — APPOINTMENT (OUTPATIENT)
Dept: CARDIOLOGY | Facility: HOSPITAL | Age: 60
End: 2025-06-02
Payer: MEDICARE

## 2025-06-02 ENCOUNTER — APPOINTMENT (OUTPATIENT)
Dept: ORTHOPEDIC SURGERY | Facility: CLINIC | Age: 60
End: 2025-06-02
Payer: MEDICARE

## 2025-06-02 RX ORDER — TRAZODONE HYDROCHLORIDE 50 MG/1
TABLET ORAL
Qty: 90 TABLET | Refills: 0 | Status: SHIPPED | OUTPATIENT
Start: 2025-06-02

## 2025-06-03 ENCOUNTER — HOSPITAL ENCOUNTER (OUTPATIENT)
Dept: RADIOLOGY | Facility: HOSPITAL | Age: 60
Discharge: HOME | End: 2025-06-03
Payer: MEDICARE

## 2025-06-03 ENCOUNTER — HOSPITAL ENCOUNTER (OUTPATIENT)
Dept: CARDIOLOGY | Facility: HOSPITAL | Age: 60
Discharge: HOME | End: 2025-06-03
Payer: MEDICARE

## 2025-06-03 ENCOUNTER — APPOINTMENT (OUTPATIENT)
Dept: CARDIOLOGY | Facility: HOSPITAL | Age: 60
End: 2025-06-03
Payer: MEDICARE

## 2025-06-03 DIAGNOSIS — R06.09 OTHER FORMS OF DYSPNEA: ICD-10-CM

## 2025-06-03 DIAGNOSIS — E78.2 MIXED HYPERLIPIDEMIA: ICD-10-CM

## 2025-06-03 DIAGNOSIS — I25.10 ARTERIOSCLEROSIS OF CORONARY ARTERY: ICD-10-CM

## 2025-06-03 DIAGNOSIS — I25.10 ARTERIOSCLEROSIS OF CORONARY ARTERY: Primary | ICD-10-CM

## 2025-06-03 DIAGNOSIS — E11.9 TYPE 2 DIABETES MELLITUS WITHOUT RETINOPATHY (MULTI): ICD-10-CM

## 2025-06-03 DIAGNOSIS — I25.810: ICD-10-CM

## 2025-06-03 DIAGNOSIS — R06.02 SHORTNESS OF BREATH: ICD-10-CM

## 2025-06-03 DIAGNOSIS — I20.9 TYPICAL ANGINA: ICD-10-CM

## 2025-06-03 DIAGNOSIS — Z01.810 PREOP CARDIOVASCULAR EXAM: ICD-10-CM

## 2025-06-03 LAB
AORTIC VALVE PEAK VELOCITY: 1.38 M/S
AV PEAK GRADIENT: 8 MMHG
AVA (PEAK VEL): 3.13 CM2
EJECTION FRACTION: 65 %
LEFT ATRIUM VOLUME AREA LENGTH INDEX BSA: 19.5 ML/M2
LEFT VENTRICLE INTERNAL DIMENSION DIASTOLE: 4.95 CM (ref 3.5–6)
LEFT VENTRICULAR OUTFLOW TRACT DIAMETER: 2.24 CM
MITRAL VALVE E/A RATIO: 0.7
RIGHT VENTRICLE FREE WALL PEAK S': 7 CM/S
RIGHT VENTRICLE PEAK SYSTOLIC PRESSURE: 22 MMHG
TRICUSPID ANNULAR PLANE SYSTOLIC EXCURSION: 1.7 CM

## 2025-06-03 PROCEDURE — 93306 TTE W/DOPPLER COMPLETE: CPT | Performed by: INTERNAL MEDICINE

## 2025-06-03 PROCEDURE — A9502 TC99M TETROFOSMIN: HCPCS | Performed by: STUDENT IN AN ORGANIZED HEALTH CARE EDUCATION/TRAINING PROGRAM

## 2025-06-03 PROCEDURE — 93016 CV STRESS TEST SUPVJ ONLY: CPT | Performed by: INTERNAL MEDICINE

## 2025-06-03 PROCEDURE — 3430000001 HC RX 343 DIAGNOSTIC RADIOPHARMACEUTICALS: Performed by: STUDENT IN AN ORGANIZED HEALTH CARE EDUCATION/TRAINING PROGRAM

## 2025-06-03 PROCEDURE — 93018 CV STRESS TEST I&R ONLY: CPT | Performed by: INTERNAL MEDICINE

## 2025-06-03 PROCEDURE — 78452 HT MUSCLE IMAGE SPECT MULT: CPT | Performed by: STUDENT IN AN ORGANIZED HEALTH CARE EDUCATION/TRAINING PROGRAM

## 2025-06-03 PROCEDURE — C8929 TTE W OR WO FOL WCON,DOPPLER: HCPCS

## 2025-06-03 PROCEDURE — 2500000004 HC RX 250 GENERAL PHARMACY W/ HCPCS (ALT 636 FOR OP/ED): Performed by: STUDENT IN AN ORGANIZED HEALTH CARE EDUCATION/TRAINING PROGRAM

## 2025-06-03 PROCEDURE — 93017 CV STRESS TEST TRACING ONLY: CPT

## 2025-06-03 PROCEDURE — 78452 HT MUSCLE IMAGE SPECT MULT: CPT

## 2025-06-03 RX ORDER — REGADENOSON 0.08 MG/ML
0.4 INJECTION, SOLUTION INTRAVENOUS
Status: COMPLETED | OUTPATIENT
Start: 2025-06-03 | End: 2025-06-03

## 2025-06-03 RX ADMIN — REGADENOSON 0.4 MG: 0.08 INJECTION, SOLUTION INTRAVENOUS at 10:02

## 2025-06-03 RX ADMIN — PERFLUTREN 6 ML OF DILUTION: 6.52 INJECTION, SUSPENSION INTRAVENOUS at 15:12

## 2025-06-03 RX ADMIN — TETROFOSMIN 34.3 MILLICURIE: 0.23 INJECTION, POWDER, LYOPHILIZED, FOR SOLUTION INTRAVENOUS at 10:03

## 2025-06-03 RX ADMIN — TETROFOSMIN 10.7 MILLICURIE: 0.23 INJECTION, POWDER, LYOPHILIZED, FOR SOLUTION INTRAVENOUS at 08:40

## 2025-06-04 ENCOUNTER — ANESTHESIA (OUTPATIENT)
Dept: OPERATING ROOM | Facility: HOSPITAL | Age: 60
End: 2025-06-04
Payer: MEDICARE

## 2025-06-04 ENCOUNTER — APPOINTMENT (OUTPATIENT)
Dept: RADIOLOGY | Facility: HOSPITAL | Age: 60
End: 2025-06-04
Payer: MEDICARE

## 2025-06-04 ENCOUNTER — ANESTHESIA EVENT (OUTPATIENT)
Dept: OPERATING ROOM | Facility: HOSPITAL | Age: 60
End: 2025-06-04
Payer: MEDICARE

## 2025-06-04 ENCOUNTER — HOSPITAL ENCOUNTER (OUTPATIENT)
Facility: HOSPITAL | Age: 60
Discharge: HOME | End: 2025-06-06
Attending: ORTHOPAEDIC SURGERY | Admitting: ORTHOPAEDIC SURGERY
Payer: MEDICARE

## 2025-06-04 DIAGNOSIS — K59.03 CONSTIPATION DUE TO OPIOID THERAPY: ICD-10-CM

## 2025-06-04 DIAGNOSIS — G89.18 POSTOPERATIVE PAIN AFTER SPINAL SURGERY: ICD-10-CM

## 2025-06-04 DIAGNOSIS — M47.12 CERVICAL SPONDYLOSIS WITH MYELOPATHY: Primary | ICD-10-CM

## 2025-06-04 DIAGNOSIS — R11.0 POSTOPERATIVE NAUSEA: ICD-10-CM

## 2025-06-04 DIAGNOSIS — M48.02 SPINAL STENOSIS IN CERVICAL REGION: ICD-10-CM

## 2025-06-04 DIAGNOSIS — T40.2X5A CONSTIPATION DUE TO OPIOID THERAPY: ICD-10-CM

## 2025-06-04 DIAGNOSIS — G95.20 SPINAL CORD COMPRESSION (MULTI): ICD-10-CM

## 2025-06-04 DIAGNOSIS — Z98.890 POSTOPERATIVE NAUSEA: ICD-10-CM

## 2025-06-04 PROBLEM — R79.89 ELEVATED LIVER FUNCTION TESTS: Status: ACTIVE | Noted: 2025-06-04

## 2025-06-04 LAB
GLUCOSE BLD MANUAL STRIP-MCNC: 100 MG/DL (ref 74–99)
GLUCOSE BLD MANUAL STRIP-MCNC: 112 MG/DL (ref 74–99)
GLUCOSE BLD MANUAL STRIP-MCNC: 142 MG/DL (ref 74–99)

## 2025-06-04 PROCEDURE — 2500000001 HC RX 250 WO HCPCS SELF ADMINISTERED DRUGS (ALT 637 FOR MEDICARE OP): Performed by: ANESTHESIOLOGY

## 2025-06-04 PROCEDURE — 22846 INSERT SPINE FIXATION DEVICE: CPT | Performed by: PHYSICIAN ASSISTANT

## 2025-06-04 PROCEDURE — 7100000011 HC EXTENDED STAY RECOVERY HOURLY - NURSING UNIT

## 2025-06-04 PROCEDURE — 72020 X-RAY EXAM OF SPINE 1 VIEW: CPT | Performed by: RADIOLOGY

## 2025-06-04 PROCEDURE — 3700000002 HC GENERAL ANESTHESIA TIME - EACH INCREMENTAL 1 MINUTE: Performed by: ORTHOPAEDIC SURGERY

## 2025-06-04 PROCEDURE — 3700000001 HC GENERAL ANESTHESIA TIME - INITIAL BASE CHARGE: Performed by: ORTHOPAEDIC SURGERY

## 2025-06-04 PROCEDURE — 2500000001 HC RX 250 WO HCPCS SELF ADMINISTERED DRUGS (ALT 637 FOR MEDICARE OP): Performed by: PHYSICIAN ASSISTANT

## 2025-06-04 PROCEDURE — 22552 ARTHRD ANT NTRBD CERVICAL EA: CPT | Performed by: PHYSICIAN ASSISTANT

## 2025-06-04 PROCEDURE — 22552 ARTHRD ANT NTRBD CERVICAL EA: CPT | Performed by: ORTHOPAEDIC SURGERY

## 2025-06-04 PROCEDURE — 82947 ASSAY GLUCOSE BLOOD QUANT: CPT

## 2025-06-04 PROCEDURE — 2780000003 HC OR 278 NO HCPCS: Performed by: ORTHOPAEDIC SURGERY

## 2025-06-04 PROCEDURE — 2500000002 HC RX 250 W HCPCS SELF ADMINISTERED DRUGS (ALT 637 FOR MEDICARE OP, ALT 636 FOR OP/ED): Performed by: PHYSICIAN ASSISTANT

## 2025-06-04 PROCEDURE — 3600000018 HC OR TIME - INITIAL BASE CHARGE - PROCEDURE LEVEL SIX: Performed by: ORTHOPAEDIC SURGERY

## 2025-06-04 PROCEDURE — 20930 SP BONE ALGRFT MORSEL ADD-ON: CPT | Performed by: ORTHOPAEDIC SURGERY

## 2025-06-04 PROCEDURE — 2500000005 HC RX 250 GENERAL PHARMACY W/O HCPCS: Performed by: ANESTHESIOLOGY

## 2025-06-04 PROCEDURE — 7100000002 HC RECOVERY ROOM TIME - EACH INCREMENTAL 1 MINUTE: Performed by: ORTHOPAEDIC SURGERY

## 2025-06-04 PROCEDURE — C1889 IMPLANT/INSERT DEVICE, NOC: HCPCS | Performed by: ORTHOPAEDIC SURGERY

## 2025-06-04 PROCEDURE — 22551 ARTHRD ANT NTRBDY CERVICAL: CPT | Performed by: PHYSICIAN ASSISTANT

## 2025-06-04 PROCEDURE — 2500000005 HC RX 250 GENERAL PHARMACY W/O HCPCS: Performed by: ORTHOPAEDIC SURGERY

## 2025-06-04 PROCEDURE — C1765 ADHESION BARRIER: HCPCS | Performed by: ORTHOPAEDIC SURGERY

## 2025-06-04 PROCEDURE — 2500000004 HC RX 250 GENERAL PHARMACY W/ HCPCS (ALT 636 FOR OP/ED): Performed by: PHYSICIAN ASSISTANT

## 2025-06-04 PROCEDURE — 7100000001 HC RECOVERY ROOM TIME - INITIAL BASE CHARGE: Performed by: ORTHOPAEDIC SURGERY

## 2025-06-04 PROCEDURE — 20931 SP BONE ALGRFT STRUCT ADD-ON: CPT | Performed by: ORTHOPAEDIC SURGERY

## 2025-06-04 PROCEDURE — C1713 ANCHOR/SCREW BN/BN,TIS/BN: HCPCS | Performed by: ORTHOPAEDIC SURGERY

## 2025-06-04 PROCEDURE — 2500000005 HC RX 250 GENERAL PHARMACY W/O HCPCS: Performed by: ANESTHESIOLOGIST ASSISTANT

## 2025-06-04 PROCEDURE — 2500000004 HC RX 250 GENERAL PHARMACY W/ HCPCS (ALT 636 FOR OP/ED): Mod: JZ | Performed by: ANESTHESIOLOGY

## 2025-06-04 PROCEDURE — 2700000047 HC OR 270 NO HCPCS: Performed by: ORTHOPAEDIC SURGERY

## 2025-06-04 PROCEDURE — 9420000001 HC RT PATIENT EDUCATION 5 MIN

## 2025-06-04 PROCEDURE — 2500000004 HC RX 250 GENERAL PHARMACY W/ HCPCS (ALT 636 FOR OP/ED): Performed by: ANESTHESIOLOGIST ASSISTANT

## 2025-06-04 PROCEDURE — 22846 INSERT SPINE FIXATION DEVICE: CPT | Performed by: ORTHOPAEDIC SURGERY

## 2025-06-04 PROCEDURE — 2720000007 HC OR 272 NO HCPCS: Performed by: ORTHOPAEDIC SURGERY

## 2025-06-04 PROCEDURE — 22551 ARTHRD ANT NTRBDY CERVICAL: CPT | Performed by: ORTHOPAEDIC SURGERY

## 2025-06-04 PROCEDURE — 72020 X-RAY EXAM OF SPINE 1 VIEW: CPT

## 2025-06-04 PROCEDURE — RXMED WILLOW AMBULATORY MEDICATION CHARGE

## 2025-06-04 PROCEDURE — 3600000017 HC OR TIME - EACH INCREMENTAL 1 MINUTE - PROCEDURE LEVEL SIX: Performed by: ORTHOPAEDIC SURGERY

## 2025-06-04 DEVICE — SPACER ACF LORDOTIC 10MM, ADVANCED, FRZ-DRY: Type: IMPLANTABLE DEVICE | Site: SPINE CERVICAL | Status: FUNCTIONAL

## 2025-06-04 DEVICE — BONE, PUTTY DBX  1CC DE-MINERAL: Type: IMPLANTABLE DEVICE | Site: SPINE CERVICAL | Status: FUNCTIONAL

## 2025-06-04 DEVICE — IMPLANTABLE DEVICE: Type: IMPLANTABLE DEVICE | Site: SPINE CERVICAL | Status: FUNCTIONAL

## 2025-06-04 DEVICE — SCREW, CERV 4.0 X 17 ST VA: Type: IMPLANTABLE DEVICE | Site: SPINE CERVICAL | Status: FUNCTIONAL

## 2025-06-04 DEVICE — SCREW, CERV 4.0 X 17 SD VA: Type: IMPLANTABLE DEVICE | Site: SPINE CERVICAL | Status: FUNCTIONAL

## 2025-06-04 RX ORDER — PROPOFOL 10 MG/ML
INJECTION, EMULSION INTRAVENOUS AS NEEDED
Status: DISCONTINUED | OUTPATIENT
Start: 2025-06-04 | End: 2025-06-04

## 2025-06-04 RX ORDER — PANTOPRAZOLE SODIUM 40 MG/1
40 TABLET, DELAYED RELEASE ORAL
Status: DISCONTINUED | OUTPATIENT
Start: 2025-06-05 | End: 2025-06-06 | Stop reason: HOSPADM

## 2025-06-04 RX ORDER — GENTAMICIN 40 MG/ML
INJECTION, SOLUTION INTRAMUSCULAR; INTRAVENOUS AS NEEDED
Status: DISCONTINUED | OUTPATIENT
Start: 2025-06-04 | End: 2025-06-04

## 2025-06-04 RX ORDER — VENLAFAXINE HYDROCHLORIDE 75 MG/1
150 CAPSULE, EXTENDED RELEASE ORAL
Status: DISCONTINUED | OUTPATIENT
Start: 2025-06-05 | End: 2025-06-06 | Stop reason: HOSPADM

## 2025-06-04 RX ORDER — OXYCODONE HYDROCHLORIDE 5 MG/1
10 TABLET ORAL EVERY 4 HOURS PRN
Status: DISCONTINUED | OUTPATIENT
Start: 2025-06-04 | End: 2025-06-06 | Stop reason: HOSPADM

## 2025-06-04 RX ORDER — DEXAMETHASONE SODIUM PHOSPHATE 10 MG/ML
10 INJECTION INTRAMUSCULAR; INTRAVENOUS EVERY 8 HOURS SCHEDULED
Status: COMPLETED | OUTPATIENT
Start: 2025-06-04 | End: 2025-06-05

## 2025-06-04 RX ORDER — METFORMIN HYDROCHLORIDE 500 MG/1
500 TABLET, EXTENDED RELEASE ORAL
Status: DISCONTINUED | OUTPATIENT
Start: 2025-06-05 | End: 2025-06-06 | Stop reason: HOSPADM

## 2025-06-04 RX ORDER — METHOCARBAMOL 500 MG/1
500 TABLET, FILM COATED ORAL 4 TIMES DAILY
Status: DISCONTINUED | OUTPATIENT
Start: 2025-06-04 | End: 2025-06-06 | Stop reason: HOSPADM

## 2025-06-04 RX ORDER — LIDOCAINE 560 MG/1
PATCH PERCUTANEOUS; TOPICAL; TRANSDERMAL AS NEEDED
Status: DISCONTINUED | OUTPATIENT
Start: 2025-06-04 | End: 2025-06-04 | Stop reason: HOSPADM

## 2025-06-04 RX ORDER — METOCLOPRAMIDE HYDROCHLORIDE 5 MG/ML
10 INJECTION INTRAMUSCULAR; INTRAVENOUS EVERY 6 HOURS PRN
Status: DISCONTINUED | OUTPATIENT
Start: 2025-06-04 | End: 2025-06-06 | Stop reason: HOSPADM

## 2025-06-04 RX ORDER — OXYCODONE HYDROCHLORIDE 5 MG/1
5 TABLET ORAL EVERY 6 HOURS PRN
Qty: 28 TABLET | Refills: 0 | Status: SHIPPED | OUTPATIENT
Start: 2025-06-04 | End: 2025-06-13

## 2025-06-04 RX ORDER — ONDANSETRON HYDROCHLORIDE 2 MG/ML
4 INJECTION, SOLUTION INTRAVENOUS ONCE AS NEEDED
Status: DISCONTINUED | OUTPATIENT
Start: 2025-06-04 | End: 2025-06-04 | Stop reason: HOSPADM

## 2025-06-04 RX ORDER — DIPHENHYDRAMINE HCL 12.5MG/5ML
25 LIQUID (ML) ORAL EVERY 6 HOURS PRN
Status: DISCONTINUED | OUTPATIENT
Start: 2025-06-04 | End: 2025-06-06 | Stop reason: HOSPADM

## 2025-06-04 RX ORDER — ROSUVASTATIN CALCIUM 20 MG/1
20 TABLET, COATED ORAL NIGHTLY
Status: DISCONTINUED | OUTPATIENT
Start: 2025-06-04 | End: 2025-06-06 | Stop reason: HOSPADM

## 2025-06-04 RX ORDER — CEFAZOLIN SODIUM 2 G/50ML
2 SOLUTION INTRAVENOUS EVERY 8 HOURS
Status: COMPLETED | OUTPATIENT
Start: 2025-06-04 | End: 2025-06-05

## 2025-06-04 RX ORDER — OXYCODONE HYDROCHLORIDE 5 MG/1
5 TABLET ORAL EVERY 4 HOURS PRN
Status: DISCONTINUED | OUTPATIENT
Start: 2025-06-04 | End: 2025-06-06 | Stop reason: HOSPADM

## 2025-06-04 RX ORDER — MIDAZOLAM HYDROCHLORIDE 1 MG/ML
INJECTION INTRAMUSCULAR; INTRAVENOUS AS NEEDED
Status: DISCONTINUED | OUTPATIENT
Start: 2025-06-04 | End: 2025-06-04

## 2025-06-04 RX ORDER — KETOROLAC TROMETHAMINE 30 MG/ML
INJECTION, SOLUTION INTRAMUSCULAR; INTRAVENOUS AS NEEDED
Status: DISCONTINUED | OUTPATIENT
Start: 2025-06-04 | End: 2025-06-04

## 2025-06-04 RX ORDER — HYDROMORPHONE HYDROCHLORIDE 0.2 MG/ML
0.2 INJECTION INTRAMUSCULAR; INTRAVENOUS; SUBCUTANEOUS EVERY 4 HOURS PRN
Status: DISCONTINUED | OUTPATIENT
Start: 2025-06-04 | End: 2025-06-06 | Stop reason: HOSPADM

## 2025-06-04 RX ORDER — HYDROMORPHONE HYDROCHLORIDE 1 MG/ML
INJECTION, SOLUTION INTRAMUSCULAR; INTRAVENOUS; SUBCUTANEOUS AS NEEDED
Status: DISCONTINUED | OUTPATIENT
Start: 2025-06-04 | End: 2025-06-04

## 2025-06-04 RX ORDER — DIPHENHYDRAMINE HCL 25 MG
25 CAPSULE ORAL EVERY 6 HOURS PRN
Status: DISCONTINUED | OUTPATIENT
Start: 2025-06-04 | End: 2025-06-06 | Stop reason: HOSPADM

## 2025-06-04 RX ORDER — LISINOPRIL 20 MG/1
20 TABLET ORAL DAILY
Status: DISCONTINUED | OUTPATIENT
Start: 2025-06-05 | End: 2025-06-06 | Stop reason: HOSPADM

## 2025-06-04 RX ORDER — METOCLOPRAMIDE HYDROCHLORIDE 5 MG/ML
10 INJECTION INTRAMUSCULAR; INTRAVENOUS ONCE AS NEEDED
Status: DISCONTINUED | OUTPATIENT
Start: 2025-06-04 | End: 2025-06-04 | Stop reason: HOSPADM

## 2025-06-04 RX ORDER — METHOCARBAMOL 500 MG/1
500 TABLET, FILM COATED ORAL 4 TIMES DAILY PRN
Qty: 40 TABLET | Refills: 0 | Status: SHIPPED | OUTPATIENT
Start: 2025-06-04 | End: 2025-06-16

## 2025-06-04 RX ORDER — VENLAFAXINE 75 MG/1
75 TABLET ORAL NIGHTLY
Status: DISCONTINUED | OUTPATIENT
Start: 2025-06-04 | End: 2025-06-06 | Stop reason: HOSPADM

## 2025-06-04 RX ORDER — NALOXONE HYDROCHLORIDE 0.4 MG/ML
0.2 INJECTION, SOLUTION INTRAMUSCULAR; INTRAVENOUS; SUBCUTANEOUS EVERY 5 MIN PRN
Status: DISCONTINUED | OUTPATIENT
Start: 2025-06-04 | End: 2025-06-06 | Stop reason: HOSPADM

## 2025-06-04 RX ORDER — ROCURONIUM BROMIDE 10 MG/ML
INJECTION, SOLUTION INTRAVENOUS AS NEEDED
Status: DISCONTINUED | OUTPATIENT
Start: 2025-06-04 | End: 2025-06-04

## 2025-06-04 RX ORDER — METOCLOPRAMIDE 10 MG/1
10 TABLET ORAL EVERY 6 HOURS PRN
Status: DISCONTINUED | OUTPATIENT
Start: 2025-06-04 | End: 2025-06-06 | Stop reason: HOSPADM

## 2025-06-04 RX ORDER — DEXTROSE 50 % IN WATER (D50W) INTRAVENOUS SYRINGE
12.5
Status: DISCONTINUED | OUTPATIENT
Start: 2025-06-04 | End: 2025-06-06 | Stop reason: HOSPADM

## 2025-06-04 RX ORDER — ONDANSETRON 4 MG/1
4 TABLET, FILM COATED ORAL EVERY 8 HOURS PRN
Qty: 15 TABLET | Refills: 0 | Status: SHIPPED | OUTPATIENT
Start: 2025-06-04 | End: 2025-06-04 | Stop reason: HOSPADM

## 2025-06-04 RX ORDER — PHENYLEPHRINE HCL IN 0.9% NACL 1 MG/10 ML
SYRINGE (ML) INTRAVENOUS AS NEEDED
Status: DISCONTINUED | OUTPATIENT
Start: 2025-06-04 | End: 2025-06-04

## 2025-06-04 RX ORDER — DOCUSATE SODIUM 100 MG/1
100 CAPSULE, LIQUID FILLED ORAL 2 TIMES DAILY
Status: DISCONTINUED | OUTPATIENT
Start: 2025-06-04 | End: 2025-06-06 | Stop reason: HOSPADM

## 2025-06-04 RX ORDER — SODIUM CHLORIDE, SODIUM LACTATE, POTASSIUM CHLORIDE, CALCIUM CHLORIDE 600; 310; 30; 20 MG/100ML; MG/100ML; MG/100ML; MG/100ML
INJECTION, SOLUTION INTRAVENOUS CONTINUOUS PRN
Status: DISCONTINUED | OUTPATIENT
Start: 2025-06-04 | End: 2025-06-04

## 2025-06-04 RX ORDER — FENTANYL CITRATE 50 UG/ML
INJECTION, SOLUTION INTRAMUSCULAR; INTRAVENOUS AS NEEDED
Status: DISCONTINUED | OUTPATIENT
Start: 2025-06-04 | End: 2025-06-04

## 2025-06-04 RX ORDER — METOPROLOL TARTRATE 25 MG/1
25 TABLET, FILM COATED ORAL 2 TIMES DAILY
Status: DISCONTINUED | OUTPATIENT
Start: 2025-06-04 | End: 2025-06-06 | Stop reason: HOSPADM

## 2025-06-04 RX ORDER — METHOCARBAMOL 100 MG/ML
INJECTION, SOLUTION INTRAMUSCULAR; INTRAVENOUS AS NEEDED
Status: DISCONTINUED | OUTPATIENT
Start: 2025-06-04 | End: 2025-06-04

## 2025-06-04 RX ORDER — OXYCODONE HYDROCHLORIDE 5 MG/1
5 TABLET ORAL EVERY 4 HOURS PRN
Status: DISCONTINUED | OUTPATIENT
Start: 2025-06-04 | End: 2025-06-04 | Stop reason: HOSPADM

## 2025-06-04 RX ORDER — SODIUM CHLORIDE 0.9 G/100ML
INJECTION, SOLUTION IRRIGATION AS NEEDED
Status: DISCONTINUED | OUTPATIENT
Start: 2025-06-04 | End: 2025-06-04 | Stop reason: HOSPADM

## 2025-06-04 RX ORDER — SODIUM CHLORIDE 9 MG/ML
100 INJECTION, SOLUTION INTRAVENOUS CONTINUOUS
Status: DISCONTINUED | OUTPATIENT
Start: 2025-06-04 | End: 2025-06-06 | Stop reason: HOSPADM

## 2025-06-04 RX ORDER — SYRING-NEEDL,DISP,INSUL,0.3 ML 29 G X1/2"
148 SYRINGE, EMPTY DISPOSABLE MISCELLANEOUS ONCE AS NEEDED
Status: DISCONTINUED | OUTPATIENT
Start: 2025-06-04 | End: 2025-06-06 | Stop reason: HOSPADM

## 2025-06-04 RX ORDER — DOCUSATE SODIUM 100 MG/1
100 CAPSULE, LIQUID FILLED ORAL 2 TIMES DAILY
Qty: 20 CAPSULE | Refills: 0 | Status: SHIPPED | OUTPATIENT
Start: 2025-06-04 | End: 2025-06-16

## 2025-06-04 RX ORDER — MEPERIDINE HYDROCHLORIDE 25 MG/ML
12.5 INJECTION INTRAMUSCULAR; INTRAVENOUS; SUBCUTANEOUS EVERY 10 MIN PRN
Status: DISCONTINUED | OUTPATIENT
Start: 2025-06-04 | End: 2025-06-04 | Stop reason: HOSPADM

## 2025-06-04 RX ORDER — LIDOCAINE HYDROCHLORIDE 20 MG/ML
INJECTION, SOLUTION INFILTRATION; PERINEURAL AS NEEDED
Status: DISCONTINUED | OUTPATIENT
Start: 2025-06-04 | End: 2025-06-04

## 2025-06-04 RX ORDER — CEFAZOLIN 1 G/1
INJECTION, POWDER, FOR SOLUTION INTRAVENOUS AS NEEDED
Status: DISCONTINUED | OUTPATIENT
Start: 2025-06-04 | End: 2025-06-04

## 2025-06-04 RX ORDER — DEXTROSE 50 % IN WATER (D50W) INTRAVENOUS SYRINGE
25
Status: DISCONTINUED | OUTPATIENT
Start: 2025-06-04 | End: 2025-06-06 | Stop reason: HOSPADM

## 2025-06-04 RX ORDER — METOPROLOL TARTRATE 1 MG/ML
INJECTION, SOLUTION INTRAVENOUS AS NEEDED
Status: DISCONTINUED | OUTPATIENT
Start: 2025-06-04 | End: 2025-06-04

## 2025-06-04 RX ORDER — DIPHENHYDRAMINE HYDROCHLORIDE 50 MG/ML
25 INJECTION, SOLUTION INTRAMUSCULAR; INTRAVENOUS EVERY 6 HOURS PRN
Status: DISCONTINUED | OUTPATIENT
Start: 2025-06-04 | End: 2025-06-06 | Stop reason: HOSPADM

## 2025-06-04 RX ORDER — LIDOCAINE HYDROCHLORIDE 10 MG/ML
0.1 INJECTION, SOLUTION EPIDURAL; INFILTRATION; INTRACAUDAL; PERINEURAL ONCE
Status: DISCONTINUED | OUTPATIENT
Start: 2025-06-04 | End: 2025-06-04 | Stop reason: HOSPADM

## 2025-06-04 RX ADMIN — CEFAZOLIN 2 G: 1 INJECTION, POWDER, FOR SOLUTION INTRAMUSCULAR; INTRAVENOUS at 14:22

## 2025-06-04 RX ADMIN — METHOCARBAMOL TABLETS 500 MG: 500 TABLET, COATED ORAL at 20:48

## 2025-06-04 RX ADMIN — SODIUM CHLORIDE, POTASSIUM CHLORIDE, SODIUM LACTATE AND CALCIUM CHLORIDE: 600; 310; 30; 20 INJECTION, SOLUTION INTRAVENOUS at 16:35

## 2025-06-04 RX ADMIN — METOPROLOL TARTRATE 25 MG: 25 TABLET, FILM COATED ORAL at 20:48

## 2025-06-04 RX ADMIN — GENTAMICIN SULFATE 80 MG: 40 INJECTION, SOLUTION INTRAMUSCULAR; INTRAVENOUS at 14:22

## 2025-06-04 RX ADMIN — HYDROMORPHONE HYDROCHLORIDE 0.5 MG: 1 INJECTION, SOLUTION INTRAMUSCULAR; INTRAVENOUS; SUBCUTANEOUS at 17:08

## 2025-06-04 RX ADMIN — OXYCODONE HYDROCHLORIDE 5 MG: 5 TABLET ORAL at 18:28

## 2025-06-04 RX ADMIN — Medication 2 L/MIN: at 18:05

## 2025-06-04 RX ADMIN — CARBOXYMETHYLCELLULOSE SODIUM 2 DROP: 0.5 SOLUTION/ DROPS OPHTHALMIC at 14:22

## 2025-06-04 RX ADMIN — LIDOCAINE HYDROCHLORIDE 60 MG: 20 INJECTION, SOLUTION INFILTRATION; PERINEURAL at 14:22

## 2025-06-04 RX ADMIN — HYDROMORPHONE HYDROCHLORIDE 0.5 MG: 1 INJECTION, SOLUTION INTRAMUSCULAR; INTRAVENOUS; SUBCUTANEOUS at 17:26

## 2025-06-04 RX ADMIN — DEXAMETHASONE SODIUM PHOSPHATE 4 MG: 4 INJECTION, SOLUTION INTRAMUSCULAR; INTRAVENOUS at 14:35

## 2025-06-04 RX ADMIN — HYDROMORPHONE HYDROCHLORIDE 0.5 MG: 1 INJECTION, SOLUTION INTRAMUSCULAR; INTRAVENOUS; SUBCUTANEOUS at 15:50

## 2025-06-04 RX ADMIN — ROCURONIUM 10 MG: 100 INJECTION, SOLUTION INTRAVENOUS at 15:32

## 2025-06-04 RX ADMIN — Medication 100 MCG: at 15:59

## 2025-06-04 RX ADMIN — ROCURONIUM 10 MG: 100 INJECTION, SOLUTION INTRAVENOUS at 15:44

## 2025-06-04 RX ADMIN — ROSUVASTATIN 20 MG: 20 TABLET, FILM COATED ORAL at 20:48

## 2025-06-04 RX ADMIN — METHOCARBAMOL 1000 MG: 1000 INJECTION, SOLUTION INTRAMUSCULAR; INTRAVENOUS at 16:35

## 2025-06-04 RX ADMIN — OXYCODONE HYDROCHLORIDE 10 MG: 5 TABLET ORAL at 22:37

## 2025-06-04 RX ADMIN — VENLAFAXINE 75 MG: 75 TABLET ORAL at 20:48

## 2025-06-04 RX ADMIN — HYDROMORPHONE HYDROCHLORIDE 0.5 MG: 1 INJECTION, SOLUTION INTRAMUSCULAR; INTRAVENOUS; SUBCUTANEOUS at 16:18

## 2025-06-04 RX ADMIN — HYDROMORPHONE HYDROCHLORIDE 0.5 MG: 1 INJECTION, SOLUTION INTRAMUSCULAR; INTRAVENOUS; SUBCUTANEOUS at 16:57

## 2025-06-04 RX ADMIN — ROCURONIUM 10 MG: 100 INJECTION, SOLUTION INTRAVENOUS at 16:25

## 2025-06-04 RX ADMIN — FENTANYL CITRATE 100 MCG: 50 INJECTION, SOLUTION INTRAMUSCULAR; INTRAVENOUS at 14:22

## 2025-06-04 RX ADMIN — KETOROLAC TROMETHAMINE 15 MG: 30 INJECTION, SOLUTION INTRAMUSCULAR at 16:35

## 2025-06-04 RX ADMIN — ROCURONIUM 10 MG: 100 INJECTION, SOLUTION INTRAVENOUS at 15:07

## 2025-06-04 RX ADMIN — CEFAZOLIN SODIUM 2 G: 2 SOLUTION INTRAVENOUS at 21:00

## 2025-06-04 RX ADMIN — ROCURONIUM 10 MG: 100 INJECTION, SOLUTION INTRAVENOUS at 16:14

## 2025-06-04 RX ADMIN — SODIUM CHLORIDE 100 ML/HR: 9 INJECTION, SOLUTION INTRAVENOUS at 20:48

## 2025-06-04 RX ADMIN — DOCUSATE SODIUM 100 MG: 100 CAPSULE, LIQUID FILLED ORAL at 20:48

## 2025-06-04 RX ADMIN — METOPROLOL TARTRATE 2 MG: 5 INJECTION INTRAVENOUS at 14:41

## 2025-06-04 RX ADMIN — Medication 100 MCG: at 15:15

## 2025-06-04 RX ADMIN — DEXAMETHASONE SODIUM PHOSPHATE 10 MG: 10 INJECTION, SOLUTION INTRAMUSCULAR; INTRAVENOUS at 22:06

## 2025-06-04 RX ADMIN — PROPOFOL 130 MG: 10 INJECTION, EMULSION INTRAVENOUS at 14:22

## 2025-06-04 RX ADMIN — POVIDONE-IODINE 1 APPLICATION: 5 SOLUTION TOPICAL at 13:15

## 2025-06-04 RX ADMIN — MIDAZOLAM HYDROCHLORIDE 2 MG: 1 INJECTION, SOLUTION INTRAMUSCULAR; INTRAVENOUS at 14:18

## 2025-06-04 RX ADMIN — ROCURONIUM 60 MG: 100 INJECTION, SOLUTION INTRAVENOUS at 14:22

## 2025-06-04 RX ADMIN — SODIUM CHLORIDE, POTASSIUM CHLORIDE, SODIUM LACTATE AND CALCIUM CHLORIDE: 600; 310; 30; 20 INJECTION, SOLUTION INTRAVENOUS at 14:18

## 2025-06-04 ASSESSMENT — PAIN SCALES - GENERAL
PAINLEVEL_OUTOF10: 8
PAINLEVEL_OUTOF10: 3
PAINLEVEL_OUTOF10: 7
PAINLEVEL_OUTOF10: 8
PAINLEVEL_OUTOF10: 7
PAINLEVEL_OUTOF10: 8
PAINLEVEL_OUTOF10: 8
PAINLEVEL_OUTOF10: 6
PAINLEVEL_OUTOF10: 4
PAINLEVEL_OUTOF10: 7
PAINLEVEL_OUTOF10: 8
PAINLEVEL_OUTOF10: 7

## 2025-06-04 ASSESSMENT — PAIN - FUNCTIONAL ASSESSMENT
PAIN_FUNCTIONAL_ASSESSMENT: 0-10
PAIN_FUNCTIONAL_ASSESSMENT: 0-10
PAIN_FUNCTIONAL_ASSESSMENT: UNABLE TO SELF-REPORT
PAIN_FUNCTIONAL_ASSESSMENT: UNABLE TO SELF-REPORT
PAIN_FUNCTIONAL_ASSESSMENT: 0-10
PAIN_FUNCTIONAL_ASSESSMENT: UNABLE TO SELF-REPORT
PAIN_FUNCTIONAL_ASSESSMENT: 0-10
PAIN_FUNCTIONAL_ASSESSMENT: UNABLE TO SELF-REPORT
PAIN_FUNCTIONAL_ASSESSMENT: 0-10

## 2025-06-04 ASSESSMENT — COLUMBIA-SUICIDE SEVERITY RATING SCALE - C-SSRS
6. HAVE YOU EVER DONE ANYTHING, STARTED TO DO ANYTHING, OR PREPARED TO DO ANYTHING TO END YOUR LIFE?: NO
1. IN THE PAST MONTH, HAVE YOU WISHED YOU WERE DEAD OR WISHED YOU COULD GO TO SLEEP AND NOT WAKE UP?: NO
2. HAVE YOU ACTUALLY HAD ANY THOUGHTS OF KILLING YOURSELF?: NO

## 2025-06-04 ASSESSMENT — COGNITIVE AND FUNCTIONAL STATUS - GENERAL
CLIMB 3 TO 5 STEPS WITH RAILING: A LITTLE
MOBILITY SCORE: 20
MOVING TO AND FROM BED TO CHAIR: A LITTLE
HELP NEEDED FOR BATHING: A LITTLE
STANDING UP FROM CHAIR USING ARMS: A LITTLE
DAILY ACTIVITIY SCORE: 21
WALKING IN HOSPITAL ROOM: A LITTLE
TOILETING: A LITTLE
DRESSING REGULAR LOWER BODY CLOTHING: A LITTLE

## 2025-06-04 ASSESSMENT — PAIN DESCRIPTION - ORIENTATION: ORIENTATION: LEFT

## 2025-06-04 ASSESSMENT — PAIN DESCRIPTION - DESCRIPTORS
DESCRIPTORS: SORE
DESCRIPTORS: SORE
DESCRIPTORS: ACHING
DESCRIPTORS: SORE

## 2025-06-04 ASSESSMENT — PAIN DESCRIPTION - LOCATION: LOCATION: NECK

## 2025-06-04 NOTE — DISCHARGE INSTRUCTIONS
Cervical Decompression and Fusion (Dr. Camacho)      ACTIVITY / RESTRICTIONS:  Do not drive for at least 24 hours after surgery or while taking narcotics (Percocet/oxycodone, hydrocodone, tramadol, etc) pain medication.  No pushing, pulling, or lifting of objects greater than 5-10 pounds for 3 weeks.   A gallon of milk is 8 pounds for reference  Limit Excessive neck rotation, flexion/extension in cervical collar  Caution with overhead lifting, activities that strain the head/neck  No NSAIDs (Advil, Aleve, Motrin, ibuprofen, naproxen, diclofenac, meloxicam, Celebrex, etc) for 3 months following fusion surgery  Exception: you may be prescribed one week of Ketorolac postop    COLLAR Instruction:  If given a collar, MUST wear cervical collar at all times (including sleep)  take off when showering/hygiene/changing clothing.  No driving while in Cervical Collar  Hard collar instructions:   Two pieces Velcro on sides. Should be placed under the chin.  Dial around yellow [place] pulls forward and twists to change height of collar under chin.  Hard collar pads may be removed and hand washed. There should be a second set of cervical collar pads with personal items to alternate. Collar may be off to exchange pads.  Soft collar instructions: Curve rests under chin and Velcro in back of neck.    Medication  Prescriptions for Oxycodone, Tylenol, and a Muscle relaxant provided  Each of these can be taken every 6 hours as needed  Recommend alternating doses (take the Tylenol & Muscle Relaxant IN BETWEEN doses of Oxycodone). Example:  Oxycodone 6am, 12pm, etc  Tylenol, Muscle relaxant 9am, 3pm, etc  If prescribed Toradol (Ketorolac) take as needed   Docusate is a stool softener. Please continue until you have a bowel movement  Zofran is for Nausea. Use as needed   Please call the office for refills 1-2 days prior to running out    BLOOD THINNERS / ANTICOAGULANTS / ANTIPLATELETS:   Anticoagulants: (Coumadin, warfarin, Lovenox,  etc)  Restart 5 days after surgery, or as directed by prescribing provider.   Antiplatelets: (Plavix, etc)  Restart 5 days after surgery, or as directed by prescription provider  Continue aspirin daily without restriction.    WOUND CARE:  Do not shower for 48 hours following surgery.   Keep surgical incision clean and dry until shower. Change with non-adherent dressing daily and as needed.  If no drainage, may cover or leave uncovered based on personal preference.   Showering: let warm soapy water run down back. Do not scrub. Pat dry.   Do not remove Steri-Strips they will fall off on their own after getting wet  Any nylon sutures will be removed by provider at follow up visit.   Remove lidocaine patch after 12 hours.     DIET:  Continue on soft diet until swelling/difficulty swallowing has decreased.  Then may resume regular diet.   Continue Bowel Regimen until bowel movement.   If you do not have a bowel movement in 72 hours with addition of magnesium citrate, go to the Emergency Department.     REMEMBER:   It is normal to have post-surgical neck and back pain/spasms, even with small incisions.   Ice and/or heat may be helpful.  It is normal to have coming and going nerve pain in the arms as the nerve heals.   Nerve pain may be replaced initially with numbness and tingling/ pins and needles.  Week to week, post-surgical pain should become less often and less intense.   Continue medication as prescribed.     CALL PHYSICIAN:   Signs of infection at incision site:   progressive drainage or an unusual odor  increased redness and or swelling  increased pain and or tenderness    Excessive Bleeding:  Slow general oozing that completely soaks dressing  Fresh bright red bleeding or bleeding that will not stop.   It is normal to have a couple of days of drainage, but continues beyond 5 days of surgery    Inability to go to the bathroom    FOR PRESCRIPTION REFILLS GIVE 48 HOURS NOTICE. Please do not wait until Friday after  3pm to call    Follow-up 2-3 weeks from surgery for radiographs and suture removal if needed    Dr. Bo Camacho' Office  [facility] - : [phone]  [place] Voicemail ([place]): [phone]  *Please leave Name, , & call back number

## 2025-06-04 NOTE — ANESTHESIA PROCEDURE NOTES
Airway  Date/Time: 6/4/2025 2:23 PM  Reason: elective    Airway not difficult    Staffing  Performed: ARIS   Authorized by: eNal Tillman MD    Performed by: ARIS Vazquez  Patient location during procedure: OR    Patient Condition  Indications for airway management: anesthesia and airway protection  Sedation level: deep     Final Airway Details   Preoxygenated: yes  Final airway type: endotracheal airway  Successful airway: ETT  Cuffed: yes   Successful intubation technique: direct laryngoscopy  Adjuncts used in placement: intubating stylet  Endotracheal tube insertion site: oral  Blade: Aleksandra  Blade size: #3  ETT size (mm): 7.0  Cormack-Lehane Classification: grade IIb - view of arytenoids or posterior of glottis only  Placement verified by: chest auscultation and capnometry   Measured from: lips  ETT to lips (cm): 21  Number of attempts at approach: 1

## 2025-06-04 NOTE — ANESTHESIA PREPROCEDURE EVALUATION
"Patient: Dinorah Horn \"Raquel\"    Procedure Information       Date/Time: 06/04/25 0715    Procedure: C4-C7 Anterior Cervical Spine Discectomy and Fusion (Spine Cervical) - CPT: 22552 x 2    Location: [facility] OR 07 / [facility] OR    Surgeons: Bo Camacho MD            Relevant Problems   Anesthesia (within normal limits)      Cardiac   (+) Arteriosclerosis of coronary artery   (+) Chest pain   (+) History of coronary artery bypass graft (2010)   (+) Hyperlipidemia   (+) Mixed hyperlipidemia   (+) Precordial pain   (+) Primary hypertension   (+) Prolonged QT interval   (+) Stable angina   (+) Typical angina      Pulmonary  Ex smoker quit 2009      Neuro   (+) Depression   (+) Depressive disorder      GI   (+) Gastroesophageal reflux disease without esophagitis   (+) Hiatal hernia      /Renal   (+) UTI (urinary tract infection)      Liver   (+) Alcoholic cirrhosis (Multi)   (+) Elevated liver function tests   (+) Steatosis of liver      Endocrine   (+) Type 2 diabetes mellitus without retinopathy (Multi)      Musculoskeletal   (+) Chronic pain disorder   (+) Fibromyalgia   (+) Generalized osteoarthritis   (+) Osteoarthritis   (+) Osteoarthritis of both hands   (+) Rheumatoid arthritis   (+) Spinal stenosis in cervical region      HEENT   (+) Chronic sinusitis   (+) Sinus congestion      ID   (+) Candidiasis of skin   (+) UTI (urinary tract infection)      Skin   (+) Rash and other nonspecific skin eruption                                                         Pre-Anesthesia Evaluation                                         Dinorah Horn \"Raquel\" is a 60 y.o. female who presents for the above mentioned procedure due to Spinal stenosis in cervical region [M48.02];Cervical spondylosis with myelopathy [M47.12];Spinal cord compression (Multi) [G95.20]    Medical History[1]  Surgical History[2]  Social History[3]  RX Allergies[4]  Current Medications[5]  Prior to Admission medications    Medication Sig Start " Date End Date Taking? Authorizing Provider   aspirin 81 mg EC tablet Take 1 tablet (81 mg) by mouth once daily.    Historical Provider, MD   chlorhexidine (Peridex) 0.12 % solution 15 ml swish and spit for 30 seconds night prior to surgery and morning of surgery 5/30/25   Ellen Layton PA-C   cholecalciferol (Vitamin D3) 25 MCG (1000 UT) capsule Take 1 capsule (25 mcg) by mouth once daily.    Historical Provider, MD   cyanocobalamin (Vitamin B-12) 50 mcg tablet Take 1 tablet (50 mcg) by mouth once daily.    Historical Provider, MD   ferrous sulfate, 325 mg ferrous sulfate, tablet Take 1 tablet by mouth once daily.    Historical Provider, MD   lisinopril 20 mg tablet Take 1 tablet (20 mg) by mouth once daily. 12/29/24   Yamilet Frazier DO   meclizine (Antivert) 12.5 mg tablet Take 1 tablet (12.5 mg) by mouth 3 times a day as needed for dizziness. 5/11/25   Imelda Gaines APRN-CNP   metFORMIN  mg 24 hr tablet Take one tablet twice daily 1/9/25   Yamilet Frazier DO   metoprolol tartrate (Lopressor) 25 mg tablet Take 1 tablet (25 mg) by mouth 2 times a day. 12/31/24   Yamilet Frazier DO   multivitamin tablet Take 1 tablet by mouth once daily.    Historical Provider, MD   nitroglycerin (Nitrostat) 0.3 mg SL tablet Place 1 tablet (0.3 mg) under the tongue every 5 minutes if needed for chest pain.  Patient not taking: Reported on 5/30/2025    Historical Provider, MD   omeprazole (PriLOSEC) 40 mg DR capsule Take 1 capsule (40 mg) by mouth once daily in the morning. Take before meals. Do not crush or chew. 12/29/24   Yamilet Frazier DO   rosuvastatin (Crestor) 20 mg tablet Take 1 tablet (20 mg) by mouth once daily. 11/15/24   Yamilet Frazier DO   traZODone (Desyrel) 50 mg tablet TAKE ONE-HALF TABLET BY MOUTH ONCE DAILY AT BEDTIME 6/2/25   Yamilet Frazier DO   venlafaxine (Effexor) 75 mg tablet TAKE ONE TABLET BY MOUTH DAILY IN THE MORNING 4/19/25   Yamilet Frazier, DO   venlafaxine XR  "(Effexor-XR) 150 mg 24 hr capsule TAKE ONE CAPSULE BY MOUTH ONCE DAILY. DO NOT CRUSH OR CHEW. 4/22/25   Yamilet Frazier, DO   traZODone (Desyrel) 50 mg tablet Take 0.5 tablets (25 mg) by mouth once daily at bedtime. 12/16/24 6/2/25  Yamilet Frazier DO     No medication comments found.  Visit Vitals  OB Status Hysterectomy   Smoking Status Former                             5/30/2025     2:03 PM 5/30/2025    10:09 AM 5/11/2025    10:54 AM 5/11/2025     7:40 AM 5/11/2025    12:15 AM 5/10/2025     6:13 PM 5/10/2025     6:12 PM   BP REVIEW   BP (ultimate) 107/70 98/62 119/80 102/63 120/67 -- 109/70     Recent Labs     05/11/25  0703 05/10/25  0652   WBC 6.4 4.6   HGB 12.4 11.2*   HCT 37.7 34.5*    176   MCV 98 99     Recent Labs     05/11/25  0703 05/10/25  0652   EGFR 90 76   ANIONGAP 14 11   BUN 16 17   CREATININE 0.76 0.87    139   K 3.9 4.3    105   CO2 24 27   GLUCOSE 103* 103*   CALCIUM 10.1 9.9     Recent Labs     05/30/25  1447 05/06/25  0858 05/05/25  1723 05/05/25  1557   HGBA1C 6.1*  --   --   --    PTH  --  30.0  --   --    TROPHS  --   --  6 7     Recent Labs     05/11/25  0703 05/09/25  0624 05/06/25  0858 05/05/25  1557   BILITOT 0.4 0.3   < > 0.6   PROT 8.0 7.6   < > 8.6*   ALBUMIN 4.8 4.7   < > 5.1*   ALKPHOS 96 89   < > 78   ALT 65* 57*   < > 29   AST 56* 51*   < > 23   LIPASE  --   --   --  23    < > = values in this interval not displayed.     Recent Labs     05/30/25  1447 05/05/25  1611   PROTIME 10.9 12.1   INR 1.0 1.1     Lab Results   Component Value Date    FERRITIN 481 (H) 05/07/2025    TIBC 281 05/07/2025    IRONSAT 29 05/07/2025     Lab Results   Component Value Date    STAPHMRSASCR No Staphylococcus aureus isolated 05/30/2025     Recent Labs     05/05/25  1849   AMPHETAMINE Presumptive Negative     No results for input(s): \"PHART\", \"EKQ6YEG\", \"PO2ART\", \"OBX2AWJ\", \"T4PCLJVZ\", \"BEART\", \"L4UJFAYZ\" in the last 62825 hours.      Lab Results   Component Value Date    ABO " O 05/30/2025     EKG   Encounter Date: 05/05/25   ECG 12 lead   Result Value    Ventricular Rate 77    Atrial Rate 77    RI Interval 174    QRS Duration 100    QT Interval 424    QTC Calculation(Bazett) 479    P Axis 69    R Axis 51    T Axis 67    QRS Count 13    Q Onset 222    P Onset 135    P Offset 198    T Offset 434    QTC Fredericia 460    Narrative    Sinus rhythm with occasional Premature ventricular complexes  Incomplete right bundle branch block  Borderline ECG  No previous ECGs available  See ED provider note for full interpretation and clinical correlation  Confirmed by Julieta Garcia ([id number]) on 5/9/2025 8:39:11 PM     Ejection Fractions:  LV EF   Date/Time Value Ref Range Status   06/03/2025 03:06 PM 65 %      EchocardiogramNo results found for this or any previous visit from the past 38423 days.    Stress TestingNo results found for this or any previous visit from the past 80728 days.    Cardiac CatheterizationNo results found for this or any previous visit from the past 37576 days.    Cardiac Scoring No results found for this or any previous visit from the past 55077 days.    AAA screenNo results found for this or any previous visit from the past 41451 days.    Carotid Doppler  Carotid duplex bilateral 05/09/2025    [place] [id number]  Tel [phone] and Fax [phone]      Vascular Lab Report  [facility] CAROTID ARTERY DUPLEX BILATERAL      Patient Name:      AMANDA Bird Physician: [id number] Cheryl Martin MD  Study Date:        5/9/2025            Ordering           [id number] DONTAE SHAVER  Physician:  MRN/PID:           [id number]            Technologist:      Kelsie Do RDMS, RVT  Accession#:        [id number]        Technologist 2:  Date of Birth/Age: 1965 / 59      Encounter#:        [id number]  years  Gender:            F  Admission Status:  Inpatient           Location           [facility]  Performed:      Diagnosis/ICD: Dizziness and giddiness-R42; Syncope and collapse-R55  CPT Codes:     47276 Cerebrovascular Carotid Duplex scan complete      Patient History Syncope and HTN.  Smoker:         Former.  Diabetes:       Yes. 1-5 years.      CONCLUSIONS:  Right Carotid: Findings are consistent with less than 50% stenosis of the right proximal internal carotid artery. Laminar flow seen by color Doppler. Right external carotid artery appears patent with no evidence of stenosis. No evidence of hemodynamically significant stenosis of the right common carotid artery. The right vertebral artery is patent with antegrade flow. No evidence of hemodynamically significant stenosis in the right subclavian artery.  Left Carotid: Findings are consistent with less than 50% stenosis of the left proximal internal carotid artery. Laminar flow seen by color Doppler. Left external carotid artery appears patent with no evidence of stenosis. No evidence of hemodynamically significant stenosis of the left common carotid artery. The left vertebral artery is patent with antegrade flow. No evidence of hemodynamically significant stenosis in the left subclavian artery.    Imaging & Doppler Findings:  Right Plaque Morph: The proximal right internal carotid artery demonstrates calcified plaque. The mid right internal carotid artery demonstrates heterogenous and calcified plaque. The mid right common carotid artery demonstrates homogenous plaque.  Left Plaque Morph: The proximal left internal carotid artery demonstrates calcified plaque.    Right                        Left  PSV      EDV                PSV      EDV  109 cm/s 20 cm/s   CCA P    109 cm/s 44 cm/s  89 cm/s  39 cm/s   CCA D    100 cm/s 39 cm/s  63 cm/s  22 cm/s   ICA P    83 cm/s  34 cm/s  96 cm/s  47 cm/s   ICA M    88 cm/s  43 cm/s  109 cm/s 59 cm/s   ICA D    93 cm/s  45 cm/s  88 cm/s  21 cm/s    ECA     84 cm/s  13 cm/s  37 cm/s  15 cm/s Vertebral  47 cm/s   "18 cm/s  117 cm/s         Subclavian 110 cm/s    Right Left  ICA/CCA Ratio  0.7  0.8        [id number] Cheryl Martin MD  Electronically signed by [id number] Cheryl Martin MD on 5/9/2025 at 5:53:23 PM        ** Final **    X Ray === 05/05/25 ===    XR LUMBAR SPINE COMPLETE 4+ VIEWS    - Impression -  Mild-to-moderate spondylosis.    Signed by: Reji Chang 5/6/2025 5:01 PM  Dictation workstation:   [id number]  Pulmonary Function Tests  No results found for: \"FEV1\", \"FVC\", \"STM3ZRY\", \"TLC\", \"DLCO\"   OTHER: No results found for this or any previous visit from the past 1825 days.        No results found for: \"PREGTESTUR\", \"PREGSERUM\", \"HCG\", \"HCGQUANT\"  The ASCVD Risk score (Becca BALES, et al., 2019) failed to calculate for the following reasons:    Cannot find a previous HDL lab    Cannot find a previous total cholesterol lab    Code Status: Assume Full                   Clinical information reviewed:                   NPO Detail:  No data recorded     Physical Exam    Airway  Mallampati: II     Cardiovascular    Dental    Pulmonary    Abdominal            Anesthesia Plan    History of general anesthesia?: yes  History of complications of general anesthesia?: no    ASA 3     general     intravenous induction   Anesthetic plan and risks discussed with patient.           [1]   Past Medical History:  Diagnosis Date    Alopecia     Cervical spinal cord compression     C5-7    Chronic back pain     Chronic sinusitis     Cirrhosis of liver (Multi)     former alcohol abuse    Coronary artery disease     CABG in 2010, no recent follow up with cardiology, Apt for cardiac risk assessment made with Dr. Jagdish Roque     Diabetes mellitus (Multi)     Fibromyalgia     GERD (gastroesophageal reflux disease)     under control with medication    Hiatal hernia     HLD (hyperlipidemia)     Hypertension     Insomnia     Irritable bowel syndrome     Lung nodule     CT 10/2021 calcified granulomas in the right  lower lobe.    Lymphadenopathy "     US 2021 Sonographically benign-appearing lymph node redemonstrated superior  and lateral to the left thyroid lobe    Numbness and tingling     arms, hands, feet and legs    Palpitations     occasional, did wear heart monitor 6/15/21-results in [place], seeing dr. Dubon 25    Postural dizziness with near syncope     recent ED admit 25; Carotid US, head CT and MRI all WNL, patient found to have a UTI, discharged on ATB's [id number], per patient no further issues    Prolonged QT interval     Psoriasis     Psoriatic arthritis (Multi)     follows with Rhematology    RA (rheumatoid arthritis)     Raynaud's disease     cold fingertips witth pain    Thyroid nodule     US 2021 Nonenlarged thyroid gland with scattered subcentimeter nodules. No  routine follow-up is required per ACR TI-RADS guidelines.    Trigger finger     UTI (urinary tract infection) 2025    treated with 10 day coarse of ATB's   [2]   Past Surgical History:  Procedure Laterality Date    APPENDECTOMY      BREAST SURGERY      Augmentation    CARDIAC CATHETERIZATION      COLONOSCOPY      CORONARY ARTERY BYPASS GRAFT  10/2010    FOREARM FRACTURE SURGERY Right     FOREARM HARDWARE REMOVAL Right     HYSTERECTOMY  Partial,     KNEE ARTHROSCOPY W/ LASER Left     LIVER BIOPSY     [3]   Social History  Tobacco Use    Smoking status: Former     Current packs/day: 0.00     Average packs/day: 1 pack/day for 15.0 years (15.0 ttl pk-yrs)     Types: Cigarettes     Start date: 1994     Quit date: 2009     Years since quittin.1    Smokeless tobacco: Never    Tobacco comments:     I smoked on and off through a 15 year period.   Vaping Use    Vaping status: Never Used   Substance Use Topics    Alcohol use: Yes     Comment: occassional  drink now - total  drinks so far this week    Drug use: Never   [4]   Allergies  Allergen Reactions    Cosentyx [Secukinumab] Rash   [5] No current facility-administered medications for this  encounter.     Discharged

## 2025-06-04 NOTE — OP NOTE
"C4-C7 Anterior Cervical Spine Discectomy and Fusion Operative Note     Date: 2025  OR Location: [facility] A OR    Name: Dinorah Horn \"Raquel\", : 1965, Age: 60 y.o., MRN: [id number], Sex: female    Diagnosis  Pre-op Diagnosis      * Spinal stenosis in cervical region [M48.02]     * Cervical spondylosis with myelopathy [M47.12]     * Spinal cord compression (Multi) [G95.20] Post-op Diagnosis     * Spinal stenosis in cervical region [M48.02]     * Cervical spondylosis with myelopathy [M47.12]     * Spinal cord compression (Multi) [G95.20]     Procedures  C4-5, C5-6, C6-7 anterior cervical discectomy and fusion with allograft spacer, demineralized bone matrix, and Atlantis anterior cervical instrumentation    Surgeons      * Bo Camacho - Primary    Resident/Fellow/Other Assistant:  Sonia Armenta PA-C    Staff:   Scrub Person: Paolo Orosco Circulator: Ashley  Circulator: Maricel Orosco Scrub: Anna    Anesthesia Staff: Anesthesiologist: Neal Tillman MD; Jerry Jaime MD  C-AA: ARIS Vazquez    Procedure Summary  Anesthesia: General  ASA: III  Estimated Blood Loss: 10mL  Intra-op Medications:   Administrations occurring from 1235 to 1435 on 25:   Medication Name Total Dose   ceFAZolin (Ancef) vial 1 g 2 g   dexAMETHasone (Decadron) 4 mg/mL IV Syringe 2 mL 4 mg   fentaNYL (Sublimaze) injection 50 mcg/mL 100 mcg   gentamicin (Garamycin) injection 40 mg/mL 80 mg   lactated Ringer's infusion Cannot be calculated   lidocaine (Xylocaine) injection 2 % 60 mg   lubricating eye drops ophthalmic solution 2 drop   midazolam PF (Versed) injection 1 mg/mL 2 mg   propofol (Diprivan) injection 10 mg/mL 130 mg   rocuronium (ZeMuron) 50 mg/5 mL injection 60 mg   povidone-iodine 5 % kit kit 1 Application 1 Application              Anesthesia Record               Intraprocedure I/O Totals          Intake    lactated Ringer's 1000.00 mL    Total Intake 1000 mL          Specimen: No specimens collected " "             Drains and/or Catheters: * None in log *    Tourniquet Times:         Implants:  Implants       Type Name Action Serial No.      Graft BONE, PUTTY DBX  1CC DE-MINERAL - [id number] - [id number] Implanted [id number]      10mm Lordotic spacer Implanted [id number]     Graft SPACER ACF LORDOTIC 10MM, ADVANCED, FRZ-DRY - [id number] - [id number] Implanted [id number]      8mm Lordotic spacer Implanted [id number]     Screw PLATE,  57.5MM, VISION ELITE - [id number] Implanted      Spinal Hardware SCREW, CERV 4.0 X 17 SD VA - [id number] Implanted      Spinal Hardware SCREW, CERV 4.0 X 17 ST FA - [id number] Implanted      Spinal Hardware SCREW, CERV 4.0 X 17 ST VA - [id number] Implanted                   Indications: Dinorah Hron \"Raquel\" is an 60 y.o. female who is having surgery for Spinal stenosis in cervical region [M48.02]  Cervical spondylosis with myelopathy [M47.12]  Spinal cord compression (Multi) [G95.20].     The patient was seen in the preoperative area. The risks, benefits, complications, treatment options, non-operative alternatives, expected recovery and outcomes were discussed with the patient. The possibilities of reaction to medication, pulmonary aspiration, injury to surrounding structures, bleeding, recurrent infection, the need for additional procedures, failure to diagnose a condition, and creating a complication requiring transfusion or operation were discussed with the patient. The patient concurred with the proposed plan, giving informed consent.  The site of surgery was properly noted/marked if necessary per policy. The patient has been actively warmed in preoperative area. Preoperative antibiotics have been ordered and given within 1 hours of incision. Venous thrombosis prophylaxis have been ordered including bilateral sequential compression devices    Procedure Details: Patient was taken to the operating room where a formal timeout was done according to " hospital protocol.  Patient was intubated without difficulty.  Patient was given preoperative antibiotics.  Patient was placed supine on the OR table.  Neck was gently extended.  Was prepped and draped in usual fashion.  We did a standard left-sided approach to the cervical spine.  We got an x-ray to confirm our level.  Anterior osteophytes were taken down with a Le[place]ell rongeur.  Attention was turned to C6-C7.  Retractor was placed.  Quinnesec pins were placed.  Under slight distraction we did a complete discectomy from uncus to uncus.  We took down the posterior longitudinal ligament and did foraminotomies bilaterally until nerve hook passed out the neuroforamen.  We then placed a allograft spacer packed with demineralized bone matrix.  It was placed around the implant.  There was good hemostasis.  Attention was then turned proximally to C5-6 and C4-5 where we did an identical procedure including complete discectomy, resection of the PLL, and foraminotomies.  We placed a allograft spacer to these levels.  Once the interbody fusions were completed, attention was turned to placing an Atlantis anterior cervical plate from C4-C7.  Screws were tightened with  specification.  Floseal was placed around the implant.  There is good hemostasis.  Wound was then closed in usual fashion.  Sterile dressing was applied.  Patient was placed into a cervical collar.    I was present and scrubbed for the entire procedure.  The physician assistant was present, scrubbed and participated in all portions of the procedure, as no qualified surgeon physician and/or resident surgeon was available to assist in the case.      Bo Camacho MD    Chief of Spine Surgery, [facility]  Director of Spine Service, [facility]  , Department of Orthopaedics  [facility] [employer]  [address] [place] [address]  P:  [phone]    This note was dictated with voice recognition software.  It has not been proofread for grammatical errors, typographical mistakes or other semantic inconsistencies.      Evidence of Infection: No   Complications:  None; patient tolerated the procedure well.    Disposition: PACU - hemodynamically stable.  Condition: stable                     Attending Attestation:     Bo Camacho  Phone Number: [phone]

## 2025-06-04 NOTE — ANESTHESIA POSTPROCEDURE EVALUATION
"Patient: Dinorah Horn \"Raquel\"    Procedure Summary       Date: 06/04/25 Room / Location: [facility] OR 07 / Virtual [facility] OR    Anesthesia Start: 1418 Anesthesia Stop:     Procedure: C4-C7 Anterior Cervical Spine Discectomy and Fusion (Spine Cervical) Diagnosis:       Spinal stenosis in cervical region      Cervical spondylosis with myelopathy      Spinal cord compression (Multi)      (Spinal stenosis in cervical region [M48.02])      (Cervical spondylosis with myelopathy [M47.12])      (Spinal cord compression (Multi) [G95.20])    Surgeons: Bo Camacho MD Responsible Provider: Jerry Jaime MD    Anesthesia Type: general ASA Status: 3            Anesthesia Type: general    Vitals Value Taken Time   /79 06/04/25 16:48   Temp 36.6 06/04/25 16:48   Pulse 95 06/04/25 16:48   Resp 17 06/04/25 16:48   SpO2 98 06/04/25 16:48       Anesthesia Post Evaluation    Patient location during evaluation: PACU  Patient participation: complete - patient participated  Level of consciousness: awake  Pain management: adequate  Airway patency: patent  Cardiovascular status: acceptable  Respiratory status: acceptable  Hydration status: acceptable  Postoperative Nausea and Vomiting: none        No notable events documented.    "

## 2025-06-05 ENCOUNTER — APPOINTMENT (OUTPATIENT)
Dept: RADIOLOGY | Facility: HOSPITAL | Age: 60
End: 2025-06-05
Payer: MEDICARE

## 2025-06-05 PROBLEM — G90.2: Status: ACTIVE | Noted: 2025-06-05

## 2025-06-05 LAB
ANION GAP SERPL CALC-SCNC: 13 MMOL/L (ref 10–20)
BUN SERPL-MCNC: 20 MG/DL (ref 6–23)
CALCIUM SERPL-MCNC: 8.8 MG/DL (ref 8.6–10.3)
CHLORIDE SERPL-SCNC: 103 MMOL/L (ref 98–107)
CO2 SERPL-SCNC: 24 MMOL/L (ref 21–32)
CREAT SERPL-MCNC: 0.66 MG/DL (ref 0.5–1.05)
EGFRCR SERPLBLD CKD-EPI 2021: >90 ML/MIN/1.73M*2
ERYTHROCYTE [DISTWIDTH] IN BLOOD BY AUTOMATED COUNT: 12.3 % (ref 11.5–14.5)
GLUCOSE BLD MANUAL STRIP-MCNC: 142 MG/DL (ref 74–99)
GLUCOSE BLD MANUAL STRIP-MCNC: 146 MG/DL (ref 74–99)
GLUCOSE BLD MANUAL STRIP-MCNC: 168 MG/DL (ref 74–99)
GLUCOSE SERPL-MCNC: 152 MG/DL (ref 74–99)
HCT VFR BLD AUTO: 31 % (ref 36–46)
HGB BLD-MCNC: 10 G/DL (ref 12–16)
MCH RBC QN AUTO: 31.5 PG (ref 26–34)
MCHC RBC AUTO-ENTMCNC: 32.3 G/DL (ref 32–36)
MCV RBC AUTO: 98 FL (ref 80–100)
NRBC BLD-RTO: 0 /100 WBCS (ref 0–0)
PLATELET # BLD AUTO: 185 X10*3/UL (ref 150–450)
POTASSIUM SERPL-SCNC: 4 MMOL/L (ref 3.5–5.3)
RBC # BLD AUTO: 3.17 X10*6/UL (ref 4–5.2)
SODIUM SERPL-SCNC: 136 MMOL/L (ref 136–145)
WBC # BLD AUTO: 8.3 X10*3/UL (ref 4.4–11.3)

## 2025-06-05 PROCEDURE — 82947 ASSAY GLUCOSE BLOOD QUANT: CPT

## 2025-06-05 PROCEDURE — 36415 COLL VENOUS BLD VENIPUNCTURE: CPT | Performed by: PHYSICIAN ASSISTANT

## 2025-06-05 PROCEDURE — 85027 COMPLETE CBC AUTOMATED: CPT | Performed by: PHYSICIAN ASSISTANT

## 2025-06-05 PROCEDURE — 2500000002 HC RX 250 W HCPCS SELF ADMINISTERED DRUGS (ALT 637 FOR MEDICARE OP, ALT 636 FOR OP/ED): Performed by: PHYSICIAN ASSISTANT

## 2025-06-05 PROCEDURE — 97530 THERAPEUTIC ACTIVITIES: CPT | Mod: GP

## 2025-06-05 PROCEDURE — 7100000011 HC EXTENDED STAY RECOVERY HOURLY - NURSING UNIT

## 2025-06-05 PROCEDURE — 70553 MRI BRAIN STEM W/O & W/DYE: CPT | Performed by: RADIOLOGY

## 2025-06-05 PROCEDURE — 97530 THERAPEUTIC ACTIVITIES: CPT | Mod: GO | Performed by: OCCUPATIONAL THERAPIST

## 2025-06-05 PROCEDURE — 97161 PT EVAL LOW COMPLEX 20 MIN: CPT | Mod: GP

## 2025-06-05 PROCEDURE — 70544 MR ANGIOGRAPHY HEAD W/O DYE: CPT | Performed by: RADIOLOGY

## 2025-06-05 PROCEDURE — 70547 MR ANGIOGRAPHY NECK W/O DYE: CPT | Performed by: RADIOLOGY

## 2025-06-05 PROCEDURE — 97165 OT EVAL LOW COMPLEX 30 MIN: CPT | Mod: GO | Performed by: OCCUPATIONAL THERAPIST

## 2025-06-05 PROCEDURE — 2500000004 HC RX 250 GENERAL PHARMACY W/ HCPCS (ALT 636 FOR OP/ED): Performed by: PHYSICIAN ASSISTANT

## 2025-06-05 PROCEDURE — 70553 MRI BRAIN STEM W/O & W/DYE: CPT

## 2025-06-05 PROCEDURE — 2500000004 HC RX 250 GENERAL PHARMACY W/ HCPCS (ALT 636 FOR OP/ED): Mod: JZ | Performed by: ORTHOPAEDIC SURGERY

## 2025-06-05 PROCEDURE — 99223 1ST HOSP IP/OBS HIGH 75: CPT | Performed by: PSYCHIATRY & NEUROLOGY

## 2025-06-05 PROCEDURE — 2500000001 HC RX 250 WO HCPCS SELF ADMINISTERED DRUGS (ALT 637 FOR MEDICARE OP): Performed by: PHYSICIAN ASSISTANT

## 2025-06-05 PROCEDURE — 70547 MR ANGIOGRAPHY NECK W/O DYE: CPT

## 2025-06-05 PROCEDURE — A9575 INJ GADOTERATE MEGLUMI 0.1ML: HCPCS | Performed by: ORTHOPAEDIC SURGERY

## 2025-06-05 PROCEDURE — 70544 MR ANGIOGRAPHY HEAD W/O DYE: CPT

## 2025-06-05 PROCEDURE — 2550000001 HC RX 255 CONTRASTS: Performed by: ORTHOPAEDIC SURGERY

## 2025-06-05 PROCEDURE — 80048 BASIC METABOLIC PNL TOTAL CA: CPT | Performed by: PHYSICIAN ASSISTANT

## 2025-06-05 RX ORDER — KETOROLAC TROMETHAMINE 30 MG/ML
30 INJECTION, SOLUTION INTRAMUSCULAR; INTRAVENOUS EVERY 6 HOURS SCHEDULED
Status: DISCONTINUED | OUTPATIENT
Start: 2025-06-05 | End: 2025-06-05

## 2025-06-05 RX ORDER — GADOTERATE MEGLUMINE 376.9 MG/ML
17 INJECTION INTRAVENOUS
Status: COMPLETED | OUTPATIENT
Start: 2025-06-05 | End: 2025-06-05

## 2025-06-05 RX ORDER — KETOROLAC TROMETHAMINE 30 MG/ML
30 INJECTION, SOLUTION INTRAMUSCULAR; INTRAVENOUS EVERY 6 HOURS PRN
Status: DISPENSED | OUTPATIENT
Start: 2025-06-05 | End: 2025-06-06

## 2025-06-05 RX ADMIN — VENLAFAXINE 75 MG: 75 TABLET ORAL at 20:45

## 2025-06-05 RX ADMIN — DEXAMETHASONE SODIUM PHOSPHATE 10 MG: 10 INJECTION, SOLUTION INTRAMUSCULAR; INTRAVENOUS at 06:22

## 2025-06-05 RX ADMIN — DEXAMETHASONE SODIUM PHOSPHATE 10 MG: 10 INJECTION, SOLUTION INTRAMUSCULAR; INTRAVENOUS at 13:56

## 2025-06-05 RX ADMIN — DOCUSATE SODIUM 100 MG: 100 CAPSULE, LIQUID FILLED ORAL at 20:45

## 2025-06-05 RX ADMIN — GADOTERATE MEGLUMINE 17 ML: 376.9 INJECTION INTRAVENOUS at 19:14

## 2025-06-05 RX ADMIN — ROSUVASTATIN 20 MG: 20 TABLET, FILM COATED ORAL at 20:44

## 2025-06-05 RX ADMIN — OXYCODONE HYDROCHLORIDE 5 MG: 5 TABLET ORAL at 20:45

## 2025-06-05 RX ADMIN — METHOCARBAMOL TABLETS 500 MG: 500 TABLET, COATED ORAL at 20:45

## 2025-06-05 RX ADMIN — METHOCARBAMOL TABLETS 500 MG: 500 TABLET, COATED ORAL at 06:22

## 2025-06-05 RX ADMIN — LISINOPRIL 20 MG: 20 TABLET ORAL at 09:17

## 2025-06-05 RX ADMIN — DIPHENHYDRAMINE HYDROCHLORIDE 25 MG: 25 CAPSULE ORAL at 02:02

## 2025-06-05 RX ADMIN — METFORMIN HYDROCHLORIDE 500 MG: 500 TABLET, EXTENDED RELEASE ORAL at 16:25

## 2025-06-05 RX ADMIN — OXYCODONE HYDROCHLORIDE 10 MG: 5 TABLET ORAL at 13:22

## 2025-06-05 RX ADMIN — METHOCARBAMOL TABLETS 500 MG: 500 TABLET, COATED ORAL at 16:25

## 2025-06-05 RX ADMIN — DOCUSATE SODIUM 100 MG: 100 CAPSULE, LIQUID FILLED ORAL at 09:17

## 2025-06-05 RX ADMIN — VENLAFAXINE HYDROCHLORIDE 150 MG: 75 CAPSULE, EXTENDED RELEASE ORAL at 09:17

## 2025-06-05 RX ADMIN — METOPROLOL TARTRATE 25 MG: 25 TABLET, FILM COATED ORAL at 09:17

## 2025-06-05 RX ADMIN — KETOROLAC TROMETHAMINE 30 MG: 30 INJECTION, SOLUTION INTRAMUSCULAR at 13:56

## 2025-06-05 RX ADMIN — PANTOPRAZOLE SODIUM 40 MG: 40 TABLET, DELAYED RELEASE ORAL at 06:22

## 2025-06-05 RX ADMIN — METOPROLOL TARTRATE 25 MG: 25 TABLET, FILM COATED ORAL at 20:45

## 2025-06-05 RX ADMIN — DIPHENHYDRAMINE HYDROCHLORIDE 25 MG: 25 CAPSULE ORAL at 23:17

## 2025-06-05 RX ADMIN — METHOCARBAMOL TABLETS 500 MG: 500 TABLET, COATED ORAL at 13:22

## 2025-06-05 RX ADMIN — METFORMIN HYDROCHLORIDE 500 MG: 500 TABLET, EXTENDED RELEASE ORAL at 09:17

## 2025-06-05 RX ADMIN — OXYCODONE HYDROCHLORIDE 10 MG: 5 TABLET ORAL at 09:18

## 2025-06-05 RX ADMIN — METOCLOPRAMIDE 10 MG: 5 INJECTION, SOLUTION INTRAMUSCULAR; INTRAVENOUS at 13:56

## 2025-06-05 RX ADMIN — CEFAZOLIN SODIUM 2 G: 2 SOLUTION INTRAVENOUS at 06:22

## 2025-06-05 RX ADMIN — OXYCODONE HYDROCHLORIDE 10 MG: 5 TABLET ORAL at 03:08

## 2025-06-05 ASSESSMENT — PAIN SCALES - GENERAL
PAINLEVEL_OUTOF10: 7
PAINLEVEL_OUTOF10: 6
PAINLEVEL_OUTOF10: 6
PAINLEVEL_OUTOF10: 3
PAINLEVEL_OUTOF10: 4
PAINLEVEL_OUTOF10: 0 - NO PAIN
PAINLEVEL_OUTOF10: 8
PAINLEVEL_OUTOF10: 8
PAINLEVEL_OUTOF10: 5 - MODERATE PAIN

## 2025-06-05 ASSESSMENT — COGNITIVE AND FUNCTIONAL STATUS - GENERAL
WALKING IN HOSPITAL ROOM: A LITTLE
STANDING UP FROM CHAIR USING ARMS: A LITTLE
WALKING IN HOSPITAL ROOM: A LITTLE
DRESSING REGULAR LOWER BODY CLOTHING: A LITTLE
MOBILITY SCORE: 21
DRESSING REGULAR UPPER BODY CLOTHING: A LITTLE
CLIMB 3 TO 5 STEPS WITH RAILING: A LITTLE
TURNING FROM BACK TO SIDE WHILE IN FLAT BAD: A LITTLE
TOILETING: A LITTLE
PERSONAL GROOMING: A LITTLE
MOVING TO AND FROM BED TO CHAIR: A LITTLE
DRESSING REGULAR UPPER BODY CLOTHING: A LITTLE
PERSONAL GROOMING: A LITTLE
HELP NEEDED FOR BATHING: A LITTLE
DAILY ACTIVITIY SCORE: 18
DAILY ACTIVITIY SCORE: 19
CLIMB 3 TO 5 STEPS WITH RAILING: A LITTLE
MOVING TO AND FROM BED TO CHAIR: A LITTLE
HELP NEEDED FOR BATHING: A LITTLE
MOBILITY SCORE: 18
WALKING IN HOSPITAL ROOM: A LITTLE
TOILETING: A LITTLE
DRESSING REGULAR UPPER BODY CLOTHING: A LITTLE
STANDING UP FROM CHAIR USING ARMS: A LITTLE
DRESSING REGULAR LOWER BODY CLOTHING: A LITTLE
DRESSING REGULAR LOWER BODY CLOTHING: A LITTLE
CLIMB 3 TO 5 STEPS WITH RAILING: A LITTLE
MOVING FROM LYING ON BACK TO SITTING ON SIDE OF FLAT BED WITH BEDRAILS: A LITTLE
DAILY ACTIVITIY SCORE: 20
EATING MEALS: A LITTLE
HELP NEEDED FOR BATHING: A LITTLE
TOILETING: A LITTLE
MOVING TO AND FROM BED TO CHAIR: A LITTLE
MOBILITY SCORE: 20

## 2025-06-05 ASSESSMENT — PAIN - FUNCTIONAL ASSESSMENT
PAIN_FUNCTIONAL_ASSESSMENT: 0-10

## 2025-06-05 ASSESSMENT — ACTIVITIES OF DAILY LIVING (ADL)
ADL_ASSISTANCE: INDEPENDENT
LACK_OF_TRANSPORTATION: NO

## 2025-06-05 ASSESSMENT — PAIN DESCRIPTION - LOCATION
LOCATION: NECK
LOCATION: NECK

## 2025-06-05 ASSESSMENT — PAIN DESCRIPTION - ORIENTATION
ORIENTATION: LEFT
ORIENTATION: LEFT

## 2025-06-05 ASSESSMENT — PAIN DESCRIPTION - DESCRIPTORS: DESCRIPTORS: ACHING;THROBBING

## 2025-06-05 NOTE — NURSING NOTE
Assumed patient care. Received report from Nurys PAPPAS. Pt safety maintained,  bed locked in lowest position and call light within reach. Plan of care ongoing.

## 2025-06-05 NOTE — PROGRESS NOTES
06/05/25 0744   [facility] Disability Status   Are you deaf or do you have serious difficulty hearing? N   Are you blind or do you have serious difficulty seeing, even when wearing glasses? N   Because of a physical, mental, or emotional condition, do you have serious difficulty concentrating, remembering, or making decisions? (5 years old or older) N   Do you have serious difficulty walking or climbing stairs? N   Do you have serious difficulty dressing or bathing? N   Because of a physical, mental, or emotional condition, do you have serious difficulty doing errands alone such as visiting the doctor? N

## 2025-06-05 NOTE — PROGRESS NOTES
"Occupational Therapy    Evaluation/Treatment    Patient Name: Dinorah Horn \"Raquel\"  MRN: [id number]  Department: [place] [address]  Room: [address] [place] [address]  Today's Date: 06/05/25  Time Calculation  Start Time: 1448  Stop Time: 1511  Time Calculation (min): 23 min       Assessment:  OT Assessment: Pt presents to OT this date POD#1 C4-C7 Anterior Cervical Spine Discectomy and Fusion and demos decreased balance, strength, endurance and safety awareness resulting in decreased safety & independence with ADLs/IADLs. Pt requires skilled OT services to address above deficits to safely return to PLOF.  Prognosis: Good  Barriers to Discharge Home: No anticipated barriers  Evaluation/Treatment Tolerance: Patient limited by fatigue  Medical Staff Made Aware: Yes  End of Session Communication: Bedside nurse  End of Session Patient Position: Bed, 3 rail up, Alarm on  OT Assessment Results: Decreased ADL status, Decreased upper extremity strength, Decreased safe judgment during ADL, Decreased endurance, Decreased functional mobility, Decreased IADLs, Decreased trunk control for functional activities  Prognosis: Good  Evaluation/Treatment Tolerance: Patient limited by fatigue  Medical Staff Made Aware: Yes  Strengths: Premorbid level of function, Ability to acquire knowledge  Barriers to Participation: Comorbidities  Plan:  Treatment Interventions: ADL retraining, Functional transfer training, UE strengthening/ROM, Endurance training, Patient/family training, Equipment evaluation/education, Compensatory technique education  OT Frequency: 3 times per week  OT Discharge Recommendations: Low intensity level of continued care  Equipment Recommended upon Discharge:  (TBD)  OT Recommended Transfer Status: Assist of 1  OT - OK to Discharge: Yes (OT POC established this date)  Treatment Interventions: ADL retraining, Functional transfer training, UE strengthening/ROM, Endurance training, Patient/family training, Equipment evaluation/education, Compensatory " technique education    Subjective     OT Visit Info:  OT Received On: 06/05/25  General Visit Info:   General  Reason for Referral: Pt is POD#1  C4-C7 Anterior Cervical Spine Discectomy and Fusion (Spine Cervical). Pt c/o persistent L sided headache and ptosis in the L eye.  Referred By: Kathi (LULÚ)  Past Medical History Relevant to Rehab: Past Medical History:  Diagnosis Date    Alopecia     Cervical spinal cord compression     C5-7    Chronic back pain     Chronic sinusitis     Cirrhosis of liver (Multi)     former alcohol abuse    Coronary artery disease     CABG in 2010, no recent follow up with cardiology, Apt for cardiac risk assessment made with Dr. Dubon    Depression     Diabetes mellitus (Multi)     Fibromyalgia     GERD (gastroesophageal reflux disease)     under control with medication    Hiatal hernia     HLD (hyperlipidemia)     Hypertension     Insomnia     Irritable bowel syndrome     Lung nodule     CT 10/2021 calcified granulomas in the right  lower lobe.    Lymphadenopathy     US 1/2021 Sonographically benign-appearing lymph node redemonstrated superior  and lateral to the left thyroid lobe    Numbness and tingling     arms, hands, feet and legs    Palpitations     occasional, did wear heart monitor 6/15/21-results in CE, seeing dr. Dubon 5/30/25    Postural dizziness with near syncope     recent ED admit 5/11/25; Carotid US, head CT and MRI all WNL, patient found to have a UTI, discharged on ATB's [id number], per patient no further issues    Prolonged QT interval     Psoriasis     Psoriatic arthritis (Multi)     follows with Rhematology    RA (rheumatoid arthritis)     Raynaud's disease     cold fingertips witth pain    Thyroid nodule     US 1/2021 Nonenlarged thyroid gland with scattered subcentimeter nodules. No  routine follow-up is required per ACR TI-RADS guidelines.    Trigger finger     UTI (urinary tract infection) 05/11/2025    treated with 10 day coarse of ATB's      Family/Caregiver  Present: No  Co-Treatment: PT  Co-Treatment Reason: to maximize safety and participation with skilled intervention  Prior to Session Communication: Bedside nurse  Patient Position Received: Bed, 3 rail up, Alarm off, not on at start of session  Preferred Learning Style: auditory, verbal, visual, written  General Comment: Pt supine in bed upon arrival and agreeable to OT eval/tx. Pt fully participatory. Pt awaiting MRI/MRA of brain d/t L sided headache and L eye ptosis, cleared to participate with OOB activity per surgical team.   Precautions:  Medical Precautions: Fall precautions, Spinal precautions  Post-Surgical Precautions: Spinal precautions  Braces Applied: [place] collar  Precautions Comment: pt unable to recall spinal precautions, pt educated on precautions, cues to adhere to precautions throughout session     Date/Time Vitals Session Patient Position Pulse Resp SpO2 BP MAP (mmHg)    06/05/25 1447 --  --  --  --  --  116/74  --     06/05/25 1448 --  --  --  --  --  116/74  --           Vital Signs Comment: vitals monitored throughout; BP supine in bed 114/62, seated at EOB /71, standing at EOB /74     Pain:  Pain Assessment  Pain Assessment: 0-10  0-10 (Numeric) Pain Score: 6  Pain Type: Surgical pain  Pain Location: Neck (throat and upper back)  Pain Interventions: Repositioned, Ambulation/increased activity  Response to Interventions: Resting quietly (RN notified of pain rating, pt medicated prior to session)    Objective   Cognition:  Overall Cognitive Status: Within Functional Limits  Orientation Level: Oriented X4  Attention: Within Functional Limits  Memory: Within Funtional Limits  Insight: Mild  Impulsive: Mildly           Home Living:  Type of Home: House  Lives With: Spouse  Home Adaptive Equipment: None  Home Layout: Two level, Full bath main level, Able to live on main level with bedroom/bathroom, Bed/bath upstairs, Stairs to alternate level with rails  Alternate Level Stairs-Rails:  Right  Alternate Level Stairs-Number of Steps: 14  Home Access: Stairs to enter without rails  Entrance Stairs-Rails: None  Entrance Stairs-Number of Steps: 3  Bathroom Shower/Tub: Tub/shower unit  Bathroom Toilet: Handicapped height  Bathroom Equipment: None  Prior Function:  Level of [place]: Independent with ADLs and functional transfers, Independent with homemaking with ambulation  ADL Assistance: Independent  Homemaking Assistance: Independent  Ambulatory Assistance: Independent  Hand Dominance: Right  Prior Function Comments: + driving. pt reports x2 falls in past 6 months       ADL:  Eating Assistance: Independent (anticipated)  Grooming Assistance:  (pt educated on compensatory techniques for oral hygiene task to adhere to cervical spinal precautions)  UE Dressing Assistance:  (educated pt on compensatory techniques for UB Dressing while adhering to cervical spinal precautions, pt receptive)  LE Dressing Assistance: Stand by  LE Dressing Deficit: Don/doff R sock, Don/doff L sock    Activity Tolerance:  Endurance: Tolerates 10 - 20 min exercise with multiple rests       Bed Mobility/Transfers: Bed Mobility  Bed Mobility: Yes  Bed Mobility 1  Bed Mobility 1: Supine to sitting, Sitting to supine  Level of Assistance 1: Close supervision, Minimal verbal cues  Bed Mobility Comments 1: cues for sequencing steps of log roll, HOB minimally elevated    Transfers  Transfer: Yes  Transfer 1  Technique 1: Sit to stand, Stand to sit  Transfer Device 1: Gait belt, Walker  Transfer Level of Assistance 1: Close supervision, Minimal verbal cues  Trials/Comments 1: from EOB, cues for sequencing, safe hand placement & walker safety      Functional Mobility:  Functional Mobility  Functional Mobility Performed: Yes  Functional Mobility 1  Comments 1: Pt functionally navigated x min household distance using FWW with SBA x1. Cues for walker safety and upright posture, no LOB noted.  Sitting Balance:  Static Sitting  Balance  Static Sitting-Balance Support: Bilateral upper extremity supported, Feet supported  Static Sitting-Level of Assistance: Close supervision  Static Sitting-Comment/Number of Minutes: at EOB  Dynamic Sitting Balance  Dynamic Sitting-Comments: SBA at EOB  Standing Balance:  Static Standing Balance  Static Standing-Balance Support: Bilateral upper extremity supported  Static Standing-Level of Assistance: Close supervision  Static Standing-Comment/Number of Minutes: FWW  Dynamic Standing Balance  Dynamic Standing-Comments: SBA    Therapy/Activity: Therapeutic Activity  Therapeutic Activity Performed: Yes  Therapeutic Activity 1: Pt educated on cervical spinal precautions, log roll for bed mobility, how to don/doff [place] collar, AE/DME for safe home going, safe functional transfer techniques, walker safet, compensatory techniques for ADLs (i.e AE, figure four technique, etc), etc. Pt verbalized understanding.       Vision:Vision - Basic Assessment  Current Vision: Wears glasses only for reading  Sensation:  Light Touch: No apparent deficits  Sensation Comment: pt denies numbness/tingling  Strength:  Strength Comments: B shoulders grossly 3-/5, distally 3/5 based on function, not formally assessed d/t spinal precautions     Coordination:  Movements are Fluid and Coordinated: Yes        Extremities: RUE   RUE : Exceptions to WFL (shoulder flexion observed to 90 degrees, distally WFL) and LUE   LUE: Exceptions to WFL (shoulder flexion observed to 90 degrees, distally WFL)      Outcome Measures: [facility] Daily Activity  Putting on and taking off regular lower body clothing: A little  Bathing (including washing, rinsing, drying): A little  Putting on and taking off regular upper body clothing: A little  Toileting, which includes using toilet, bedpan or urinal: A little  Taking care of personal grooming such as brushing teeth: A little  Eating Meals: A little  Daily Activity - Total Score: 18        Education  Documentation  Handouts, taught by Manuela Hoang OT at 6/5/2025  4:25 PM.  Learner: Patient  Readiness: Acceptance  Method: Explanation, Demonstration, Handout  Response: Verbalizes Understanding, Needs Reinforcement    Precautions, taught by Manuela Hoang OT at 6/5/2025  4:25 PM.  Learner: Patient  Readiness: Acceptance  Method: Explanation, Demonstration, Handout  Response: Verbalizes Understanding, Needs Reinforcement    Body Mechanics, taught by Manuela Hoang OT at 6/5/2025  4:25 PM.  Learner: Patient  Readiness: Acceptance  Method: Explanation, Demonstration, Handout  Response: Verbalizes Understanding, Needs Reinforcement    ADL Training, taught by Manuela Hoang OT at 6/5/2025  4:25 PM.  Learner: Patient  Readiness: Acceptance  Method: Explanation, Demonstration, Handout  Response: Verbalizes Understanding, Needs Reinforcement         Goals:  Encounter Problems       Encounter Problems (Active)       ADLs       Patient will perform UB and LB bathing with modified independent level of assistance and grab bars, shower chair, and long-handled sponge.       Start:  06/05/25    Expected End:  06/19/25            Patient with complete upper body dressing with modified independent level of assistance donning and doffing all UE clothes with PRN adaptive equipment.       Start:  06/05/25    Expected End:  06/19/25            Patient with complete lower body dressing with modified independent level of assistance donning and doffing all LE clothes  with PRN adaptive equipment.       Start:  06/05/25    Expected End:  06/19/25            Patient will complete daily grooming tasks with modified independent level of assistance and PRN adaptive equipment while standing.       Start:  06/05/25    Expected End:  06/19/25            Patient will complete toileting including hygiene clothing management/hygiene with modified independent level of assistance and raised toilet seat and grab bars.       Start:  06/05/25     Expected End:  06/19/25               MOBILITY       Patient will perform Functional mobility x Household distances/Community Distances with modified independent level of assistance and least restrictive device in order to improve safety and functional mobility.       Start:  06/05/25    Expected End:  06/19/25               TRANSFERS       Patient will perform bed mobility modified independent level of assistance via log roll technique in order to improve safety and independence with mobility       Start:  06/05/25    Expected End:  06/19/25            Patient will complete functional transfers with least restrictive device with modified independent level of assistance.       Start:  06/05/25    Expected End:  06/19/25

## 2025-06-05 NOTE — PROGRESS NOTES
"Dinorah Horn \"Raquel\" is a 60 y.o. female on day 0 of admission presenting with Cervical spondylosis with myelopathy.    Subjective   Patient  states that she has had a persistent left-sided headache since the time she woke up, as well as some difficulty opening her left eyelid.  She has no upper extremity symptoms.  She has 5 out of 5 in strength in the upper and lower extremities.  No numbness and tingling.  Pupils are round and reactive.  She follows and tracks with her eyes perfectly normally.       Objective     Physical Exam    Last Recorded Vitals  Blood pressure 125/79, pulse 92, temperature 35.6 °C (96.1 °F), resp. rate 16, height 1.753 m (5' 9\"), weight 83.8 kg (184 lb 11.9 oz), SpO2 93%.  Intake/Output last 3 Shifts:  I/O last 3 completed shifts:  In: 2650 (31.6 mL/kg) [P.O.:120; I.V.:2430 (29 mL/kg); IV Piggyback:100]  Out: 1000 (11.9 mL/kg) [Urine:1000 (0.3 mL/kg/hr)]  Weight: 83.8 kg     Relevant Results      Scheduled medications  Scheduled Medications[1]  Continuous medications  Continuous Medications[2]  PRN medications  PRN Medications[3]  Results for orders placed or performed during the hospital encounter of 06/04/25 (from the past 24 hours)   POCT GLUCOSE   Result Value Ref Range    POCT Glucose 100 (H) 74 - 99 mg/dL   POCT GLUCOSE   Result Value Ref Range    POCT Glucose 112 (H) 74 - 99 mg/dL   POCT GLUCOSE   Result Value Ref Range    POCT Glucose 142 (H) 74 - 99 mg/dL   CBC   Result Value Ref Range    WBC 8.3 4.4 - 11.3 x10*3/uL    nRBC 0.0 0.0 - 0.0 /100 WBCs    RBC 3.17 (L) 4.00 - 5.20 x10*6/uL    Hemoglobin 10.0 (L) 12.0 - 16.0 g/dL    Hematocrit 31.0 (L) 36.0 - 46.0 %    MCV 98 80 - 100 fL    MCH 31.5 26.0 - 34.0 pg    MCHC 32.3 32.0 - 36.0 g/dL    RDW 12.3 11.5 - 14.5 %    Platelets 185 150 - 450 x10*3/uL   Basic metabolic panel   Result Value Ref Range    Glucose 152 (H) 74 - 99 mg/dL    Sodium 136 136 - 145 mmol/L    Potassium 4.0 3.5 - 5.3 mmol/L    Chloride 103 98 - 107 mmol/L    " Bicarbonate 24 21 - 32 mmol/L    Anion Gap 13 10 - 20 mmol/L    Urea Nitrogen 20 6 - 23 mg/dL    Creatinine 0.66 0.50 - 1.05 mg/dL    eGFR >90 >60 mL/min/1.73m*2    Calcium 8.8 8.6 - 10.3 mg/dL                            Assessment & Plan  Cervical spondylosis with myelopathy    History of coronary artery bypass graft    Elevated liver function tests    Spinal stenosis in cervical region    Spinal cord compression (Multi)    Postoperative day 1 status post C4-7 ACDF  Patient is complaining of persistent headache and ptosis in the left eye.  In discussion with neurology this morning, the neurologist recommended a MRI of the brain with and without contrast.  That is ordered.  Neurology will see the patient this morning to discuss any further potential treatment options.  Patient can be mobilized with physical therapy and Occupational Therapy.              Bo Camacho MD           [1] dexAMETHasone, 10 mg, intravenous, q8h BETHANY  docusate sodium, 100 mg, oral, BID  lisinopril, 20 mg, oral, Daily  metFORMIN XR, 500 mg, oral, BID  methocarbamol, 500 mg, oral, 4x daily  metoprolol tartrate, 25 mg, oral, BID  oxygen, , inhalation, Continuous - 02/gases  pantoprazole, 40 mg, oral, Daily before breakfast  rosuvastatin, 20 mg, oral, Nightly  venlafaxine, 75 mg, oral, Nightly  venlafaxine XR, 150 mg, oral, Daily with breakfast    [2] sodium chloride 0.9%, 100 mL/hr, Last Rate: 100 mL/hr (06/05/25 0600)    [3] PRN medications: benzocaine-menthol, dextrose, dextrose, diphenhydrAMINE **OR** diphenhydrAMINE **OR** diphenhydrAMINE, glucagon, glucagon, HYDROmorphone, magnesium citrate, metoclopramide **OR** metoclopramide, naloxone, oxyCODONE, oxyCODONE

## 2025-06-05 NOTE — PROGRESS NOTES
06/05/25 0744   Discharge Planning   Living Arrangements Spouse/significant other   Support Systems Spouse/significant other;Children   Assistance Needed Independent prior to admission   Type of Residence Private residence   Number of Stairs to Enter Residence 2   Number of Stairs Within Residence 12   Do you have animals or pets at home? Yes   Type of Animals or Pets Bird and dog   Home or Post Acute Services In home services   Type of Home Care Services Home PT;Home OT   Expected Discharge Disposition Home H   Does the patient need discharge transport arranged? No   Financial Resource Strain   How hard is it for you to pay for the very basics like food, housing, medical care, and heating? Not hard   Housing Stability   In the last 12 months, was there a time when you were not able to pay the mortgage or rent on time? N   In the past 12 months, how many times have you moved where you were living? 0   At any time in the past 12 months, were you homeless or living in a shelter (including now)? N   Transportation Needs   In the past 12 months, has lack of transportation kept you from medical appointments or from getting medications? no   In the past 12 months, has lack of transportation kept you from meetings, work, or from getting things needed for daily living? No   Patient Choice   Provider Choice list and CMS website (https://medicare.gov/care-compare#search) for post-acute Quality and Resource Measure Data were provided and reviewed with: Patient   Patient / Family choosing to utilize agency / facility established prior to hospitalization No   Stroke Family Assessment   Stroke Family Assessment Needed No   Intensity of Service   Intensity of Service 0-30 min     Patient is POD 1 for a C4-C7 discectomy/fusion.  PLAN/BARRIER: MRI SALLIE way, neurology, PT  DISP: home  HHC: [facility]  DME: none  O2: none  WOUNDS: surgical  If MRI clear, then patient should discharge home today.  Bell Robison RN

## 2025-06-05 NOTE — NURSING NOTE
Interdisciplinary team present: NP, PT, NM, CC, SW, Orthopedic Coordinator, and bedside RN.  Pain - controlled  Nausea - none  Discharge barrier - needs MRI for unilateral headache. PT/OT  Discharge plan - home with [facility]  Discharge date/time -  later today pending MRI and clearance from neurology

## 2025-06-05 NOTE — CARE PLAN
The patient's goals for the shift include      The clinical goals for the shift include Patient to ambulate for shift/ pain control taken      Problem: Pain - Adult  Goal: Verbalizes/displays adequate comfort level or baseline comfort level  Outcome: Progressing     Problem: Safety - Adult  Goal: Free from fall injury  Outcome: Progressing     Problem: Discharge Planning  Goal: Discharge to home or other facility with appropriate resources  Outcome: Progressing     Problem: Chronic Conditions and Co-morbidities  Goal: Patient's chronic conditions and co-morbidity symptoms are monitored and maintained or improved  Outcome: Progressing     Problem: Nutrition  Goal: Nutrient intake appropriate for maintaining nutritional needs  Outcome: Progressing

## 2025-06-05 NOTE — CARE PLAN
The patient's goals for the shift include      The clinical goals for the shift include Patient to ambulate for shift/ pain control taken    Problem: Pain - Adult  Goal: Verbalizes/displays adequate comfort level or baseline comfort level  Outcome: Progressing     Problem: Discharge Planning  Goal: Discharge to home or other facility with appropriate resources  Outcome: Progressing     Problem: Chronic Conditions and Co-morbidities  Goal: Patient's chronic conditions and co-morbidity symptoms are monitored and maintained or improved  Outcome: Progressing     Problem: Safety - Adult  Goal: Free from fall injury  Outcome: Progressing     Problem: Nutrition  Goal: Nutrient intake appropriate for maintaining nutritional needs  Outcome: Progressing

## 2025-06-05 NOTE — PROGRESS NOTES
"Physical Therapy    Physical Therapy Evaluation & Treatment    Patient Name: Dinorah Horn \"Raquel\"  MRN: [id number]  Today's Date: 6/5/2025   Time Calculation  Start Time: 1447  Stop Time: 1510  Time Calculation (min): 23 min  727/727-A    Assessment/Plan   PT Assessment  PT Assessment Results: Decreased endurance, Impaired balance, Decreased mobility, Decreased safety awareness, Orthopedic restrictions  Rehab Prognosis: Good  End of Session Communication: Bedside nurse  Assessment Comment: Pt would benefit from continued low intensity PT to improve independence with functional mobility.  PT will continue to follow pt during this hospital stay.  End of Session Patient Position: Bed, 3 rail up, Alarm on  IP OR SWING BED PT PLAN  Inpatient or Swing Bed: Inpatient  PT Plan  PT Plan: Ongoing PT  PT Frequency: Daily  PT Discharge Recommendations: Low intensity level of continued care  Equipment Recommended upon Discharge: Wheeled walker  PT Recommended Transfer Status: Assist x1, Assistive device  PT - OK to Discharge: Yes (OK to discharge from acute PT services to the next level of care when cleared by the medical team.)    Current Problem:  Problem List[1]    Subjective     General Visit Information:  General  Reason for Referral: Difficulty walking s/p C4-5, C5-6, C6-7 anterior cervical discectomy and fusion.  Referred By: Dr. Camacho  Co-Treatment: OT  Co-Treatment Reason: Co-treatment to maximize functional independence and for safety.  Prior to Session Communication: Bedside nurse  Patient Position Received: Bed, 3 rail up, Alarm off, not on at start of session    Home Living:  Home Living  Home Adaptive Equipment: None  Home Living Comments: Pt reports she resides in a house with her spouse, 3 stairs to enter with grab bar, first floor full bathroom, 14 stairs up with handrail to second floor bedroom and full bathroom.  Pt is able to stay on the first floor if needed.    Prior Level of Function:  Prior Function Per " Pt/Caregiver Report  Prior Function Comments: Pt reports she was independent with gait, ADLs and IADLs.  Pt reports 2 falls in the past 6 months.    Precautions:  Precautions  Medical Precautions: Fall precautions  Post-Surgical Precautions: Spinal precautions  Braces Applied: [place] J collar    Vital Signs:  Vital Signs  BP: 116/74 (standing)  Objective     Pain:  Pain Assessment  Pain Assessment: 0-10  0-10 (Numeric) Pain Score:  (5-6)  Pain Type: Surgical pain  Pain Location: Neck (throat)    Cognition:       General Assessments:                                Functional Assessments:     Bed Mobility  Bed Mobility:  (supine<>sit close supervision with cues for log roll.)  Transfers  Transfer:  (sit<>stand close supervision with cues for safe hand placement with walker.)  Ambulation/Gait Training  Ambulation/Gait Training Performed:  (15 feet with front wheeled walker, close supervision, no loss of balance, cues to keep walker close during approach to bed to sit.)          Extremity/Trunk Assessments:        RLE   RLE : Within Functional Limits  LLE   LLE : Within Functional Limits    Treatments:  Pt issued cervical spine handout.  Pt educated on spine precautions and required cues to maintain spine precautions during mobility and while supine in bed as pt kept lifting her head off the pillow and trying to look around despite [place] J collar in place.            Bed Mobility  Bed Mobility:  (supine<>sit close supervision with cues for log roll.)  Ambulation/Gait Training  Ambulation/Gait Training Performed:  (15 feet with front wheeled walker, close supervision, no loss of balance, cues to keep walker close during approach to bed to sit.)  Transfers  Transfer:  (sit<>stand close supervision with cues for safe hand placement with walker.)       Outcome Measures:  [facility] Basic Mobility  Turning from your back to your side while in a flat bed without using bedrails: A little  Moving from lying on your back to sitting on  the side of a flat bed without using bedrails: A little  Moving to and from bed to chair (including a wheelchair): A little  Standing up from a chair using your arms (e.g. wheelchair or bedside chair): A little  To walk in hospital room: A little  Climbing 3-5 steps with railing: A little  Basic Mobility - Total Score: 18                            Goals:  Encounter Problems       Encounter Problems (Active)       PT Problem       PT Goal 1 (Progressing)       Start:  06/05/25    Expected End:  06/19/25       Pt able to perform bed mobility modified independent.           PT Goal 2 (Progressing)       Start:  06/05/25    Expected End:  06/19/25       Pt able to complete all transfers modified independent.            PT Goal 3 (Progressing)       Start:  06/05/25    Expected End:  06/19/25       Pt able to ambulate 200 feet with front wheeled walker modified independent.           PT Goal 4 (Progressing)       Start:  06/05/25    Expected End:  06/19/25       Pt able to ascend/descend 14 stairs with LRAD modified independent to allow pt to access her second floor bedroom.                Education Documentation  No documentation found.  Education Comments  No comments found.             [1]   Patient Active Problem List  Diagnosis    Gastroesophageal reflux disease without esophagitis    Insomnia    Alcoholic cirrhosis (Multi)    Type 2 diabetes mellitus without retinopathy (Multi)    Abnormal cardiovascular stress test    Abnormal findings in cerebrospinal fluid, abnormal level of enzymes    Alcohol intake above recommended sensible limits    Alopecia areata    Arthralgia of wrist    Multiple joint pain    Arteriosclerosis of coronary artery    Breast implant status    Chest pain    Candidiasis of skin    Capsular contracture of breast implant    Chronic pain disorder    Chronic sinusitis    Fibromyalgia    Closed displaced fracture of base of fourth metacarpal bone of left hand with routine healing    Depressive  disorder    Epistaxis    Generalized osteoarthritis    Heart disease    Hiatal hernia    History of cardiac catheterization    Lung nodule    Bilateral hand pain    Mixed hyperlipidemia    Hyperlipidemia    History of coronary artery bypass graft    Lymphadenopathy    Memory loss    Menopausal and female climacteric states    Neck mass    Osteoarthritis    Pain in unspecified knee    Pain of left lower extremity    Palpitations    Paresthesia of bilateral legs    Pityriasis rosea    Precordial pain    Rheumatoid arthritis    Polyarthritis, inflammatory (Multi)    Prediabetes    Primary hypertension    Prolonged QT interval    Psoriasis    Psoriatic arthritis (Multi)    Ptosis of breast    Radial styloid tenosynovitis    Shortness of breath    Raynaud's phenomenon    Raynaud's disease    Rash and other nonspecific skin eruption    Trigger finger    Tobacco dependence syndrome    Thyroid nodule    Steatosis of liver    Typical angina    Stable angina    Sinus congestion    Unintentional weight loss    Urinary incontinence    Need for influenza vaccination    Encounter for screening mammogram for malignant neoplasm of breast    Vasomotor instability    Depression    Arthritis of carpometacarpal (CMC) joint of left thumb    Bilateral wrist pain    Stenosing tenosynovitis of finger of left hand    Osteoarthritis of both hands    Trigger middle finger    Interscapular pain    Long-term use of Plaquenil    Vitreous syneresis of right eye    Age-related nuclear cataract of both eyes    Dry eye syndrome of lacrimal gland, bilateral    Hyperopia with presbyopia of both eyes    Abdominal pain    Nausea and vomiting    Postural dizziness with near syncope    Acute thoracic back pain    UTI (urinary tract infection)    Spinal stenosis in cervical region    Cervical spondylosis with myelopathy    Spinal cord compression (Multi)    Elevated liver function tests    Acquired left-sided Manuel syndrome

## 2025-06-05 NOTE — CONSULTS
Consults    History Of Present Illness  Ms. Horn  is a 60 y.o. RH woman with PMHx of DM, CAD s/p CABG, GERD, IBS, cervical DJD, Fibromyalgia, PsO, fatty liver disease suspected cirrhosis, R knee torn ligament, who is ostoperative day 1 status post C4-7 ACDF. Patient is complaining of persistent headache and ptosis in the left eye started sometime yesterday but more noticeable when she looked at the mirror this morning.   She reports headache in the left side of her head and left side of her neck, top of her palate, no pain behind her eye or with moving her eye, no double vision, no recent infection, no similar symptoms in the past, no weakness, tingling or numbness, no speech difficulty, no imbalance.  Past Medical History  Medical History[1]  Surgical History  Surgical History[2]  Social History  Social History[3]  Allergies  Cosentyx [secukinumab]  Prescriptions Prior to Admission[4]    Review of Systems  Neurological Exam  Physical Exam  Mental Status Examination:  General appearance: Well-groomed, good eye contact, cooperative  Orientation: Alert and oriented to person, place, and time  Motor: no psychomotor agitation or retardation  Speech: regular rate, rhythm, tone, and volume    NEUROLOGICAL EXAMINATION    Opthalmoscopic:   Normal.  Fundi were well visualized with normal disc margins, clear vessels, and vascular pulsations, No disc edema.  The cup/disk ratio was not enlarged.  No hemorrhages or exudates were present in the posterior segments that were visualized.    Cranial nerves:  CN II: visual fields full to confrontation  CN III, IV, VI: Pupils round, asymmetric, L pupil 3 mm, R pupil 5 mm, reactive to light and accommodation.  Lids asymmetric with left sided partial ptosis.  Extra-occular muscles are intact with normal alignment.  No nystagmus  CN V: Facial sensation intact bilaterally.    CN VII: Normal and symmetric facial strength.  Nasolabial folds symmetric  CN VIII: Hearing intact to finger  "rub  CN IX: Palate elevates symmetrically.  CN XI: Normal shoulder shrug and neck turning  CN XII: Tongue midline, with normal bulk and strength, no fasciculations        Motor:  Muscle bulk was normal in all extremities.  5/5 strength in all extremities  Normal finger taps and hand opening  Normal tone  Bilateral ankle clonus    Reflexes:   Right UE     LEFT UE  BR: 2          BR: 2  Biceps: 2    Biceps: 2  Triceps: 2   Triceps: 2    RIGHT LE     LEFT LE  Knee: 3        Knee: 3  Ankle: 3       Ankle: 3    Negative Richi's reflex  No frontal release signs    Coordination:  Normal finger to nose testing and rapid alternating movements    Gait:  Deferred for patient's safety     Sensory:  Normal to light touch bilaterally   Last Recorded Vitals  Blood pressure 146/82, pulse 94, temperature 36.4 °C (97.5 °F), temperature source Temporal, resp. rate 16, height 1.753 m (5' 9\"), weight 83.8 kg (184 lb 11.9 oz), SpO2 94%.    Relevant Results  Results for orders placed or performed during the hospital encounter of 06/04/25 (from the past 24 hours)   POCT GLUCOSE   Result Value Ref Range    POCT Glucose 100 (H) 74 - 99 mg/dL   POCT GLUCOSE   Result Value Ref Range    POCT Glucose 112 (H) 74 - 99 mg/dL   POCT GLUCOSE   Result Value Ref Range    POCT Glucose 142 (H) 74 - 99 mg/dL   CBC   Result Value Ref Range    WBC 8.3 4.4 - 11.3 x10*3/uL    nRBC 0.0 0.0 - 0.0 /100 WBCs    RBC 3.17 (L) 4.00 - 5.20 x10*6/uL    Hemoglobin 10.0 (L) 12.0 - 16.0 g/dL    Hematocrit 31.0 (L) 36.0 - 46.0 %    MCV 98 80 - 100 fL    MCH 31.5 26.0 - 34.0 pg    MCHC 32.3 32.0 - 36.0 g/dL    RDW 12.3 11.5 - 14.5 %    Platelets 185 150 - 450 x10*3/uL   Basic metabolic panel   Result Value Ref Range    Glucose 152 (H) 74 - 99 mg/dL    Sodium 136 136 - 145 mmol/L    Potassium 4.0 3.5 - 5.3 mmol/L    Chloride 103 98 - 107 mmol/L    Bicarbonate 24 21 - 32 mmol/L    Anion Gap 13 10 - 20 mmol/L    Urea Nitrogen 20 6 - 23 mg/dL    Creatinine 0.66 0.50 - 1.05 " mg/dL    eGFR >90 >60 mL/min/1.73m*2    Calcium 8.8 8.6 - 10.3 mg/dL   POCT GLUCOSE   Result Value Ref Range    POCT Glucose 168 (H) 74 - 99 mg/dL         I have personally reviewed the following imaging results:   Imaging  Nuclear Stress Test  Result Date: 6/3/2025  1. Negative myocardial perfusion study without evidence of inducible myocardial ischemia or prior infarction. 2. The left ventricle is normal in size. 3. Normal LV wall motion with a post-stress LV EF estimated at 64%.   I personally reviewed the images/study and I agree with the findings as stated. This study was interpreted at [place], [place]. Fellow: Edy Sprague MD   MACRO: None   Signed by: Nadege Dumont 6/3/2025 4:26 PM Dictation workstation:   [id number]      Cardiology, Vascular, and Other Imaging  Transthoracic Echo (TTE) Complete  Result Date: 6/3/2025   [facility], [address] [place] [address]                Tel [phone] and Fax [phone] TRANSTHORACIC ECHOCARDIOGRAM REPORT  Patient Name:       AMANDA JAY       Reading Physician:    [id number] Jay Marsh MD Study Date:         6/3/2025            Ordering Provider:    [id number] OLIVER MRN/PID:            [id number]            Fellow: Accession#:         [id number]        Nurse:                Nisa Hernandez RN Date of Birth/Age:  1965 / 60      Sonographer:          Meenu Deleon RDCS                     years Gender assigned at  F                   Additional Staff: Birth: Height:             175.26 cm           Admit Date: Weight:             82.55 kg            Admission Status:     Outpatient BSA / BMI:          1.98 m2 / 26.88     Encounter#:           [id number]                     kg/m2 Blood Pressure:      107/70 mmHg         Department Location:  [facility]                                                               Non Invasive Study Type:    TRANSTHORACIC ECHO (TTE) COMPLETE Diagnosis/ICD: Atherosclerotic heart disease of native coronary artery without                angina pectoris-I25.10; Other forms of dyspnea-R06.09 Indication:    CAD; Dyspnea on exertion CPT Code:      Echo Complete w Full Doppler-80774 Patient History: Pertinent History: Dyspnea, Chest Pain, Hyperlipidemia and HTN. CAD s/p CABG,                    DMII. Study Detail: The following Echo studies were performed: 2D, M-Mode, Doppler and               color flow. Technically challenging study due to poor acoustic               windows, body habitus and prominent lung artifact. Agitated saline               used as a contrast agent for intraseptal flow evaluation and               Definity used as a contrast agent for endocardial border               definition. Total contrast used for this procedure was 6.0 mL via               IV push.  PHYSICIAN INTERPRETATION: Left Ventricle: Left ventricular ejection fraction is normal by visual estimate at 65%. There are no regional left ventricular wall motion abnormalities. The left ventricular cavity size is normal. There is normal septal and normal posterior left ventricular wall thickness. Left ventricular diastolic filling is indeterminate. Left Atrium: The left atrial size is normal. There is no evidence of a patent foramen ovale. A bubble study using agitated saline was performed. Bubble study is negative. Right Ventricle: The right ventricle is normal in size. There is low normal right ventricular systolic function. Right Atrium: The right atrium is normal in size. Aortic Valve: There is trace aortic valve regurgitation. Mitral Valve: The mitral valve is normal in structure. There is trace mitral valve regurgitation. The E Vmax is 0.49 m/s. Tricuspid Valve: The tricuspid valve is structurally  normal. There is trace to mild tricuspid regurgitation. Pulmonic Valve: The pulmonic valve is not well visualized. There is trace pulmonic valve regurgitation. Pericardium: There is no pericardial effusion noted. Aorta: The aortic root is normal. There is no dilatation of the aortic arch. The aortic root is at the upper limits of normal size. Pulmonary Artery: The tricuspid regurgitant velocity is 2.19 m/s, and with an estimated right atrial pressure of 3, the estimated pulmonary artery pressure is normal with the RVSP at 22 mmHg. Systemic Veins: The inferior vena cava appears normal in size, with IVC inspiratory collapse greater than 50%.  CONCLUSIONS:  1. Left ventricular ejection fraction is normal by visual estimate at 65%.  2. There is low normal right ventricular systolic function.  3. There is no evidence of a patent foramen ovale. QUANTITATIVE DATA SUMMARY:  2D MEASUREMENTS:         Normal Ranges: Ao Root d:       3.70 cm (2.0-3.7cm) LAs:             3.80 cm (2.7-4.0cm) RVIDd:           2.38 cm (0.9-3.6cm) IVSd:            0.73 cm (0.6-1.1cm) LVPWd:           0.73 cm (0.6-1.1cm) LVIDd:           4.95 cm (3.9-5.9cm) LVIDs:           3.23 cm LV Mass Index:   60 g/m2 LV % FS          34.8 %  LEFT ATRIUM:                  Normal Ranges: LA Vol A4C:        27.4 ml    (22+/-6mL/m2) LA Vol A2C:        53.4 ml LA Vol BP:         38.6 ml LA Vol Index A4C:  13.8ml/m2 LA Vol Index A2C:  26.9 ml/m2 LA Vol Index BP:   19.5 ml/m2 LA Area A4C:       12.7 cm2 LA Area A2C:       17.9 cm2 LA Major Axis A4C: 5.0 cm LA Major Axis A2C: 5.1 cm  RIGHT ATRIUM:          Normal Ranges: RA Area A4C:  10.5 cm2  AORTA MEASUREMENTS:         Normal Ranges: Ao Arch:            2.80 cm (2.0-3.6cm)  LV SYSTOLIC FUNCTION:                      Normal Ranges: EF-Visual:      65 % LV EF Reported: 65 %  LV DIASTOLIC FUNCTION:             Normal Ranges: MV Peak E:             0.49 m/s    (0.7-1.2 m/s) MV Peak A:             0.70 m/s    (0.42-0.7  m/s) E/A Ratio:             0.70        (1.0-2.2) MV e'                  0.060 m/s   (>8.0) MV lateral e'          0.07 m/s MV medial e'           0.05 m/s MV A Dur:              119.95 msec E/e' Ratio:            8.21        (<8.0) MV DT:                 266 msec    (150-240 msec)  MITRAL VALVE:          Normal Ranges: MV DT:        266 msec (150-240msec)  AORTIC VALVE:           Normal Ranges: AoV Vmax:      1.38 m/s (<=1.7m/s) AoV Peak P.6 mmHg (<20mmHg) LVOT Max Nba:  1.09 m/s (<=1.1m/s) LVOT VTI:      20.61 cm LVOT Diameter: 2.24 cm  (1.8-2.4cm) AoV Area,Vmax: 3.13 cm2 (2.5-4.5cm2)  RIGHT VENTRICLE: RV Basal 3.00 cm RV Mid   2.40 cm RV Major 5.5 cm TAPSE:   17.0 mm RV s'    0.07 m/s  TRICUSPID VALVE/RVSP:          Normal Ranges: Peak TR Velocity:     2.19 m/s Est. RA Pressure:     3 RV Syst Pressure:     22       (< 30mmHg) IVC Diam:             1.70 cm  PULMONIC VALVE:          Normal Ranges: PV Accel Time:  159 msec (>120ms) PV Max Nba:     0.6 m/s  (0.6-0.9m/s) PV Max P.4 mmHg  [id number] Jay Marsh MD Electronically signed on 6/3/2025 at 6:11:40 PM  ** Final **     Cardiology Interpretation Of Nuclear Stress - See Other Report For Nuclear Portion  Result Date: 6/3/2025   [facility], [address] [place] [address]                Tel [phone] and Fax [phone] Nuclear Pharmacologic Stress Test Patient Name:     AMANDA THOMPSON       Ordering Provider:    [id number] STUART VUONGU Study Date:       6/3/2025            Reading Physician:    [id number] Marsh MD MRN/PID:          [id number]            Supervising           [id number] Marsh                                       Physician:            MD Accession#:       [id number]        Fellow: Date of           1965      Fellow: Birth/Age:        years Gender:           F                   Nurse:                Nisa Hernandez                                                              RN Admit Date:                           Technician:           Geraldo Caban CVT Admission Status: Outpatient          Sonographer:          N/A Height:           175.26 cm           Technologist: Weight:           83.01 kg            Additional Staff: BSA:              1.99 m2             Encounter#:           [id number] BMI:              27.02 kg/m2         Patient Location:     [facility] Type:    CARDIOLOGY INTERPRETATION OF NUCLEAR STRESS Diagnosis/ICD: Atherosclerotic heart disease-I25.10; Encounter for preprocedural                cardiovascular examination-Z01.810; Angina pectoris,                unspecified-I20.9; Shortness of breath-R06.02; Atherosclerosis of                coronary artery bypass graft(s) without angina pectoris-I25.810 Indication:    Dyspnea, Pre-Op Evaluation and CAD, angina CPT Code:      Stress Test Interpretation-24649; Stress Test Supervision-16022 Falls Risk: Low: Patient has a low risk for sustaining a fall; enviromental safety interventions in place.  Study Details: Correct procedure and correct patient verified verbally and with                ID Band checked.  Patient History: Coronary artery disease, hyperlipidemia, dyspnea, hypertension and chest pain. 61yo female here today for eval for CAD, pre-op eval for spine discectomy and fusion, DMII, HLD, angina and dyspnea; PMH also includes CABG (2010), prolonged tachyarrhythmia (2021), GERD, alcoholic cirrhosis, OA and Raynaud's disease. Allergies: Cosentyx. Smoker:    Former. Diabetes:  Yes.  Medications: The patient's prescribed medication is ASA, vitD, vitB12, ferrous sulfate, lisinopril, metformin, metoprolol tartrate, MV, omeprazole, rosuvastatin, trazodone, venlafaxine, HCTZ. The patient took medications as prescribed.  Patient Performance: Patient received a total of 0.4 mg of Regadenoson at 10:02:43 AM. Patient received a total of 34.3 mCi of Myoview at 10:03:15 AM. The patient did  not exercise during infusion. The peak heart rate achieved was 101 bpm, which was 64 % of the age predicted target heart rate of 159 bpm. The resting blood pressure was 100/69 mmHg with a heart rate of 72 bpm. The patient developed symptoms typical of Lexiscan during the stress exam. The symptoms resolved with rest. The blood pressure response was normal. The test was terminated due to: completed lab protocol. Patient has met the discharge criteria and is discharged to radiology.  Baseline ECG: Resting ECG showed normal sinus rhythm with rare premature ventricular contractions and incomplete right bundle branch block.  Stress ECG: Stress ECG showed sinus tachycardia. No ST changes.  Stress Stage Data: +---+------+-------+-----------------------------------------------------------+ HR Sys BPDias BPComments                                                    +---+------+-------+-----------------------------------------------------------+ 72 100   69                                                                 +---+------+-------+-----------------------------------------------------------+ 74              1 min s/p 0.4mg Lexiscan given IVP; pt complains of                         symptoms typical of Lexiscan                                +---+------+-------+-----------------------------------------------------------+ 101             2 min s/p Lexiscan; pt complains of symptoms typical of                     Lexiscan                                                    +---+------+-------+-----------------------------------------------------------+  Recovery ECG: Recovery ECG showed normal sinus rhythm.  +------------+--+------+-------+-----------------------------+             HRSys BPDias BPComments                      +------------+--+------+-------+-----------------------------+ Recovery I  [id number]   62     1 minute; symptoms resolving   +------------+--+------+-------+-----------------------------+ Recovery II [id number]   67     2 minutes; symptoms resolving +------------+--+------+-------+-----------------------------+ Recovery [id number]    65     4 minutes; symptoms resolving +------------+--+------+-------+-----------------------------+ Recovery IV [id number]   67     6 minutes; symptoms resolved  +------------+--+------+-------+-----------------------------+  Summary:  1. No clinical or electrocardiographic evidence for ischemia at a maximal infusion.  2. Nuclear image results are reported separately. [id number] Jay Marsh MD Electronically signed on 6/3/2025 at 12:20:30 PM   ** Final **     Assessment & Plan  Acquired left-sided Manuel syndrome  Cervical spondylosis with myelopathy  Spinal stenosis in cervical region    Ms. Horn  is a 60 y.o. RH woman with PMHx of DM, CAD s/p CABG, GERD, IBS, cervical DJD, Fibromyalgia, PsO, fatty liver disease suspected cirrhosis, R knee torn ligament, who is ostoperative day 1 status post C4-7 ACDF.  Patient is complaining of persistent headache and ptosis in the left eye started sometime yesterday but more noticeable when she looked at the mirror this morning. Neurological examination is notable for left sided Manuel's syndrome, no other focal neurological deficits. Differential diagnosis include CNIII palsy due to vascular causes including DM, PCOM aneurysm, ICA dissection especially with reported left jaw and neck pain, less likely TAC, migraine with cranial autonomic involvement, or Cory Hunt syndrome. I recommended to follow Dr. Camacho postop directions, continue using cervical collar.    Recommendations:  MRI w/wo contrast  MRA H/N w/wo contrast  Continue use of cervical collar  Management with headache as follows:    Step 1 (do all of the following):  - Ketorolac 30mg IVP or 30-60mg IM  - Metoclopramide 10mg IVP over 2min OR ondansetron 8mg IVP  - Diphenhydramine 25-50mg IVP  - IVF     Step 2  (if Step 1 fails, do all of the following):  - Dexamethasone 4-8mg IVP  - Valproate sodium 500-1000mg over 20min  - Magnesium sulfate 1g IV over 1h     Step 3   - Prochlorperazine 10mg IVP over 30s q2-4h PRN  - Metoclopramide 10mg IVP over 2min  - Ondansetron 4-8mg IVP over 30s     I spent 62 minutes in the professional and overall care of this patient.      Jayme Gutiérrez MD         [1]   Past Medical History:  Diagnosis Date    Alopecia     Cervical spinal cord compression     C5-7    Chronic back pain     Chronic sinusitis     Cirrhosis of liver (Multi)     former alcohol abuse    Coronary artery disease     CABG in 2010, no recent follow up with cardiology, Apt for cardiac risk assessment made with Dr. Dubon    Depression     Diabetes mellitus (Multi)     Fibromyalgia     GERD (gastroesophageal reflux disease)     under control with medication    Hiatal hernia     HLD (hyperlipidemia)     Hypertension     Insomnia     Irritable bowel syndrome     Lung nodule     CT 10/2021 calcified granulomas in the right  lower lobe.    Lymphadenopathy     US 1/2021 Sonographically benign-appearing lymph node redemonstrated superior  and lateral to the left thyroid lobe    Numbness and tingling     arms, hands, feet and legs    Palpitations     occasional, did wear heart monitor 6/15/21-results in [facility], seeing dr. Dubon 5/30/25    Postural dizziness with near syncope     recent ED admit 5/11/25; Carotid US, head CT and MRI all WNL, patient found to have a UTI, discharged on ATB's [id number], per patient no further issues    Prolonged QT interval     Psoriasis     Psoriatic arthritis (Multi)     follows with Rhematology    RA (rheumatoid arthritis)     Raynaud's disease     cold fingertips witth pain    Thyroid nodule     US 1/2021 Nonenlarged thyroid gland with scattered subcentimeter nodules. No  routine follow-up is required per ACR TI-RADS guidelines.    Trigger finger     UTI (urinary tract infection) 05/11/2025     treated with 10 day coarse of ATB's   [2]   Past Surgical History:  Procedure Laterality Date    APPENDECTOMY      BREAST SURGERY  2003    Augmentation    CARDIAC CATHETERIZATION      COLONOSCOPY      CORONARY ARTERY BYPASS GRAFT  10/2010    FOREARM FRACTURE SURGERY Right 1992    FOREARM HARDWARE REMOVAL Right     HYSTERECTOMY  Partial,     KNEE ARTHROSCOPY W/ LASER Left     LIVER BIOPSY     [3]   Social History  Tobacco Use    Smoking status: Former     Current packs/day: 0.00     Average packs/day: 1 pack/day for 15.0 years (15.0 ttl pk-yrs)     Types: Cigarettes     Start date: 1994     Quit date: 2009     Years since quittin.1    Smokeless tobacco: Never    Tobacco comments:     I smoked on and off through a 15 year period.   Vaping Use    Vaping status: Never Used   Substance Use Topics    Alcohol use: Not Currently     Comment: occassional  drink now - total  drinks so far this week    Drug use: Never   [4]   Facility-Administered Medications Prior to Admission   Medication Dose Route Frequency Provider Last Rate Last Admin    [COMPLETED] perflutren lipid microspheres (Definity) injection 10 mL of dilution  10 mL of dilution intravenous Once in imaging Tawny Fay MD   6 mL of dilution at 25 1512     Medications Prior to Admission   Medication Sig Dispense Refill Last Dose/Taking    aspirin 81 mg EC tablet Take 1 tablet (81 mg) by mouth once daily.   2025    cholecalciferol (Vitamin D3) 25 MCG (1000 UT) capsule Take 1 capsule (25 mcg) by mouth once daily.   Past Week    cyanocobalamin (Vitamin B-12) 50 mcg tablet Take 1 tablet (50 mcg) by mouth once daily.   Past Week    ferrous sulfate, 325 mg ferrous sulfate, tablet Take 1 tablet by mouth once daily.   Past Week    lisinopril 20 mg tablet Take 1 tablet (20 mg) by mouth once daily. 90 tablet 1 6/3/2025    metFORMIN  mg 24 hr tablet Take one tablet twice daily 180 tablet 1 6/3/2025    metoprolol tartrate  (Lopressor) 25 mg tablet Take 1 tablet (25 mg) by mouth 2 times a day. 180 tablet 1 6/4/2025    multivitamin tablet Take 1 tablet by mouth once daily.   Past Week    omeprazole (PriLOSEC) 40 mg DR capsule Take 1 capsule (40 mg) by mouth once daily in the morning. Take before meals. Do not crush or chew. 90 capsule 3 6/4/2025    rosuvastatin (Crestor) 20 mg tablet Take 1 tablet (20 mg) by mouth once daily. 90 tablet 1 6/3/2025    traZODone (Desyrel) 50 mg tablet TAKE ONE-HALF TABLET BY MOUTH ONCE DAILY AT BEDTIME 90 tablet 0 6/3/2025    venlafaxine (Effexor) 75 mg tablet TAKE ONE TABLET BY MOUTH DAILY IN THE MORNING 90 tablet 0 6/3/2025    venlafaxine XR (Effexor-XR) 150 mg 24 hr capsule TAKE ONE CAPSULE BY MOUTH ONCE DAILY. DO NOT CRUSH OR CHEW. 90 capsule 0 6/4/2025    [DISCONTINUED] chlorhexidine (Peridex) 0.12 % solution 15 ml swish and spit for 30 seconds night prior to surgery and morning of surgery 473 mL 0 6/4/2025    meclizine (Antivert) 12.5 mg tablet Take 1 tablet (12.5 mg) by mouth 3 times a day as needed for dizziness. (Patient not taking: Reported on 6/4/2025) 30 tablet 0 Not Taking    nitroglycerin (Nitrostat) 0.3 mg SL tablet Place 1 tablet (0.3 mg) under the tongue every 5 minutes if needed for chest pain. (Patient not taking: Reported on 6/4/2025)   Not Taking

## 2025-06-06 ENCOUNTER — DOCUMENTATION (OUTPATIENT)
Dept: HOME HEALTH SERVICES | Facility: HOME HEALTH | Age: 60
End: 2025-06-06
Payer: MEDICARE

## 2025-06-06 ENCOUNTER — HOME HEALTH ADMISSION (OUTPATIENT)
Dept: HOME HEALTH SERVICES | Facility: HOME HEALTH | Age: 60
End: 2025-06-06
Payer: MEDICARE

## 2025-06-06 ENCOUNTER — TELEPHONE (OUTPATIENT)
Dept: HOME HEALTH SERVICES | Facility: HOME HEALTH | Age: 60
End: 2025-06-06
Payer: MEDICARE

## 2025-06-06 ENCOUNTER — PHARMACY VISIT (OUTPATIENT)
Dept: PHARMACY | Facility: CLINIC | Age: 60
End: 2025-06-06
Payer: COMMERCIAL

## 2025-06-06 ENCOUNTER — APPOINTMENT (OUTPATIENT)
Dept: RADIOLOGY | Facility: HOSPITAL | Age: 60
End: 2025-06-06
Payer: MEDICARE

## 2025-06-06 VITALS
DIASTOLIC BLOOD PRESSURE: 70 MMHG | WEIGHT: 184.75 LBS | HEART RATE: 83 BPM | HEIGHT: 69 IN | BODY MASS INDEX: 27.36 KG/M2 | RESPIRATION RATE: 18 BRPM | OXYGEN SATURATION: 97 % | SYSTOLIC BLOOD PRESSURE: 121 MMHG | TEMPERATURE: 97.7 F

## 2025-06-06 LAB
ANION GAP SERPL CALC-SCNC: 17 MMOL/L (ref 10–20)
BUN SERPL-MCNC: 19 MG/DL (ref 6–23)
CALCIUM SERPL-MCNC: 9.5 MG/DL (ref 8.6–10.3)
CHLORIDE SERPL-SCNC: 104 MMOL/L (ref 98–107)
CO2 SERPL-SCNC: 22 MMOL/L (ref 21–32)
CREAT SERPL-MCNC: 0.69 MG/DL (ref 0.5–1.05)
EGFRCR SERPLBLD CKD-EPI 2021: >90 ML/MIN/1.73M*2
ERYTHROCYTE [DISTWIDTH] IN BLOOD BY AUTOMATED COUNT: 12.9 % (ref 11.5–14.5)
GLUCOSE SERPL-MCNC: 146 MG/DL (ref 74–99)
HCT VFR BLD AUTO: 30.4 % (ref 36–46)
HGB BLD-MCNC: 9.9 G/DL (ref 12–16)
MCH RBC QN AUTO: 31.6 PG (ref 26–34)
MCHC RBC AUTO-ENTMCNC: 32.6 G/DL (ref 32–36)
MCV RBC AUTO: 97 FL (ref 80–100)
NRBC BLD-RTO: 0 /100 WBCS (ref 0–0)
PLATELET # BLD AUTO: 214 X10*3/UL (ref 150–450)
POTASSIUM SERPL-SCNC: 4.2 MMOL/L (ref 3.5–5.3)
RBC # BLD AUTO: 3.13 X10*6/UL (ref 4–5.2)
SODIUM SERPL-SCNC: 139 MMOL/L (ref 136–145)
WBC # BLD AUTO: 14.3 X10*3/UL (ref 4.4–11.3)

## 2025-06-06 PROCEDURE — 2500000002 HC RX 250 W HCPCS SELF ADMINISTERED DRUGS (ALT 637 FOR MEDICARE OP, ALT 636 FOR OP/ED): Performed by: PHYSICIAN ASSISTANT

## 2025-06-06 PROCEDURE — 70498 CT ANGIOGRAPHY NECK: CPT

## 2025-06-06 PROCEDURE — 36415 COLL VENOUS BLD VENIPUNCTURE: CPT | Performed by: PHYSICIAN ASSISTANT

## 2025-06-06 PROCEDURE — 97530 THERAPEUTIC ACTIVITIES: CPT | Mod: GO | Performed by: OCCUPATIONAL THERAPIST

## 2025-06-06 PROCEDURE — 97530 THERAPEUTIC ACTIVITIES: CPT | Mod: GP,CQ

## 2025-06-06 PROCEDURE — 2500000001 HC RX 250 WO HCPCS SELF ADMINISTERED DRUGS (ALT 637 FOR MEDICARE OP): Performed by: PHYSICIAN ASSISTANT

## 2025-06-06 PROCEDURE — 70496 CT ANGIOGRAPHY HEAD: CPT | Performed by: RADIOLOGY

## 2025-06-06 PROCEDURE — 7100000011 HC EXTENDED STAY RECOVERY HOURLY - NURSING UNIT

## 2025-06-06 PROCEDURE — 80048 BASIC METABOLIC PNL TOTAL CA: CPT | Performed by: PHYSICIAN ASSISTANT

## 2025-06-06 PROCEDURE — 2550000001 HC RX 255 CONTRASTS: Performed by: ORTHOPAEDIC SURGERY

## 2025-06-06 PROCEDURE — 97535 SELF CARE MNGMENT TRAINING: CPT | Mod: GO | Performed by: OCCUPATIONAL THERAPIST

## 2025-06-06 PROCEDURE — 85027 COMPLETE CBC AUTOMATED: CPT | Performed by: PHYSICIAN ASSISTANT

## 2025-06-06 PROCEDURE — 70498 CT ANGIOGRAPHY NECK: CPT | Performed by: RADIOLOGY

## 2025-06-06 PROCEDURE — 99222 1ST HOSP IP/OBS MODERATE 55: CPT | Performed by: PSYCHIATRY & NEUROLOGY

## 2025-06-06 PROCEDURE — 97116 GAIT TRAINING THERAPY: CPT | Mod: GP,CQ

## 2025-06-06 RX ADMIN — LISINOPRIL 20 MG: 20 TABLET ORAL at 08:40

## 2025-06-06 RX ADMIN — IOHEXOL 90 ML: 350 INJECTION, SOLUTION INTRAVENOUS at 08:16

## 2025-06-06 RX ADMIN — METHOCARBAMOL TABLETS 500 MG: 500 TABLET, COATED ORAL at 14:04

## 2025-06-06 RX ADMIN — METOPROLOL TARTRATE 25 MG: 25 TABLET, FILM COATED ORAL at 08:40

## 2025-06-06 RX ADMIN — VENLAFAXINE HYDROCHLORIDE 150 MG: 75 CAPSULE, EXTENDED RELEASE ORAL at 08:40

## 2025-06-06 RX ADMIN — METHOCARBAMOL TABLETS 500 MG: 500 TABLET, COATED ORAL at 07:56

## 2025-06-06 RX ADMIN — OXYCODONE HYDROCHLORIDE 10 MG: 5 TABLET ORAL at 14:03

## 2025-06-06 RX ADMIN — METFORMIN HYDROCHLORIDE 500 MG: 500 TABLET, EXTENDED RELEASE ORAL at 08:40

## 2025-06-06 RX ADMIN — PANTOPRAZOLE SODIUM 40 MG: 40 TABLET, DELAYED RELEASE ORAL at 07:56

## 2025-06-06 RX ADMIN — DOCUSATE SODIUM 100 MG: 100 CAPSULE, LIQUID FILLED ORAL at 08:40

## 2025-06-06 RX ADMIN — OXYCODONE HYDROCHLORIDE 5 MG: 5 TABLET ORAL at 06:36

## 2025-06-06 ASSESSMENT — COGNITIVE AND FUNCTIONAL STATUS - GENERAL
DRESSING REGULAR LOWER BODY CLOTHING: A LITTLE
DRESSING REGULAR UPPER BODY CLOTHING: A LITTLE
STANDING UP FROM CHAIR USING ARMS: A LITTLE
DRESSING REGULAR UPPER BODY CLOTHING: A LITTLE
HELP NEEDED FOR BATHING: A LITTLE
CLIMB 3 TO 5 STEPS WITH RAILING: A LITTLE
DAILY ACTIVITIY SCORE: 21
MOVING TO AND FROM BED TO CHAIR: A LITTLE
DAILY ACTIVITIY SCORE: 19
MOVING TO AND FROM BED TO CHAIR: A LITTLE
TURNING FROM BACK TO SIDE WHILE IN FLAT BAD: A LITTLE
DRESSING REGULAR LOWER BODY CLOTHING: A LITTLE
MOBILITY SCORE: 20
MOBILITY SCORE: 18
WALKING IN HOSPITAL ROOM: A LITTLE
STANDING UP FROM CHAIR USING ARMS: A LITTLE
CLIMB 3 TO 5 STEPS WITH RAILING: A LITTLE
WALKING IN HOSPITAL ROOM: A LITTLE
TOILETING: A LITTLE
PERSONAL GROOMING: A LITTLE
TOILETING: A LITTLE
MOVING FROM LYING ON BACK TO SITTING ON SIDE OF FLAT BED WITH BEDRAILS: A LITTLE

## 2025-06-06 ASSESSMENT — PAIN - FUNCTIONAL ASSESSMENT
PAIN_FUNCTIONAL_ASSESSMENT: 0-10

## 2025-06-06 ASSESSMENT — PAIN DESCRIPTION - ORIENTATION: ORIENTATION: LEFT

## 2025-06-06 ASSESSMENT — ACTIVITIES OF DAILY LIVING (ADL): HOME_MANAGEMENT_TIME_ENTRY: 15

## 2025-06-06 ASSESSMENT — PAIN SCALES - GENERAL
PAINLEVEL_OUTOF10: 6
PAINLEVEL_OUTOF10: 6
PAINLEVEL_OUTOF10: 5 - MODERATE PAIN
PAINLEVEL_OUTOF10: 2
PAINLEVEL_OUTOF10: 7

## 2025-06-06 ASSESSMENT — PAIN DESCRIPTION - DESCRIPTORS: DESCRIPTORS: ACHING

## 2025-06-06 ASSESSMENT — PAIN DESCRIPTION - LOCATION: LOCATION: NECK

## 2025-06-06 NOTE — CARE PLAN
The patient's goals for the shift include      The clinical goals for the shift include patient pain control taken    Problem: Safety - Adult  Goal: Free from fall injury  Outcome: Progressing     Problem: Discharge Planning  Goal: Discharge to home or other facility with appropriate resources  Outcome: Progressing     Problem: Chronic Conditions and Co-morbidities  Goal: Patient's chronic conditions and co-morbidity symptoms are monitored and maintained or improved  Outcome: Progressing     Problem: Nutrition  Goal: Nutrient intake appropriate for maintaining nutritional needs  Outcome: Progressing

## 2025-06-06 NOTE — NURSING NOTE
Interdisciplinary team present: NP, PT, NM, CC, SW, Orthopedic Coordinator, and bedside RN.  Pain - controlled  Nausea - none  Discharge barrier - needs AM PT and medical clearance  Discharge plan - Home   Discharge date/time - today

## 2025-06-06 NOTE — PROGRESS NOTES
"Occupational Therapy                 Therapy Communication Note    Patient Name: Dinorah Horn \"Raquel\"  MRN: [id number]  Department: [facility]  Room: [address] [place] [address]  Today's Date: 6/6/2025     Discipline: Occupational Therapy    Missed Visit: OT Missed Visit: Yes     Missed Visit Reason: Missed Visit Reason: Patient refused (Pt reports significant fatigue and requesting to rest at this time despite encouragement.)    Missed Time: Attempt        "

## 2025-06-06 NOTE — PROGRESS NOTES
"Dinorah Horn \"Raquel\" is a 60 y.o. female on day 3 of admission presenting with cervical spondylosis with myelopathy & Acquired left-sided Manuel syndrome.  POD#2 C4-C7 Anterior Cervical Spine Discectomy and Fusion with Dr. Camacho on 06/04/25.    Subjective   Patient evaluated, resting in bed. Reports feeling tired. Admits to expected posterior neck and shoulder discomfort, pain well controlled with current regimen. No headaches at this time. Reports discomfort in her esophagus - tolerating diet without dysphagia. Wearing hard collar as instructed.     Awaiting results from imaging from neurology. Feels comfortable with dc home later today otherwise       Objective     Physical Exam  Well appearing, NAD  Pleasant & cooperative  Breathing nonlabored on RA  Moves all extremities x 4 appropriately; UE motor 5/5  Dressing C/D/I  Hard collar in place  L eye ptosis       Last Recorded Vitals  Blood pressure 121/70, pulse 83, temperature 36.5 °C (97.7 °F), resp. rate 18, height 1.753 m (5' 9\"), weight 83.8 kg (184 lb 11.9 oz), SpO2 97%.  Intake/Output last 3 Shifts:  I/O last 3 completed shifts:  In: 1340 (16 mL/kg) [P.O.:240; I.V.:1000 (11.9 mL/kg); IV Piggyback:100]  Out: 3300 (39.4 mL/kg) [Urine:3300 (1.1 mL/kg/hr)]  Weight: 83.8 kg     Relevant Results      Scheduled medications  Scheduled Medications[1]  Continuous medications  Continuous Medications[2]  PRN medications  PRN Medications[3]  Results for orders placed or performed during the hospital encounter of 06/04/25 (from the past 24 hours)   POCT GLUCOSE   Result Value Ref Range    POCT Glucose 142 (H) 74 - 99 mg/dL   CBC   Result Value Ref Range    WBC 14.3 (H) 4.4 - 11.3 x10*3/uL    nRBC 0.0 0.0 - 0.0 /100 WBCs    RBC 3.13 (L) 4.00 - 5.20 x10*6/uL    Hemoglobin 9.9 (L) 12.0 - 16.0 g/dL    Hematocrit 30.4 (L) 36.0 - 46.0 %    MCV 97 80 - 100 fL    MCH 31.6 26.0 - 34.0 pg    MCHC 32.6 32.0 - 36.0 g/dL    RDW 12.9 11.5 - 14.5 %    Platelets 214 150 - 450 " x10*3/uL   Basic metabolic panel   Result Value Ref Range    Glucose 146 (H) 74 - 99 mg/dL    Sodium 139 136 - 145 mmol/L    Potassium 4.2 3.5 - 5.3 mmol/L    Chloride 104 98 - 107 mmol/L    Bicarbonate 22 21 - 32 mmol/L    Anion Gap 17 10 - 20 mmol/L    Urea Nitrogen 19 6 - 23 mg/dL    Creatinine 0.69 0.50 - 1.05 mg/dL    eGFR >90 >60 mL/min/1.73m*2    Calcium 9.5 8.6 - 10.3 mg/dL                            Assessment & Plan  Cervical spondylosis with myelopathy    History of coronary artery bypass graft    Elevated liver function tests    Spinal stenosis in cervical region    Spinal cord compression (Multi)    Acquired left-sided Manuel syndrome    VSS  Labs  Pain control - continue current regimen   Diet - regular  PT/OT consulted - appreciate recommendations   Home with [facility]  DVT prophylaxis - SCDs, encourage ambulation and up to chair for all meals  Hard collar at all times   L eye ptosis    Discussed with neurology, Dr. Gutiérrez today - imaging reassuring, okay for DC home today and follow up outpatient with neurology      DC home today with [facility]  Follow up in office 2-3 weeks for wound check and radiographs  Follow up neurology outpatient  Continue hard collar until postop visit  All questions answered   Appropriate DC instructions and pain medications provided        Please EPIC chat with any questions/concerns      I spent 30 minutes in the professional and overall care of this patient.      Sonia Armenta PA-C           [1] docusate sodium, 100 mg, oral, BID  lisinopril, 20 mg, oral, Daily  metFORMIN XR, 500 mg, oral, BID  methocarbamol, 500 mg, oral, 4x daily  metoprolol tartrate, 25 mg, oral, BID  pantoprazole, 40 mg, oral, Daily before breakfast  rosuvastatin, 20 mg, oral, Nightly  venlafaxine, 75 mg, oral, Nightly  venlafaxine XR, 150 mg, oral, Daily with breakfast    [2] sodium chloride 0.9%, 100 mL/hr, Last Rate: 100 mL/hr (06/05/25 7747)    [3] PRN medications: benzocaine-menthol, dextrose,  dextrose, diphenhydrAMINE **OR** diphenhydrAMINE **OR** diphenhydrAMINE, glucagon, glucagon, HYDROmorphone, ketorolac, magnesium citrate, metoclopramide **OR** metoclopramide, naloxone, oxyCODONE, oxyCODONE, oxygen

## 2025-06-06 NOTE — PROGRESS NOTES
"Dinorah Horn \"Pratibha" is a 60 y.o. female on day 2 of admission presenting with Acquired left-sided Manuel syndrome.      Subjective   I saw Raquel today, lying comfortably in bed in no acute distress. She has less left sided ptosis compared to yesterday. Headache has resolved, she has residual left neck pain but tolerable, likely related to surgery.    Objective   Mental Status Examination:  General appearance: Well-groomed, good eye contact, cooperative  Orientation: Alert and oriented to person, place, and time  Motor: no psychomotor agitation or retardation  Speech: regular rate, rhythm, tone, and volume     NEUROLOGICAL EXAMINATION     Cranial nerves:  CN II: visual fields full to confrontation  CN III, IV, VI: Pupils round, asymmetric, L pupil 4 mm, R pupil 5 mm, reactive to light and accommodation.  Lids asymmetric with left sided partial ptosis improving.  Extra-occular muscles are intact with normal alignment.  No nystagmus  CN V: Facial sensation intact bilaterally.    CN VII: Normal and symmetric facial strength.  Nasolabial folds symmetric  CN VIII: Hearing intact to finger rub  CN IX: Palate elevates symmetrically.  CN XI: Normal shoulder shrug and neck turning  CN XII: Tongue midline, with normal bulk and strength, no fasciculations          Motor:  Muscle bulk was normal in all extremities.  5/5 strength in all extremities  Normal finger taps and hand opening  Normal tone  Bilateral ankle clonus L>R     Reflexes:   Right UE     LEFT UE  BR: 2          BR: 2  Biceps: 2    Biceps: 2  Triceps: 2   Triceps: 2     RIGHT LE     LEFT LE  Knee: 3        Knee: 3  Ankle: 3       Ankle: 3     Negative Richi's reflex  No frontal release signs     Coordination:  Normal finger to nose testing and rapid alternating movements     Gait:  Deferred for patient's safety      Sensory:  Normal to light touch bilaterally   Last Recorded Vitals  Blood pressure 109/66, pulse 88, temperature 35.6 °C (96.1 °F), resp. rate " "17, height 1.753 m (5' 9\"), weight 83.8 kg (184 lb 11.9 oz), SpO2 94%.    Physical Exam  Neurological Exam  .meex  Relevant Results  Results for orders placed or performed during the hospital encounter of 06/04/25 (from the past 96 hours)   POCT GLUCOSE   Result Value Ref Range    POCT Glucose 100 (H) 74 - 99 mg/dL   POCT GLUCOSE   Result Value Ref Range    POCT Glucose 112 (H) 74 - 99 mg/dL   POCT GLUCOSE   Result Value Ref Range    POCT Glucose 142 (H) 74 - 99 mg/dL   CBC   Result Value Ref Range    WBC 8.3 4.4 - 11.3 x10*3/uL    nRBC 0.0 0.0 - 0.0 /100 WBCs    RBC 3.17 (L) 4.00 - 5.20 x10*6/uL    Hemoglobin 10.0 (L) 12.0 - 16.0 g/dL    Hematocrit 31.0 (L) 36.0 - 46.0 %    MCV 98 80 - 100 fL    MCH 31.5 26.0 - 34.0 pg    MCHC 32.3 32.0 - 36.0 g/dL    RDW 12.3 11.5 - 14.5 %    Platelets 185 150 - 450 x10*3/uL   Basic metabolic panel   Result Value Ref Range    Glucose 152 (H) 74 - 99 mg/dL    Sodium 136 136 - 145 mmol/L    Potassium 4.0 3.5 - 5.3 mmol/L    Chloride 103 98 - 107 mmol/L    Bicarbonate 24 21 - 32 mmol/L    Anion Gap 13 10 - 20 mmol/L    Urea Nitrogen 20 6 - 23 mg/dL    Creatinine 0.66 0.50 - 1.05 mg/dL    eGFR >90 >60 mL/min/1.73m*2    Calcium 8.8 8.6 - 10.3 mg/dL   POCT GLUCOSE   Result Value Ref Range    POCT Glucose 168 (H) 74 - 99 mg/dL   POCT GLUCOSE   Result Value Ref Range    POCT Glucose 146 (H) 74 - 99 mg/dL   POCT GLUCOSE   Result Value Ref Range    POCT Glucose 142 (H) 74 - 99 mg/dL      CT angio head and neck w and wo IV contrast  Result Date: 6/6/2025  Interpreted By:  Chuck Garcia, STUDY: CT ANGIO HEAD AND NECK W AND WO IV CONTRAST;  6/6/2025 8:43 am   INDICATION: Signs/Symptoms:better characterization of focal stenosis of the right vertebral artery, left sided headche and Manuel's syndrome, rule out carotid dissection.   COMPARISON: None.   ACCESSION NUMBER(S): [id number]   ORDERING CLINICIAN: ML MILLER   TECHNIQUE: Following intravenous injection of contrast material, CT " was acquired from the aortic arch to the vertex of the skull. Multiplanar reconstructions and 3D reconstructions were made.3D reconstructions, MIPs, Volume Rendered, or Surface Shading image were performed at a separate workstation   FINDINGS: Chest   The aortic arch and origin of great vessels are normal. The visualized mediastinum and pulmonary apices are normal.   Neck   Right carotid The common carotid artery is normal. There is minor athero sclerotic calcification at the bifurcation without measurable luminal narrowing. The cervical, petrous and cavernous portions are normal.   Left carotid The common carotid artery is normal. There is minor athero sclerotic calcification at the bifurcation without measurable luminal narrowing. The cervical, petrous and cavernous portions are normal.   Vertebrals   The vertebral arteries are symmetrical. Their origin is are normal. There is no evidence of compression or filling defect in the cervical portions. The vertebrobasilar junction and basilar artery are normal. The right vertebral artery terminates as PICA branches. Specifically, the right V3 and V4 segments are normal.   Soft tissue neck     Residual fluid and gas in the left anterior triangle and retropharyngeal space bilaterally. Focus consistent with previous surgery.   Intracranial   There is no evidence of aneurysm, vascular malformation or branch occlusion.       * No evidence of compression or filling defect of the vertebral arteries. *No evidence of dissection, compression or thrombosis.   MACRO: none   Signed by: Chuck Garcia 6/6/2025 10:17 AM Dictation workstation:   [id number]    MR angio head wo IV contrast  Result Date: 6/5/2025  Interpreted By:  Mariaelena Dubose, STUDY: MR ANGIO HEAD WO IV CONTRAST;  6/5/2025 8:24 pm   INDICATION: Signs/Symptoms:left sided headache, left Manuel's syndrome, ru;le out secondary causes including carotid dissection.     COMPARISON: None.   ACCESSION NUMBER(S): [id number]    ORDERING CLINICIAN: ML MILLER   TECHNIQUE: Time-of-flight MRA of the head was performed. The images were reviewed as source images and maximum intensity projections.   FINDINGS:     Anterior circulation:    There is expected flow signal in bilateral intracranial internal carotid arteries, bilateral carotid terminals, bilateral proximal anterior and middle cerebral arteries.   Posterior circulation:    Bilateral intracranial vertebral arteries, vertebrobasilar junction, basilar artery and proximal posterior cerebral arteries demonstrate expected flow signal. The left vertebral artery is dominant. Right vertebral artery is very small which could be a developmental variant.       1.  There is no evidence for hemodynamically significant stenosis or large branch vessel cutoffs of the visualized intracranial vasculature. 2.  No definite aneurysm is demonstrated.   MACRO: None   Signed by: Mariaelena Dubose 6/5/2025 9:26 PM Dictation workstation:   [id number]    MR angio neck wo IV contrast  Result Date: 6/5/2025  Interpreted By:  Mariaelena Dubose, STUDY: MR ANGIO NECK WO IV CONTRAST;  6/5/2025 8:24 pm   INDICATION: Signs/Symptoms:left sided headache, left sided Manuel's syndrome, rule out secondary causes including carotid dissection.     COMPARISON: None.   ACCESSION NUMBER(S): [id number]   ORDERING CLINICIAN: ML MILLER   TECHNIQUE: Time of flight MRA of the neck was performed. The images were reviewed as source images and maximum intensity projections.   FINDINGS: The source images are mildly degraded by artifact.   Right carotid vessels:  There is expected flow signal in the visualized portion of the common carotid artery.  The internal carotid artery in the neck demonstrates expected flow signal.  There is no significant stenosis .   Left carotid vessels:   There is expected flow signal in the visualized portion of the common carotid artery.   The internal carotid artery in the neck demonstrates expected flow  signal.  There is no significant stenosis .   Vertebral vessels:   The visualized segments of the cervical vertebral arteries demonstrate expected flow signal. At the level of C1 there is a short segment of focal narrowing of the right vertebral artery that is approximate 90% stenosis. The left vertebral artery is dominant.       1. Focal area of high-grade severe stenosis in the right vertebral artery at the level of C1. CT angiography would be helpful for further evaluation of the right vertebral artery 2. The carotid systems are widely patent bilaterally. 3. Left vertebral artery is widely patent   MACRO: Critical Finding:  See findings. Notification was initiated on 6/5/2025 at 9:21 pm by  Mariaelena Dubose.  (**-OCF-**)   Signed by: Mariaelena Dubose 6/5/2025 9:22 PM Dictation workstation:   [id number]    MR brain w and wo IV contrast  Result Date: 6/5/2025  Interpreted By:  Mariaelena Dubose, STUDY: MR BRAIN W AND WO IV CONTRAST;  6/5/2025 8:24 pm   INDICATION: Signs/Symptoms:left sided headache and ptosis.     COMPARISON: MRI of the brain 05/09/2025   ACCESSION NUMBER(S): [id number]   ORDERING CLINICIAN: MERRY PHELPS   TECHNIQUE: Axial T2, FLAIR, DWI, gradient echo T2 and sagittal and coronal T1 weighted images of brain were acquired. Post contrast T1 weighted images were acquired after administration of 17 ML Dotarem gadolinium based intravenous contrast.   FINDINGS: CSF Spaces: The ventricles, sulci and basal cisterns are within normal limits.   Parenchyma: There is no diffusion restriction abnormality to suggest acute infarct.  FLAIR images show multiple foci of abnormal signal in the subcortical white matter and periventricular white matter. Areas of abnormal signal are seen in the wyatt bilaterally on FLAIR imaging as well. There is no mass effect or midline shift. There is no abnormal enhancement. There are no enhancing lesions.   Paranasal Sinuses and Mastoids: Visualized paranasal sinuses and mastoid air cells are  unremarkable.       1. Multiple foci of abnormal signal throughout the white matter and in the wyatt on FLAIR imaging may be due to ischemic, degenerative, or demyelinating process. No change since the prior MRI of the brain 2. No acute infarct on diffusion-weighted imaging 3. No abnormal enhancement     MACRO: None   Signed by: Mariaelena Dubose 6/5/2025 9:14 PM Dictation workstation:   [id number]    XR cervical spine 1 view  Result Date: 6/5/2025  Interpreted By:  Misael Davis, STUDY: XR CERVICAL SPINE 1 VIEW; ;  6/4/2025 4:42 pm   INDICATION: Signs/Symptoms:C4-C7 Anterior Cervical Spine Discectomy and Fusion.   ,M47.12 Other spondylosis with myelopathy, cervical region   COMPARISON: None.   ACCESSION NUMBER(S): [id number]   ORDERING CLINICIAN: MERRY PHELPS   FINDINGS: Single lateral view of the cervical spine obtained intraoperatively   Anterior fusion C4 through C7 with intact hardware. Associated postsurgical changes in the soft tissues.       Interval C4-C7 anterior fusion. Intraoperative view for localization purposes     MACRO: None   Signed by: Misael Davis 6/5/2025 7:31 PM Dictation workstation:   [id number]    XR cervical spine 1 view  Result Date: 6/5/2025  Interpreted By:  Misael Davis, STUDY: XR CERVICAL SPINE 1 VIEW; ;  6/4/2025 2:51 pm   INDICATION: Signs/Symptoms:C4-C7 Anterior Cervical Spine Discectomy and Fusion.   ,M47.12 Other spondylosis with myelopathy, cervical region   COMPARISON: 05/06/2025   ACCESSION NUMBER(S): [id number]   ORDERING CLINICIAN: MERRY PHELPS   FINDINGS: Single portable view of the cervical spine with metallic instrument projecting anteriorly over the C5-C6 level.       Study performed for localization purposes only     MACRO: None   Signed by: Misael Davis 6/5/2025 7:30 PM Dictation workstation:   [id number]    Transthoracic Echo (TTE) Complete  Result Date: 6/3/2025   [facility], [address] [place] [address]                Tel  [phone] and Fax [phone] TRANSTHORACIC ECHOCARDIOGRAM REPORT  Patient Name:       AMANDA THOMPSON       Reading Physician:    [id number] Jay Marsh MD Study Date:         6/3/2025            Ordering Provider:    [id number] VUONGFLEX MRN/PID:            [id number]            Fellow: Accession#:         [id number]        Nurse:                Nisa Hernandez RN Date of Birth/Age:  1965 / 60      Sonographer:          Meenu Deleon RDCS                     years Gender assigned at  F                   Additional Staff: Birth: Height:             175.26 cm           Admit Date: Weight:             82.55 kg            Admission Status:     Outpatient BSA / BMI:          1.98 m2 / 26.88     Encounter#:           [id number]                     kg/m2 Blood Pressure:     107/70 mmHg         Department Location:  [facility]                                                               Non Invasive Study Type:    TRANSTHORACIC ECHO (TTE) COMPLETE Diagnosis/ICD: Atherosclerotic heart disease of native coronary artery without                angina pectoris-I25.10; Other forms of dyspnea-R06.09 Indication:    CAD; Dyspnea on exertion CPT Code:      Echo Complete w Full Doppler-24114 Patient History: Pertinent History: Dyspnea, Chest Pain, Hyperlipidemia and HTN. CAD s/p CABG,                    DMII. Study Detail: The following Echo studies were performed: 2D, M-Mode, Doppler and               color flow. Technically challenging study due to poor acoustic               windows, body habitus and prominent lung artifact. Agitated saline               used as a contrast agent for intraseptal flow evaluation and               Definity used as a contrast agent for endocardial border               definition. Total contrast used for this  procedure was 6.0 mL via               IV push.  PHYSICIAN INTERPRETATION: Left Ventricle: Left ventricular ejection fraction is normal by visual estimate at 65%. There are no regional left ventricular wall motion abnormalities. The left ventricular cavity size is normal. There is normal septal and normal posterior left ventricular wall thickness. Left ventricular diastolic filling is indeterminate. Left Atrium: The left atrial size is normal. There is no evidence of a patent foramen ovale. A bubble study using agitated saline was performed. Bubble study is negative. Right Ventricle: The right ventricle is normal in size. There is low normal right ventricular systolic function. Right Atrium: The right atrium is normal in size. Aortic Valve: There is trace aortic valve regurgitation. Mitral Valve: The mitral valve is normal in structure. There is trace mitral valve regurgitation. The E Vmax is 0.49 m/s. Tricuspid Valve: The tricuspid valve is structurally normal. There is trace to mild tricuspid regurgitation. Pulmonic Valve: The pulmonic valve is not well visualized. There is trace pulmonic valve regurgitation. Pericardium: There is no pericardial effusion noted. Aorta: The aortic root is normal. There is no dilatation of the aortic arch. The aortic root is at the upper limits of normal size. Pulmonary Artery: The tricuspid regurgitant velocity is 2.19 m/s, and with an estimated right atrial pressure of 3, the estimated pulmonary artery pressure is normal with the RVSP at 22 mmHg. Systemic Veins: The inferior vena cava appears normal in size, with IVC inspiratory collapse greater than 50%.  CONCLUSIONS:  1. Left ventricular ejection fraction is normal by visual estimate at 65%.  2. There is low normal right ventricular systolic function.  3. There is no evidence of a patent foramen ovale. QUANTITATIVE DATA SUMMARY:  2D MEASUREMENTS:         Normal Ranges: Ao Root d:       3.70 cm (2.0-3.7cm) LAs:             3.80 cm  (2.7-4.0cm) RVIDd:           2.38 cm (0.9-3.6cm) IVSd:            0.73 cm (0.6-1.1cm) LVPWd:           0.73 cm (0.6-1.1cm) LVIDd:           4.95 cm (3.9-5.9cm) LVIDs:           3.23 cm LV Mass Index:   60 g/m2 LV % FS          34.8 %  LEFT ATRIUM:                  Normal Ranges: LA Vol A4C:        27.4 ml    (22+/-6mL/m2) LA Vol A2C:        53.4 ml LA Vol BP:         38.6 ml LA Vol Index A4C:  13.8ml/m2 LA Vol Index A2C:  26.9 ml/m2 LA Vol Index BP:   19.5 ml/m2 LA Area A4C:       12.7 cm2 LA Area A2C:       17.9 cm2 LA Major Axis A4C: 5.0 cm LA Major Axis A2C: 5.1 cm  RIGHT ATRIUM:          Normal Ranges: RA Area A4C:  10.5 cm2  AORTA MEASUREMENTS:         Normal Ranges: Ao Arch:            2.80 cm (2.0-3.6cm)  LV SYSTOLIC FUNCTION:                      Normal Ranges: EF-Visual:      65 % LV EF Reported: 65 %  LV DIASTOLIC FUNCTION:             Normal Ranges: MV Peak E:             0.49 m/s    (0.7-1.2 m/s) MV Peak A:             0.70 m/s    (0.42-0.7 m/s) E/A Ratio:             0.70        (1.0-2.2) MV e'                  0.060 m/s   (>8.0) MV lateral e'          0.07 m/s MV medial e'           0.05 m/s MV A Dur:              119.95 msec E/e' Ratio:            8.21        (<8.0) MV DT:                 266 msec    (150-240 msec)  MITRAL VALVE:          Normal Ranges: MV DT:        266 msec (150-240msec)  AORTIC VALVE:           Normal Ranges: AoV Vmax:      1.38 m/s (<=1.7m/s) AoV Peak P.6 mmHg (<20mmHg) LVOT Max Nba:  1.09 m/s (<=1.1m/s) LVOT VTI:      20.61 cm LVOT Diameter: 2.24 cm  (1.8-2.4cm) AoV Area,Vmax: 3.13 cm2 (2.5-4.5cm2)  RIGHT VENTRICLE: RV Basal 3.00 cm RV Mid   2.40 cm RV Major 5.5 cm TAPSE:   17.0 mm RV s'    0.07 m/s  TRICUSPID VALVE/RVSP:          Normal Ranges: Peak TR Velocity:     2.19 m/s Est. RA Pressure:     3 RV Syst Pressure:     22       (< 30mmHg) IVC Diam:             1.70 cm  PULMONIC VALVE:          Normal Ranges: PV Accel Time:  159 msec (>120ms) PV Max Nba:     0.6 m/s   (0.6-0.9m/s) PV Max P.4 mmHg  [id number] Jay Marsh MD Electronically signed on 6/3/2025 at 6:11:40 PM  ** Final **     Nuclear Stress Test  Result Date: 6/3/2025  Interpreted By:  Nadege Dumont and Rana Mohammad STUDY: NUCLEAR STRESS TEST; 6/3/2025 11:12 am   INDICATION: Signs/Symptoms:CAD/ preop/ [facility].   ,I25.10 Atherosclerotic heart disease of native coronary artery without angina pectoris,Z01.810 Encounter for preprocedural cardiovascular examination,E11.9 Type 2 diabetes mellitus without complications,E78.2 Mixed hyperlipidemia,I20.9 Angina pectoris, unspecified,R06.02 Shortness of breath,I25.810 Atherosclerosis of coronary artery bypass graft(s) without angina pectoris   COMPARISON: None.   ACCESSION NUMBER(S): [id number]   ORDERING CLINICIAN: STUART OLIVER   TECHNIQUE: DIVISION OF NUCLEAR MEDICINE STRESS MYOCARDIAL PERFUSION SCAN, ONE DAY PROTOCOL     The patient received an intravenous dose of  10.7 mCi of Tc-99m Myoview and resting emission tomographic (SPECT) images of the myocardium were acquired. The patient then received an intravenous infusion of 0.4mg regadenoson (Lexiscan)  followed by an additional dose of  31.3 mCi of Tc-99m  Myoview. Stress phase SPECT images of the myocardium were then acquired. These included ECG-gated images to assess and quantify ventricular function.   A low-dose, nondiagnostic regional CT was utilized for attenuation correction purposes.   Limiting Factors: None   FINDINGS: Quality of the Study: Adequate   Technical limitations/Significant Artifacts: None   Cardiac Imaging: Images show no definite fixed or reversible perfusion defects throughout the visualized cardiac walls.   Attenuated Corrected Images: No significant change noted on attenuation corrected images.   Left ventricular size (EDV): 71 mL (normal less than 120 mL)   Gated images: Normal wall motion and thickening. Left ventricular ejection fraction estimated at 64% (Normal 45% or greater)   Other  Significant Findings:None   Nondiagnostic low dose CT does not reveal any definite gross abnormalities suggestive of an emergent/oncologic process.       1. Negative myocardial perfusion study without evidence of inducible myocardial ischemia or prior infarction. 2. The left ventricle is normal in size. 3. Normal LV wall motion with a post-stress LV EF estimated at 64%.   I personally reviewed the images/study and I agree with the findings as stated. This study was interpreted at [place], [place]. Fellow: Edy Sprague MD   MACRO: None   Signed by: Nadege Dumont 6/3/2025 4:26 PM Dictation workstation:   [id number]    Cardiology Interpretation Of Nuclear Stress - See Other Report For Nuclear Portion  Result Date: 6/3/2025   [facility], [address] [place] [address]                Tel [phone] and Fax [phone] Nuclear Pharmacologic Stress Test Patient Name:     AMANDA THOMPSON       Ordering Provider:    [id number] STUART VUONG Study Date:       6/3/2025            Reading Physician:    [id number] Jay Marsh MD MRN/PID:          [id number]            Supervising           [id number] Jay Marsh                                       Physician:            MD Accession#:       [id number]        Fellow: Date of           1965 / 60      Fellow: Birth/Age:        years Gender:           F                   Nurse:                Nisa Hernandez RN Admit Date:                           Technician:           Geraldo Caban CVT Admission Status: Outpatient          Sonographer:          N/A Height:           175.26 cm           Technologist: Weight:           83.01 kg            Additional Staff: BSA:              1.99 m2             Encounter#:           [id number] BMI:              27.02 kg/m2         Patient Location:      [facility] Study Type:    CARDIOLOGY INTERPRETATION OF NUCLEAR STRESS Diagnosis/ICD: Atherosclerotic heart disease-I25.10; Encounter for preprocedural                cardiovascular examination-Z01.810; Angina pectoris,                unspecified-I20.9; Shortness of breath-R06.02; Atherosclerosis of                coronary artery bypass graft(s) without angina pectoris-I25.810 Indication:    Dyspnea, Pre-Op Evaluation and CAD, angina CPT Code:      Stress Test Interpretation-44885; Stress Test Supervision-81430 Falls Risk: Low: Patient has a low risk for sustaining a fall; enviromental safety interventions in place.  Study Details: Correct procedure and correct patient verified verbally and with                ID Band checked.  Patient History: Coronary artery disease, hyperlipidemia, dyspnea, hypertension and chest pain. 59yo female here today for eval for CAD, pre-op eval for spine discectomy and fusion, DMII, HLD, angina and dyspnea; PMH also includes CABG (2010), prolonged tachyarrhythmia (2021), GERD, alcoholic cirrhosis, OA and Raynaud's disease. Allergies: Cosentyx. Smoker:    Former. Diabetes:  Yes.  Medications: The patient's prescribed medication is ASA, vitD, vitB12, ferrous sulfate, lisinopril, metformin, metoprolol tartrate, MV, omeprazole, rosuvastatin, trazodone, venlafaxine, HCTZ. The patient took medications as prescribed.  Patient Performance: Patient received a total of 0.4 mg of Regadenoson at 10:02:43 AM. Patient received a total of 34.3 mCi of Myoview at 10:03:15 AM. The patient did not exercise during infusion. The peak heart rate achieved was 101 bpm, which was 64 % of the age predicted target heart rate of 159 bpm. The resting blood pressure was 100/69 mmHg with a heart rate of 72 bpm. The patient developed symptoms typical of Lexiscan during the stress exam. The symptoms resolved with rest. The blood pressure response was normal. The test was terminated due to: completed lab protocol. Patient  has met the discharge criteria and is discharged to radiology.  Baseline ECG: Resting ECG showed normal sinus rhythm with rare premature ventricular contractions and incomplete right bundle branch block.  Stress ECG: Stress ECG showed sinus tachycardia. No ST changes.  Stress Stage Data: +---+------+-------+-----------------------------------------------------------+ HR Sys BPDias BPComments                                                    +---+------+-------+-----------------------------------------------------------+ 72 100   69                                                                 +---+------+-------+-----------------------------------------------------------+ 74              1 min s/p 0.4mg Lexiscan given IVP; pt complains of                         symptoms typical of Lexiscan                                +---+------+-------+-----------------------------------------------------------+ 101             2 min s/p Lexiscan; pt complains of symptoms typical of                     Lexiscan                                                    +---+------+-------+-----------------------------------------------------------+  Recovery ECG: Recovery ECG showed normal sinus rhythm.  +------------+--+------+-------+-----------------------------+             HRSys BPDias BPComments                      +------------+--+------+-------+-----------------------------+ Recovery I  [id number]   62     1 minute; symptoms resolving  +------------+--+------+-------+-----------------------------+ Recovery II [id number]   67     2 minutes; symptoms resolving +------------+--+------+-------+-----------------------------+ Recovery [id number]    65     4 minutes; symptoms resolving +------------+--+------+-------+-----------------------------+ Recovery IV [id number]   67     6 minutes; symptoms resolved  +------------+--+------+-------+-----------------------------+  Summary:  1. No  clinical or electrocardiographic evidence for ischemia at a maximal infusion.  2. Nuclear image results are reported separately. [id number] Jay Marsh MD Electronically signed on 6/3/2025 at 12:20:30 PM   ** Final **     Carotid duplex bilateral  Result Date: 5/9/2025            [place] [address] [place] [address]  Tel [phone] and Fax [phone]  Vascular Lab Report [facility] US CAROTID ARTERY DUPLEX BILATERAL  Patient Name:      AMANDA Bird Physician: [id number] Cheryl Martin MD Study Date:        5/9/2025            Ordering           [id number] DONTAE SHAVER                                        Physician: MRN/PID:           [id number]            Technologist:      Kelsie Do RDMS,                                                           T Accession#:        [id number]        Technologist 2: Date of Birth/Age: 1965 / 59      Encounter#:        [id number]                    years Gender:            F Admission Status:  Inpatient           Location           [facility]                                        Performed:  Diagnosis/ICD: Dizziness and giddiness-R42; Syncope and collapse-R55 CPT Codes:     54133 Cerebrovascular Carotid Duplex scan complete  Patient History Syncope and HTN. Smoker:         Former. Diabetes:       Yes. 1-5 years.  CONCLUSIONS: Right Carotid: Findings are consistent with less than 50% stenosis of the right proximal internal carotid artery. Laminar flow seen by color Doppler. Right external carotid artery appears patent with no evidence of stenosis. No evidence of hemodynamically significant stenosis of the right common carotid artery. The right vertebral artery is patent with antegrade flow. No evidence of hemodynamically significant stenosis in the right subclavian artery. Left Carotid: Findings are consistent with less than 50% stenosis of the left proximal internal carotid artery. Laminar flow seen by color Doppler. Left external  carotid artery appears patent with no evidence of stenosis. No evidence of hemodynamically significant stenosis of the left common carotid artery. The left vertebral artery is patent with antegrade flow. No evidence of hemodynamically significant stenosis in the left subclavian artery.  Imaging & Doppler Findings: Right Plaque Morph: The proximal right internal carotid artery demonstrates calcified plaque. The mid right internal carotid artery demonstrates heterogenous and calcified plaque. The mid right common carotid artery demonstrates homogenous plaque. Left Plaque Morph: The proximal left internal carotid artery demonstrates calcified plaque.   Right                        Left   PSV      EDV                PSV       cm/s 20 cm/s   CCA P    109 cm/s 44 cm/s 89 cm/s  39 cm/s   CCA D    100 cm/s 39 cm/s 63 cm/s  22 cm/s   ICA P    83 cm/s  34 cm/s 96 cm/s  47 cm/s   ICA M    88 cm/s  43 cm/s 109 cm/s 59 cm/s   ICA D    93 cm/s  45 cm/s 88 cm/s  21 cm/s    ECA     84 cm/s  13 cm/s 37 cm/s  15 cm/s Vertebral  47 cm/s  18 cm/s 117 cm/s         Subclavian 110 cm/s               Right Left ICA/CCA Ratio  0.7  0.8   [id number] Cheryl Martin MD Electronically signed by [id number] Cheryl Martin MD on 5/9/2025 at 5:53:23 PM  ** Final **     MR brain wo IV contrast  Result Date: 5/9/2025  Interpreted By:  Massiel Mac, STUDY: MR BRAIN WO IV CONTRAST;  5/9/2025 10:51 am   INDICATION: Signs/Symptoms:Dizziness.     COMPARISON: CT head 05/05/2025   ACCESSION NUMBER(S): [id number]   ORDERING CLINICIAN: DONTAE SHAVER   TECHNIQUE: Axial T2, FLAIR, DWI, gradient echo T2 and sagittal and coronal T1 weighted images of brain were acquired.   FINDINGS: CSF Spaces: The ventricles, sulci and basal cisterns are within normal limits. There is no extra-axial fluid collection.   Parenchyma: There is no diffusion restriction abnormality to suggest acute infarct.  Mild degree of patchy subcortical and periventricular T2 and FLAIR  hyperintense signal is compatible with microangiopathy. Signal within the wyatt is also compatible with microangiopathy. There is no mass effect or midline shift. Cerebellar tonsils are above the foramen magnum. Pituitary and sella are not enlarged. No abnormal susceptibility artifact.   Paranasal Sinuses and Mastoids: Visualized paranasal sinuses and mastoid air cells are predominantly clear. Major intracranial flow voids at the skull base are unremarkable.       No evidence of acute infarct, intracranial mass effect or midline shift.   Mild degree of nonspecific white matter signal compatible with microangiopathy.   MACRO: None   Signed by: Massiel Mac 5/9/2025 10:58 AM Dictation workstation:   [id number]       Assessment & Plan  Acquired left-sided Manuel syndrome  Cervical spondylosis with myelopathy  Spinal stenosis in cervical region    Ms. Horn  is a 60 y.o. RH woman with PMHx of DM, CAD s/p CABG, GERD, IBS, cervical DJD, Fibromyalgia, PsO, fatty liver disease suspected cirrhosis, R knee torn ligament, who is ostoperative day 1 status post C4-7 ACDF.  Patient is complaining of persistent headache and ptosis in the left eye started sometime yesterday but more noticeable when she looked at the mirror this morning. Neurological examination is notable for left sided Manuel's syndrome with ptosis improving compared to yesterday, no other focal neurological deficits.   MRI brain w/wo contrast personally reviewed, shows multiple white matter hyperintense foci and in the wyatt on FLAIR imaging may be due to ischemic, degenerative, or demyelinating process, likely small vessel disease, unchanged since the prior MRI of the brain. No acute infarct on diffusion-weighted imaging. No abnormal enhancement. MRA head w/wo contrast with no evidence for hemodynamically significant stenosis or large branch vessel cutoffs of the visualized intracranial vasculature, no definite aneurysm is demonstrated. CTA w/wo contrast with no  evidence of compression or filling defect of the vertebral arteries, no evidence of dissection, compression or thrombosis. There is residual fluid and gas in the left anterior triangle and retropharyngeal space bilaterally. Headache has resolved.    Differential diagnosis include postoperative swelling due to proximity of the surgery to the cervical sympathetic chain, other differentials include CNIII palsy due to vascular causes including DM, PCOM aneurysm which is less likely due to myotic pupil and absence of abnormal eye movements along with negative brain imaging, ICA dissection especially with reported left jaw and neck pain but CTA showed no evidence of dissection, less likely TAC, migraine with cranial autonomic involvement, or Cory Hunt syndrome due to absence of painful eye movements. I recommended to follow Dr. Camacho postop directions, continue using cervical collar.     Recommendations:  Continue use of cervical collar  Follow up with neurology outpatient  Management with headache as follows:     Step 1 (do all of the following):  - Ketorolac 30mg IVP or 30-60mg IM  - Metoclopramide 10mg IVP over 2min OR ondansetron 8mg IVP  - Diphenhydramine 25-50mg IVP  - IVF     Step 2 (if Step 1 fails, do all of the following):  - Dexamethasone 4-8mg IVP  - Valproate sodium 500-1000mg over 20min  - Magnesium sulfate 1g IV over 1h     Step 3   - Prochlorperazine 10mg IVP over 30s q2-4h PRN  - Metoclopramide 10mg IVP over 2min  - Ondansetron 4-8mg IVP over 30s        I spent 43 minutes in the professional and overall care of this patient.      Jayme Gutiérrez MD

## 2025-06-06 NOTE — PROGRESS NOTES
"Physical Therapy    Physical Therapy Treatment    Patient Name: Dinorah Horn \"Pratibha"  MRN: [id number]  Department: [place] [address]  Room: [address] [place] [address]  Today's Date: 6/6/2025  Time Calculation  Start Time: 0952  Stop Time: 1017  Time Calculation (min): 25 min         Assessment/Plan   PT Assessment  Rehab Prognosis: Good  Barriers to Discharge Home: No anticipated barriers  End of Session Communication: Bedside nurse  Assessment Comment: pt able to complete gait and stair training this tx  End of Session Patient Position: Alarm on, Up in chair  PT Plan  Inpatient/Swing Bed or Outpatient: Inpatient  PT Plan  Treatment/Interventions: Bed mobility, Transfer training, Gait training, Stair training  PT Plan: Ongoing PT  PT Frequency: Daily  PT Discharge Recommendations: Low intensity level of continued care  Equipment Recommended upon Discharge: Wheeled walker  PT Recommended Transfer Status: Stand by assist  PT - OK to Discharge: Yes (per POC)    PT Visit Info:  PT Received On: 06/06/25     General Visit Information:   General  Reason for Referral: C4-C7 Anterior Cervical Spine Discectomy and Fusion (Spine Cervical). Pt c/o persistent L sided headache and ptosis in the L eye.  Referred By: Kathi JIMENEZ)  Prior to Session Communication: Bedside nurse  Patient Position Received: Bed, 3 rail up, Alarm on  Preferred Learning Style: auditory, verbal, visual, written  General Comment: pt agreeable to tx    Subjective   Precautions:  Precautions  Medical Precautions: Fall precautions, Spinal precautions        Objective   Pain:  Pain Assessment  0-10 (Numeric) Pain Score: 5 - Moderate pain  Pain Type: Acute pain, Surgical pain  Pain Location: Neck  Pain Orientation: Left  Pain Interventions: Medication (See MAR) (given prior to tx)  Cognition:  Cognition  Overall Cognitive Status: Within Functional Limits  Coordination:     Postural Control:  Static Sitting Balance  Static Sitting-Comment/Number of Minutes: pt performed at EOB with S, pt " Patient rescheduled to  12/7/22 under MAC with Dr. Kee.     Pre assessment questions completed for upcoming colonoscopy  procedure scheduled on 12/7/22    COVID policy reviewed. +Covid 11/18/22    Pre op scheduled? Patient was not aware this was needed. Patient will call to schedule with primary. Patient is aware this is needed prior to procedure.     Reviewed procedural arrival time 1115 and facility location [facility]; [address] [place] [address]    Designated  policy reviewed. Instructed to have someone stay 24 hours post procedure.     Reviewed procedure prep instructions.     Patient verbalized understanding and had no questions or concerns at this time.    Bri Cordero RN       performed x3 min  Static Standing Balance  Static Standing-Comment/Number of Minutes: pt performed static standing balance with UE support at RW and  SBA,  x1-2 min         Bed Mobility  Bed Mobility: Yes  Bed Mobility 1  Bed Mobility 1: Supine to sitting  Level of Assistance 1: Close supervision  Bed Mobility Comments 1: HOB flat min vc for log roll technique    Ambulation/Gait Training  Ambulation/Gait Training Performed: Yes  Ambulation/Gait Training 1  Surface 1: Level tile  Device 1: Rolling walker  Gait Support Devices: Gait belt  Comments/Distance (ft) 1: 75x1, 425x1 (step through gait pattern, no noted LOB, x1 short standing rest break.)  Transfer 1  Technique 1: Sit to stand, Stand to sit  Transfer Device 1: Walker  Transfer Level of Assistance 1: Close supervision, Minimal verbal cues  Trials/Comments 1: vc/tc for hand placment on solid sitting surface and not RW.    Stairs  Stairs: Yes  Stairs  Comment/Number of Steps 1: pt states she uses rail and wall at home BUE used this tx, pt performed 4 steps with 2 rails.  CGA.  no overt LOB    Outcome Measures:  [facility] Basic Mobility  Turning from your back to your side while in a flat bed without using bedrails: A little  Moving from lying on your back to sitting on the side of a flat bed without using bedrails: A little  Moving to and from bed to chair (including a wheelchair): A little  Standing up from a chair using your arms (e.g. wheelchair or bedside chair): A little  To walk in hospital room: A little  Climbing 3-5 steps with railing: A little  Basic Mobility - Total Score: 18    Education Documentation  Mobility Training, taught by Lester Perrin PTA at 6/6/2025  1:18 PM.  Learner: Patient  Readiness: Acceptance  Method: Explanation  Response: Verbalizes Understanding    Education Comments  No comments found.        OP EDUCATION:       Encounter Problems       Encounter Problems (Active)       PT Problem       PT Goal 1 (Progressing)       Start:   06/05/25    Expected End:  06/19/25       Pt able to perform bed mobility modified independent.           PT Goal 2 (Progressing)       Start:  06/05/25    Expected End:  06/19/25       Pt able to complete all transfers modified independent.            PT Goal 3 (Progressing)       Start:  06/05/25    Expected End:  06/19/25       Pt able to ambulate 200 feet with front wheeled walker modified independent.           PT Goal 4 (Progressing)       Start:  06/05/25    Expected End:  06/19/25       Pt able to ascend/descend 14 stairs with LRAD modified independent to allow pt to access her second floor bedroom.

## 2025-06-06 NOTE — HH CARE COORDINATION
Home Care received a Referral for Physical Therapy and Occupational Therapy. We have processed the referral for a Start of Care on 6/8/2025 .     If you have any questions or concerns, please feel free to contact us at [phone]. Follow the prompts, enter your five digit zip code, and you will be directed to your care team on EAST 2.

## 2025-06-06 NOTE — NURSING NOTE

## 2025-06-06 NOTE — PROGRESS NOTES
06/06/25 1312   Discharge Planning   Home or Post Acute Services In home services   Type of Home Care Services Home PT;Home OT   Expected Discharge Disposition Home H  ([facility])   Does the patient need discharge transport arranged? No   Stroke Family Assessment   Stroke Family Assessment Needed No   Intensity of Service   Intensity of Service 0-30 min     Patient is medically cleared for discharge.  DISP: home  DME: none  HHC: [facility]  O2: none  WOUNDS: surgical  ADOD: today  Patient's spouse will transport her home.  Bell Robison RN

## 2025-06-06 NOTE — TELEPHONE ENCOUNTER
FYI patient was referred to [facility] for PT/OT upon hospital discharge however declined services when contacted for scheduling. Patient states she prefers to have OP therapy.  Please advise if this is ok with you as she is a post op patient and then I will cancel the referral and you would need to refer her to OP.

## 2025-06-06 NOTE — DISCHARGE SUMMARY
Discharge Diagnosis  Acquired left-sided Manuel syndrome  Cervical spondylosis with myelopathy    Issues Requiring Follow-Up  above    Test Results Pending At Discharge  Pending Labs       No current pending labs.            Hospital Course   Patient admitted on 06/04/25 for elective spine surgery with Dr. Camacho, undergoing C4-C7 Anterior Cervical Spine Discectomy and Fusion . Surgical procedure uncomplicated. Patient transferred to PACU and in stable condition transferred to the floor. Daily discussions were had with the patient, nursing staff, PT/OT, orthopedic team, case management, and family members if present.     Patient woke up day after surgery with left eye ptosis, headache. Neurology consulted and evaluated patient.     Patient worked with PT/OT daily with recommendations for discharge home with [facility]. Imaging performed including MR brain, MRA brain, MRA head & neck, CTA head & neck. Imaging negative for acute stroke or carotid dissection. Likely postoperative swelling compressing the cervical sympathetic chain - recommendations for follow up with neurology outpatient.     On discharge date patient tolerating regular diet and pain under good control. Vital signs stable and patient in satisfactory condition. Patient cleared by PT and orthopedic team & neurology and determined safe for discharge.     All questions answered. Appropriate discharge instructions and pain medications provided.     Patient should follow up at scheduled 2-3 week post up visit unless:  Uncontrolled bleeding from incision site  Intractable pain in extremities  Signs and symptoms of infection  Inability to urinate/have a bowel movement      Pertinent Physical Exam At Time of Discharge  Physical Exam    Home Medications     Medication List      START taking these medications     docusate sodium 100 mg capsule; Commonly known as: Colace; Take 1   capsule (100 mg) by mouth 2 times a day for 10 days.   methocarbamol 500 mg tablet;  Commonly known as: Robaxin; Take 1 tablet   (500 mg) by mouth 4 times a day as needed for muscle spasms for up to 10   days.   oxyCODONE 5 mg immediate release tablet; Commonly known as: Roxicodone;   Take 1 tablet (5 mg) by mouth every 6 hours if needed for severe pain (7 -   10) for up to 7 days.     CONTINUE taking these medications     aspirin 81 mg EC tablet   cyanocobalamin 50 mcg tablet; Commonly known as: Vitamin B-12   ferrous sulfate 325 mg (65 mg elemental) tablet   lisinopril 20 mg tablet; Take 1 tablet (20 mg) by mouth once daily.   metFORMIN  mg 24 hr tablet; Commonly known as: Glucophage-XR; Take   one tablet twice daily   metoprolol tartrate 25 mg tablet; Commonly known as: Lopressor; Take 1   tablet (25 mg) by mouth 2 times a day.   multivitamin tablet   omeprazole 40 mg DR capsule; Commonly known as: PriLOSEC; Take 1 capsule   (40 mg) by mouth once daily in the morning. Take before meals. Do not   crush or chew.   rosuvastatin 20 mg tablet; Commonly known as: Crestor; Take 1 tablet (20   mg) by mouth once daily.   traZODone 50 mg tablet; Commonly known as: Desyrel; TAKE ONE-HALF TABLET   BY MOUTH ONCE DAILY AT BEDTIME   * venlafaxine 75 mg tablet; Commonly known as: Effexor; TAKE ONE TABLET   BY MOUTH DAILY IN THE MORNING   * venlafaxine  mg 24 hr capsule; Commonly known as: Effexor-XR;   TAKE ONE CAPSULE BY MOUTH ONCE DAILY. DO NOT CRUSH OR CHEW.   Vitamin D3 25 mcg (1,000 units) capsule; Generic drug: cholecalciferol  * This list has 2 medication(s) that are the same as other medications   prescribed for you. Read the directions carefully, and ask your doctor or   other care provider to review them with you.     STOP taking these medications     chlorhexidine 0.12 % solution; Commonly known as: Peridex     ASK your doctor about these medications     meclizine 12.5 mg tablet; Commonly known as: Antivert; Take 1 tablet   (12.5 mg) by mouth 3 times a day as needed for dizziness.    nitroglycerin 0.3 mg SL tablet; Commonly known as: Nitrostat       Outpatient Follow-Up  Future Appointments   Date Time Provider Department [place]   6/9/2025  2:30 PM Yamilet Frazier DO [id number] [facility]   6/10/2025  2:00 PM Percy Lewis [id number] [facility]   6/10/2025  2:30 PM Orlando Tate PA-C [id number] [facility]   6/13/2025  3:40 PM Ravi Mcqueen MD [id number] [facility]   6/16/2025  4:30 PM Michael Guerrero DO [id number] [facility]   6/24/2025  9:30 AM Sonia Armenta PA-C [id number] [facility]   7/24/2025  3:40 PM Pasha Benson MD [id number] [facility]   10/21/2025  3:00 PM Yamilet Frazier DO [id number] [facility]   11/11/2025  3:00 PM GEN MAMMO 1 GENMAM [facility]   12/24/2025  2:00 PM Esperanza Ceballos MD [id number] [facility]   3/6/2026  1:45 PM Jae Garvey, ALFONSO [id number] [facility]       Sonia Armenta PA-C

## 2025-06-06 NOTE — PROGRESS NOTES
"Occupational Therapy    OT Treatment    Patient Name: Dinorah Horn \"Raquel\"  MRN: [id number]  Department: [facility] [id number]  Room: [address] [place] [address]  Today's Date: 6/6/2025  Time Calculation  Start Time: 1454  Stop Time: 1519  Time Calculation (min): 25 min        Assessment:  Prognosis: Good  Barriers to Discharge Home: No anticipated barriers  Evaluation/Treatment Tolerance: Patient limited by pain, Patient limited by fatigue  Medical Staff Made Aware: Yes  End of Session Communication: Bedside nurse  End of Session Patient Position: Up in chair, Alarm on  Prognosis: Good  Evaluation/Treatment Tolerance: Patient limited by pain, Patient limited by fatigue  Medical Staff Made Aware: Yes  Plan:  Treatment Interventions: ADL retraining, Functional transfer training, UE strengthening/ROM, Endurance training, Patient/family training, Equipment evaluation/education, Compensatory technique education  OT Frequency: 3 times per week  OT Discharge Recommendations: Low intensity level of continued care  Equipment Recommended upon Discharge:  (TBD)  OT Recommended Transfer Status: Assist of 1  OT - OK to Discharge: Yes (continue per OT POC)  Treatment Interventions: ADL retraining, Functional transfer training, UE strengthening/ROM, Endurance training, Patient/family training, Equipment evaluation/education, Compensatory technique education    Subjective   OT Visit Info:  OT Received On: 06/06/25  General Visit Info:  General  Reason for Referral: Pt is POD#2 C4-C7 Anterior Cervical Spine Discectomy and Fusion (Spine Cervical). Pt c/o persistent L sided headache and ptosis in the L eye post op.  Referred By: Kathi (LULÚ)  Past Medical History Relevant to Rehab: Past Medical History:  Diagnosis Date    Alopecia     Cervical spinal cord compression     C5-7    Chronic back pain     Chronic sinusitis     Cirrhosis of liver (Multi)     former alcohol abuse    Coronary artery disease     CABG in 2010, no recent follow up with cardiology, Apt for " cardiac risk assessment made with Dr. Dubon    Depression     Diabetes mellitus (Multi)     Fibromyalgia     GERD (gastroesophageal reflux disease)     under control with medication    Hiatal hernia     HLD (hyperlipidemia)     Hypertension     Insomnia     Irritable bowel syndrome     Lung nodule     CT 10/2021 calcified granulomas in the right  lower lobe.    Lymphadenopathy     US 1/2021 Sonographically benign-appearing lymph node redemonstrated superior  and lateral to the left thyroid lobe    Numbness and tingling     arms, hands, feet and legs    Palpitations     occasional, did wear heart monitor 6/15/21-results in CE, seeing dr. Dubon 5/30/25    Postural dizziness with near syncope     recent ED admit 5/11/25; Carotid US, head CT and MRI all WNL, patient found to have a UTI, discharged on ATB's [id number], per patient no further issues    Prolonged QT interval     Psoriasis     Psoriatic arthritis (Multi)     follows with Rhematology    RA (rheumatoid arthritis)     Raynaud's disease     cold fingertips witth pain    Thyroid nodule     US 1/2021 Nonenlarged thyroid gland with scattered subcentimeter nodules. No  routine follow-up is required per ACR TI-RADS guidelines.    Trigger finger     UTI (urinary tract infection) 05/11/2025    treated with 10 day coarse of ATB's      Family/Caregiver Present: No  Prior to Session Communication: Bedside nurse  Patient Position Received: Bed, 3 rail up, Alarm on  Preferred Learning Style: auditory, verbal, visual  General Comment: Pt supine in bed upon arrival and agreeable to treatment with min encouragement. Pt fully participatory.  Precautions:  Medical Precautions: Fall precautions, Spinal precautions  Post-Surgical Precautions: Spinal precautions  Braces Applied: Aspen collar (pt donned/doffed during session while standing in front of mirror in bathroom with SBA and cues for technique to adhere to spinal precautions, educated pt on option to perform seated in  chair in front of mirror, pt requests to perform standing)  Precautions Comment: pt requires cues to recall spinal precautions            Pain:  Pain Assessment  Pain Assessment: 0-10  0-10 (Numeric) Pain Score: 6  Pain Type: Surgical pain  Pain Location: Neck  Pain Orientation: Anterior (+ shoulders, RN aware, pt premedicated)  Pain Interventions: Repositioned, Ambulation/increased activity  Response to Interventions: Resting quietly    Objective    Cognition:  Cognition  Overall Cognitive Status: Within Functional Limits  Orientation Level: Oriented X4       Activities of Daily Living:      Grooming  Grooming Level of Assistance: Close supervision, Minimal verbal cues  Grooming Where Assessed: Standing sinkside  Grooming Comments: pt performed oral hygiene at sink, cues for body positioning & compensatory techniques to maintain spinal precautions as pt frequently attempting to perform trunk flexion during task    UE Dressing  UE Dressing Level of Assistance: Close supervision, Minimal verbal cues  UE Dressing Where Assessed: Edge of bed  UE Dressing Comments: pt doffed gown and donned pullover garment, cues for compensatory techniques to perform task; pt donned/doffed [place] collar with SBA and cues for technique to maintain spinal precautions throughout, pt performing in front of mirror    LE Dressing  LE Dressing: Yes  Pants Level of Assistance: Close supervision, Minimal verbal cues  Sock Level of Assistance: Close supervision, Minimal verbal cues  Shoe Level of Assistance: Close supervision, Minimal verbal cues  LE Dressing Where Assessed: Edge of bed  LE Dressing Comments: cues for sequencing and compensatory techniques to adhere to cervical spinal precautions, pt utilizing figure four technique to perform        Bed Mobility/Transfers: Bed Mobility  Bed Mobility: Yes  Bed Mobility 1  Bed Mobility 1: Supine to sitting, Log roll  Level of Assistance 1: Close supervision, Minimal verbal cues  Bed Mobility  Comments 1: cues for sequencing steps of log roll, HOB minimally elevated    Transfers  Transfer: Yes  Transfer 1  Technique 1: Sit to stand, Stand to sit  Transfer Device 1: Gait belt  Transfer Level of Assistance 1: Close supervision, Minimal verbal cues  Trials/Comments 1: cues for safe hand placement and increased eccentric control, pt declines using RW this date       Sitting Balance:  Static Sitting Balance  Static Sitting-Balance Support: Bilateral upper extremity supported, Feet supported  Static Sitting-Level of Assistance: Distant supervision  Static Sitting-Comment/Number of Minutes: at EOB  Dynamic Sitting Balance  Dynamic Sitting-Comments: SBA at EOB  Standing Balance:  Static Standing Balance  Static Standing-Balance Support: No upper extremity supported  Static Standing-Level of Assistance: Close supervision  Dynamic Standing Balance  Dynamic Standing-Comments: SBA-CGA       Therapy/Activity: Therapeutic Activity  Therapeutic Activity Performed: Yes  Therapeutic Activity 1: Pt educated on spinal precautions, log roll, how to don/doff Aspen collar and having collar donned at all times except for bathing, compensatory techniques for ADLs/IADLs, home safety, and AE/DME for safe homegoing. Pt receptive to all education, verbalized understanding.  Therapeutic Activity 2: Pt functionally navigated x min household distance in room using no AD with initial CGA but progressed to SBA. Cues for upright posture and safety, no overt LOB noted.      Outcome Measures:[facility] Daily Activity  Putting on and taking off regular lower body clothing: A little  Bathing (including washing, rinsing, drying): A little  Putting on and taking off regular upper body clothing: A little  Toileting, which includes using toilet, bedpan or urinal: A little  Taking care of personal grooming such as brushing teeth: A little  Eating Meals: None  Daily Activity - Total Score: 19        Education Documentation  Handouts, taught by Manuela  LORENA Hoang at 6/6/2025  4:38 PM.  Learner: Patient  Readiness: Acceptance  Method: Explanation, Demonstration, Handout  Response: Verbalizes Understanding, Needs Reinforcement    Precautions, taught by Manuela Hoang OT at 6/6/2025  4:38 PM.  Learner: Patient  Readiness: Acceptance  Method: Explanation, Demonstration, Handout  Response: Verbalizes Understanding, Needs Reinforcement    Body Mechanics, taught by Manuela Hoang OT at 6/6/2025  4:38 PM.  Learner: Patient  Readiness: Acceptance  Method: Explanation, Demonstration, Handout  Response: Verbalizes Understanding, Needs Reinforcement    ADL Training, taught by Manuela Hoang OT at 6/6/2025  4:38 PM.  Learner: Patient  Readiness: Acceptance  Method: Explanation, Demonstration, Handout  Response: Verbalizes Understanding, Needs Reinforcement         Goals:  Encounter Problems       Encounter Problems (Active)       ADLs       Patient will perform UB and LB bathing with modified independent level of assistance and grab bars, shower chair, and long-handled sponge.       Start:  06/05/25    Expected End:  06/19/25            Patient with complete upper body dressing with modified independent level of assistance donning and doffing all UE clothes with PRN adaptive equipment. (Progressing)       Start:  06/05/25    Expected End:  06/19/25            Patient with complete lower body dressing with modified independent level of assistance donning and doffing all LE clothes  with PRN adaptive equipment. (Progressing)       Start:  06/05/25    Expected End:  06/19/25            Patient will complete daily grooming tasks with modified independent level of assistance and PRN adaptive equipment while standing. (Progressing)       Start:  06/05/25    Expected End:  06/19/25            Patient will complete toileting including hygiene clothing management/hygiene with modified independent level of assistance and raised toilet seat and grab bars.       Start:  06/05/25     Expected End:  06/19/25               MOBILITY       Patient will perform Functional mobility x Household distances/Community Distances with modified independent level of assistance and least restrictive device in order to improve safety and functional mobility. (Progressing)       Start:  06/05/25    Expected End:  06/19/25               TRANSFERS       Patient will perform bed mobility modified independent level of assistance via log roll technique in order to improve safety and independence with mobility (Progressing)       Start:  06/05/25    Expected End:  06/19/25            Patient will complete functional transfers with least restrictive device with modified independent level of assistance. (Progressing)       Start:  06/05/25    Expected End:  06/19/25

## 2025-06-09 ENCOUNTER — APPOINTMENT (OUTPATIENT)
Dept: PRIMARY CARE | Facility: CLINIC | Age: 60
End: 2025-06-09
Payer: MEDICARE

## 2025-06-10 ENCOUNTER — APPOINTMENT (OUTPATIENT)
Dept: OTOLARYNGOLOGY | Facility: CLINIC | Age: 60
End: 2025-06-10
Payer: MEDICARE

## 2025-06-10 ENCOUNTER — TELEPHONE (OUTPATIENT)
Dept: INPATIENT UNIT | Facility: HOSPITAL | Age: 60
End: 2025-06-10

## 2025-06-10 ENCOUNTER — APPOINTMENT (OUTPATIENT)
Dept: AUDIOLOGY | Facility: CLINIC | Age: 60
End: 2025-06-10
Payer: MEDICARE

## 2025-06-13 ENCOUNTER — APPOINTMENT (OUTPATIENT)
Facility: CLINIC | Age: 60
End: 2025-06-13
Payer: MEDICARE

## 2025-06-13 NOTE — PROGRESS NOTES
"Verbal consent of the patient and/or verbal parental consent for patients under the age of 18 have been obtained to conduct a physical examination at this office visit.    Established patient  History Of Present Illness  06/13/25 Dinorah Horn \"Pratibha" is a 60 y.o. female who presents for MRI review of their BILATERAL hand with possible cortisone injection      All previous Progress Notes and imaging results related to this patients chief complaint have been reviewed in preparation for this examination.    Past Medical History  She has a past medical history of Alopecia, Cervical spinal cord compression, Chronic back pain, Chronic sinusitis, Cirrhosis of liver (Multi), Coronary artery disease, Depression, Diabetes mellitus (Multi), Fibromyalgia, GERD (gastroesophageal reflux disease), Hiatal hernia, HLD (hyperlipidemia), Hypertension, Insomnia, Irritable bowel syndrome, Lung nodule, Lymphadenopathy, Numbness and tingling, Palpitations, Postural dizziness with near syncope, Prolonged QT interval, Psoriasis, Psoriatic arthritis (Multi), RA (rheumatoid arthritis), Raynaud's disease, Thyroid nodule, Trigger finger, and UTI (urinary tract infection) (05/11/2025).    Surgical History  She has a past surgical history that includes Appendectomy; Breast surgery (2003); Coronary artery bypass graft (10/2010); Hysterectomy (Partial, 2000); Forearm fracture surgery (Right, 1992); Forearm hardware removal (Right, 2003); Liver biopsy; Cardiac catheterization; Colonoscopy; and Knee arthroscopy w/ laser (Left).     Social History  She reports that she quit smoking about 16 years ago. Her smoking use included cigarettes. She started smoking about 31 years ago. She has a 15 pack-year smoking history. She has never used smokeless tobacco. She reports that she does not currently use alcohol. She reports that she does not use drugs.    Family History  Family History   Problem Relation Name Age of Onset    Alcohol abuse Mother Trice" "   Cancer Mother Trice     Diabetes Mother Trice     Heart disease Mother Trice     Arthritis Father Reed     Atrial fibrillation Father Reed     Cancer Father Reed     COPD Father Reed     Hearing loss Father Reed     Heart disease Father Reed     Stroke Paternal Grandfather Guy     Alcohol abuse Paternal Grandmother Brigette     Alcohol abuse Sister Ioana     Alcohol abuse Sister Alis     Alcohol abuse Daughter Katerine     Cancer Father's Brother Xavier     Cancer Father's Brother Daljit     Colon cancer Father's Brother Daljit J     Drug abuse Sister Ioana GARCIA      Historical Clinical Intake:  11/27/24 Dinorah Horn \"Raquel\" is a 59 y.o. female who presents for an evaluation of their BILATERAL hand. States that she has been dealing with hand pain for years. She was referred in by her Rheumatologist Dr. Ravi Mcqueen for referall to sports medicine/orthopedics for peroneal tendon tear and CSI of stenosing tenosynovitis of L 3rd digit especially trigger finger.  States that she has inconsistent 5/10 pain. States that rainy weather and cold weather are what flare her up the most. States that when flare ups occur she tries to tolerate it and tough it out as much as she can. States that she feels numbness tingling pins needles all the time in both hands right worse than left. States that she feels cracks and pops in both hands right worse than left, states she has history of carpel tunnel in both hands. She states that the trigger finger for the middle finger of the right hand is her biggest concern.  Additionally she had some diagnostic ultrasound done on top of her x-rays that showed synovitis and synovial thickening of multiple peripheral joints especially throughout the fingers with tenosynovitis noted of multiple areas especially 2nd through 5th digits at the MCP joint consistent with synovial proliferation most likely related to inflammatory and/or crystal arthropathies.  Rheumatology " has her on hydroxychloroquine  AKA Plaquenil 200 mg daily.      Allergies  Cosentyx [secukinumab]    Review of Systems  CONSTITUTIONAL:   Negative for weight change, loss of appetite, fatigue, weakness, fever, chills, night sweats, headaches .           HEENT:   Negative for cold, cough, sore throat, sinus pain, swollen lymph nodes.           OPHTHALMOLOGY:   Negative for diminished vision, blurred vision, loss of vision, double vision.           ALLERGY:   Negative for runny nose, scratchy throat, sinus congestion, rash, facial pressure, nasal congestion, post-nasal drip.           CARDIOLOGY:   Negative for chest pain, palpitations, murmurs, irregular heart beat, shortness of breath, leg edema, dyspnea on exertion, fatigue, dizziness.           RESPIRATORY:   Negative for chest pain, shortness of breath, swelling of the legs, asthma/copd, chest congestion, pain with breathing .           GASTROENTEROLOGY:   Negative for nausea, vomitting, heartburn, constipation, diarrhea, blood in stool, change in bowel habits, black stool.           HEMATOLOGY/LYMPH:   Negative for fatigue, loss of appetitie, easy bruising, easy bleeding, anemia, abnormal bleeding, slow healing.           ENDOCRINOLOGY:   Negative for polyuria, polydipsia, polyphagia, fatigue, weight loss, weight gain, cold intolerance, heat intolerance, diabetes.           MUSCULOSKELETAL:   Positive  for BILATERAL hand/wrists        DERMATOLOGY:   Negative for rash, bruising.           NEUROLOGY:   Negative for tingling, numbness, gait abnormality, paresthesias, weakness, sciatica.        Examination:i  BILATERAL  Thumb, 3rd Digit, and MCP  Erythema: Negative.   Edema: Positive all of her fingers sausage style fingers mixed with edema  Effusion: Negative.   Warmth: Negative.   Ecchymosis/Bruising: Negative.   Percussion Test: Negative.   Tuning Fork Test: Negative.   Abrasions: Negative.   Orientation: Positive all of her fingers sausage style fingers mixed  with edema            ROM:   Wrist Flexion (80 degrees)  Wrist Extension (70 degrees)  Wrist Supination (90 degrees)  Wrist Pronation (90 degrees)         Wrist Ulnar Deviation (30 degrees)  Wrist Radial Deviation (20 degrees)          Finger MCP (100 degrees)/ PIP (100 degrees)/ DIP (90 degrees) Flexion  Finger MCP (30 degrees) /PIP (0 degrees) / DIP (20 degrees) Extension          Fingers ABduction (20 degrees)  Fingers ADduction (0 degrees)          Thumb & Fingertip Opposition Positive  unable to perform right side  Thumb Circumduction.    Positive all of her fingers sausage style fingers mixed with edema and square thumb at MCP joint        Muscle Strength:   Positive Decreased muscle strength  +5/+5 Wrist Flexion  +5/+5 Wrist Extension        +5/+5 Wrist Supination  +5/+5 Wrist Pronation          +5/+5 Wrist Ulnar Deviation  +5/+5 Wrist Radial Deviation           Positive +4/+5 Finger MCP/PIP/DIP Flexion    Positive +4/+5 Finger MCP/PIP/DIP Extension            Positive +4/+5 Finger Abduction    Positive +4/+5 Finger Adduction            Positive +4/+5 Finger/Thumb Opposition    Positive +4/+5 Thumb Circumduction.            Motor/Neurological:    Normal  Tip of Thumb (Median Nerve)  Tip of 5th Finger (Ulnar Nerve)  Flex and Extend Thumb (Median and Radial Nerve)  Scissor Fingers (Ulnar Nerve)  Raise Thumb Against Resistance on Flat Surface (Median Nerve).          Sensation/Neurological:   Negative, Sensation Intact, 2 Point Discrimination Test Negative         C6: Lateral forearms, thumbs, anterior index finger, lateral half of the middle finger  C7: Some of posterior index finger, medial half of middle finger, upper posterior back, back of arms  C8: Ring finger, little finger, medial forearm, posterior upper back.     Palpation:   Positive Tenderness to Palpation BILATERAL Thumb, 3rd Digit, and MCP   at 3rd finger A1 pulley         Vascular:   +2/+4 Radial Pulse  +2/+4 Ulnar Pulse  Capillary Refill < 2  seconds.               Wrist/Hand/Finger - Motor Function:  Vincenzo-Littler Test: Negative.   Retinacular Test: Negative.   Princh  Test:   Positive   Key  Test: Negative.   Power  Test:   Positive   Trell  Test: Negative.         Wrist/Hand/Finger - Nerve Lesions/Compression Neuropathies: Right Side clinically worse than left  Carpal Tunnel Sign Test: Negative.  .   Phalen's Sign Test:Negative.    Reverse Phalen's Sign Test: Negative.   Wartenberg Sign Test: Negative.   Tinel's Sign Test: Negative.   Opposition Test: Positive right sided.   Pinch Test: Positive btwn 3rd finger and thumb.   Carpal/Digital Compression Test:Negative.   Ochsner Test: Negative.   Froment's Sign Ulnar Nerve Test: Negative.   Intrinsic Ulnar Nerve Test: Negative.   O Test:Positive btwn 3rd finger and thumb.       Wrist/Hand/Finger - Stability:  Valgus Stess Test: Negative.   Varus Stess Test: Negative.   Dumont Scaphoid Shift Test: Negative.   Scapholunate Ballotment Test: Negative.   Pérez Test: Negative.   Dorsal Capitate Displacement Apprehension Test: Negative.   Shuck Test: Negative.         Wrist/Hand/Finger - Tenosynovitis:  Flexor Digitorum Profundus Test:  Positive btwn 3rd finger    Flexor Digitorum Superficialis Test:  Positive btwn 3rd finger   Flexor Pollicis Longus Test:  Negative.   Flexor Pollicis Brevis Test:  Negative.   Flexor Radial Longus Test:  Negative.   Flexor Radial Brevis Test:  Negative.   Extensor Digitorum Profundus Test:  Negative.   Extensor Digitorum Superficialis Test:  Negative.   Extensor Pollicis Longus Test:  Negative.   Extensor Pollicis Brevis (EPB)[Dequervain's):  Negative.   Extensor Radial Longus Test:  Negative.   Extensor Radial Brevis Test:  Negative.   Muckard Test:  Positive .   Finklestein Test:  Positive .   Kanavel Sign:  Negative.   Flexor and Extensor Longus Test:  Negative.         Wrist/Hand/Finger - TFCC:  Supination Lift Test: Negative.   Grind Test: Negative.              Imaging and Diagnostics Review:  MRI of the  left hand without IV contrast;  12/18/2024 1:22 pm   Joints:  Severe degenerative changes at the 1st CMC joint and triscaphe joint  with degenerative subchondral cysts in the proximal metacarpal and  trapezium. Mild degenerative changes at the 1st MCP and at the  2nd-5th distal interphalangeal and 5th proximal interphalangeal  joints. Moderate degenerative changes with subchondral edema and  cystic changes in the 1st interphalangeal joint. No erosions. No  synovitis or abnormal joint effusion.      Osseous structures:  There is no evidence of acute fracture or dislocation. There is no  avascular necrosis. Irregular T2 hyperintense and T1 hypointense  lesion in the distal 3rd metacarpal intramedullary space.      Soft tissues:  There is no evidence of muscular atrophy or tear.  No suspicious soft tissue lesions are seen.      IMPRESSION:  1. Severe degenerative changes of the 1st CMC and triscaphe joint.  Moderate degenerative changes at the 1st interphalangeal joint. Mild  degenerative changes of the 1st MCP and 2nd-5th distal  interphalangeal and 5th proximal interphalangeal joints. Distribution  is compatible with osteoarthritic disease.  2. Nonaggressive appearing lesion in the distal 3rd metacarpal, may  represent chondroid or cystic lesion.  3. No evidence of inflammatory arthropathy.      Signed by: Tara Otero 12/19/2024 10:01 AM  Dictation workstation:   [id number]   --------------------------------------------------------------------------    MRI of the  right hand without IV contrast;  12/18/2024 1:01 pm   FINDINGS:  Suboptimal secondary to motion artifact on multiple sequences.      Tendons:  Mild tendinosis and tenosynovitis of the 3rd digit flexor tendons at  the level of the proximal phalanx. The flexor and extensor tendons of  the hand are otherwise intact. The tendons of the thenar and  hypothenar compartments are normal.      Ligaments:  The  major ligamentous structures of the hand are intact.      Joints:  Mild flexion deformity of the 5th proximal interphalangeal joint  which may be due to chronic ligament injury.      Moderate degenerative changes at the 1st metacarpophalangeal and 5th  proximal interphalangeal joints with subchondral edema and  osteophytes. Mild degenerative changes at the 1st interphalangeal  joint with osteophytosis. Severe degenerative changes of the 1st  carpometacarpal joint with edema and erosive change in the proximal  metacarpal and in the trapezium. No erosions.      Osseous structures:  There is no evidence of acute fracture or dislocation. There is no  avascular necrosis. No marrow replacing lesions are seen.      Soft tissues:  There is no evidence of muscular atrophy or tear.  No suspicious soft tissue lesions are seen.      IMPRESSION:  1. Severe degenerative changes of the 1st CMC joint. Moderate  degenerative changes at the 1st metacarpophalangeal and 5th proximal  interphalangeal joints. Mild degenerative changes of the 1st  interphalangeal joint. This distribution is compatible with  osteoarthritic changes.  2. Mild tendinosis and tenosynovitis of the 3rd digit flexor tendons  at the level of the proximal phalanx.  3. Mild flexion deformity of the 5th proximal interphalangeal joint  which may be due to chronic ligament injury.  4. No erosions.    Signed by: Tara Otero 12/19/2024 9:57 AM  Dictation workstation:   [id number]     --------------------------------------------------------------------------  US MSK upper extremity joints tendons muscles  US MSK upper extremity joints tendons muscles  Study Result  Narrative & Impression   Interpreted By:  Reed Patrick,   STUDY:  Musculoskeletal ultrasound of the bilateral hand and wrist dated  10/22/2024.  INDICATION:  Hand pain. Synovial screen.  COMPARISON:  None. Correlation is made with 09/18/2024 radiographs.   ACCESSION NUMBER(S):  [id number];  [id number]  ORDERING CLINICIAN:  MORENO LANE  TECHNIQUE:  Multiplanar color and grayscale ultrasound of the bilateral hand and  wrist was performed. Exam was performed according to a synovial  screening protocol.      FINDINGS:  RIGHT:      No fluid is evident in the flexor or extensor tendon sheaths as seen  on this exam.      Marginal osteophytes are seen of the 1st digit carpometacarpal  articulation without joint effusion synovial proliferation or  increased color signal intensity evident.      No significant no joint effusion synovial proliferation or increased  color signal intensity is evident at the 1st digit  metacarpophalangeal joint. There is mild marginal osteophyte  formation at the interphalangeal joint of the 1st digit without joint  effusion synovial proliferation or increased color signal intensity  evident.      No significant joint effusion synovial proliferation or increased  color signal intensity is evident at the 2nd metacarpophalangeal  joint. Marginal micro osteophyte formation is seen of the proximal  distal interphalangeal joints of the 2nd digit without joint effusion  increased color signal intensity or synovial proliferation evident.      There is a trace volume effusion at the 3rd digit metacarpophalangeal  joint without synovial proliferation or increased color signal  intensity evident. Marginal micro osteophyte formation is seen of the  proximal distal interphalangeal joint of the 3rd digit without joint  effusion synovial proliferation or increased color signal intensity  evident.      No joint effusion synovial proliferation or increased color signal  intensity is evident at the 4th digit metacarpophalangeal joint.  There is a trace volume effusion of the 4th digit proximal  interphalangeal joint possibly with some mild capsular hyperemia  without synovial proliferation evident. No significant degenerative  change joint effusion are increased color signal intensity is  evident  at the distal interphalangeal joint of the 4th digit.      No joint effusion synovial proliferation or increased color signal  intensity is evident at the 5th digit metacarpophalangeal joint.  Marginal osteophyte formation and small effusion is seen of the  proximal interphalangeal joint of the 5th digit without synovial  proliferation or increased color signal intensity evident. There is a  degree of flexion deformity at this joint. No significant  degenerative change joint effusion or synovial proliferation is  evident at the distal interphalangeal joint of the 5th digit.      LEFT:      No fluid is evident in the flexor or extensor tendon sheaths as seen  on this examination.      No joint effusion synovial proliferation or increased color signal  intensity is evident at the metacarpophalangeal joint of the 1st  digit. Marginal osteophyte formation is seen of the interphalangeal  joint of the 1st digit without joint effusion synovial proliferation  or increased color signal intensity evident.      There is synovial proliferation with trace joint effusion and mild  hyperemia at the 2nd digit metacarpophalangeal joint. Micro  osteophyte formation is seen at the proximal distal interphalangeal  joint of the 2nd digit without joint effusion synovial proliferation  or increased color signal intensity evident.      No joint effusion synovial proliferation or increased color signal  intensity is evident at the 3rd digit metacarpophalangeal joint.  Micro osteophyte formation is seen of the proximal distal  interphalangeal joint of the 3rd digit without joint effusion  synovial proliferation or increased color signal intensity evident.      No joint effusion synovial proliferation or increased color signal  intensity is evident at the 4th digit metacarpophalangeal joint. No  joint effusion synovial proliferation or increased color signal  intensity is evident at the 4th digit interphalangeal joints.      There is  a small volume joint effusion with mild synovial  proliferation without increased color signal intensity of the 5th  digit metacarpophalangeal joint. There is osteophyte formation with  trace joint effusion of the proximal interphalangeal joint of the 5th  digit. No synovial proliferation or increased color signal intensity  is evident. Micro osteophyte formation is seen of the distal  interphalangeal joint of the 5th digit without joint effusion  synovial proliferation or increased color signal intensity evident.      IMPRESSION DX U/S of B/L upper extremities:  Polyarticular degenerative change of the bilateral hand and wrist in  a pattern overall likely related to osteoarthrosis although there are  some joints that have trace effusions, described above which are  nonspecific and possibly reactive. Given this, there does appear to  be some synovial proliferation at the 2nd and 5th digit  metacarpophalangeal joint of the left hand which could be related to  crystal and/or inflammatory arthropathy.      Signed by: Reed Patrick 10/22/2024 2:42 PM  Dictation workstation:   [id number]       Assessment   No diagnosis found.      Treatment or Intervention:  May continue alternating ice and moist heat therapy as needed   Stressed the importance of trying to keep her hands warm because of the amount of arthritis she has especially in the winter,   Start into Hand/Physical Therapy 1-2 times a week for 8-10 weeks with manual therapy as well as dry needling and IASTM  Reviewed home exercises to be performed by the patient routinely   Recommendation over-the-counter vitamin-D 2 -3000+ milligrams a day, as well as  a daily multivitamin.  Recommendation over-the-counter curcumin, turmeric, boswellia,  as directed to aid with joint inflammation.   Recommendation over-the-counter Move Free ULTRA for joint health.    Continue hydroxychloroquine  AKA Plaquenil 200 mg daily per her Rheumatologist Dr. Ravi Mcqueen .  Patient  advised regarding the risks and/or potential adverse reactions and/or side effects of any prescribed medications along with any over-the-counter medications or any supplements used. Patient advised to seek immediate medical care if any adverse reactions occur. The patient and/or patient(s) parent(s) verbalized their understanding  MRI bilateral hands to further evaluate A1 pulley system for trigger fingers as well as further evaluate ligaments and tendons and further evaluate patient's bone structures  Continue follow-ups regularly as instructed by her Rheumatologist Dr. Ravi Mcqueen   Most likely in the future corticosteroid injections not only for the thumb but also trigger fingers  Also possibility in the future regenerative injections versus viscosupplementation injections versus a combination of both for fingers and thumbs  Possibility referral to orthopedics for surgical evaluation for trigger fingers if injections do not work however patient would like to try to avoid surgical intervention only for her fingers but also her thumbs  Follow-up       Please note that this report has been produced using speech recognition software.  It may contain errors related to grammar, punctuation or spelling.  Electronically signed, but not reviewed.  YAZMIN Flores, Director of Sports Medicine      RISSA MARTÍNEZ on 6/13/25 at 12:20 PM.     DEYSI Flores DO

## 2025-06-16 ENCOUNTER — APPOINTMENT (OUTPATIENT)
Dept: ORTHOPEDIC SURGERY | Facility: CLINIC | Age: 60
End: 2025-06-16
Payer: MEDICARE

## 2025-06-17 DIAGNOSIS — G89.18 POSTOPERATIVE PAIN AFTER SPINAL SURGERY: ICD-10-CM

## 2025-06-17 DIAGNOSIS — M47.12 CERVICAL SPONDYLOSIS WITH MYELOPATHY: ICD-10-CM

## 2025-06-17 RX ORDER — METHOCARBAMOL 500 MG/1
500 TABLET, FILM COATED ORAL 4 TIMES DAILY PRN
Qty: 40 TABLET | Refills: 0 | Status: SHIPPED | OUTPATIENT
Start: 2025-06-17 | End: 2025-06-27

## 2025-06-19 ENCOUNTER — DOCUMENTATION (OUTPATIENT)
Dept: PHYSICAL THERAPY | Facility: HOSPITAL | Age: 60
End: 2025-06-19
Payer: MEDICARE

## 2025-06-19 ENCOUNTER — TRANSCRIBE ORDERS (OUTPATIENT)
Dept: ORTHOPEDIC SURGERY | Facility: HOSPITAL | Age: 60
End: 2025-06-19
Payer: MEDICARE

## 2025-06-19 DIAGNOSIS — Z98.1 STATUS POST CERVICAL SPINAL FUSION: ICD-10-CM

## 2025-06-23 ENCOUNTER — APPOINTMENT (OUTPATIENT)
Dept: PRIMARY CARE | Facility: CLINIC | Age: 60
End: 2025-06-23
Payer: MEDICARE

## 2025-06-23 VITALS
HEART RATE: 101 BPM | WEIGHT: 177.6 LBS | DIASTOLIC BLOOD PRESSURE: 80 MMHG | BODY MASS INDEX: 26.23 KG/M2 | OXYGEN SATURATION: 98 % | SYSTOLIC BLOOD PRESSURE: 142 MMHG | TEMPERATURE: 97.2 F

## 2025-06-23 DIAGNOSIS — K70.30 ALCOHOLIC CIRRHOSIS OF LIVER WITHOUT ASCITES (MULTI): ICD-10-CM

## 2025-06-23 DIAGNOSIS — D64.9 ANEMIA, UNSPECIFIED TYPE: Primary | ICD-10-CM

## 2025-06-23 PROCEDURE — 3079F DIAST BP 80-89 MM HG: CPT | Performed by: FAMILY MEDICINE

## 2025-06-23 PROCEDURE — 99214 OFFICE O/P EST MOD 30 MIN: CPT | Performed by: FAMILY MEDICINE

## 2025-06-23 PROCEDURE — 4010F ACE/ARB THERAPY RXD/TAKEN: CPT | Performed by: FAMILY MEDICINE

## 2025-06-23 PROCEDURE — 3077F SYST BP >= 140 MM HG: CPT | Performed by: FAMILY MEDICINE

## 2025-06-23 PROCEDURE — 3044F HG A1C LEVEL LT 7.0%: CPT | Performed by: FAMILY MEDICINE

## 2025-06-23 NOTE — PROGRESS NOTES
"Subjective   Patient ID: Dinorah Horn \"Raquel\" is a 60 y.o. female who presents for Dizziness, Abdominal Pain, Anemia, Weight Loss, and CANCER SCREENING (Dinorah would like to discuss cancer screening also she complaining of dizziness stomach ache weight loss and anemia).  HPI  Breast cancer last 11-24  Normal  Colonoscopy 2 years ago normal   Very anemic at last check and no follow so far   Review of Systems   Constitutional:  Positive for fatigue. Negative for activity change, appetite change, fever and unexpected weight change.   HENT:  Negative for congestion, ear pain, postnasal drip, rhinorrhea, sinus pressure and sore throat.    Eyes:  Negative for discharge, itching and visual disturbance.   Respiratory:  Positive for chest tightness. Negative for cough, shortness of breath and wheezing.         Breathless    Cardiovascular:  Negative for chest pain, palpitations and leg swelling.        Tachycardic with activity    Gastrointestinal:  Negative for abdominal pain, blood in stool, constipation, diarrhea, nausea and vomiting.        DENIED BLOOD IN STOOL  REPORTS NORMAL SCOPE AND NO GI ISSUES   KNOWN CIRRHOSIS /ALCOHOL RELATED    Endocrine: Negative for cold intolerance, heat intolerance and polyuria.   Genitourinary:  Negative for dysuria, flank pain and hematuria.   Musculoskeletal:  Negative for arthralgias, gait problem, joint swelling and myalgias.   Skin:  Negative for rash.   Allergic/Immunologic: Negative for environmental allergies and food allergies.   Neurological:  Negative for dizziness, syncope, weakness, light-headedness, numbness and headaches.   Hematological:         PALE AND ANEMIC NOW FOR SOME TIME    Psychiatric/Behavioral:  Negative for dysphoric mood and sleep disturbance. The patient is not nervous/anxious.            6/5/2025     2:48 PM 6/5/2025     7:49 PM 6/5/2025    11:16 PM 6/6/2025     4:08 AM 6/6/2025    11:31 AM 6/23/2025    11:35 AM 7/1/2025     3:33 PM   Vitals   Systolic 116 " "128 107 109 121 142 118   Diastolic 74 64 68 66 70 80 70   BP Location  Right arm Right arm    Left arm   Heart Rate  103 78 88 83 101 84   Temp  37.1 °C (98.8 °F) 37.1 °C (98.7 °F) 35.6 °C (96.1 °F) 36.5 °C (97.7 °F) 36.2 °C (97.2 °F)    Resp  20 19 17 18     Height       1.753 m (5' 9\")   Weight (lb)      177.6 177   BMI      26.23 kg/m2 26.14 kg/m2   BSA (m2)      1.98 m2 1.98 m2   Visit Report      Report Report       Body mass index is 26.23 kg/m².      Objective   Physical Exam  Vitals and nursing note reviewed.   Constitutional:       Appearance: Normal appearance. She is ill-appearing.   HENT:      Head: Normocephalic.   Cardiovascular:      Rate and Rhythm: Normal rate and regular rhythm.      Pulses: Normal pulses.      Heart sounds: Normal heart sounds. No murmur heard.     No friction rub. No gallop.   Pulmonary:      Effort: Pulmonary effort is normal. No respiratory distress.      Breath sounds: Normal breath sounds. No wheezing.   Abdominal:      General: There is no distension.      Palpations: Abdomen is soft.      Tenderness: There is no abdominal tenderness.   Musculoskeletal:         General: No deformity. Normal range of motion.   Skin:     General: Skin is warm and dry.      Capillary Refill: Capillary refill takes less than 2 seconds.      Coloration: Skin is pale.   Neurological:      General: No focal deficit present.      Mental Status: She is alert and oriented to person, place, and time.   Psychiatric:         Mood and Affect: Mood normal.         Assessment/Plan   Problem List Items Addressed This Visit       Alcoholic cirrhosis (Multi)    Anemia - Primary    Relevant Orders    Referral To Hematology and Oncology          Yamilet Frazier DO     "

## 2025-06-24 ENCOUNTER — HOSPITAL ENCOUNTER (OUTPATIENT)
Dept: RADIOLOGY | Facility: CLINIC | Age: 60
Discharge: HOME | End: 2025-06-24
Payer: MEDICARE

## 2025-06-24 ENCOUNTER — OFFICE VISIT (OUTPATIENT)
Dept: ORTHOPEDIC SURGERY | Facility: CLINIC | Age: 60
End: 2025-06-24
Payer: MEDICARE

## 2025-06-24 DIAGNOSIS — M54.12 CERVICAL MYELOPATHY WITH CERVICAL RADICULOPATHY: Primary | ICD-10-CM

## 2025-06-24 DIAGNOSIS — Z98.1 STATUS POST CERVICAL SPINAL FUSION: ICD-10-CM

## 2025-06-24 DIAGNOSIS — G95.9 CERVICAL MYELOPATHY WITH CERVICAL RADICULOPATHY: Primary | ICD-10-CM

## 2025-06-24 PROCEDURE — L0120 CERV FLEX N/ADJ FOAM PRE OTS: HCPCS | Performed by: PHYSICIAN ASSISTANT

## 2025-06-24 PROCEDURE — 4010F ACE/ARB THERAPY RXD/TAKEN: CPT | Performed by: PHYSICIAN ASSISTANT

## 2025-06-24 PROCEDURE — 3044F HG A1C LEVEL LT 7.0%: CPT | Performed by: PHYSICIAN ASSISTANT

## 2025-06-24 PROCEDURE — 72040 X-RAY EXAM NECK SPINE 2-3 VW: CPT | Performed by: RADIOLOGY

## 2025-06-24 PROCEDURE — 72040 X-RAY EXAM NECK SPINE 2-3 VW: CPT

## 2025-06-24 PROCEDURE — 99212 OFFICE O/P EST SF 10 MIN: CPT | Performed by: PHYSICIAN ASSISTANT

## 2025-06-24 PROCEDURE — 99024 POSTOP FOLLOW-UP VISIT: CPT | Performed by: PHYSICIAN ASSISTANT

## 2025-06-24 NOTE — PROGRESS NOTES
HPI:  Dinorah Horn is a 60 y.o. female who presents today for initial postop visit after undergoing C4-C7 Anterior Cervical Spine Discectomy and Fusion  on 06/04/25 with Dr. Camacho.    Overall, patient is doing very well. She has been wearing the hard collar as instructed. Reports the expected intrascapular pain and intermittent left deltoid pain. No upper extremity weakness. She is no longer taking pain medication. She is ambulating well without assist.   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    ROS:  Reviewed on EMR and patient intake sheet.    PMH/SH:  Reviewed on EMR and patient intake sheet.    Exam:  Well appearing, NAD  Pleasant & cooperative  BMI 26.2  Decreased ROM cervical spine with rotation, flexion/extension  + paraspinal muscle spasms  upper extremity sensation intact to light touch  upper extremity motor 5/5 throughout; no focal motor weakness  normal gait & station; assistive devices: none    Anterior cervical wound healing well without signs of infection. Well approximated without redness, palpable fluid collection or drainage.     Radiology:     Xrays cervical spine done in the office today 06/24/25 personally reviewed, along with the accompanying rad report when available.   Hardware intact and in good position. No fractures or signs of instability      Assessment and Plan:  1. Cervical myelopathy with cervical radiculopathy (Primary)  2. Status post cervical spinal fusion  - XR cervical spine  - Cervical collar soft  - Referral to Physical Therapy; Future    Both the patient and I are very pleased with her recovery so far. We reviewed Xrays in detail today.    We reviewed wound care in detail today. Okay to leave incision open to air. Okay to shower letting warm, soapy water run down the wound, do not scrub, pat dry. Okay to apply small amount of hydrogen peroxide nightly to scabs if needed.     She was transitioned to a soft collar today and instructed to  wear for 2-3 weeks. After 2-3 weeks okay to wean out of the cervical collar and only use for comfort.     Okay to lift up to 25 pounds until 6 weeks postop, then may increase lifting as tolerated. Continue to limit excessive flexion and extension of the neck, however okay for gentle ROM exercises to help reduce stiffness and increase mobility. Recommend caution with overhead lifting or any activities that strain the head/neck. Encourage ambulating as tolerated. If needed okay to start outpatient physical therapy at 6-8 weeks postop (referral placed today)    She did specifically ask about returning to swimming and she was advised to wait until 6 weeks postop.     Discussed postop pain medication today and recommend transitioning from opioids to plain tylenol. No NSAIDs until 8 weeks postop due to increased risk of pseudoarthrosis.     Plan to see patient for followup with repeat Xrays 1 year postop, or sooner if needed. All questions answered. Patient in agreement to plan of care. Of course Dinorah Horn can call at anytime with questions or concerns.         Sonia Armenta PA-C  Department of Orthopaedic Surgery    [address] [place] [address]    Voicemail: [phone]   Appts: [phone]  Fax: [phone]

## 2025-06-25 ENCOUNTER — APPOINTMENT (OUTPATIENT)
Facility: CLINIC | Age: 60
End: 2025-06-25
Payer: MEDICARE

## 2025-06-26 ENCOUNTER — TELEPHONE (OUTPATIENT)
Dept: PRIMARY CARE | Facility: CLINIC | Age: 60
End: 2025-06-26
Payer: MEDICARE

## 2025-06-26 NOTE — TELEPHONE ENCOUNTER
Raquel called and left a message stating she took an at home Urine test and it came back positive for leukocytes. She is asking what should be done.       Would you like her to drop off a urine sample?

## 2025-06-27 ENCOUNTER — CLINICAL SUPPORT (OUTPATIENT)
Dept: PRIMARY CARE | Facility: CLINIC | Age: 60
End: 2025-06-27
Payer: MEDICARE

## 2025-06-27 DIAGNOSIS — R30.0 DYSURIA: ICD-10-CM

## 2025-06-27 LAB
POC APPEARANCE, URINE: CLEAR
POC BILIRUBIN, URINE: NEGATIVE
POC BLOOD, URINE: NEGATIVE
POC COLOR, URINE: YELLOW
POC GLUCOSE, URINE: NEGATIVE MG/DL
POC KETONES, URINE: NEGATIVE MG/DL
POC LEUKOCYTES, URINE: ABNORMAL
POC NITRITE,URINE: NEGATIVE
POC PH, URINE: 5.5 PH
POC PROTEIN, URINE: NEGATIVE MG/DL
POC SPECIFIC GRAVITY, URINE: >=1.03
POC UROBILINOGEN, URINE: 0.2 EU/DL

## 2025-06-27 PROCEDURE — 81003 URINALYSIS AUTO W/O SCOPE: CPT | Performed by: FAMILY MEDICINE

## 2025-06-27 NOTE — TELEPHONE ENCOUNTER
Raquel notified and will stop by this afternoon to drop off urine sample and collect stool cards per [employer] message.

## 2025-06-27 NOTE — PROGRESS NOTES
"Verbal consent of the patient and/or verbal parental consent for patients under the age of 18 have been obtained to conduct a physical examination at this office visit.    Established patient  History Of Present Illness  06/27/25 Dinorah Horn \"Pratibha" is a 60 y.o. female who presents for MRI review of their BILATERAL hand with possible cortisone injection      All previous Progress Notes and imaging results related to this patients chief complaint have been reviewed in preparation for this examination.    Past Medical History  She has a past medical history of Alopecia, Cervical spinal cord compression, Chronic back pain, Chronic sinusitis, Cirrhosis of liver (Multi), Coronary artery disease, Depression, Diabetes mellitus (Multi), Fibromyalgia, GERD (gastroesophageal reflux disease), Hiatal hernia, HLD (hyperlipidemia), Hypertension, Insomnia, Irritable bowel syndrome, Lung nodule, Lymphadenopathy, Numbness and tingling, Palpitations, Postural dizziness with near syncope, Prolonged QT interval, Psoriasis, Psoriatic arthritis (Multi), RA (rheumatoid arthritis), Raynaud's disease, Thyroid nodule, Trigger finger, and UTI (urinary tract infection) (05/11/2025).    Surgical History  She has a past surgical history that includes Appendectomy; Breast surgery (2003); Coronary artery bypass graft (10/2010); Hysterectomy (Partial, 2000); Forearm fracture surgery (Right, 1992); Forearm hardware removal (Right, 2003); Liver biopsy; Cardiac catheterization; Colonoscopy; and Knee arthroscopy w/ laser (Left).     Social History  She reports that she quit smoking about 16 years ago. Her smoking use included cigarettes. She started smoking about 31 years ago. She has a 15 pack-year smoking history. She has never used smokeless tobacco. She reports that she does not currently use alcohol. She reports that she does not use drugs.    Family History  Family History   Problem Relation Name Age of Onset    Alcohol abuse Mother Trice" "   Cancer Mother Trice     Diabetes Mother Trice     Heart disease Mother Trice     Arthritis Father Reed     Atrial fibrillation Father Reed     Cancer Father Reed     COPD Father Reed     Hearing loss Father Reed     Heart disease Father Reed     Stroke Paternal Grandfather Guy     Alcohol abuse Paternal Grandmother Brigette     Alcohol abuse Sister Ioana     Alcohol abuse Sister Alis     Alcohol abuse Daughter Katerine     Cancer Father's Brother Xavier     Cancer Father's Brother Daljit     Colon cancer Father's Brother Daljit J     Drug abuse Sister Ioana GARCIA      Historical Clinical Intake:  11/27/24 Dinorah Horn \"Raquel\" is a 59 y.o. female who presents for an evaluation of their BILATERAL hand. States that she has been dealing with hand pain for years. She was referred in by her Rheumatologist Dr. Ravi Mcqueen for referall to sports medicine/orthopedics for peroneal tendon tear and CSI of stenosing tenosynovitis of L 3rd digit especially trigger finger.  States that she has inconsistent 5/10 pain. States that rainy weather and cold weather are what flare her up the most. States that when flare ups occur she tries to tolerate it and tough it out as much as she can. States that she feels numbness tingling pins needles all the time in both hands right worse than left. States that she feels cracks and pops in both hands right worse than left, states she has history of carpel tunnel in both hands. She states that the trigger finger for the middle finger of the right hand is her biggest concern.  Additionally she had some diagnostic ultrasound done on top of her x-rays that showed synovitis and synovial thickening of multiple peripheral joints especially throughout the fingers with tenosynovitis noted of multiple areas especially 2nd through 5th digits at the MCP joint consistent with synovial proliferation most likely related to inflammatory and/or crystal arthropathies.  Rheumatology " has her on hydroxychloroquine  AKA Plaquenil 200 mg daily.      Allergies  Cosentyx [secukinumab]    Review of Systems  CONSTITUTIONAL:   Negative for weight change, loss of appetite, fatigue, weakness, fever, chills, night sweats, headaches .           HEENT:   Negative for cold, cough, sore throat, sinus pain, swollen lymph nodes.           OPHTHALMOLOGY:   Negative for diminished vision, blurred vision, loss of vision, double vision.           ALLERGY:   Negative for runny nose, scratchy throat, sinus congestion, rash, facial pressure, nasal congestion, post-nasal drip.           CARDIOLOGY:   Negative for chest pain, palpitations, murmurs, irregular heart beat, shortness of breath, leg edema, dyspnea on exertion, fatigue, dizziness.           RESPIRATORY:   Negative for chest pain, shortness of breath, swelling of the legs, asthma/copd, chest congestion, pain with breathing .           GASTROENTEROLOGY:   Negative for nausea, vomitting, heartburn, constipation, diarrhea, blood in stool, change in bowel habits, black stool.           HEMATOLOGY/LYMPH:   Negative for fatigue, loss of appetitie, easy bruising, easy bleeding, anemia, abnormal bleeding, slow healing.           ENDOCRINOLOGY:   Negative for polyuria, polydipsia, polyphagia, fatigue, weight loss, weight gain, cold intolerance, heat intolerance, diabetes.           MUSCULOSKELETAL:   Positive  for BILATERAL hand/wrists        DERMATOLOGY:   Negative for rash, bruising.           NEUROLOGY:   Negative for tingling, numbness, gait abnormality, paresthesias, weakness, sciatica.        Examination:i  BILATERAL  Thumb, 3rd Digit, and MCP  Erythema: Negative.   Edema: Positive all of her fingers sausage style fingers mixed with edema  Effusion: Negative.   Warmth: Negative.   Ecchymosis/Bruising: Negative.   Percussion Test: Negative.   Tuning Fork Test: Negative.   Abrasions: Negative.   Orientation: Positive all of her fingers sausage style fingers mixed  with edema            ROM:   Wrist Flexion (80 degrees)  Wrist Extension (70 degrees)  Wrist Supination (90 degrees)  Wrist Pronation (90 degrees)         Wrist Ulnar Deviation (30 degrees)  Wrist Radial Deviation (20 degrees)          Finger MCP (100 degrees)/ PIP (100 degrees)/ DIP (90 degrees) Flexion  Finger MCP (30 degrees) /PIP (0 degrees) / DIP (20 degrees) Extension          Fingers ABduction (20 degrees)  Fingers ADduction (0 degrees)          Thumb & Fingertip Opposition Positive  unable to perform right side  Thumb Circumduction.    Positive all of her fingers sausage style fingers mixed with edema and square thumb at MCP joint        Muscle Strength:   Positive Decreased muscle strength  +5/+5 Wrist Flexion  +5/+5 Wrist Extension        +5/+5 Wrist Supination  +5/+5 Wrist Pronation          +5/+5 Wrist Ulnar Deviation  +5/+5 Wrist Radial Deviation           Positive +4/+5 Finger MCP/PIP/DIP Flexion    Positive +4/+5 Finger MCP/PIP/DIP Extension            Positive +4/+5 Finger Abduction    Positive +4/+5 Finger Adduction            Positive +4/+5 Finger/Thumb Opposition    Positive +4/+5 Thumb Circumduction.            Motor/Neurological:    Normal  Tip of Thumb (Median Nerve)  Tip of 5th Finger (Ulnar Nerve)  Flex and Extend Thumb (Median and Radial Nerve)  Scissor Fingers (Ulnar Nerve)  Raise Thumb Against Resistance on Flat Surface (Median Nerve).          Sensation/Neurological:   Negative, Sensation Intact, 2 Point Discrimination Test Negative         C6: Lateral forearms, thumbs, anterior index finger, lateral half of the middle finger  C7: Some of posterior index finger, medial half of middle finger, upper posterior back, back of arms  C8: Ring finger, little finger, medial forearm, posterior upper back.     Palpation:   Positive Tenderness to Palpation BILATERAL Thumb, 3rd Digit, and MCP   at 3rd finger A1 pulley         Vascular:   +2/+4 Radial Pulse  +2/+4 Ulnar Pulse  Capillary Refill < 2  seconds.               Wrist/Hand/Finger - Motor Function:  Vincenzo-Littler Test: Negative.   Retinacular Test: Negative.   Princh  Test:   Positive   Key  Test: Negative.   Power  Test:   Positive   Trell  Test: Negative.         Wrist/Hand/Finger - Nerve Lesions/Compression Neuropathies: Right Side clinically worse than left  Carpal Tunnel Sign Test: Negative.  .   Phalen's Sign Test:Negative.    Reverse Phalen's Sign Test: Negative.   Wartenberg Sign Test: Negative.   Tinel's Sign Test: Negative.   Opposition Test: Positive right sided.   Pinch Test: Positive btwn 3rd finger and thumb.   Carpal/Digital Compression Test:Negative.   Ochsner Test: Negative.   Froment's Sign Ulnar Nerve Test: Negative.   Intrinsic Ulnar Nerve Test: Negative.   O Test:Positive btwn 3rd finger and thumb.       Wrist/Hand/Finger - Stability:  Valgus Stess Test: Negative.   Varus Stess Test: Negative.   Dumont Scaphoid Shift Test: Negative.   Scapholunate Ballotment Test: Negative.   Pérez Test: Negative.   Dorsal Capitate Displacement Apprehension Test: Negative.   Shuck Test: Negative.         Wrist/Hand/Finger - Tenosynovitis:  Flexor Digitorum Profundus Test:  Positive btwn 3rd finger    Flexor Digitorum Superficialis Test:  Positive btwn 3rd finger   Flexor Pollicis Longus Test:  Negative.   Flexor Pollicis Brevis Test:  Negative.   Flexor Radial Longus Test:  Negative.   Flexor Radial Brevis Test:  Negative.   Extensor Digitorum Profundus Test:  Negative.   Extensor Digitorum Superficialis Test:  Negative.   Extensor Pollicis Longus Test:  Negative.   Extensor Pollicis Brevis (EPB)[Dequervain's):  Negative.   Extensor Radial Longus Test:  Negative.   Extensor Radial Brevis Test:  Negative.   Muckard Test:  Positive .   Finklestein Test:  Positive .   Kanavel Sign:  Negative.   Flexor and Extensor Longus Test:  Negative.         Wrist/Hand/Finger - TFCC:  Supination Lift Test: Negative.   Grind Test: Negative.              Imaging and Diagnostics Review:  MRI of the  left hand without IV contrast;  12/18/2024 1:22 pm   Joints:  Severe degenerative changes at the 1st CMC joint and triscaphe joint  with degenerative subchondral cysts in the proximal metacarpal and  trapezium. Mild degenerative changes at the 1st MCP and at the  2nd-5th distal interphalangeal and 5th proximal interphalangeal  joints. Moderate degenerative changes with subchondral edema and  cystic changes in the 1st interphalangeal joint. No erosions. No  synovitis or abnormal joint effusion.      Osseous structures:  There is no evidence of acute fracture or dislocation. There is no  avascular necrosis. Irregular T2 hyperintense and T1 hypointense  lesion in the distal 3rd metacarpal intramedullary space.      Soft tissues:  There is no evidence of muscular atrophy or tear.  No suspicious soft tissue lesions are seen.      IMPRESSION:  1. Severe degenerative changes of the 1st CMC and triscaphe joint.  Moderate degenerative changes at the 1st interphalangeal joint. Mild  degenerative changes of the 1st MCP and 2nd-5th distal  interphalangeal and 5th proximal interphalangeal joints. Distribution  is compatible with osteoarthritic disease.  2. Nonaggressive appearing lesion in the distal 3rd metacarpal, may  represent chondroid or cystic lesion.  3. No evidence of inflammatory arthropathy.      Signed by: Tara Otero 12/19/2024 10:01 AM  Dictation workstation:   [id number]   --------------------------------------------------------------------------    MRI of the  right hand without IV contrast;  12/18/2024 1:01 pm   FINDINGS:  Suboptimal secondary to motion artifact on multiple sequences.      Tendons:  Mild tendinosis and tenosynovitis of the 3rd digit flexor tendons at  the level of the proximal phalanx. The flexor and extensor tendons of  the hand are otherwise intact. The tendons of the thenar and  hypothenar compartments are normal.      Ligaments:  The  major ligamentous structures of the hand are intact.      Joints:  Mild flexion deformity of the 5th proximal interphalangeal joint  which may be due to chronic ligament injury.      Moderate degenerative changes at the 1st metacarpophalangeal and 5th  proximal interphalangeal joints with subchondral edema and  osteophytes. Mild degenerative changes at the 1st interphalangeal  joint with osteophytosis. Severe degenerative changes of the 1st  carpometacarpal joint with edema and erosive change in the proximal  metacarpal and in the trapezium. No erosions.      Osseous structures:  There is no evidence of acute fracture or dislocation. There is no  avascular necrosis. No marrow replacing lesions are seen.      Soft tissues:  There is no evidence of muscular atrophy or tear.  No suspicious soft tissue lesions are seen.      IMPRESSION:  1. Severe degenerative changes of the 1st CMC joint. Moderate  degenerative changes at the 1st metacarpophalangeal and 5th proximal  interphalangeal joints. Mild degenerative changes of the 1st  interphalangeal joint. This distribution is compatible with  osteoarthritic changes.  2. Mild tendinosis and tenosynovitis of the 3rd digit flexor tendons  at the level of the proximal phalanx.  3. Mild flexion deformity of the 5th proximal interphalangeal joint  which may be due to chronic ligament injury.  4. No erosions.    Signed by: Tara Otero 12/19/2024 9:57 AM  Dictation workstation:   [id number]     --------------------------------------------------------------------------  US MSK upper extremity joints tendons muscles  US MSK upper extremity joints tendons muscles  Study Result  Narrative & Impression   Interpreted By:  Reed Patrick,   STUDY:  Musculoskeletal ultrasound of the bilateral hand and wrist dated  10/22/2024.  INDICATION:  Hand pain. Synovial screen.  COMPARISON:  None. Correlation is made with 09/18/2024 radiographs.   ACCESSION NUMBER(S):  [id number];  [id number]  ORDERING CLINICIAN:  MORENO LANE  TECHNIQUE:  Multiplanar color and grayscale ultrasound of the bilateral hand and  wrist was performed. Exam was performed according to a synovial  screening protocol.      FINDINGS:  RIGHT:      No fluid is evident in the flexor or extensor tendon sheaths as seen  on this exam.      Marginal osteophytes are seen of the 1st digit carpometacarpal  articulation without joint effusion synovial proliferation or  increased color signal intensity evident.      No significant no joint effusion synovial proliferation or increased  color signal intensity is evident at the 1st digit  metacarpophalangeal joint. There is mild marginal osteophyte  formation at the interphalangeal joint of the 1st digit without joint  effusion synovial proliferation or increased color signal intensity  evident.      No significant joint effusion synovial proliferation or increased  color signal intensity is evident at the 2nd metacarpophalangeal  joint. Marginal micro osteophyte formation is seen of the proximal  distal interphalangeal joints of the 2nd digit without joint effusion  increased color signal intensity or synovial proliferation evident.      There is a trace volume effusion at the 3rd digit metacarpophalangeal  joint without synovial proliferation or increased color signal  intensity evident. Marginal micro osteophyte formation is seen of the  proximal distal interphalangeal joint of the 3rd digit without joint  effusion synovial proliferation or increased color signal intensity  evident.      No joint effusion synovial proliferation or increased color signal  intensity is evident at the 4th digit metacarpophalangeal joint.  There is a trace volume effusion of the 4th digit proximal  interphalangeal joint possibly with some mild capsular hyperemia  without synovial proliferation evident. No significant degenerative  change joint effusion are increased color signal intensity is  evident  at the distal interphalangeal joint of the 4th digit.      No joint effusion synovial proliferation or increased color signal  intensity is evident at the 5th digit metacarpophalangeal joint.  Marginal osteophyte formation and small effusion is seen of the  proximal interphalangeal joint of the 5th digit without synovial  proliferation or increased color signal intensity evident. There is a  degree of flexion deformity at this joint. No significant  degenerative change joint effusion or synovial proliferation is  evident at the distal interphalangeal joint of the 5th digit.      LEFT:      No fluid is evident in the flexor or extensor tendon sheaths as seen  on this examination.      No joint effusion synovial proliferation or increased color signal  intensity is evident at the metacarpophalangeal joint of the 1st  digit. Marginal osteophyte formation is seen of the interphalangeal  joint of the 1st digit without joint effusion synovial proliferation  or increased color signal intensity evident.      There is synovial proliferation with trace joint effusion and mild  hyperemia at the 2nd digit metacarpophalangeal joint. Micro  osteophyte formation is seen at the proximal distal interphalangeal  joint of the 2nd digit without joint effusion synovial proliferation  or increased color signal intensity evident.      No joint effusion synovial proliferation or increased color signal  intensity is evident at the 3rd digit metacarpophalangeal joint.  Micro osteophyte formation is seen of the proximal distal  interphalangeal joint of the 3rd digit without joint effusion  synovial proliferation or increased color signal intensity evident.      No joint effusion synovial proliferation or increased color signal  intensity is evident at the 4th digit metacarpophalangeal joint. No  joint effusion synovial proliferation or increased color signal  intensity is evident at the 4th digit interphalangeal joints.      There is  a small volume joint effusion with mild synovial  proliferation without increased color signal intensity of the 5th  digit metacarpophalangeal joint. There is osteophyte formation with  trace joint effusion of the proximal interphalangeal joint of the 5th  digit. No synovial proliferation or increased color signal intensity  is evident. Micro osteophyte formation is seen of the distal  interphalangeal joint of the 5th digit without joint effusion  synovial proliferation or increased color signal intensity evident.      IMPRESSION DX U/S of B/L upper extremities:  Polyarticular degenerative change of the bilateral hand and wrist in  a pattern overall likely related to osteoarthrosis although there are  some joints that have trace effusions, described above which are  nonspecific and possibly reactive. Given this, there does appear to  be some synovial proliferation at the 2nd and 5th digit  metacarpophalangeal joint of the left hand which could be related to  crystal and/or inflammatory arthropathy.      Signed by: Reed Patrick 10/22/2024 2:42 PM  Dictation workstation:   [id number]       Assessment   No diagnosis found.      Treatment or Intervention:  May continue alternating ice and moist heat therapy as needed   Stressed the importance of trying to keep her hands warm because of the amount of arthritis she has especially in the winter,   Start into Hand/Physical Therapy 1-2 times a week for 8-10 weeks with manual therapy as well as dry needling and IASTM  Reviewed home exercises to be performed by the patient routinely   Recommendation over-the-counter vitamin-D 2 -3000+ milligrams a day, as well as  a daily multivitamin.  Recommendation over-the-counter curcumin, turmeric, boswellia,  as directed to aid with joint inflammation.   Recommendation over-the-counter Move Free ULTRA for joint health.    Continue hydroxychloroquine  AKA Plaquenil 200 mg daily per her Rheumatologist Dr. Ravi Mcqueen .  Patient  advised regarding the risks and/or potential adverse reactions and/or side effects of any prescribed medications along with any over-the-counter medications or any supplements used. Patient advised to seek immediate medical care if any adverse reactions occur. The patient and/or patient(s) parent(s) verbalized their understanding  MRI bilateral hands to further evaluate A1 pulley system for trigger fingers as well as further evaluate ligaments and tendons and further evaluate patient's bone structures  Continue follow-ups regularly as instructed by her Rheumatologist Dr. Ravi Mcqueen   Most likely in the future corticosteroid injections not only for the thumb but also trigger fingers  Also possibility in the future regenerative injections versus viscosupplementation injections versus a combination of both for fingers and thumbs  Possibility referral to orthopedics for surgical evaluation for trigger fingers if injections do not work however patient would like to try to avoid surgical intervention only for her fingers but also her thumbs  Follow-up       Please note that this report has been produced using speech recognition software.  It may contain errors related to grammar, punctuation or spelling.  Electronically signed, but not reviewed.  YAZMIN Flores, Director of Sports Medicine      RISSA MARTÍNEZ on 6/27/25 at 12:08 PM.     DEYSI Flores DO

## 2025-06-29 LAB — BACTERIA UR CULT: NORMAL

## 2025-06-30 ENCOUNTER — APPOINTMENT (OUTPATIENT)
Dept: ORTHOPEDIC SURGERY | Facility: CLINIC | Age: 60
End: 2025-06-30
Payer: MEDICARE

## 2025-06-30 DIAGNOSIS — I10 PRIMARY HYPERTENSION: ICD-10-CM

## 2025-06-30 DIAGNOSIS — N30.90 CYSTITIS: ICD-10-CM

## 2025-06-30 DIAGNOSIS — N30.90 CYSTITIS: Primary | ICD-10-CM

## 2025-06-30 RX ORDER — NITROFURANTOIN 25; 75 MG/1; MG/1
100 CAPSULE ORAL 2 TIMES DAILY
Qty: 10 CAPSULE | Refills: 0 | Status: SHIPPED | OUTPATIENT
Start: 2025-06-30 | End: 2025-07-02

## 2025-06-30 NOTE — PROGRESS NOTES
"Verbal consent of the patient and/or verbal parental consent for patients under the age of 18 have been obtained to conduct a physical examination at this office visit.    Established patient  History Of Present Illness  07/02/25 Dinorah Horn \"Raquel\" is a 60 y.o. female who presents for MRI review of their BILATERAL hand with possible cortisone injection. Patient reports bilateral hand pain with right being worse than left, has most pain/stiffness in Right middle finger, would like trigger finger injection. Rates pain as 4/10, uses heat intermittently. Completed OT in March with limited improvement.   She says she has not been able to get back in since the last time we saw her in November of last year due to other medical issues.    Had cervical fusion surgery about one month ago in which she says she believes it was from C4-C7 but she is not totally sure..       All previous Progress Notes and imaging results related to this patients chief complaint have been reviewed in preparation for this examination.    Past Medical History  She has a past medical history of Alopecia, Cervical spinal cord compression, Chronic back pain, Chronic sinusitis, Cirrhosis of liver (Multi), Coronary artery disease, Depression, Diabetes mellitus (Multi), Fibromyalgia, GERD (gastroesophageal reflux disease), Hiatal hernia, HLD (hyperlipidemia), Hypertension, Insomnia, Irritable bowel syndrome, Lung nodule, Lymphadenopathy, Numbness and tingling, Palpitations, Postural dizziness with near syncope, Prolonged QT interval, Psoriasis, Psoriatic arthritis (Multi), RA (rheumatoid arthritis), Raynaud's disease, Thyroid nodule, Trigger finger, and UTI (urinary tract infection) (05/11/2025).    Surgical History  She has a past surgical history that includes Appendectomy; Breast surgery (2003); Coronary artery bypass graft (10/2010); Hysterectomy (Partial, 2000); Forearm fracture surgery (Right, 1992); Forearm hardware removal (Right, 2003); " "Liver biopsy; Cardiac catheterization; Colonoscopy; and Knee arthroscopy w/ laser (Left).     Social History  She reports that she quit smoking about 16 years ago. Her smoking use included cigarettes. She started smoking about 31 years ago. She has a 15 pack-year smoking history. She has never used smokeless tobacco. She reports that she does not currently use alcohol. She reports that she does not use drugs.    Family History  Family History   Problem Relation Name Age of Onset    Alcohol abuse Mother Trice     Cancer Mother Trice     Diabetes Mother Trice     Heart disease Mother Trice     Arthritis Father Reed     Atrial fibrillation Father Reed     Cancer Father Reed     COPD Father Reed     Hearing loss Father Reed     Heart disease Father Reed     Stroke Paternal Grandfather Guy     Alcohol abuse Paternal Grandmother Brigette     Alcohol abuse Sister Ioana     Alcohol abuse Sister Alis     Alcohol abuse Daughter Katerine     Cancer Father's Brother Xavier     Cancer Father's Brother Daljit     Colon cancer Father's Brother Daljit J     Drug abuse Sister Ioana GARCIA      Historical Clinical Intake:  11/27/24 Dinorah Horn \"Raquel\" is a 59 y.o. female who presents for an evaluation of their BILATERAL hand. States that she has been dealing with hand pain for years. She was referred in by her Rheumatologist Dr. Ravi Mcqueen for referall to sports medicine/orthopedics for peroneal tendon tear and CSI of stenosing tenosynovitis of L 3rd digit especially trigger finger.  States that she has inconsistent 5/10 pain. States that rainy weather and cold weather are what flare her up the most. States that when flare ups occur she tries to tolerate it and tough it out as much as she can. States that she feels numbness tingling pins needles all the time in both hands right worse than left. States that she feels cracks and pops in both hands right worse than left, states she has history of carpel " tunnel in both hands. She states that the trigger finger for the middle finger of the right hand is her biggest concern.  Additionally she had some diagnostic ultrasound done on top of her x-rays that showed synovitis and synovial thickening of multiple peripheral joints especially throughout the fingers with tenosynovitis noted of multiple areas especially 2nd through 5th digits at the MCP joint consistent with synovial proliferation most likely related to inflammatory and/or crystal arthropathies.  Rheumatology has her on hydroxychloroquine  AKA Plaquenil 200 mg daily.      Allergies  Cosentyx [secukinumab]    Review of Systems  CONSTITUTIONAL:   Negative for weight change, loss of appetite, fatigue, weakness, fever, chills, night sweats, headaches .           HEENT:   Negative for cold, cough, sore throat, sinus pain, swollen lymph nodes.           OPHTHALMOLOGY:   Negative for diminished vision, blurred vision, loss of vision, double vision.           ALLERGY:   Negative for runny nose, scratchy throat, sinus congestion, rash, facial pressure, nasal congestion, post-nasal drip.           CARDIOLOGY:   Negative for chest pain, palpitations, murmurs, irregular heart beat, shortness of breath, leg edema, dyspnea on exertion, fatigue, dizziness.           RESPIRATORY:   Negative for chest pain, shortness of breath, swelling of the legs, asthma/copd, chest congestion, pain with breathing .           GASTROENTEROLOGY:   Negative for nausea, vomitting, heartburn, constipation, diarrhea, blood in stool, change in bowel habits, black stool.           HEMATOLOGY/LYMPH:   Negative for fatigue, loss of appetitie, easy bruising, easy bleeding, anemia, abnormal bleeding, slow healing.           ENDOCRINOLOGY:   Negative for polyuria, polydipsia, polyphagia, fatigue, weight loss, weight gain, cold intolerance, heat intolerance, diabetes.           MUSCULOSKELETAL:   Positive  for BILATERAL hand/wrists        DERMATOLOGY:    Negative for rash, bruising.           NEUROLOGY:   Negative for tingling, numbness, gait abnormality, paresthesias, weakness, sciatica.        Examination: All findings relatively unchanged since November's visit right hand worse than left  BILATERAL  Thumb, 3rd Digit, and MCP  Erythema: Negative.   Edema: Positive all of her fingers sausage style fingers mixed with edema  Effusion: Negative.   Warmth: Negative.   Ecchymosis/Bruising: Negative.   Percussion Test: Negative.   Tuning Fork Test: Negative.   Abrasions: Negative.   Orientation: Positive all of her fingers sausage style fingers mixed with edema            ROM: All findings relatively unchanged since November's visit right hand worse than left  Wrist Flexion (80 degrees)  Wrist Extension (70 degrees)  Wrist Supination (90 degrees)  Wrist Pronation (90 degrees)         Wrist Ulnar Deviation (30 degrees)  Wrist Radial Deviation (20 degrees)          Finger MCP (100 degrees)/ PIP (100 degrees)/ DIP (90 degrees) Flexion  Finger MCP (30 degrees) /PIP (0 degrees) / DIP (20 degrees) Extension          Fingers ABduction (20 degrees)  Fingers ADduction (0 degrees)          Thumb & Fingertip Opposition Positive  unable to perform right side  Thumb Circumduction.    Positive all of her fingers sausage style fingers mixed with edema and square thumb at MCP joint        Muscle Strength:   All findings relatively unchanged since November's visit right hand worse than left  Positive Decreased muscle strength, causes pain  +5/+5 Wrist Flexion  +5/+5 Wrist Extension        +5/+5 Wrist Supination  +5/+5 Wrist Pronation          +5/+5 Wrist Ulnar Deviation  +5/+5 Wrist Radial Deviation           Positive +4/+5 Finger MCP/PIP/DIP Flexion    Positive +4/+5 Finger MCP/PIP/DIP Extension            Positive +4/+5 Finger Abduction    Positive +4/+5 Finger Adduction            Positive +4/+5 Finger/Thumb Opposition    Positive +4/+5 Thumb Circumduction.             Motor/Neurological:    Normal  Tip of Thumb (Median Nerve)  Tip of 5th Finger (Ulnar Nerve)  Flex and Extend Thumb (Median and Radial Nerve)  Scissor Fingers (Ulnar Nerve)  Raise Thumb Against Resistance on Flat Surface (Median Nerve).          Sensation/Neurological:   Negative, Sensation Intact, 2 Point Discrimination Test Negative         C6: Lateral forearms, thumbs, anterior index finger, lateral half of the middle finger  C7: Some of posterior index finger, medial half of middle finger, upper posterior back, back of arms  C8: Ring finger, little finger, medial forearm, posterior upper back.     Palpation: All findings relatively unchanged since November's visit right hand worse than left  Positive Tenderness to Palpation BILATERAL Thumb, 3rd Digit, and MCP   at 3rd finger A1 pulley         Vascular:   +2/+4 Radial Pulse  +2/+4 Ulnar Pulse  Capillary Refill < 2 seconds.               Wrist/Hand/Finger - Motor Function:All findings relatively unchanged since November's visit right hand worse than left  Vincenzo-Littler Test: Negative.   Retinacular Test: Negative.   Princh  Test:   Positive   Key  Test: Negative.   Power  Test:   Positive   Trell  Test: Negative.         Wrist/Hand/Finger - Nerve Lesions/Compression Neuropathies: All findings relatively unchanged since November's visit right hand worse than left  Right Side clinically worse than left  Carpal Tunnel Sign Test: Negative.  .   Phalen's Sign Test:Negative.    Reverse Phalen's Sign Test: Negative.   Wartenberg Sign Test: Negative.   Tinel's Sign Test: Negative.   Opposition Test: Positive right sided.   Pinch Test: Positive btwn 3rd finger and thumb.   Carpal/Digital Compression Test:Negative.   Ochsner Test: Negative.   Froment's Sign Ulnar Nerve Test: Negative.   Intrinsic Ulnar Nerve Test: Negative.   O Test:Positive btwn 3rd finger and thumb.       Wrist/Hand/Finger - Stability:  Valgus Stess Test: Negative.   Varus Stess Test:  Negative.   Dumont Scaphoid Shift Test: Negative.   Scapholunate Ballotment Test: Negative.   [place] Test: Negative.   Dorsal Capitate Displacement Apprehension Test: Negative.   Shuck Test: Negative.         Wrist/Hand/Finger - Tenosynovitis:All findings relatively unchanged since November's visit right hand worse than left Flexor Digitorum Profundus Test:  Positive btwn 3rd finger    Flexor Digitorum Superficialis Test:  Positive btwn 3rd finger   Flexor Pollicis Longus Test:  Negative  Flexor Pollicis Brevis Test:  Negative   Flexor Radial Longus Test:  Negative   Flexor Radial Brevis Test:  Negative   Extensor Digitorum Profundus Test:  Negative   Extensor Digitorum Superficialis Test:  Negative   Extensor Pollicis Longus Test:  Negative   Extensor Pollicis Brevis (EPB)[Dequervain's):  Negative.   Extensor Radial Longus Test:  Negative   Extensor Radial Brevis Test:  Negative   Muckard Test:  Positive   Finklestein Test:  Positive    Kanavel Sign:  Negative   Flexor and Extensor Longus Test:  Negative         Wrist/Hand/Finger - TFCC:  Supination Lift Test: Negative.   Grind Test: Negative.             Imaging and Diagnostics Review:  MRI of the left hand without IV contrast;  12/18/2024 1:22 pm   Joints:  Severe degenerative changes at the 1st CMC joint and triscaphe joint  with degenerative subchondral cysts in the proximal metacarpal and  trapezium. Mild degenerative changes at the 1st MCP and at the  2nd-5th distal interphalangeal and 5th proximal interphalangeal  joints. Moderate degenerative changes with subchondral edema and  cystic changes in the 1st interphalangeal joint. No erosions. No  synovitis or abnormal joint effusion.      Osseous structures:  There is no evidence of acute fracture or dislocation. There is no  avascular necrosis. Irregular T2 hyperintense and T1 hypointense  lesion in the distal 3rd metacarpal intramedullary space.      Soft tissues:  There is no evidence of muscular atrophy or  tear.  No suspicious soft tissue lesions are seen.      IMPRESSION MRI of the left hand December 19, 2020:  1. Severe degenerative changes of the 1st CMC and triscaphe joint.  Moderate degenerative changes at the 1st interphalangeal joint. Mild  degenerative changes of the 1st MCP and 2nd-5th distal  interphalangeal and 5th proximal interphalangeal joints. Distribution  is compatible with osteoarthritic disease.  2. Nonaggressive appearing lesion in the distal 3rd metacarpal, may  represent chondroid or cystic lesion.  3. No evidence of inflammatory arthropathy.      Signed by: Tara Otero 12/19/2024 10:01 AM  Dictation workstation:   [id number]   --------------------------------------------------------------------------    MRI of the right hand without IV contrast;  12/18/2024 1:01 pm   FINDINGS:  Suboptimal secondary to motion artifact on multiple sequences.      Tendons:  Mild tendinosis and tenosynovitis of the 3rd digit flexor tendons at  the level of the proximal phalanx. The flexor and extensor tendons of  the hand are otherwise intact. The tendons of the thenar and  hypothenar compartments are normal.      Ligaments:  The major ligamentous structures of the hand are intact.      Joints:  Mild flexion deformity of the 5th proximal interphalangeal joint  which may be due to chronic ligament injury.      Moderate degenerative changes at the 1st metacarpophalangeal and 5th  proximal interphalangeal joints with subchondral edema and  osteophytes. Mild degenerative changes at the 1st interphalangeal  joint with osteophytosis. Severe degenerative changes of the 1st  carpometacarpal joint with edema and erosive change in the proximal  metacarpal and in the trapezium. No erosions.      Osseous structures:  There is no evidence of acute fracture or dislocation. There is no  avascular necrosis. No marrow replacing lesions are seen.      Soft tissues:  There is no evidence of muscular atrophy or tear.  No  suspicious soft tissue lesions are seen.      IMPRESSION MRI of the right hand December 19, 2024:  1. Severe degenerative changes of the 1st CMC joint. Moderate  degenerative changes at the 1st metacarpophalangeal and 5th proximal  interphalangeal joints. Mild degenerative changes of the 1st  interphalangeal joint. This distribution is compatible with  osteoarthritic changes.  2. Mild tendinosis and tenosynovitis of the 3rd digit flexor tendons  at the level of the proximal phalanx.  3. Mild flexion deformity of the 5th proximal interphalangeal joint  which may be due to chronic ligament injury.  4. No erosions.    Signed by: Tara Otero 12/19/2024 9:57 AM  Dictation workstation:   [id number]     --------------------------------------------------------------------------  US MSK upper extremity joints tendons muscles  Interpreted By:  Reed Patrick,   STUDY:  Musculoskeletal ultrasound of the bilateral hand and wrist dated  10/22/2024.  INDICATION:  Hand pain. Synovial screen.  ORDERING CLINICIAN:  MORENO LANE  TECHNIQUE:  Multiplanar color and grayscale ultrasound of the bilateral hand and  wrist was performed. Exam was performed according to a synovial  screening protocol.      FINDINGS:  RIGHT:      No fluid is evident in the flexor or extensor tendon sheaths as seen  on this exam.      Marginal osteophytes are seen of the 1st digit carpometacarpal  articulation without joint effusion synovial proliferation or  increased color signal intensity evident.      No significant no joint effusion synovial proliferation or increased  color signal intensity is evident at the 1st digit  metacarpophalangeal joint. There is mild marginal osteophyte  formation at the interphalangeal joint of the 1st digit without joint  effusion synovial proliferation or increased color signal intensity  evident.      No significant joint effusion synovial proliferation or increased  color signal intensity is evident at the 2nd  metacarpophalangeal  joint. Marginal micro osteophyte formation is seen of the proximal  distal interphalangeal joints of the 2nd digit without joint effusion  increased color signal intensity or synovial proliferation evident.      There is a trace volume effusion at the 3rd digit metacarpophalangeal  joint without synovial proliferation or increased color signal  intensity evident. Marginal micro osteophyte formation is seen of the  proximal distal interphalangeal joint of the 3rd digit without joint  effusion synovial proliferation or increased color signal intensity  evident.      No joint effusion synovial proliferation or increased color signal  intensity is evident at the 4th digit metacarpophalangeal joint.  There is a trace volume effusion of the 4th digit proximal  interphalangeal joint possibly with some mild capsular hyperemia  without synovial proliferation evident. No significant degenerative  change joint effusion are increased color signal intensity is evident  at the distal interphalangeal joint of the 4th digit.      No joint effusion synovial proliferation or increased color signal  intensity is evident at the 5th digit metacarpophalangeal joint.  Marginal osteophyte formation and small effusion is seen of the  proximal interphalangeal joint of the 5th digit without synovial  proliferation or increased color signal intensity evident. There is a  degree of flexion deformity at this joint. No significant  degenerative change joint effusion or synovial proliferation is  evident at the distal interphalangeal joint of the 5th digit.      LEFT:      No fluid is evident in the flexor or extensor tendon sheaths as seen  on this examination.      No joint effusion synovial proliferation or increased color signal  intensity is evident at the metacarpophalangeal joint of the 1st  digit. Marginal osteophyte formation is seen of the interphalangeal  joint of the 1st digit without joint effusion synovial  proliferation  or increased color signal intensity evident.      There is synovial proliferation with trace joint effusion and mild  hyperemia at the 2nd digit metacarpophalangeal joint. Micro  osteophyte formation is seen at the proximal distal interphalangeal  joint of the 2nd digit without joint effusion synovial proliferation  or increased color signal intensity evident.      No joint effusion synovial proliferation or increased color signal  intensity is evident at the 3rd digit metacarpophalangeal joint.  Micro osteophyte formation is seen of the proximal distal  interphalangeal joint of the 3rd digit without joint effusion  synovial proliferation or increased color signal intensity evident.      No joint effusion synovial proliferation or increased color signal  intensity is evident at the 4th digit metacarpophalangeal joint. No  joint effusion synovial proliferation or increased color signal  intensity is evident at the 4th digit interphalangeal joints.      There is a small volume joint effusion with mild synovial  proliferation without increased color signal intensity of the 5th  digit metacarpophalangeal joint. There is osteophyte formation with  trace joint effusion of the proximal interphalangeal joint of the 5th  digit. No synovial proliferation or increased color signal intensity  is evident. Micro osteophyte formation is seen of the distal  interphalangeal joint of the 5th digit without joint effusion  synovial proliferation or increased color signal intensity evident.      IMPRESSION DX U/S of B/L upper extremities:  Polyarticular degenerative change of the bilateral hand and wrist in  a pattern overall likely related to osteoarthrosis although there are  some joints that have trace effusions, described above which are  nonspecific and possibly reactive. Given this, there does appear to  be some synovial proliferation at the 2nd and 5th digit  metacarpophalangeal joint of the left hand which could  "be related to  crystal and/or inflammatory arthropathy.      Signed by: Reed Patrick 10/22/2024 2:42 PM  Dictation workstation:   [id number]       Assessment   1. Primary osteoarthritis of left hand        2. Trigger middle finger of right hand  Point of Care Ultrasound      3. Tenosynovitis of both hands  Point of Care Ultrasound      4. Chronic pain of left hand  Point of Care Ultrasound      5. Chronic pain of right hand  Point of Care Ultrasound      6. Primary osteoarthritis of both hands  Point of Care Ultrasound      7. Primary osteoarthritis of right hand        8. Tenosynovitis of finger and hand        9. Tendinosis          Procedures    Procedure   Time Out (5 Minutes): Yes  Patient Name: Dinorah Horn \"Raquel\"  YOB: 1965  Procedure:  Corticosteroid injection  Needed Supplies Available: Correct Supplies  Laterality: Right hand  Site Verified and Marked: Correct Site(s) Marked  Timeout Performed by Provider: Dr. Michael Guerrero D.O.  Staff Initials:     Patient is informed and consent has been signed by the patient if over 18 years old., Patient and/or parent and/or legal guardian accept all risks, benefits, and possible complications associated with procedure(s) and/or manipulation(s) and/or injection(s)., Patient and/or parent and/or legal guardian deny patient allergy or known complications with any of the medications, instruments, products, and/or techniques used., All questions and concerns were answered and/or addressed with the patient and/or parent and/or legal guardian., The patient and/or parent and/or legal guardian agree to proceed with the procedure(s) and/or manipulation(s) and/or injection(s)., Prep: The area was cleansed with a mixture of equal parts rubbing alcohol 70% and Hibclens., Utilizing clean technique, the injection site(s) were marked with a skin marker., and Injection site(s) were anesthestized with topical analgesic spray prior to injection.    Performed " corticosteroid injection into the patients Right hand trigger finger, under ultrasound.   Corticosteroid mixture of 4cc 2% Lidocaine, 2cc Celestone, 1cc Dexamethasone    Band-aid and/or compressive bandage was placed over the injection site(s). After the injection(s) performed osteopathic manipulation therapy to the affected area(s) to help increase blood flow to aid with the healing process as well as circulation of the medication. The patient tolerated the procedure well without any complications. The patient was instructed to gently massage the treatment site(s) as well as to apply moist heat to the affected area(s). Additionally, the patient was instructed to contact the office immediately with any complications or concerns.    The nurse Massiel was present in the room during the entire procedure.    The following discharge instructions were reviewed in detail with the patient to the level of their understanding:    PAIN:  A mild to moderate amount of discomfort, tenderness, stiffness, and achiness-caused by the area injected can be expected for a few days after the injection.     SKIN: Due to the numbing spray used, you may develop a red, itchy, scaly rash in the area that was sprayed. Should your skin become itchy, wash area with mild soap and warm water. If needed, you may apply topical, over-the-counter Hydrocortisone cream to alleviate any itchiness. AVOID scratching due to risk of infection.    BATHING/SWIMMING: You may shower after your procedure with regular soap and water, but no baths, no swimming, and no hot tubs for 3-4 days after the procedure due to risk of infection.    ACTIVITIES:  Immediately following the injection, you may resume daily activities (such as work) and light exercise. We suggest that you refrain from strenuous activity and heavy lifting, particularly the injured body part, for a week post-procedure, but sometimes this can change as well.    MOIST HEAT/ICE:  Moist heat or ice may  be used on the injection area.     MEDICATION: You may continue your prescribed medications, over the counter medications or supplements, Aspirin, and/or any anti-inflammatories (Motrin, Advil, Aleve, Ibuprofen, Naproxen etc.). Tylenol Extra Strength, Tylenol Arthritis, or any prescribed narcotics or muscle relaxants are OK to take also.    APPOINTMENTS: You should have a follow-up appointment with Dr. Guerrero scheduled 1-3 weeks as recommended after your procedure. Please schedule your follow-up appointment on your way out after your procedure, or call the office to schedule these appointments as soon as possible.    PRECAUTIONS: Early, rare post procedure problems that can be concerning are as follows: an increase in pain not relieved by medication (over the counter or prescription), a temperature above 101 degrees, excessive bleeding or progressive, extreme swelling, or numbness.     CALL THE OFFICE OR GO TO THE EMERGENCY ROOM IF ANY OF THESE SYMPTOMS OCCUR.   If you have any questions or problems, please call our office at [phone]     Treatment or Intervention:  May continue alternating ice and moist heat therapy as needed   Stressed the importance of trying to keep her hands warm because of the amount of arthritis she has especially in the winter,   Reviewed home exercises to be performed by the patient routinely   Recommendation to continue over-the-counter vitamin-D 2 -3000+ milligrams a day, as well as  a daily multivitamin.  Recommendation to continue over-the-counter curcumin, turmeric, boswellia,  as directed to aid with joint inflammation.   Recommendation to continue over-the-counter Move Free ULTRA for joint health.    Continue per rheumatology hydroxychloroquine  AKA Plaquenil 200 mg daily per her Rheumatologist Dr. Ravi Mcqueen .  Patient advised regarding the risks and/or potential adverse reactions and/or side effects of any prescribed medications along with any over-the-counter medications or  any supplements used. Patient advised to seek immediate medical care if any adverse reactions occur. The patient and/or patient(s) parent(s) verbalized their understanding  Continue follow-ups regularly as instructed by her Rheumatologist Dr. Ravi Mcqueen   Also possibility in the future regenerative injections versus viscosupplementation injections versus a combination of both for fingers and thumbs  Possibility referral to orthopedics for surgical evaluation for trigger fingers if injections do not work however patient would like to try to avoid surgical intervention only for her fingers but also her thumbs  Reviewed bilateral hand MRI in detail with the patient and/or patients parent/legal guardian to their level of understanding; a copy of these results were provided to the patient and/or patients parent/legal guardian at the time of this office visit.   Make sure keep follow-up appointments with neurosurgery or orthopedic spine whoever did surgical procedures on her cervical spine  Performed corticosteroid injection into the patients right hand under ultrasound. The patient tolerated the procedure well and without any adverse effects. Discharge instructions for CORTICOSTEROID injections were reviewed in detail with the patient to the level of their understanding; a copy of these instructions were provided to the patient at the time of this office visit.   Follow-up in 3 to 4 weeks or sooner if needed      Please note that this report has been produced using speech recognition software.  It may contain errors related to grammar, punctuation or spelling.  Electronically signed, but not reviewed.  Bre Guerrero D.O. DEYSI, Director of Sports Medicine      BRE GUERRERO on 7/2/25 at 8:38 AM.     DEYSI Flores DO

## 2025-07-01 ENCOUNTER — HOSPITAL ENCOUNTER (OUTPATIENT)
Dept: RADIOLOGY | Facility: EXTERNAL LOCATION | Age: 60
Discharge: HOME | End: 2025-07-01

## 2025-07-01 ENCOUNTER — OFFICE VISIT (OUTPATIENT)
Dept: ORTHOPEDIC SURGERY | Facility: CLINIC | Age: 60
End: 2025-07-01
Payer: MEDICARE

## 2025-07-01 VITALS
BODY MASS INDEX: 26.22 KG/M2 | SYSTOLIC BLOOD PRESSURE: 118 MMHG | HEART RATE: 84 BPM | DIASTOLIC BLOOD PRESSURE: 70 MMHG | WEIGHT: 177 LBS | HEIGHT: 69 IN

## 2025-07-01 DIAGNOSIS — M65.941 TENOSYNOVITIS OF BOTH HANDS: ICD-10-CM

## 2025-07-01 DIAGNOSIS — M79.642 CHRONIC PAIN OF LEFT HAND: ICD-10-CM

## 2025-07-01 DIAGNOSIS — M19.041 PRIMARY OSTEOARTHRITIS OF RIGHT HAND: ICD-10-CM

## 2025-07-01 DIAGNOSIS — M79.641 CHRONIC PAIN OF RIGHT HAND: ICD-10-CM

## 2025-07-01 DIAGNOSIS — M19.042 PRIMARY OSTEOARTHRITIS OF BOTH HANDS: ICD-10-CM

## 2025-07-01 DIAGNOSIS — G89.29 CHRONIC PAIN OF RIGHT HAND: ICD-10-CM

## 2025-07-01 DIAGNOSIS — E78.2 MIXED HYPERLIPIDEMIA: ICD-10-CM

## 2025-07-01 DIAGNOSIS — G89.29 CHRONIC PAIN OF LEFT HAND: ICD-10-CM

## 2025-07-01 DIAGNOSIS — M65.942 TENOSYNOVITIS OF BOTH HANDS: ICD-10-CM

## 2025-07-01 DIAGNOSIS — M65.331 TRIGGER MIDDLE FINGER OF RIGHT HAND: ICD-10-CM

## 2025-07-01 DIAGNOSIS — M19.041 PRIMARY OSTEOARTHRITIS OF BOTH HANDS: ICD-10-CM

## 2025-07-01 DIAGNOSIS — M19.042 PRIMARY OSTEOARTHRITIS OF LEFT HAND: Primary | ICD-10-CM

## 2025-07-01 DIAGNOSIS — M65.949 TENOSYNOVITIS OF FINGER AND HAND: ICD-10-CM

## 2025-07-01 DIAGNOSIS — M67.80 TENDINOSIS: ICD-10-CM

## 2025-07-01 PROCEDURE — 99213 OFFICE O/P EST LOW 20 MIN: CPT | Mod: 25 | Performed by: FAMILY MEDICINE

## 2025-07-01 PROCEDURE — 20606 DRAIN/INJ JOINT/BURSA W/US: CPT | Performed by: FAMILY MEDICINE

## 2025-07-01 RX ORDER — ROSUVASTATIN CALCIUM 20 MG/1
20 TABLET, COATED ORAL DAILY
Qty: 90 TABLET | Refills: 0 | Status: SHIPPED | OUTPATIENT
Start: 2025-07-01

## 2025-07-01 ASSESSMENT — PAIN - FUNCTIONAL ASSESSMENT: PAIN_FUNCTIONAL_ASSESSMENT: 0-10

## 2025-07-01 ASSESSMENT — PAIN SCALES - GENERAL
PAINLEVEL_OUTOF10: 3
PAINLEVEL_OUTOF10: 4

## 2025-07-01 ASSESSMENT — ENCOUNTER SYMPTOMS
OCCASIONAL FEELINGS OF UNSTEADINESS: 0
DEPRESSION: 0
LOSS OF SENSATION IN FEET: 0

## 2025-07-02 ENCOUNTER — CLINICAL SUPPORT (OUTPATIENT)
Dept: PRIMARY CARE | Facility: CLINIC | Age: 60
End: 2025-07-02
Payer: MEDICARE

## 2025-07-02 DIAGNOSIS — D64.9 ANEMIA, UNSPECIFIED TYPE: ICD-10-CM

## 2025-07-02 PROBLEM — M65.942 TENOSYNOVITIS OF BOTH HANDS: Status: ACTIVE | Noted: 2025-07-02

## 2025-07-02 PROBLEM — R93.89 ABNORMAL MRI: Status: ACTIVE | Noted: 2025-07-02

## 2025-07-02 PROBLEM — M67.80 TENDINOSIS: Status: ACTIVE | Noted: 2025-07-02

## 2025-07-02 PROBLEM — M19.041 DEGENERATIVE JOINT DISEASE OF RIGHT HAND: Status: ACTIVE | Noted: 2025-07-02

## 2025-07-02 PROBLEM — M79.641 CHRONIC PAIN OF RIGHT HAND: Status: ACTIVE | Noted: 2025-07-02

## 2025-07-02 PROBLEM — M79.642 CHRONIC PAIN OF LEFT HAND: Status: ACTIVE | Noted: 2025-07-02

## 2025-07-02 PROBLEM — G89.29 CHRONIC PAIN OF RIGHT HAND: Status: ACTIVE | Noted: 2025-07-02

## 2025-07-02 PROBLEM — M65.941 TENOSYNOVITIS OF BOTH HANDS: Status: ACTIVE | Noted: 2025-07-02

## 2025-07-02 PROBLEM — M65.949 TENOSYNOVITIS OF FINGER AND HAND: Status: ACTIVE | Noted: 2025-07-02

## 2025-07-02 PROBLEM — M19.042 DEGENERATIVE JOINT DISEASE OF HAND, LEFT: Status: ACTIVE | Noted: 2025-07-02

## 2025-07-02 PROBLEM — G89.29 CHRONIC PAIN OF LEFT HAND: Status: ACTIVE | Noted: 2025-07-02

## 2025-07-02 PROBLEM — M21.242: Status: ACTIVE | Noted: 2025-07-02

## 2025-07-02 PROBLEM — M21.241: Status: ACTIVE | Noted: 2025-07-02

## 2025-07-02 LAB
POC FECAL OCCULT BLOOD: POSITIVE
POC LOT NUMBER: ABNORMAL
POC NEGATIVE CONTROL: NEGATIVE
POC POSITIVE CONTROL: POSITIVE
POC TEST CARD EXPIRATION DATE: ABNORMAL
POC TEST CARD EXPIRATION DATE: ABNORMAL

## 2025-07-02 PROCEDURE — 82270 OCCULT BLOOD FECES: CPT

## 2025-07-02 RX ORDER — NITROFURANTOIN 25; 75 MG/1; MG/1
100 CAPSULE ORAL 2 TIMES DAILY
Qty: 10 CAPSULE | Refills: 0 | Status: SHIPPED | OUTPATIENT
Start: 2025-07-02 | End: 2025-07-07

## 2025-07-02 RX ORDER — HYDROCHLOROTHIAZIDE 12.5 MG/1
12.5 TABLET ORAL DAILY
Qty: 90 TABLET | Refills: 0 | Status: SHIPPED | OUTPATIENT
Start: 2025-07-02

## 2025-07-02 RX ORDER — LISINOPRIL 20 MG/1
20 TABLET ORAL DAILY
Qty: 90 TABLET | Refills: 1 | Status: SHIPPED | OUTPATIENT
Start: 2025-07-02

## 2025-07-02 RX ORDER — METOPROLOL TARTRATE 25 MG/1
25 TABLET, FILM COATED ORAL 2 TIMES DAILY
Qty: 180 TABLET | Refills: 0 | Status: SHIPPED | OUTPATIENT
Start: 2025-07-02

## 2025-07-02 ASSESSMENT — ENCOUNTER SYMPTOMS
CHEST TIGHTNESS: 1
FATIGUE: 1

## 2025-07-03 ENCOUNTER — OFFICE VISIT (OUTPATIENT)
Dept: HEMATOLOGY/ONCOLOGY | Facility: CLINIC | Age: 60
End: 2025-07-03
Payer: MEDICARE

## 2025-07-03 ENCOUNTER — TELEPHONE (OUTPATIENT)
Dept: PRIMARY CARE | Facility: CLINIC | Age: 60
End: 2025-07-03

## 2025-07-03 VITALS
SYSTOLIC BLOOD PRESSURE: 109 MMHG | OXYGEN SATURATION: 98 % | BODY MASS INDEX: 26.45 KG/M2 | WEIGHT: 178.6 LBS | RESPIRATION RATE: 20 BRPM | TEMPERATURE: 97.2 F | DIASTOLIC BLOOD PRESSURE: 72 MMHG | HEIGHT: 69 IN | HEART RATE: 79 BPM

## 2025-07-03 DIAGNOSIS — D64.9 ANEMIA, UNSPECIFIED TYPE: ICD-10-CM

## 2025-07-03 LAB
ALBUMIN SERPL BCP-MCNC: 4.8 G/DL (ref 3.4–5)
ALP SERPL-CCNC: 108 U/L (ref 33–136)
ALT SERPL W P-5'-P-CCNC: 17 U/L (ref 7–45)
ANION GAP SERPL CALC-SCNC: 15 MMOL/L (ref 10–20)
AST SERPL W P-5'-P-CCNC: 19 U/L (ref 9–39)
BASOPHILS # BLD AUTO: 0.03 X10*3/UL (ref 0–0.1)
BASOPHILS NFR BLD AUTO: 0.2 %
BILIRUB SERPL-MCNC: 0.3 MG/DL (ref 0–1.2)
BUN SERPL-MCNC: 29 MG/DL (ref 6–23)
CALCIUM SERPL-MCNC: 10.3 MG/DL (ref 8.6–10.3)
CHLORIDE SERPL-SCNC: 101 MMOL/L (ref 98–107)
CO2 SERPL-SCNC: 25 MMOL/L (ref 21–32)
CREAT SERPL-MCNC: 0.82 MG/DL (ref 0.5–1.05)
CRP SERPL-MCNC: <0.1 MG/DL
EGFRCR SERPLBLD CKD-EPI 2021: 81 ML/MIN/1.73M*2
EOSINOPHIL # BLD AUTO: 0.07 X10*3/UL (ref 0–0.7)
EOSINOPHIL NFR BLD AUTO: 0.5 %
ERYTHROCYTE [DISTWIDTH] IN BLOOD BY AUTOMATED COUNT: 12.6 % (ref 11.5–14.5)
ERYTHROCYTE [SEDIMENTATION RATE] IN BLOOD BY WESTERGREN METHOD: 52 MM/H (ref 0–30)
FERRITIN SERPL-MCNC: 500 NG/ML (ref 8–150)
GLUCOSE SERPL-MCNC: 100 MG/DL (ref 74–99)
HCT VFR BLD AUTO: 32.7 % (ref 36–46)
HGB BLD-MCNC: 10.8 G/DL (ref 12–16)
HGB RETIC QN: 37 PG (ref 28–38)
IMM GRANULOCYTES # BLD AUTO: 0.07 X10*3/UL (ref 0–0.7)
IMM GRANULOCYTES NFR BLD AUTO: 0.5 % (ref 0–0.9)
IMMATURE RETIC FRACTION: 14.8 %
IRON SATN MFR SERPL: 25 % (ref 25–45)
IRON SERPL-MCNC: 75 UG/DL (ref 35–150)
LDH SERPL L TO P-CCNC: 132 U/L (ref 84–246)
LYMPHOCYTES # BLD AUTO: 2.37 X10*3/UL (ref 1.2–4.8)
LYMPHOCYTES NFR BLD AUTO: 17.2 %
MCH RBC QN AUTO: 31.5 PG (ref 26–34)
MCHC RBC AUTO-ENTMCNC: 33 G/DL (ref 32–36)
MCV RBC AUTO: 95 FL (ref 80–100)
MONOCYTES # BLD AUTO: 0.73 X10*3/UL (ref 0.1–1)
MONOCYTES NFR BLD AUTO: 5.3 %
NEUTROPHILS # BLD AUTO: 10.47 X10*3/UL (ref 1.2–7.7)
NEUTROPHILS NFR BLD AUTO: 76.3 %
NRBC BLD-RTO: 0 /100 WBCS (ref 0–0)
PLATELET # BLD AUTO: 257 X10*3/UL (ref 150–450)
POTASSIUM SERPL-SCNC: 3.8 MMOL/L (ref 3.5–5.3)
PROT SERPL-MCNC: 7.4 G/DL (ref 6.4–8.2)
RBC # BLD AUTO: 3.43 X10*6/UL (ref 4–5.2)
RETICS #: 0.06 X10*6/UL (ref 0.02–0.08)
RETICS/RBC NFR AUTO: 1.6 % (ref 0.5–2)
SODIUM SERPL-SCNC: 137 MMOL/L (ref 136–145)
TIBC SERPL-MCNC: 304 UG/DL (ref 240–445)
TSH SERPL-ACNC: 2.38 MIU/L (ref 0.44–3.98)
UIBC SERPL-MCNC: 229 UG/DL (ref 110–370)
WBC # BLD AUTO: 13.7 X10*3/UL (ref 4.4–11.3)

## 2025-07-03 PROCEDURE — 88189 FLOWCYTOMETRY/READ 16 & >: CPT

## 2025-07-03 PROCEDURE — 99215 OFFICE O/P EST HI 40 MIN: CPT | Mod: 25

## 2025-07-03 PROCEDURE — 84443 ASSAY THYROID STIM HORMONE: CPT

## 2025-07-03 PROCEDURE — 83550 IRON BINDING TEST: CPT

## 2025-07-03 PROCEDURE — 36415 COLL VENOUS BLD VENIPUNCTURE: CPT

## 2025-07-03 PROCEDURE — 85045 AUTOMATED RETICULOCYTE COUNT: CPT

## 2025-07-03 PROCEDURE — 99205 OFFICE O/P NEW HI 60 MIN: CPT

## 2025-07-03 PROCEDURE — 4010F ACE/ARB THERAPY RXD/TAKEN: CPT

## 2025-07-03 PROCEDURE — 85652 RBC SED RATE AUTOMATED: CPT

## 2025-07-03 PROCEDURE — 88185 FLOWCYTOMETRY/TC ADD-ON: CPT | Mod: TC,SAMLAB

## 2025-07-03 PROCEDURE — 3078F DIAST BP <80 MM HG: CPT

## 2025-07-03 PROCEDURE — 83010 ASSAY OF HAPTOGLOBIN QUANT: CPT | Mod: SAMLAB

## 2025-07-03 PROCEDURE — 99417 PROLNG OP E/M EACH 15 MIN: CPT

## 2025-07-03 PROCEDURE — 82746 ASSAY OF FOLIC ACID SERUM: CPT | Mod: SAMLAB

## 2025-07-03 PROCEDURE — 84075 ASSAY ALKALINE PHOSPHATASE: CPT

## 2025-07-03 PROCEDURE — 86140 C-REACTIVE PROTEIN: CPT

## 2025-07-03 PROCEDURE — 85025 COMPLETE CBC W/AUTO DIFF WBC: CPT

## 2025-07-03 PROCEDURE — 3008F BODY MASS INDEX DOCD: CPT

## 2025-07-03 PROCEDURE — 83615 LACTATE (LD) (LDH) ENZYME: CPT

## 2025-07-03 PROCEDURE — 3074F SYST BP LT 130 MM HG: CPT

## 2025-07-03 PROCEDURE — 82728 ASSAY OF FERRITIN: CPT

## 2025-07-03 PROCEDURE — 82607 VITAMIN B-12: CPT | Mod: SAMLAB

## 2025-07-03 ASSESSMENT — PATIENT HEALTH QUESTIONNAIRE - PHQ9
4. FEELING TIRED OR HAVING LITTLE ENERGY: NEARLY EVERY DAY
3. TROUBLE FALLING OR STAYING ASLEEP OR SLEEPING TOO MUCH: SEVERAL DAYS
SUM OF ALL RESPONSES TO PHQ QUESTIONS 1-9: 8
SUM OF ALL RESPONSES TO PHQ9 QUESTIONS 1 AND 2: 3
5. POOR APPETITE OR OVEREATING: SEVERAL DAYS
1. LITTLE INTEREST OR PLEASURE IN DOING THINGS: NEARLY EVERY DAY
2. FEELING DOWN, DEPRESSED OR HOPELESS: NOT AT ALL
9. THOUGHTS THAT YOU WOULD BE BETTER OFF DEAD, OR OF HURTING YOURSELF: NOT AT ALL
7. TROUBLE CONCENTRATING ON THINGS, SUCH AS READING THE NEWSPAPER OR WATCHING TELEVISION: NOT AT ALL
8. MOVING OR SPEAKING SO SLOWLY THAT OTHER PEOPLE COULD HAVE NOTICED. OR THE OPPOSITE, BEING SO FIGETY OR RESTLESS THAT YOU HAVE BEEN MOVING AROUND A LOT MORE THAN USUAL: NOT AT ALL
6. FEELING BAD ABOUT YOURSELF - OR THAT YOU ARE A FAILURE OR HAVE LET YOURSELF OR YOUR FAMILY DOWN: NOT AT ALL

## 2025-07-03 ASSESSMENT — ENCOUNTER SYMPTOMS
VOMITING: 0
FEVER: 0
LIGHT-HEADEDNESS: 0
DYSURIA: 0
EYE ITCHING: 0
COUGH: 0
DEPRESSION: 0
SORE THROAT: 0
NERVOUS/ANXIOUS: 0
JOINT SWELLING: 0
APPETITE CHANGE: 0
ACTIVITY CHANGE: 0
SINUS PRESSURE: 0
PALPITATIONS: 0
SHORTNESS OF BREATH: 0
WHEEZING: 0
EYE DISCHARGE: 0
RHINORRHEA: 0
SLEEP DISTURBANCE: 0
MYALGIAS: 0
LOSS OF SENSATION IN FEET: 0
HEADACHES: 0
DIZZINESS: 0
OCCASIONAL FEELINGS OF UNSTEADINESS: 1
WEAKNESS: 0
NAUSEA: 0
BLOOD IN STOOL: 0
DYSPHORIC MOOD: 0
DIARRHEA: 0
ARTHRALGIAS: 0
ABDOMINAL PAIN: 0
CONSTIPATION: 0
NUMBNESS: 0
FLANK PAIN: 0
HEMATURIA: 0
UNEXPECTED WEIGHT CHANGE: 0

## 2025-07-03 ASSESSMENT — PAIN SCALES - GENERAL: PAINLEVEL_OUTOF10: 0-NO PAIN

## 2025-07-03 NOTE — PROGRESS NOTES
"Patient ID: Dinorah Horn \"Raquel\" is a 60 y.o. female.  Referring Physician: Yamilet Frazier DO  [address] [id number]  Primary Care Provider: Yamilet Frazier DO  Visit Type: Initial Visit    Diagnosis/Reason: Anemia    HPI:  Dinorah Horn is a 60 y.o. female referred for consultation of Anemia    Per review of records:  Mildly Anemic since at least 2024 with a hemoglobin ranging from 9.9-12.5  Severe Abdominal pain accompanied by dizziness that has been going on for over a year  Liver Cirrhosis   Currently taking oral iron  Recent Iron Parameters as of 5/7/25- Iron Serum 81, Iron Saturation 29%  Positive Guaiac on 7/2/25       Previous Hematological Background:  Hx of hematological disorders: No   Hx of blood transfusions: No  Hx of iron supplementation: Yes   Hx of B12 supplementation: Yes     Patient is a 61 yo female with an extensive PMH of Liver Cirrhosis, Rheumatoid Arthritis, DM2, GERD, Fibromyalgia, Hiatal hernia, palpitations, HTN, Hyperlipidemia  and was referred to benign hematology for consultation of Anemia.     Patient reports that last year she began having episodes of dizziness accompanied by severe abdominal pain.  She continues to have theses episodes periodically, and then they will go away for a time frame.     Pt complains of chronic nausea. Reports weight loss 10-12 lbs in the last several months. She thinks it could be related to changing her diet and starting metformin.         Fatigued, weak and tired, chills   Dehydrated   Beginning stages of Cirrihosis   Diarrhea  No appetite   No fevers  Chilled all the time  UTI in May, resolved with antibiotics    Occasional headaches, nothing major  Bruising easier than she use too, no bleeding issues   Severe stress from losing her pet in October and finances   Night sweats almost every night         Today, patient presents for initial consultation. Denies abnormal weight loss, night sweats, fever. Denies fatigue, chills, sob, " cp, n/v/d, abdominal pain, early satiety. Denies any abnormal bleeding or bruising. No recurrent infections or lymphadenopathy. No bone pain. No known blood disorders in family. Has had surgery in past w/o issue.     Shortness of breath with dizzy spells    Eating healthier for liver, increased fruits/veggies, increased water consumption, cut out starchy foods and alcohol     up to date on cancer screenings    SH: Diet - reg, Tobacco-quit 09, 15 years ppd day , ETOH-beer daily since 11, Rec drug use-THC gummies,  4 grown children, house wife, and    PSH:  hysterecomy, plates in right arm, open heart surgery, appendectomy, neck surgery, arthroscopic knee surgery   FH:  father prostate  Mother lung cancer- former smoker  Pat uncle Colon, Lung Cancer    Meds: see list     Discussed possible etiologies including multifactorial, nutritional deficiencies, anemia of chronic disease, malabsorption, hemopathy, medications, bleeding, malignancy, etc. Will start hematological workup today.     Relevant medications:  Currently using NSAID's (Naproxen, Ibuprofen etc.):   Currently taking any supplements:         PMHx:  Active Ambulatory Problems     Diagnosis Date Noted   • Gastroesophageal reflux disease without esophagitis 12/11/2023   • Insomnia 03/08/2024   • Alcoholic cirrhosis (Multi) 03/08/2024   • Type 2 diabetes mellitus without retinopathy (Multi) 03/08/2024   • Abnormal cardiovascular stress test 08/03/2020   • Abnormal findings in cerebrospinal fluid, abnormal level of enzymes 06/15/2022   • Alcohol intake above recommended sensible limits 10/17/2018   • Alopecia areata 09/13/2024   • Arthralgia of wrist 10/02/2018   • Multiple joint pain 02/04/2015   • Arteriosclerosis of coronary artery 11/29/2015   • Breast implant status 10/02/2018   • Chest pain 08/15/2016   • Candidiasis of skin 04/30/2019   • Capsular contracture of breast implant 10/12/2018   • Chronic pain disorder 02/15/2021   • Chronic  sinusitis 04/01/2019   • Fibromyalgia 02/04/2015   • Closed displaced fracture of base of fourth metacarpal bone of left hand with routine healing 07/07/2022   • Depressive disorder 11/29/2015   • Epistaxis 12/28/2021   • Generalized osteoarthritis 02/15/2021   • Heart disease 09/13/2024   • Hiatal hernia 08/05/2013   • History of cardiac catheterization 04/14/2021   • Lung nodule 09/17/2020   • Bilateral hand pain 08/06/2020   • Mixed hyperlipidemia 10/17/2016   • Hyperlipidemia 06/15/2022   • History of coronary artery bypass graft 06/15/2022   • Lymphadenopathy 01/26/2021   • Memory loss 11/10/2016   • Menopausal and female climacteric states 01/21/2019   • Neck mass 09/15/2020   • Osteoarthritis 09/13/2024   • Pain in unspecified knee 10/17/2018   • Pain of left lower extremity 01/21/2019   • Palpitations 08/05/2020   • Paresthesia of bilateral legs 01/21/2019   • Pityriasis rosea 04/30/2019   • Precordial pain 10/17/2016   • Rheumatoid arthritis 11/15/2018   • Polyarthritis, inflammatory (Multi) 10/17/2018   • Prediabetes 07/13/2018   • Primary hypertension 11/29/2015   • Prolonged QT interval 04/12/2018   • Psoriasis 10/26/2017   • Psoriatic arthritis (Multi) 06/15/2022   • Ptosis of breast 10/12/2018   • Radial styloid tenosynovitis 03/19/2019   • Shortness of breath 08/05/2020   • Raynaud's phenomenon 01/21/2019   • Raynaud's disease 09/13/2024   • Rash and other nonspecific skin eruption 03/19/2019   • Trigger finger 09/13/2024   • Tobacco dependence syndrome 09/15/2020   • Thyroid nodule 09/24/2020   • Steatosis of liver 11/29/2015   • Typical angina 06/15/2022   • Stable angina 07/02/2020   • Sinus congestion 04/12/2018   • Unintentional weight loss 07/13/2018   • Urinary incontinence 05/11/2020   • Need for influenza vaccination 09/16/2024   • Encounter for screening mammogram for malignant neoplasm of breast 09/16/2024   • Vasomotor instability 10/24/2024   • Depression 10/24/2024   • Arthritis of  carpometacarpal (CMC) joint of left thumb 11/27/2024   • Bilateral wrist pain 11/27/2024   • Stenosing tenosynovitis of finger of left hand 11/27/2024   • Osteoarthritis of both hands 11/27/2024   • Trigger middle finger 11/27/2024   • Interscapular pain 01/29/2025   • Long-term use of Plaquenil 03/03/2025   • Vitreous syneresis of right eye 03/03/2025   • Age-related nuclear cataract of both eyes 03/03/2025   • Dry eye syndrome of lacrimal gland, bilateral 03/03/2025   • Hyperopia with presbyopia of both eyes 03/03/2025   • Abdominal pain 05/05/2025   • Nausea and vomiting 05/05/2025   • Postural dizziness with near syncope 05/05/2025   • Acute thoracic back pain 05/06/2025   • UTI (urinary tract infection) 05/06/2025   • Spinal stenosis in cervical region 05/21/2025   • Cervical spondylosis with myelopathy 05/21/2025   • Spinal cord compression (Multi) 05/21/2025   • Elevated liver function tests 06/04/2025   • Acquired left-sided Manuel syndrome 06/05/2025   • Degenerative joint disease of hand, left 07/02/2025   • Abnormal MRI 07/02/2025   • Degenerative joint disease of right hand 07/02/2025   • Tenosynovitis of finger and hand 07/02/2025   • Tendinosis 07/02/2025   • Flexion deformity of finger joint of both hands 07/02/2025   • Chronic pain of right hand 07/02/2025   • Chronic pain of left hand 07/02/2025   • Tenosynovitis of both hands 07/02/2025   • Anemia 07/03/2025     Resolved Ambulatory Problems     Diagnosis Date Noted   • Diarrhea 05/06/2025   • Hypercalcemia 05/06/2025     Past Medical History:   Diagnosis Date   • Alopecia    • Cervical spinal cord compression    • Chronic back pain    • Cirrhosis of liver (Multi)    • Coronary artery disease    • Diabetes mellitus (Multi) 2023   • GERD (gastroesophageal reflux disease)    • HLD (hyperlipidemia)    • Hypertension    • Irritable bowel syndrome    • Liver disease    • Numbness and tingling    • Obesity ?   • RA (rheumatoid arthritis)      "    PSHx:  Surgical History[1]   ***  Have you had your wisdom teeth removed: ***    FHx:  Family History[2]   Mother:   Father:   Siblings:   Children:   Miscarriages:     Social Hx:  Dinorah Horn \"Raquel\"    reports that she quit smoking about 16 years ago. Her smoking use included cigarettes. She started smoking about 31 years ago. She has a 15 pack-year smoking history. She has never used smokeless tobacco.  She  reports that she does not currently use alcohol after a past usage of about 20.0 standard drinks of alcohol per week.  She  reports no history of drug use.  Social History[3]   Living Situation: ***  Occupation: ***  Marital Status: ***  Alcohol Use: ***  Smoking: ***  Recreational Drug Use: ***  Special Diets: ***  Ethnicity: ***  Advent: ***    Cancer Screenings:  Upper EGD: ***  Colonoscopy: ***   Mammogram: ***  PAP smear: ***  Prostate/PSA screenings: ***  Lung cancer screenings: ***    Medications and allergies reviewed in EMR.    ROS:  Review of Systems - Oncology   10 point review of systems negative except as state in HPI.    Vitals & Statistics:  Objective   BSA: 1.99 meters squared  /72   Pulse 79   Temp 36.2 °C (97.2 °F)   Resp 20   Wt 81 kg (178 lb 9.6 oz)   SpO2 98%   BMI 26.37 kg/m²     Physical Exam:  Physical Exam      Results:  Lab Results   Component Value Date    WBC 14.3 (H) 06/06/2025    NEUTROABS 3.99 05/11/2025    IGABSOL 0.01 05/11/2025    LYMPHSABS 1.74 05/11/2025    MONOSABS 0.52 05/11/2025    EOSABS 0.09 05/11/2025    BASOSABS 0.02 05/11/2025    RBC 3.13 (L) 06/06/2025    MCV 97 06/06/2025    MCHC 32.6 06/06/2025    HGB 9.9 (L) 06/06/2025    HCT 30.4 (L) 06/06/2025     06/06/2025     Lab Results   Component Value Date    RETICCTPCT 0.8 05/07/2025      Lab Results   Component Value Date    CREATININE 0.69 06/06/2025    BUN 19 06/06/2025    EGFR >90 06/06/2025     06/06/2025    K 4.2 06/06/2025     06/06/2025    CO2 22 06/06/2025      Lab " "Results   Component Value Date    ALT 65 (H) 05/11/2025    AST 56 (H) 05/11/2025    ALKPHOS 96 05/11/2025    BILITOT 0.4 05/11/2025      No results found for: \"TSH\"  No results found for: \"TSH\", \"L2GQQLE\", \"L9BYAPC\", \"THYROIDPAB\"  Lab Results   Component Value Date    IRON 81 05/07/2025    TIBC 281 05/07/2025    FERRITIN 481 (H) 05/07/2025      Lab Results   Component Value Date    UOCJKSRF92 1,035 (H) 09/05/2024      Lab Results   Component Value Date    FOLATE >24.0 05/07/2025     Lab Results   Component Value Date    GORDON Positive (A) 02/05/2024    RF 27 (H) 09/19/2024    SEDRATE 8 09/19/2024      Lab Results   Component Value Date    CRP 0.10 09/19/2024      No results found for: \"APOLINAR\"  Lab Results   Component Value Date     09/05/2024     Lab Results   Component Value Date    HAPTOGLOBIN 213 (H) 05/07/2025     Lab Results   Component Value Date    SPEP Normal. 05/06/2025     Lab Results   Component Value Date    IGG 1,200 05/30/2024    IGM 31 (L) 05/30/2024     05/30/2024     Lab Results   Component Value Date    HEPAIGM Nonreactive 05/30/2024    HEPBCIGM Nonreactive 05/30/2024    HEPBSAG Nonreactive 05/30/2024    HEPCAB Nonreactive 05/30/2024     Lab Results   Component Value Date    HIV1X2 Nonreactive 02/05/2024       Assessment:  ***    I reviewed patient's chart including but not limited to labs, imaging, surgical/procedure notes, pathology, hospital notes, doctor's notes.    Relevant historical labs/imaging:  ***    *** results:  ***    Plan:  ***    ***  Lab results pending - Will review when available and address adverse results as needed  ***  RTC in *** via {BDFUVAP3:81095} - F/U sooner if needed/urgent    I had an extensive discussion with the patient regarding the diagnosis and discussed the plan of therapy, including general considerations regarding side effects and outcomes. Pt understood and gave appropriate teach back about the plan of care. All questions were answered to the " patient's satisfaction. The patient is instructed to contact us at any time if questions or problems arise. Thank you for the opportunity to participate in the care of this very pleasant patient.    Total time = {BDTIME:57370} minutes. 50% or more of this time was spent in counseling and/or coordination of care including reviewing medical history/radiology/labs, examining patient, formulating outlined plan with team, and discussing plan with patient/family.    Georgette Bell, APRN-CNP           [1]  Past Surgical History:  Procedure Laterality Date   • APPENDECTOMY     • BREAST SURGERY      Augmentation   • CARDIAC CATHETERIZATION     • COLONOSCOPY     • CORONARY ARTERY BYPASS GRAFT  10/2010   • FOREARM FRACTURE SURGERY Right    • FOREARM HARDWARE REMOVAL Right    • HYSTERECTOMY  Partial,    • KNEE ARTHROSCOPY W/ LASER Left    • LIVER BIOPSY     [2]  Family History  Problem Relation Name Age of Onset   • Alcohol abuse Mother Trice    • Cancer Mother Trice    • Diabetes Mother Trice    • Heart disease Mother Trice    • Arthritis Father Reed    • Atrial fibrillation Father Reed    • Cancer Father Reed    • COPD Father Reed    • Hearing loss Father Reed    • Heart disease Father Reed    • Stroke Paternal Grandfather Guy    • Alcohol abuse Paternal Grandmother Brigette    • Alcohol abuse Sister Ioana    • Alcohol abuse Sister Alis    • Alcohol abuse Daughter Katerine    • Cancer Father's Brother Xavier    • Cancer Father's Brother [place]    • Colon cancer Father's Brother [place] J    • Drug abuse Sister Ioana GARCIA    [3]  Social History  Socioeconomic History   • Marital status:    Tobacco Use   • Smoking status: Former     Current packs/day: 0.00     Average packs/day: 1 pack/day for 15.0 years (15.0 ttl pk-yrs)     Types: Cigarettes     Start date: 1994     Quit date: 2009     Years since quittin.2   • Smokeless tobacco: Never   • Tobacco comments:      I smoked on and off through a 15 year period.   Vaping Use   • Vaping status: Never Used   Substance and Sexual Activity   • Alcohol use: Not Currently     Alcohol/week: 20.0 standard drinks of alcohol     Types: 20 Cans of beer per week     Comment: occassional  drink now - total  drinks so far this week   • Drug use: Never   • Sexual activity: Not Currently     Partners: Male     Birth control/protection: None     Comment: My cervix has been removed.     Social Drivers of Health     Financial Resource Strain: Low Risk  (6/5/2025)    Overall Financial Resource Strain (CARDIA)    • Difficulty of Paying Living Expenses: Not hard at all   Food Insecurity: No Food Insecurity (5/5/2025)    Hunger Vital Sign    • Worried About Running Out of Food in the Last Year: Never true    • Ran Out of Food in the Last Year: Never true   Transportation Needs: No Transportation Needs (6/5/2025)    PRAPARE - Transportation    • Lack of Transportation (Medical): No    • Lack of Transportation (Non-Medical): No   Physical Activity: Inactive (5/5/2025)    Exercise Vital Sign    • Days of Exercise per Week: 0 days    • Minutes of Exercise per Session: 0 min   Stress: Stress Concern Present (5/5/2025)    [employer] - Occupational Stress Questionnaire    • Feeling of Stress : To some extent   Social Connections: Moderately Isolated (5/5/2025)    Social Connection and Isolation Panel [NHANES]    • Frequency of Communication with Friends and Family: Three times a week    • Frequency of Social Gatherings with Friends and Family: Never    • Attends Cheondoism Services: Never    • Active Member of Clubs or Organizations: No    • Attends Club or Organization Meetings: Never    • Marital Status:    Intimate Partner Violence: Not At Risk (5/5/2025)    Humiliation, Afraid, Rape, and Kick questionnaire    • Fear of Current or Ex-Partner: No    • Emotionally Abused: No    • Physically Abused: No    • Sexually  Abused: No   Housing Stability: Low Risk  (6/5/2025)    Housing Stability Vital Sign    • Unable to Pay for Housing in the Last Year: No    • Number of Times Moved in the Last Year: 0    • Homeless in the Last Year: No   Recent Concern: Housing Stability - High Risk (5/6/2025)    Housing Stability Vital Sign    • Unable to Pay for Housing in the Last Year: Yes    • Number of Times Moved in the Last Year: 0    • Homeless in the Last Year: No        Fear of Current or Ex-Partner: No     Emotionally Abused: No     Physically Abused: No     Sexually Abused: No   Housing Stability: Low Risk  (6/5/2025)    Housing Stability Vital Sign     Unable to Pay for Housing in the Last Year: No     Number of Times Moved in the Last Year: 0     Homeless in the Last Year: No   Recent Concern: Housing Stability - High Risk (5/6/2025)    Housing Stability Vital Sign     Unable to Pay for Housing in the Last Year: Yes     Number of Times Moved in the Last Year: 0     Homeless in the Last Year: No

## 2025-07-03 NOTE — TELEPHONE ENCOUNTER
Raquel is calling questioning why she was called in another antibiotic.  She had already had a 5 day round and wanted to confirm if she needs to take another 5 days.  Please advise.   (3) no apparent problem

## 2025-07-03 NOTE — PROGRESS NOTES
Labs drawn at clinic visit today  Pt instructed to call and schedule with her GI asap  Rtc 7/31 3pm for CNP visit  Reviewed AVS with patient- patient verbalizes understanding

## 2025-07-03 NOTE — PATIENT INSTRUCTIONS
Discussed and reviewed medical history   Reviewed recent labs- Anemic with a hemoglobin 9.9, Iron parameters are stable  Encouraged to continue on oral iron take with source of vitamin C and 2 hour prior or 4 hours after taking omeprazole   Stool Sample from 7/2/25 was positive for blood, highly encourage to be seen by GI, referral was placed  Discussed possible etiologies including multifactorial, nutritional deficiencies, anemia of chronic disease, malabsorption, hemopathy, medications, bleeding, malignancy, etc.   Will start hematological workup today.   Return to see APRN in 3-4 weeks

## 2025-07-04 LAB
FOLATE SERPL-MCNC: >24 NG/ML
HAPTOGLOB SERPL NEPH-MCNC: 261 MG/DL (ref 30–200)
VIT B12 SERPL-MCNC: 1939 PG/ML (ref 211–911)

## 2025-07-07 ENCOUNTER — TELEPHONE (OUTPATIENT)
Dept: PRIMARY CARE | Facility: CLINIC | Age: 60
End: 2025-07-07
Payer: MEDICARE

## 2025-07-07 NOTE — TELEPHONE ENCOUNTER
Raquel called back and stated that she is taking iron.  I let her know that the results may not be accurate.  She wants to know what to do now.  Please advise.

## 2025-07-07 NOTE — TELEPHONE ENCOUNTER
----- Message from Yamilet Frazier sent at 7/3/2025 11:35 AM EDT -----  3/3 OB ARE ++  HOPE NOT TAKING IRON  IN THAT CASE, THESE ARE NOT VALID   GI CONSULT OTHER WISE   ----- Message -----  From: Judith Grajeda MA  Sent: 7/2/2025   3:07 PM EDT  To: Yamilet Frazier, DO

## 2025-07-08 DIAGNOSIS — M54.12 CERVICAL RADICULOPATHY: Primary | ICD-10-CM

## 2025-07-08 RX ORDER — PREDNISONE 10 MG/1
TABLET ORAL
Qty: 43 TABLET | Refills: 0 | Status: SHIPPED | OUTPATIENT
Start: 2025-07-08 | End: 2025-07-18

## 2025-07-09 ENCOUNTER — APPOINTMENT (OUTPATIENT)
Facility: CLINIC | Age: 60
End: 2025-07-09
Payer: MEDICARE

## 2025-07-09 VITALS
SYSTOLIC BLOOD PRESSURE: 94 MMHG | BODY MASS INDEX: 27.24 KG/M2 | HEART RATE: 79 BPM | OXYGEN SATURATION: 97 % | DIASTOLIC BLOOD PRESSURE: 62 MMHG | WEIGHT: 181.8 LBS

## 2025-07-09 DIAGNOSIS — R89.9 ABNORMAL LABORATORY TEST RESULT: ICD-10-CM

## 2025-07-09 DIAGNOSIS — L40.9 PSORIASIS: ICD-10-CM

## 2025-07-09 DIAGNOSIS — M19.90 INFLAMMATORY ARTHRITIS: Primary | ICD-10-CM

## 2025-07-09 PROCEDURE — 99215 OFFICE O/P EST HI 40 MIN: CPT | Performed by: INTERNAL MEDICINE

## 2025-07-09 PROCEDURE — 3044F HG A1C LEVEL LT 7.0%: CPT | Performed by: INTERNAL MEDICINE

## 2025-07-09 PROCEDURE — 3074F SYST BP LT 130 MM HG: CPT | Performed by: INTERNAL MEDICINE

## 2025-07-09 PROCEDURE — 1036F TOBACCO NON-USER: CPT | Performed by: INTERNAL MEDICINE

## 2025-07-09 PROCEDURE — 4010F ACE/ARB THERAPY RXD/TAKEN: CPT | Performed by: INTERNAL MEDICINE

## 2025-07-09 PROCEDURE — 3078F DIAST BP <80 MM HG: CPT | Performed by: INTERNAL MEDICINE

## 2025-07-09 ASSESSMENT — PAIN SCALES - GENERAL: PAINLEVEL_OUTOF10: 1

## 2025-07-09 ASSESSMENT — ENCOUNTER SYMPTOMS
LOSS OF SENSATION IN FEET: 0
DEPRESSION: 0
OCCASIONAL FEELINGS OF UNSTEADINESS: 0

## 2025-07-09 NOTE — PROGRESS NOTES
"Subjective   Patient ID: [id number]  Dinorah Horn \"Raquel\" is a 60 y.o. female who presents for follow up     HPI  PMH/PSH: DM, CAD s/p CABG, GERD, IBS, cervical DJD, Fibromyalgia, PsO, fatty liver disease suspected cirrhosis, R knee torn ligament.   Social: has 4 children. No alcohol intake, ex-smoker quit in 2009, no drug use.   FHx: No family history of autoimmune diseases      was seeing Dr. Ashli Wilson 05/2023:  She was diagnosed with psoriasis in the 1990's, started in left elbow.  Then diagnosed with psoriatic arthritis/rheumatoid arthritis in 2019 based on pain in her hands, wrists, shoulders, knees, ankles, hip. Prior notes also report enthesitis and dactylitis.   Prior treatments: cosentyx (bad skin reaction), enbrel (not effective), humira (not effective)  Current treatment: tremfya (started 1 year ago) - psoriasis cleared, joints improved by 50%  Per A&P:  Continue tremfya and add HCQ 200mg bid for active arthritis and dactylitis of 2nd and 3rd digits.   She also had a R subacromial bursa CSI during that visit.      She then saw Dr. Nation 02/2024:  Was still on HCQ, A&P:  Rheumatologic labs have showed a + GORDON, SSA>8, +RF, neg CCP     Current IS:  HCQ 200mg daily      First visit 09/13/2024:  Psoriasis is doing well overall, only small spot on forehead. Reports that joint pain was an issue prior to diagnosis of PsO (bilateral CMCs, wrists, R middle finger trigger finger PIP>DIP and 2nd and 3rd MCPs), worse with activity. Worst are trigger finger and the thumbs. Associated with joint swelling. Denies dactylitis.   She limps early in the morning first thing after she wakes up because of her L ankle. Gets better after a few hours but then worse again at the end of the day.   Has had plantar fascitis.   Knees are sore and crunch, worse with steps, stairs, and activity.   Elbows are good.   B/l shoulder pain, R>L and R SAB injections help.   She reports no prior difference in symptoms while on/off " tremfya. Unsure if HCQ is helping.   Lately upper back is hurting at level of shoulder blades, with activity.      She is getting worked up by GI and liver biopsy with early cirrhosis. EGD with very small esophageal varices.     Interval Hx:  Had C4-C7 anterior cervical spine discectomy and fusion 06/4/2025, shoulder pain has resolved since.    joint pains are pretty much the same, most bothersome is R middle finger triggering, CMCs, has improved since she had the R 3rd A1 pulley injection.   Feels unrefreshed in AM and fatigue.   I asked her to stop the HCQ 03/2025 and she feels no different on or off it     Very seldom feels anxious or depressed.   No fever, chills, weight loss, night sweats or headaches. No dry eyes, blurry vision, redness or pain or photophobia. No dry mouth, dental loss, loss of taste, nasal or oral ulcers, difficulty swallowing. No chest pain. No shortness of breath, cough. No dysphagia, nausea, vomiting, heartburn.  Has alternating constipation and diarrhea. No melena or hematochezia  No genital or anal ulcers. No photosensitivity, rash or lesions, + biphasic Raynaud's phenomenon. + numbness or tingling in hands and feet. Has CTS. No muscle weakness. + urinary incontinence. Denies history of clots (arterial or venous), or abortions/miscarriages.     Patient Active Problem List   Diagnosis    Gastroesophageal reflux disease without esophagitis    Insomnia    Alcoholic cirrhosis (Multi)    Type 2 diabetes mellitus without retinopathy (Multi)    Abnormal cardiovascular stress test    Abnormal findings in cerebrospinal fluid, abnormal level of enzymes    Alcohol intake above recommended sensible limits    Alopecia areata    Arthralgia of wrist    Multiple joint pain    Arteriosclerosis of coronary artery    Breast implant status    Chest pain    Candidiasis of skin    Capsular contracture of breast implant    Chronic pain disorder    Chronic sinusitis    Fibromyalgia    Closed displaced fracture  of base of fourth metacarpal bone of left hand with routine healing    Depressive disorder    Epistaxis    Generalized osteoarthritis    Heart disease    Hiatal hernia    History of cardiac catheterization    Lung nodule    Bilateral hand pain    Mixed hyperlipidemia    Hyperlipidemia    History of coronary artery bypass graft    Lymphadenopathy    Memory loss    Menopausal and female climacteric states    Neck mass    Osteoarthritis    Pain in unspecified knee    Pain of left lower extremity    Palpitations    Paresthesia of bilateral legs    Pityriasis rosea    Precordial pain    Rheumatoid arthritis    Polyarthritis, inflammatory (Multi)    Prediabetes    Primary hypertension    Prolonged QT interval    Psoriasis    Psoriatic arthritis (Multi)    Ptosis of breast    Radial styloid tenosynovitis    Shortness of breath    Raynaud's phenomenon    Raynaud's disease    Rash and other nonspecific skin eruption    Trigger finger    Tobacco dependence syndrome    Thyroid nodule    Steatosis of liver    Typical angina    Stable angina    Sinus congestion    Unintentional weight loss    Urinary incontinence    Need for influenza vaccination    Encounter for screening mammogram for malignant neoplasm of breast    Vasomotor instability    Depression    Arthritis of carpometacarpal (CMC) joint of left thumb    Bilateral wrist pain    Stenosing tenosynovitis of finger of left hand    Osteoarthritis of both hands    Trigger middle finger    Interscapular pain    Long-term use of Plaquenil    Vitreous syneresis of right eye    Age-related nuclear cataract of both eyes    Dry eye syndrome of lacrimal gland, bilateral    Hyperopia with presbyopia of both eyes    Abdominal pain    Nausea and vomiting    Postural dizziness with near syncope    Acute thoracic back pain    UTI (urinary tract infection)    Spinal stenosis in cervical region    Cervical spondylosis with myelopathy    Spinal cord compression (Multi)    Elevated liver  function tests    Acquired left-sided Manuel syndrome    Degenerative joint disease of hand, left    Abnormal MRI    Degenerative joint disease of right hand    Tenosynovitis of finger and hand    Tendinosis    Flexion deformity of finger joint of both hands    Chronic pain of right hand    Chronic pain of left hand    Tenosynovitis of both hands    Anemia       Past Medical History:   Diagnosis Date    Alopecia     Anemia 04/2024    Cervical spinal cord compression     C5-7    Chronic back pain     Chronic sinusitis     Cirrhosis of liver (Multi)     former alcohol abuse    Coronary artery disease     CABG in 2010, no recent follow up with cardiology, Apt for cardiac risk assessment made with Dr. Dubon    Depression     Diabetes mellitus (Multi) 2023    Fibromyalgia     GERD (gastroesophageal reflux disease)     under control with medication    Hiatal hernia     HLD (hyperlipidemia)     Hypertension     I take BP medications    Insomnia     Irritable bowel syndrome     Liver disease     Lung nodule     CT 10/2021 calcified granulomas in the right  lower lobe.    Lymphadenopathy     US 1/2021 Sonographically benign-appearing lymph node redemonstrated superior  and lateral to the left thyroid lobe    Numbness and tingling     arms, hands, feet and legs    Obesity ?    Palpitations     occasional, did wear heart monitor 6/15/21-results in [facility], seeing dr. Dubon 5/30/25    Postural dizziness with near syncope     recent ED admit 5/11/25; Carotid US, head CT and MRI all WNL, patient found to have a UTI, discharged on ATB's [id number], per patient no further issues    Prolonged QT interval     Psoriasis     Psoriatic arthritis (Multi)     follows with Rhematology    RA (rheumatoid arthritis)     Raynaud's disease     cold fingertips witth pain    Thyroid nodule     US 1/2021 Nonenlarged thyroid gland with scattered subcentimeter nodules. No  routine follow-up is required per ACR TI-RADS guidelines.    Trigger  finger     UTI (urinary tract infection) 2025    treated with 10 day coarse of ATB's       Past Surgical History:   Procedure Laterality Date    APPENDECTOMY  1995    BREAST SURGERY  2003    Augmentation    CARDIAC CATHETERIZATION      COLONOSCOPY      CORONARY ARTERY BYPASS GRAFT  10/2010    FOREARM FRACTURE SURGERY Right 1992    FOREARM HARDWARE REMOVAL Right 2003    HYSTERECTOMY  Partial,     KNEE ARTHROSCOPY W/ LASER Left     LIVER BIOPSY         Social History     Socioeconomic History    Marital status:      Spouse name: Not on file    Number of children: Not on file    Years of education: Not on file    Highest education level: Not on file   Occupational History    Not on file   Tobacco Use    Smoking status: Former     Current packs/day: 0.00     Average packs/day: 1 pack/day for 15.0 years (15.0 ttl pk-yrs)     Types: Cigarettes     Start date: 1994     Quit date: 2009     Years since quittin.2    Smokeless tobacco: Never    Tobacco comments:     I smoked on and off through a 15 year period.   Vaping Use    Vaping status: Never Used   Substance and Sexual Activity    Alcohol use: Not Currently     Alcohol/week: 20.0 standard drinks of alcohol     Types: 20 Cans of beer per week     Comment: occassional  drink now - total  drinks so far this week    Drug use: Never    Sexual activity: Not Currently     Partners: Male     Birth control/protection: None     Comment: My cervix has been removed.   Other Topics Concern    Not on file   Social History Narrative    Not on file     Social Drivers of Health     Financial Resource Strain: Low Risk  (2025)    Overall Financial Resource Strain (CARDIA)     Difficulty of Paying Living Expenses: Not hard at all   Food Insecurity: No Food Insecurity (2025)    Hunger Vital Sign     Worried About Running Out of Food in the Last Year: Never true     Ran Out of Food in the Last Year: Never true   Transportation Needs: No Transportation  Needs (6/5/2025)    PRAPARE - Transportation     Lack of Transportation (Medical): No     Lack of Transportation (Non-Medical): No   Physical Activity: Inactive (5/5/2025)    Exercise Vital Sign     Days of Exercise per Week: 0 days     Minutes of Exercise per Session: 0 min   Stress: Stress Concern Present (5/5/2025)    [employer] - Occupational Stress Questionnaire     Feeling of Stress : To some extent   Social Connections: Moderately Isolated (5/5/2025)    Social Connection and Isolation Panel [NHANES]     Frequency of Communication with Friends and Family: Three times a week     Frequency of Social Gatherings with Friends and Family: Never     Attends Confucianist Services: Never     Active Member of Clubs or Organizations: No     Attends Club or Organization Meetings: Never     Marital Status:    Intimate Partner Violence: Not At Risk (5/5/2025)    Humiliation, Afraid, Rape, and Kick questionnaire     Fear of Current or Ex-Partner: No     Emotionally Abused: No     Physically Abused: No     Sexually Abused: No   Housing Stability: Low Risk  (6/5/2025)    Housing Stability Vital Sign     Unable to Pay for Housing in the Last Year: No     Number of Times Moved in the Last Year: 0     Homeless in the Last Year: No   Recent Concern: Housing Stability - High Risk (5/6/2025)    Housing Stability Vital Sign     Unable to Pay for Housing in the Last Year: Yes     Number of Times Moved in the Last Year: 0     Homeless in the Last Year: No       Allergies   Allergen Reactions    Cosentyx [Secukinumab] Rash         Current Outpatient Medications:     aspirin 81 mg EC tablet, Take 1 tablet (81 mg) by mouth once daily., Disp: , Rfl:     cholecalciferol (Vitamin D3) 25 MCG (1000 UT) capsule, Take 1 capsule (25 mcg) by mouth once daily., Disp: , Rfl:     hydroCHLOROthiazide (Microzide) 12.5 mg tablet, TAKE ONE TABLET BY MOUTH once DAILY, Disp: 90 tablet, Rfl: 0    lisinopril 20 mg tablet,  Take 1 tablet (20 mg) by mouth once daily., Disp: 90 tablet, Rfl: 1    meclizine (Antivert) 12.5 mg tablet, Take 1 tablet (12.5 mg) by mouth 3 times a day as needed for dizziness., Disp: 30 tablet, Rfl: 0    metFORMIN  mg 24 hr tablet, Take one tablet twice daily, Disp: 180 tablet, Rfl: 1    metoprolol tartrate (Lopressor) 25 mg tablet, TAKE ONE TABLET BY MOUTH TWO TIMES A DAY, Disp: 180 tablet, Rfl: 0    multivitamin tablet, Take 1 tablet by mouth once daily., Disp: , Rfl:     nitroglycerin (Nitrostat) 0.3 mg SL tablet, Place 1 tablet (0.3 mg) under the tongue every 5 minutes if needed for chest pain., Disp: , Rfl:     omeprazole (PriLOSEC) 40 mg DR capsule, Take 1 capsule (40 mg) by mouth once daily in the morning. Take before meals. Do not crush or chew., Disp: 90 capsule, Rfl: 3    rosuvastatin (Crestor) 20 mg tablet, TAKE ONE TABLET BY MOUTH DAILY, Disp: 90 tablet, Rfl: 0    traZODone (Desyrel) 50 mg tablet, TAKE ONE-HALF TABLET BY MOUTH ONCE DAILY AT BEDTIME, Disp: 90 tablet, Rfl: 0    venlafaxine (Effexor) 75 mg tablet, TAKE ONE TABLET BY MOUTH DAILY IN THE MORNING, Disp: 90 tablet, Rfl: 0    venlafaxine XR (Effexor-XR) 150 mg 24 hr capsule, TAKE ONE CAPSULE BY MOUTH ONCE DAILY. DO NOT CRUSH OR CHEW., Disp: 90 capsule, Rfl: 0    cyanocobalamin (Vitamin B-12) 50 mcg tablet, Take 1 tablet (50 mcg) by mouth once daily. (Patient not taking: Reported on 7/9/2025), Disp: , Rfl:     ferrous sulfate, 325 mg ferrous sulfate, tablet, Take 1 tablet by mouth once daily. (Patient not taking: Reported on 7/9/2025), Disp: , Rfl:     predniSONE (Deltasone) 10 mg tablet, Take 6 tablets (60 mg) by mouth once daily for 3 days, THEN 5 tablets (50 mg) once daily for 3 days, THEN 4 tablets (40 mg) once daily for 1 day, THEN 3 tablets (30 mg) once daily for 1 day, THEN 2 tablets (20 mg) once daily for 1 day, THEN 1 tablet (10 mg) once daily for 1 day. (Patient not taking: Reported on 7/9/2025), Disp: 43 tablet, Rfl:  0    Objective     Visit Vitals  BP 94/62 (BP Location: Left arm, Patient Position: Sitting)   Pulse 79     Physical Exam  Copied from previous exam unless annotated below  General: AAOx3, Cooperative  Head: normocephalic, atraumatic  Eyes: EOMI, conjunctiva clear, sclera white, anicteric  Throat/Mouth: No oral deformities, no cheek swelling, mucosa appear dry, no oral ulcers noted or loss of dentition   Neck/Lymph: FROM, trachea midline  Skin: No PsO evident on exam today or photosensitive areas  MSK:   Significant diffuse myofascial tenderness.   Upper Extremities:  Hand/Fingers: ttp of b/l 2nd MCP with fullness.+ grind test bilaterally.  Wrists: R wrist with ttp and mild swelling  Elbows: No tenderness, swelling, erythema or warmth at elbows, FROM grossly. No nodules, no enthesitis.   Lower Extremities:   Knees: No tenderness, deformities, swelling, rashes, or warmth, normal ROM grossly. + crepitus  Ankles:   No tenderness, edema, erythema of joint line.  Feet: Negative MTP squeeze. Normal ROM grossly.      Lab Results   Component Value Date    WBC 13.7 (H) 07/03/2025    HGB 10.8 (L) 07/03/2025    HCT 32.7 (L) 07/03/2025    MCV 95 07/03/2025     07/03/2025       Chemistry    Lab Results   Component Value Date/Time     07/03/2025 1425     02/21/2025 1434    K 3.8 07/03/2025 1425    K 4.8 02/21/2025 1434     07/03/2025 1425     02/21/2025 1434    CO2 25 07/03/2025 1425    CO2 28 02/21/2025 1434    BUN 29 (H) 07/03/2025 1425    BUN 20 02/21/2025 1434    CREATININE 0.82 07/03/2025 1425    CREATININE 0.75 02/21/2025 1434    Lab Results   Component Value Date/Time    CALCIUM 10.3 07/03/2025 1425    CALCIUM 9.8 02/21/2025 1434    ALKPHOS 108 07/03/2025 1425    ALKPHOS 82 02/21/2025 1434    AST 19 07/03/2025 1425    AST 36 (H) 02/21/2025 1434    ALT 17 07/03/2025 1425    ALT 42 (H) 02/21/2025 1434    BILITOT 0.3 07/03/2025 1425    BILITOT 0.5 02/21/2025 1434          Lab Results   Component  Value Date    CRP <0.10 07/03/2025    GORDON Positive (A) 02/05/2024    JO1 <0.2 02/05/2024    SPEP Normal. 05/06/2025    UPEP Normal.     05/05/2025    PTH 30.0 05/06/2025    CALCIUM 10.3 07/03/2025    CALCIUM 9.8 02/21/2025    FERRITIN 500 (H) 07/03/2025     Alkaline Phosphatase   Date Value Ref Range Status   07/03/2025 108 33 - 136 U/L Final     AST   Date Value Ref Range Status   07/03/2025 19 9 - 39 U/L Final     Urea Nitrogen   Date Value Ref Range Status   07/03/2025 29 (H) 6 - 23 mg/dL Final     Sodium   Date Value Ref Range Status   07/03/2025 137 136 - 145 mmol/L Final     Potassium   Date Value Ref Range Status   07/03/2025 3.8 3.5 - 5.3 mmol/L Final     Bicarbonate   Date Value Ref Range Status   07/03/2025 25 21 - 32 mmol/L Final     ALT   Date Value Ref Range Status   07/03/2025 17 7 - 45 U/L Final     Comment:     Patients treated with Sulfasalazine may generate falsely decreased results for ALT.     12/18/2024  MR imaging of the  right hand was obtained  without administration of  intravenous contrast medium.      FINDINGS:  Suboptimal secondary to motion artifact on multiple sequences.      Tendons:  Mild tendinosis and tenosynovitis of the 3rd digit flexor tendons at  the level of the proximal phalanx. The flexor and extensor tendons of  the hand are otherwise intact. The tendons of the thenar and  hypothenar compartments are normal.      Ligaments:  The major ligamentous structures of the hand are intact.      Joints:  Mild flexion deformity of the 5th proximal interphalangeal joint  which may be due to chronic ligament injury.      Moderate degenerative changes at the 1st metacarpophalangeal and 5th  proximal interphalangeal joints with subchondral edema and  osteophytes. Mild degenerative changes at the 1st interphalangeal  joint with osteophytosis. Severe degenerative changes of the 1st  carpometacarpal joint with edema and erosive change in the proximal  metacarpal and in the trapezium. No  erosions.      Osseous structures:  There is no evidence of acute fracture or dislocation. There is no  avascular necrosis. No marrow replacing lesions are seen.      Soft tissues:  There is no evidence of muscular atrophy or tear.  No suspicious soft tissue lesions are seen.      IMPRESSION:  1. Severe degenerative changes of the 1st CMC joint. Moderate  degenerative changes at the 1st metacarpophalangeal and 5th proximal  interphalangeal joints. Mild degenerative changes of the 1st  interphalangeal joint. This distribution is compatible with  osteoarthritic changes.  2. Mild tendinosis and tenosynovitis of the 3rd digit flexor tendons  at the level of the proximal phalanx.  3. Mild flexion deformity of the 5th proximal interphalangeal joint  which may be due to chronic ligament injury.  4. No erosions.      MRI L hand 12/18/2024  TECHNIQUE:  MR imaging of the  left hand was obtained  without administration of  intravenous contrast medium.      FINDINGS:  Suboptimal secondary to motion artifact.      Tendons:  The flexor and extensor tendons of the hand are intact. The tendons  of the thenar and hypothenar compartments are normal.      Ligaments:  The major ligamentous structures of the hand are intact.      Joints:  Severe degenerative changes at the 1st CMC joint and triscaphe joint  with degenerative subchondral cysts in the proximal metacarpal and  trapezium. Mild degenerative changes at the 1st MCP and at the  2nd-5th distal interphalangeal and 5th proximal interphalangeal  joints. Moderate degenerative changes with subchondral edema and  cystic changes in the 1st interphalangeal joint. No erosions. No  synovitis or abnormal joint effusion.      Osseous structures:  There is no evidence of acute fracture or dislocation. There is no  avascular necrosis. Irregular T2 hyperintense and T1 hypointense  lesion in the distal 3rd metacarpal intramedullary space.      Soft tissues:  There is no evidence of muscular  atrophy or tear.  No suspicious soft tissue lesions are seen.      IMPRESSION:  1. Severe degenerative changes of the 1st CMC and triscaphe joint.  Moderate degenerative changes at the 1st interphalangeal joint. Mild  degenerative changes of the 1st MCP and 2nd-5th distal  interphalangeal and 5th proximal interphalangeal joints. Distribution  is compatible with osteoarthritic disease.  2. Nonaggressive appearing lesion in the distal 3rd metacarpal, may  represent chondroid or cystic lesion.  3. No evidence of inflammatory arthropathy.    XR hands 2023 at [facility]  RESULT:   Healing nondisplaced fourth metacarpal base fracture with interval   decreased conspicuity of the fracture and slight interval callus   formation.  No new fracture.  No dislocation.  Severe first CMC and   triscaphe degenerative changes scattered mild degenerative changes of   some of the IP joints.  Diffuse osteopenia.      MR C spine 05/24/2022  MPRESSION:     1. Congenital cervical canal narrowing with superimposed disc/joint   degeneration.     2. Moderate canal narrowing at C5-6 and C6-7.     3.  Significant bony bilateral C4-5 and C5-6 and C6-7 foramina narrowing.     COUNTING REFERENCE: Superior cervical disc is taken as C2-3.  Structural   anomalies: None.        Assessment/Plan   DM, CAD s/p CABG, cervical DDD, Fibromyalgia, PsO, fatty liver disease and early cirrhosis here for follow up.      She's had a prior diagnosis of RA/PsA. She describes her pains mostly as mechanical and her most bothersome joint is her CMCs bilaterally which is mostly likely from OA and also has polyarticular OA. Her RF is low titer. At initial visit, there was evidence of synovitis and further work up was pursued:   ESR, CRP normal, ACPA neg, + low titer RF.   XR thoracic spine DDD  XR knee mild OA  XR hands with OA changes severe at CMCs  MSK US R CMC OA, and OA changes but with some synovial proliferation at the 2nd and 5th MCP of left hand  MSK b/l ankle  1.  Possible degree split tearing of the retro malleolar left peroneal  brevis tendon.  2. Mild focal interstitial tear of the inframalleolar peroneal brevis  tendon just distal to the peroneal tubercle.  3. Dorsal left foot lump correlates with region of severe dorsal  degenerative change appearing to be centered in the region of the  intermediate naviculocuneiform articulation although radiographic  correlation is recommended.    MRI hands tendinosis and tenosynovitis of R 3rd digit flexor tendons, flexion deformity of R 5th proximal IP chronic ligament injury, pattern combaitble with OA mostly, no inflammatory arthritis, severe CMC OA.  There is severe degenerative changes of the 1st  carpometacarpal joint with edema and erosive change in the proximal metacarpal and in the trapezium.    She had a CSI of the R 3rd A1 pulley and reported improvement.   She had C4-C7 anterior cervical spine discectomy and fusion 06/4/2025, shoulder pain has resolved since. She doesn't report much peripheral arthralgias today as she used to in the past, reports no benefit with HCQ. On exam however there is ttp and fullness of 2nd MCP bilaterally and of the R wrist, but patient reports not bothersome. She is going to do a short course prednisone for her back and will inform me of any effect on peripheral arthralgias.   No uveitis or IBP or IBD or dactylitis.     - Continue to follow up with medical spine  - continue to follow up with orthopedics for peroneal tendon tear and stenosing tenosynovitis    DEXA 03/2023 reviewed and normal.      RTC in 3 months or sooner as needed     Ravi Mcqueen MD  Division of Rheumatology   [facility]

## 2025-07-09 NOTE — TELEPHONE ENCOUNTER
It looks like she has a appt with GI on 7/11 and one with hematology not too long after that (within the month).

## 2025-07-11 ENCOUNTER — APPOINTMENT (OUTPATIENT)
Facility: CLINIC | Age: 60
End: 2025-07-11
Payer: MEDICARE

## 2025-07-11 LAB
CELL COUNT (BLOOD): 13.7 X10*3/UL
CELL POPULATIONS: NORMAL
DIAGNOSIS: NORMAL
FLOW DIFFERENTIAL: NORMAL
FLOW TEST ORDERED: NORMAL
LAB TEST METHOD: NORMAL
NUMBER OF CELLS COLLECTED: NORMAL PER TUBE
PATH REPORT.TOTAL CANCER: NORMAL
SIGNATURE COMMENT: NORMAL
SPECIMEN VIABILITY: NORMAL

## 2025-07-16 ASSESSMENT — ENCOUNTER SYMPTOMS
BRUISES/BLEEDS EASILY: 1
CARDIOVASCULAR NEGATIVE: 1
PSYCHIATRIC NEGATIVE: 1
SHORTNESS OF BREATH: 1
FATIGUE: 0
EYES NEGATIVE: 1
DIARRHEA: 1
MUSCULOSKELETAL NEGATIVE: 1
DIZZINESS: 1
CHILLS: 1
NAUSEA: 1
ENDOCRINE NEGATIVE: 1
ABDOMINAL PAIN: 1

## 2025-07-24 ENCOUNTER — OFFICE VISIT (OUTPATIENT)
Dept: GASTROENTEROLOGY | Facility: CLINIC | Age: 60
End: 2025-07-24
Payer: MEDICARE

## 2025-07-24 VITALS
SYSTOLIC BLOOD PRESSURE: 104 MMHG | BODY MASS INDEX: 26.81 KG/M2 | TEMPERATURE: 96.6 F | HEIGHT: 69 IN | OXYGEN SATURATION: 98 % | HEART RATE: 82 BPM | DIASTOLIC BLOOD PRESSURE: 63 MMHG | WEIGHT: 181 LBS

## 2025-07-24 DIAGNOSIS — F10.90 METALD: Primary | ICD-10-CM

## 2025-07-24 DIAGNOSIS — K76.0 METALD: Primary | ICD-10-CM

## 2025-07-24 DIAGNOSIS — K74.69 OTHER CIRRHOSIS OF LIVER: ICD-10-CM

## 2025-07-24 DIAGNOSIS — R79.89 LIVER FUNCTION TEST ABNORMALITY: ICD-10-CM

## 2025-07-24 DIAGNOSIS — D50.9 IRON DEFICIENCY ANEMIA, UNSPECIFIED IRON DEFICIENCY ANEMIA TYPE: ICD-10-CM

## 2025-07-24 PROCEDURE — 4010F ACE/ARB THERAPY RXD/TAKEN: CPT | Performed by: INTERNAL MEDICINE

## 2025-07-24 PROCEDURE — 99213 OFFICE O/P EST LOW 20 MIN: CPT

## 2025-07-24 PROCEDURE — 99214 OFFICE O/P EST MOD 30 MIN: CPT | Performed by: INTERNAL MEDICINE

## 2025-07-24 PROCEDURE — 3074F SYST BP LT 130 MM HG: CPT | Performed by: INTERNAL MEDICINE

## 2025-07-24 PROCEDURE — 3078F DIAST BP <80 MM HG: CPT | Performed by: INTERNAL MEDICINE

## 2025-07-24 PROCEDURE — 3008F BODY MASS INDEX DOCD: CPT | Performed by: INTERNAL MEDICINE

## 2025-07-24 ASSESSMENT — PAIN SCALES - GENERAL: PAINLEVEL_OUTOF10: 0-NO PAIN

## 2025-07-24 NOTE — PATIENT INSTRUCTIONS
Welcome to Dr. [facility].  Dr. Benson sees patients at the following sites:  [place] [address] [facility] at [facility]  [address], [address] at [facility] at [facility], [facility] [address]    Dr. Benson's hepatology care coordinator, Kirstin PAPPAS, can be reached at [phone].  Dr. Benson's , Jose Miguel Montelongo, can be reached at [phone].    Endoscopy tomorrow at [facility] - arrive at 1230P, no eating after midnight, take pills in the AM with water. No water or liquids after 11AM    Get blood work completed in November.    Get a Liver Ultrasound in November.  Do not eat or drink anything 6 hours prior to your exam.  Call [phone] to schedule.    Follow up with me in clinic in late November. (After testing)  Schedule your visit on your way out today. If unable, please call [phone] to schedule.

## 2025-07-24 NOTE — PROGRESS NOTES
"Subjective     Follow up visit for cirrhosis.    History of Present Illness:   Dinorah Horn \"Pratibha" is a 60 y.o. female who presents to the [facility] at [facility], for a follow up evaluation for cirrhosis    She was referred by Ratna RIVERA CNP.  Her initial appointment was last year in May 2024.  The reason for visit was for hepatomegaly, elevations in her liver enzymes, suspected chronic liver disease.    Interval history:  She has completed the liver biopsy - which was done in October, (TJ with portal pressure measurements).  Multiple episodes of dizzy spells. Had to be hospitalized.  Had neck surgery for cervical spinal stenosis.  Was also seen and evaluated by SILVANO Bell in Hematology for anemia. Her anemia has been mild. She has been taking oral iron. Stool was checked for occult blood, which was positive.    EGD and Colonoscopy were completed last year.  TJ portal pressure measurements were checked last year, and were normal.    Estimates about 5 times of alcohol consumption - over the last 6+ months. Has cut back significantly.     Prior history:  The patient recalls that she was told she had fatty liver many years ago.  She never recalls manifesting any symptoms from her liver disease.  She denies a history of jaundice, fluid retention such as her ascites, confusion from hepatic encephalopathy.  She does report some chronic GI symptoms including constipation.    As regards to liver disease risk factors, she reports a history of alcohol excess: that she would drink 50 beers per week (16 ounce beers).  She also has metabolic risk factors including diabetes hypertension and heart disease. She has basically out all alcohol use. Over the past year + since finding out about her liver disease, she stopped all alcohol for a period of time; then began drinking beer, but much less. At this time she reports no regular alcohol use, just a few occasions in which she reports some " drinking.    Initial laboratory workup was notable for the following (February 2024):  Albumin 4.6 bilirubin of 0.5 alkaline phosphatase of 102 ALT of 31 AST of 29.  A year ago her liver enzymes were abnormal (May 2023):  AST of 89 ALT of 97 alkaline phosphatase 164 total protein 8.2 albumin 4.6.  She had an GORDON that was checked -positive at 1-160.  Her anti-SSA was positive greater than 8.0. Her ferritin was slightly elevated at 257.  Hepatitis B and C serologies were negative.  HIV was negative.    Liver imaging was performed: The liver measures 16.9 cm with diffuse heterogenicity of the echotexture of the liver.    She was referred for liver elastography with Fibroscan:  E (median liver stiffness measurement):   21.5   kPa   CAP (controlled attenuation parameter):  227   dB/m   This was consistent with cirrhosis.    Review of Systems  Review of Systems  Negative ROS.    Past Medical History   has a past medical history of Alopecia, Anemia (04/2024), Cervical spinal cord compression, Chronic back pain, Chronic sinusitis, Cirrhosis of liver (Multi), Coronary artery disease, Depression (1995), Diabetes mellitus (Multi) (2023), Fibromyalgia, GERD (gastroesophageal reflux disease), Hiatal hernia, HLD (hyperlipidemia), Hypertension, Insomnia, Irritable bowel syndrome, Liver disease (), Lung nodule, Lymphadenopathy, Numbness and tingling, Obesity (?), Palpitations, Postural dizziness with near syncope, Prolonged QT interval, Psoriasis, Psoriatic arthritis (Multi), RA (rheumatoid arthritis), Raynaud's disease, Thyroid nodule, Trigger finger, and UTI (urinary tract infection) (05/11/2025).     She was diagnosed with diabetes about 2 years ago.    She has a history of cardiac surgery.    She last had a colonoscopy in 2021. Recently repeated.    Social History   reports that she quit smoking about 16 years ago. Her smoking use included cigarettes. She started smoking about 31 years ago. She has a 15 pack-year smoking  history. She has never used smokeless tobacco. She reports that she does not currently use alcohol after a past usage of about 20.0 standard drinks of alcohol per week. She reports that she does not use drugs.     Family History  family history includes Alcohol abuse in her daughter, mother, paternal grandmother, sister, and sister; Arthritis in her father; Atrial fibrillation in her father; Broken bones in her sister; COPD in her father; Cancer in her father, father's brother, father's brother, and mother; Colon cancer in her father's brother; Depression in her father, mother, and sister; Diabetes in her mother; Drug abuse in her sister and sister; Hearing loss in her father; Heart disease in her father and mother; Hypertension in her father and mother; Neuropathy in her mother; Stroke in her paternal grandfather.     Allergies  Allergies   Allergen Reactions    Cosentyx [Secukinumab] Rash       Medications  Current Outpatient Medications   Medication Instructions    aspirin 81 mg, Daily    cholecalciferol (VITAMIN D3) 25 mcg, Daily    cyanocobalamin (VITAMIN B-12) 50 mcg, Daily    ferrous sulfate 325 mg, Daily    hydroCHLOROthiazide (MICROZIDE) 12.5 mg, oral, Daily    lisinopril 20 mg, oral, Daily    meclizine (ANTIVERT) 12.5 mg, oral, 3 times daily PRN    metFORMIN  mg 24 hr tablet Take one tablet twice daily    metoprolol tartrate (LOPRESSOR) 25 mg, oral, 2 times daily    multivitamin tablet 1 tablet, Daily    nitroglycerin (NITROSTAT) 0.3 mg, Every 5 min PRN    omeprazole (PRILOSEC) 40 mg, oral, Daily before breakfast, Do not crush or chew.    rosuvastatin (CRESTOR) 20 mg, oral, Daily    traZODone (Desyrel) 50 mg tablet TAKE ONE-HALF TABLET BY MOUTH ONCE DAILY AT BEDTIME    venlafaxine (EFFEXOR) 75 mg, oral, Daily before breakfast    venlafaxine XR (EFFEXOR-XR) 150 mg, oral, Daily        Objective   Visit Vitals  /63   Pulse 82   Temp 35.9 °C (96.6 °F)          6/6/2025     4:08 AM 6/6/2025    11:31  "AM 6/23/2025    11:35 AM 7/1/2025     3:33 PM 7/3/2025    12:51 PM 7/9/2025     4:05 PM 7/24/2025     3:39 PM   Vitals   Systolic 109 121 142 118 109 94 104   Diastolic 66 70 80 70 72 62 63   BP Location    Left arm  Left arm    Heart Rate 88 83 101 84 79 79 82   Temp 35.6 °C (96.1 °F) 36.5 °C (97.7 °F) 36.2 °C (97.2 °F)  36.2 °C (97.2 °F)  35.9 °C (96.6 °F)   Resp 17 18   20     Height    1.753 m (5' 9\") 1.74 m (5' 8.5\")  1.753 m (5' 9\")   Weight (lb)   177.6 177 178.6 181.8 181   BMI   26.23 kg/m2 26.14 kg/m2 26.76 kg/m2 27.24 kg/m2 26.73 kg/m2   BSA (m2)   1.98 m2 1.98 m2 1.98 m2 2 m2 2 m2   Visit Report   Report Report Report Report Report     Physical Exam  Vitals and nursing note reviewed.   Constitutional:       Appearance: Normal appearance. She is not ill-appearing.      Comments: In general she appears slightly anxious.   HENT:      Head: Normocephalic and atraumatic.      Mouth/Throat:      Mouth: Mucous membranes are moist.      Pharynx: Oropharynx is clear.     Eyes:      General: No scleral icterus.     Extraocular Movements: Extraocular movements intact.      Pupils: Pupils are equal, round, and reactive to light.       Cardiovascular:      Rate and Rhythm: Normal rate and regular rhythm.      Heart sounds: No murmur heard.  Pulmonary:      Effort: Pulmonary effort is normal.      Breath sounds: Normal breath sounds.   Abdominal:      General: Abdomen is flat. Bowel sounds are normal.      Palpations: Abdomen is soft.     Musculoskeletal:         General: No swelling. Normal range of motion.      Right lower leg: No edema.      Left lower leg: No edema.     Skin:     Coloration: Skin is not jaundiced.     Neurological:      General: No focal deficit present.      Mental Status: She is alert and oriented to person, place, and time. Mental status is at baseline.     Psychiatric:         Mood and Affect: Mood normal.         Thought Content: Thought content normal.         Judgment: Judgment normal. "       Labs    WBC   Date/Time Value Ref Range Status   07/03/2025 02:25 PM 13.7 (H) 4.4 - 11.3 x10*3/uL Final     WHITE BLOOD CELL COUNT   Date/Time Value Ref Range Status   02/21/2025 02:34 PM 5.0 3.8 - 10.8 Thousand/uL Final     Hemoglobin   Date/Time Value Ref Range Status   07/03/2025 02:25 PM 10.8 (L) 12.0 - 16.0 g/dL Final     HEMOGLOBIN   Date/Time Value Ref Range Status   02/21/2025 02:34 PM 11.0 (L) 11.7 - 15.5 g/dL Final     Hematocrit   Date/Time Value Ref Range Status   07/03/2025 02:25 PM 32.7 (L) 36.0 - 46.0 % Final     HEMATOCRIT   Date/Time Value Ref Range Status   02/21/2025 02:34 PM 35.0 35.0 - 45.0 % Final     MCV   Date/Time Value Ref Range Status   07/03/2025 02:25 PM 95 80 - 100 fL Final   02/21/2025 02:34 PM 98.0 80.0 - 100.0 fL Final     Platelets   Date/Time Value Ref Range Status   07/03/2025 02:25  150 - 450 x10*3/uL Final     PLATELET COUNT   Date/Time Value Ref Range Status   02/21/2025 02:34  140 - 400 Thousand/uL Final        PROTEIN, TOTAL   Date/Time Value Ref Range Status   02/21/2025 02:34 PM 7.4 6.1 - 8.1 g/dL Final     Total Protein   Date/Time Value Ref Range Status   07/03/2025 02:25 PM 7.4 6.4 - 8.2 g/dL Final     ALBUMIN   Date/Time Value Ref Range Status   02/21/2025 02:34 PM 4.8 3.6 - 5.1 g/dL Final     Albumin   Date/Time Value Ref Range Status   07/03/2025 02:25 PM 4.8 3.4 - 5.0 g/dL Final     AST   Date/Time Value Ref Range Status   07/03/2025 02:25 PM 19 9 - 39 U/L Final   02/21/2025 02:34 PM 36 (H) 10 - 35 U/L Final     ALT   Date/Time Value Ref Range Status   07/03/2025 02:25 PM 17 7 - 45 U/L Final     Comment:     Patients treated with Sulfasalazine may generate falsely decreased results for ALT.   02/21/2025 02:34 PM 42 (H) 6 - 29 U/L Final     ALKALINE PHOSPHATASE   Date/Time Value Ref Range Status   02/21/2025 02:34 PM 82 37 - 153 U/L Final     Alkaline Phosphatase   Date/Time Value Ref Range Status   07/03/2025 02:25  33 - 136 U/L Final      BILIRUBIN, TOTAL   Date/Time Value Ref Range Status   02/21/2025 02:34 PM 0.5 0.2 - 1.2 mg/dL Final     Bilirubin, Total   Date/Time Value Ref Range Status   07/03/2025 02:25 PM 0.3 0.0 - 1.2 mg/dL Final     BILIRUBIN, DIRECT   Date/Time Value Ref Range Status   02/21/2025 02:34 PM 0.1 < OR = 0.2 mg/dL Final        AST   Date/Time Value Ref Range Status   07/03/2025 02:25 PM 19 9 - 39 U/L Final   02/21/2025 02:34 PM 36 (H) 10 - 35 U/L Final     ALT   Date/Time Value Ref Range Status   07/03/2025 02:25 PM 17 7 - 45 U/L Final     Comment:     Patients treated with Sulfasalazine may generate falsely decreased results for ALT.   02/21/2025 02:34 PM 42 (H) 6 - 29 U/L Final     ALKALINE PHOSPHATASE   Date/Time Value Ref Range Status   02/21/2025 02:34 PM 82 37 - 153 U/L Final     Alkaline Phosphatase   Date/Time Value Ref Range Status   07/03/2025 02:25  33 - 136 U/L Final     BILIRUBIN, TOTAL   Date/Time Value Ref Range Status   02/21/2025 02:34 PM 0.5 0.2 - 1.2 mg/dL Final     Bilirubin, Total   Date/Time Value Ref Range Status   07/03/2025 02:25 PM 0.3 0.0 - 1.2 mg/dL Final     BILIRUBIN, DIRECT   Date/Time Value Ref Range Status   02/21/2025 02:34 PM 0.1 < OR = 0.2 mg/dL Final     ALBUMIN   Date/Time Value Ref Range Status   02/21/2025 02:34 PM 4.8 3.6 - 5.1 g/dL Final     Albumin   Date/Time Value Ref Range Status   07/03/2025 02:25 PM 4.8 3.4 - 5.0 g/dL Final     PROTEIN, TOTAL   Date/Time Value Ref Range Status   02/21/2025 02:34 PM 7.4 6.1 - 8.1 g/dL Final     Total Protein   Date/Time Value Ref Range Status   07/03/2025 02:25 PM 7.4 6.4 - 8.2 g/dL Final        PT   Date/Time Value Ref Range Status   02/21/2025 02:34 PM 10.8 9.0 - 11.5 sec Final     Comment:     For additional information, please refer to  http://education.[employer].com/faq/EMW006  (This link is being provided for informational/  educational purposes only.)       Protime   Date/Time Value Ref Range Status   05/30/2025 02:47 PM  10.9 9.8 - 12.4 seconds Final     INR   Date/Time Value Ref Range Status   05/30/2025 02:47 PM 1.0 0.9 - 1.1 Final   02/21/2025 02:34 PM 1.0  Final     Comment:     Reference Range                     0.9-1.1  Moderate-intensity Warfarin Therapy 2.0-3.0  Higher-intensity Warfarin Therapy   3.0-4.0           MELD 3.0: 8 at 5/7/2025  7:46 AM  MELD-Na: 7 at 5/7/2025  7:46 AM  Calculated from:  Serum Creatinine: 0.95 mg/dL (Using min of 1 mg/dL) at 5/7/2025  7:46 AM  Serum Sodium: 139 mmol/L (Using max of 137 mmol/L) at 5/7/2025  7:46 AM  Total Bilirubin: 0.4 mg/dL (Using min of 1 mg/dL) at 5/7/2025  7:46 AM  Serum Albumin: 4.7 g/dL (Using max of 3.5 g/dL) at 5/7/2025  7:46 AM  INR(ratio): 1.1 at 5/5/2025  4:11 PM  Age at listing (hypothetical): 59 years  Sex: Female at 5/7/2025  7:46 AM    LIVER IMAGING    FibroScan  2/16/24  Results:   E (median liver stiffness measurement):   21.5   kPa   CAP (controlled attenuation parameter):  227   dB/m     Interventional Radiology:  10/3/24  HVPG Assessment:    The catheter and wire were advanced into the IVC and a pressure measurement was obtained which measured 8/7 (7)  mmHg.     The right hepatic vein was engaged with the curved catheter and steerable 035 Glidewire.  After selective catheterization of the  hepatic vein, a selective venogram was performed which demonstrated patency of the selected hepatic vein.     The endhole catheter was wedged in the selected hepatic vein and a wedged pressure was obtained to indirectly estimate the portal pressure.  The wedged hepatic pressure was measured at 11/9 (10) mmHg.     At this point the catheter was retracted and a free hepatic pressure was measured and a value of 10/8 (9) mmHg was obtained.     Therefore, the indirect portosystemic gradient was calculated at 1 mmHg.    PATHOLOGY    GI Pathology  FINAL DIAGNOSIS      A. DUODENUM SECOND PART, ABNORMAL MUCOSA, BIOPSY:               -Small intestinal mucosa with gastric heterotopic  "mucosa.        B. STOMACH ANTRUM, BIOPSY:               -Gastric mucosa with no significant histopathologic findings.  See note.               -No Helicobacter pylori organisms identified.     Note: Immunohistochemical stain for Helicobacter pylori organisms is negative.        C. COLON, RANDOM BIOPSY:                -Colon with no significant histopathologic findings.               -Melanosis coli.        D. COLON - TRANSVERSE POLYP, BIOPSY:               -Tubular adenoma.         A. LIVER BIOPSY RIGHT:   -Liver with mild steatosis and bridging fibrosis to cirrhosis     Note: The liver tissue is slightly nodular. The hepatocytes demonstrate small-droplet type macrovesicular steatosis (5-10%) without ballooning degeneration or Hilary bodies. The portal tracts are mildly expanded with chronic inflammatory cells, without bile duct injury or interface activity. Scattered pigmented macrophages are present in the portal tracts, indicating healing injury. No cholestasis or hepatocyte necrosis is identified.     Special stains:  Iron                              negative  PAS with digestionno cytoplasmic eosinophilic globules  Trichrome                    periportal fibrosis, bridging fibrosis, and focal cirrhotic nodules  Reticulin                      no collapsed liver parenchyma    ENDOSCOPY  8/14/2024    EGD  Impression  Small type I hiatal hernia  The stomach appeared normal. Performed random biopsy to rule out H. pylori.  Mild nodular mucosa in the duodenal bulb; performed cold forceps biopsy  Two grade I varices    COLONOSCOPY  Impression  Subcentimeter polyp in the distal transverse colon  Mild abnormal mucosa  (grade 1) hemorrhoids  The terminal ileum, ileocecal valve and cecum appeared normal. Performed random biopsy using biopsy forceps.       No evidence of old/fresh bleeding terminal ileum.    Assessment/Plan   Dinorah Horn \"Raquel\" is a 60 y.o. female who presents to liver clinic for a follow up evaluation " for cirrhosis.    She has multiple risk factors for chronic liver disease. Most notably she has a history of alcohol excess which she self-reported consumption of 50 beers per week.  This level of drinking certainly puts her at risk for significant liver disease.  Fortunately she has cut back and more or less stopper her alcohol use.  She also has significant metabolic risk factors for metabolic associated fatty liver -including hypertension, diabetes, heart disease, and obesity.  She also has a history of autoimmune disease and a positive GORDON.    Based on our overall suspicion of compensated advanced liver disease, based on her clinical, laboratory, and imagine assessment, we decided on further evaluation with a TJ Liver Biopsy, last October. There was no portal gradient on pressure measurements. Thus, at this time, I do not believe she has portal HTN. On my review of her endoscopy pictures from August, I do not believe the findings in her esophagus were consistent with esophageal varices. Her liver histology is consistent with advanced fibrosis and cirrhosis (probably early cirrhosis). There is steatosis on the biopsy (5-10%). There are no other major findings on the biopsy.    I had also recommended some lab work to complete her workup for concomitant liver disorders, including  testing for genetic hemochromatosis testing, genetic testing for alpha-1 antitrypsin deficiency - these were negative.     I provided counseling regarding her liver disease evaluation to date.  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 She remains compensated at this time. She will obtain update labs to reassess liver function. She will have a liver US for liver cancer screening. We will check a PETH - it has been positive in the past. She has cut back her alcohol use significantly, but she still has had some periodic alcohol  consumption.  I have reinforced prior recommendations to strictly abstain from all alcohol use. She is trying.    Her Hematology provided is still concerned about her anemia and requested a follow up EGD. We will arrange for  this tomorrow.    She will continue to follow up in liver clinic.     Instructions      Pasha Benson MD

## 2025-07-24 NOTE — LETTER
"July 25, 2025     Yamilet Frazier DO  [address] [facility]  [place] [address]    Patient: Raquel Horn   YOB: 1965   Date of Visit: 7/24/2025       Dear Dr. Yamilet Frazier, :    Thank you for referring Raquel Horn to me for evaluation. Below are my notes for this consultation.  If you have questions, please do not hesitate to call me. I look forward to following your patient along with you.       Sincerely,     Pasha Benson MD      CC: No Recipients  ______________________________________________________________________________________    Subjective     Follow up visit for cirrhosis.    History of Present Illness:   Dinorah Horn \"Pratibha" is a 60 y.o. female who presents to the [facility] at [facility], for a follow up evaluation for cirrhosis    She was referred by Ratna RIVERA CNP.  Her initial appointment was last year in May 2024.  The reason for visit was for hepatomegaly, elevations in her liver enzymes, suspected chronic liver disease.    Interval history:  She has completed the liver biopsy - which was done in October, (TJ with portal pressure measurements).  Multiple episodes of dizzy spells. Had to be hospitalized.  Had neck surgery for cervical spinal stenosis.  Was also seen and evaluated by SILVANO Bell in Hematology for anemia. Her anemia has been mild. She has been taking oral iron. Stool was checked for occult blood, which was positive.    EGD and Colonoscopy were completed last year.  TJ portal pressure measurements were checked last year, and were normal.    Estimates about 5 times of alcohol consumption - over the last 6+ months. Has cut back significantly.     Prior history:  The patient recalls that she was told she had fatty liver many years ago.  She never recalls manifesting any symptoms from her liver disease.  She denies a history of jaundice, fluid retention such as her ascites, confusion from hepatic encephalopathy.  She does report some " chronic GI symptoms including constipation.    As regards to liver disease risk factors, she reports a history of alcohol excess: that she would drink 50 beers per week (16 ounce beers).  She also has metabolic risk factors including diabetes hypertension and heart disease. She has basically out all alcohol use. Over the past year + since finding out about her liver disease, she stopped all alcohol for a period of time; then began drinking beer, but much less. At this time she reports no regular alcohol use, just a few occasions in which she reports some drinking.    Initial laboratory workup was notable for the following (February 2024):  Albumin 4.6 bilirubin of 0.5 alkaline phosphatase of 102 ALT of 31 AST of 29.  A year ago her liver enzymes were abnormal (May 2023):  AST of 89 ALT of 97 alkaline phosphatase 164 total protein 8.2 albumin 4.6.  She had an GORDON that was checked -positive at 1-160.  Her anti-SSA was positive greater than 8.0. Her ferritin was slightly elevated at 257.  Hepatitis B and C serologies were negative.  HIV was negative.    Liver imaging was performed: The liver measures 16.9 cm with diffuse heterogenicity of the echotexture of the liver.    She was referred for liver elastography with Fibroscan:  E (median liver stiffness measurement):   21.5   kPa   CAP (controlled attenuation parameter):  227   dB/m   This was consistent with cirrhosis.    Review of Systems  Review of Systems  Negative ROS.    Past Medical History   has a past medical history of Alopecia, Anemia (04/2024), Cervical spinal cord compression, Chronic back pain, Chronic sinusitis, Cirrhosis of liver (Multi), Coronary artery disease, Depression (1995), Diabetes mellitus (Multi) (2023), Fibromyalgia, GERD (gastroesophageal reflux disease), Hiatal hernia, HLD (hyperlipidemia), Hypertension, Insomnia, Irritable bowel syndrome, Liver disease (), Lung nodule, Lymphadenopathy, Numbness and tingling, Obesity (?),  Palpitations, Postural dizziness with near syncope, Prolonged QT interval, Psoriasis, Psoriatic arthritis (Multi), RA (rheumatoid arthritis), Raynaud's disease, Thyroid nodule, Trigger finger, and UTI (urinary tract infection) (05/11/2025).     She was diagnosed with diabetes about 2 years ago.    She has a history of cardiac surgery.    She last had a colonoscopy in 2021. Recently repeated.    Social History   reports that she quit smoking about 16 years ago. Her smoking use included cigarettes. She started smoking about 31 years ago. She has a 15 pack-year smoking history. She has never used smokeless tobacco. She reports that she does not currently use alcohol after a past usage of about 20.0 standard drinks of alcohol per week. She reports that she does not use drugs.     Family History  family history includes Alcohol abuse in her daughter, mother, paternal grandmother, sister, and sister; Arthritis in her father; Atrial fibrillation in her father; Broken bones in her sister; COPD in her father; Cancer in her father, father's brother, father's brother, and mother; Colon cancer in her father's brother; Depression in her father, mother, and sister; Diabetes in her mother; Drug abuse in her sister and sister; Hearing loss in her father; Heart disease in her father and mother; Hypertension in her father and mother; Neuropathy in her mother; Stroke in her paternal grandfather.     Allergies  Allergies   Allergen Reactions   • Cosentyx [Secukinumab] Rash       Medications  Current Outpatient Medications   Medication Instructions   • aspirin 81 mg, Daily   • cholecalciferol (VITAMIN D3) 25 mcg, Daily   • cyanocobalamin (VITAMIN B-12) 50 mcg, Daily   • ferrous sulfate 325 mg, Daily   • hydroCHLOROthiazide (MICROZIDE) 12.5 mg, oral, Daily   • lisinopril 20 mg, oral, Daily   • meclizine (ANTIVERT) 12.5 mg, oral, 3 times daily PRN   • metFORMIN  mg 24 hr tablet Take one tablet twice daily   • metoprolol tartrate  "(LOPRESSOR) 25 mg, oral, 2 times daily   • multivitamin tablet 1 tablet, Daily   • nitroglycerin (NITROSTAT) 0.3 mg, Every 5 min PRN   • omeprazole (PRILOSEC) 40 mg, oral, Daily before breakfast, Do not crush or chew.   • rosuvastatin (CRESTOR) 20 mg, oral, Daily   • traZODone (Desyrel) 50 mg tablet TAKE ONE-HALF TABLET BY MOUTH ONCE DAILY AT BEDTIME   • venlafaxine (EFFEXOR) 75 mg, oral, Daily before breakfast   • venlafaxine XR (EFFEXOR-XR) 150 mg, oral, Daily        Objective   Visit Vitals  /63   Pulse 82   Temp 35.9 °C (96.6 °F)          6/6/2025     4:08 AM 6/6/2025    11:31 AM 6/23/2025    11:35 AM 7/1/2025     3:33 PM 7/3/2025    12:51 PM 7/9/2025     4:05 PM 7/24/2025     3:39 PM   Vitals   Systolic 109 121 142 118 109 94 104   Diastolic 66 70 80 70 72 62 63   BP Location    Left arm  Left arm    Heart Rate 88 83 101 84 79 79 82   Temp 35.6 °C (96.1 °F) 36.5 °C (97.7 °F) 36.2 °C (97.2 °F)  36.2 °C (97.2 °F)  35.9 °C (96.6 °F)   Resp 17 18   20     Height    1.753 m (5' 9\") 1.74 m (5' 8.5\")  1.753 m (5' 9\")   Weight (lb)   177.6 177 178.6 181.8 181   BMI   26.23 kg/m2 26.14 kg/m2 26.76 kg/m2 27.24 kg/m2 26.73 kg/m2   BSA (m2)   1.98 m2 1.98 m2 1.98 m2 2 m2 2 m2   Visit Report   Report Report Report Report Report     Physical Exam  Vitals and nursing note reviewed.   Constitutional:       Appearance: Normal appearance. She is not ill-appearing.      Comments: In general she appears slightly anxious.   HENT:      Head: Normocephalic and atraumatic.      Mouth/Throat:      Mouth: Mucous membranes are moist.      Pharynx: Oropharynx is clear.     Eyes:      General: No scleral icterus.     Extraocular Movements: Extraocular movements intact.      Pupils: Pupils are equal, round, and reactive to light.       Cardiovascular:      Rate and Rhythm: Normal rate and regular rhythm.      Heart sounds: No murmur heard.  Pulmonary:      Effort: Pulmonary effort is normal.      Breath sounds: Normal breath sounds. "   Abdominal:      General: Abdomen is flat. Bowel sounds are normal.      Palpations: Abdomen is soft.     Musculoskeletal:         General: No swelling. Normal range of motion.      Right lower leg: No edema.      Left lower leg: No edema.     Skin:     Coloration: Skin is not jaundiced.     Neurological:      General: No focal deficit present.      Mental Status: She is alert and oriented to person, place, and time. Mental status is at baseline.     Psychiatric:         Mood and Affect: Mood normal.         Thought Content: Thought content normal.         Judgment: Judgment normal.       Labs    WBC   Date/Time Value Ref Range Status   07/03/2025 02:25 PM 13.7 (H) 4.4 - 11.3 x10*3/uL Final     WHITE BLOOD CELL COUNT   Date/Time Value Ref Range Status   02/21/2025 02:34 PM 5.0 3.8 - 10.8 Thousand/uL Final     Hemoglobin   Date/Time Value Ref Range Status   07/03/2025 02:25 PM 10.8 (L) 12.0 - 16.0 g/dL Final     HEMOGLOBIN   Date/Time Value Ref Range Status   02/21/2025 02:34 PM 11.0 (L) 11.7 - 15.5 g/dL Final     Hematocrit   Date/Time Value Ref Range Status   07/03/2025 02:25 PM 32.7 (L) 36.0 - 46.0 % Final     HEMATOCRIT   Date/Time Value Ref Range Status   02/21/2025 02:34 PM 35.0 35.0 - 45.0 % Final     MCV   Date/Time Value Ref Range Status   07/03/2025 02:25 PM 95 80 - 100 fL Final   02/21/2025 02:34 PM 98.0 80.0 - 100.0 fL Final     Platelets   Date/Time Value Ref Range Status   07/03/2025 02:25  150 - 450 x10*3/uL Final     PLATELET COUNT   Date/Time Value Ref Range Status   02/21/2025 02:34  140 - 400 Thousand/uL Final        PROTEIN, TOTAL   Date/Time Value Ref Range Status   02/21/2025 02:34 PM 7.4 6.1 - 8.1 g/dL Final     Total Protein   Date/Time Value Ref Range Status   07/03/2025 02:25 PM 7.4 6.4 - 8.2 g/dL Final     ALBUMIN   Date/Time Value Ref Range Status   02/21/2025 02:34 PM 4.8 3.6 - 5.1 g/dL Final     Albumin   Date/Time Value Ref Range Status   07/03/2025 02:25 PM 4.8 3.4 - 5.0  g/dL Final     AST   Date/Time Value Ref Range Status   07/03/2025 02:25 PM 19 9 - 39 U/L Final   02/21/2025 02:34 PM 36 (H) 10 - 35 U/L Final     ALT   Date/Time Value Ref Range Status   07/03/2025 02:25 PM 17 7 - 45 U/L Final     Comment:     Patients treated with Sulfasalazine may generate falsely decreased results for ALT.   02/21/2025 02:34 PM 42 (H) 6 - 29 U/L Final     ALKALINE PHOSPHATASE   Date/Time Value Ref Range Status   02/21/2025 02:34 PM 82 37 - 153 U/L Final     Alkaline Phosphatase   Date/Time Value Ref Range Status   07/03/2025 02:25  33 - 136 U/L Final     BILIRUBIN, TOTAL   Date/Time Value Ref Range Status   02/21/2025 02:34 PM 0.5 0.2 - 1.2 mg/dL Final     Bilirubin, Total   Date/Time Value Ref Range Status   07/03/2025 02:25 PM 0.3 0.0 - 1.2 mg/dL Final     BILIRUBIN, DIRECT   Date/Time Value Ref Range Status   02/21/2025 02:34 PM 0.1 < OR = 0.2 mg/dL Final        AST   Date/Time Value Ref Range Status   07/03/2025 02:25 PM 19 9 - 39 U/L Final   02/21/2025 02:34 PM 36 (H) 10 - 35 U/L Final     ALT   Date/Time Value Ref Range Status   07/03/2025 02:25 PM 17 7 - 45 U/L Final     Comment:     Patients treated with Sulfasalazine may generate falsely decreased results for ALT.   02/21/2025 02:34 PM 42 (H) 6 - 29 U/L Final     ALKALINE PHOSPHATASE   Date/Time Value Ref Range Status   02/21/2025 02:34 PM 82 37 - 153 U/L Final     Alkaline Phosphatase   Date/Time Value Ref Range Status   07/03/2025 02:25  33 - 136 U/L Final     BILIRUBIN, TOTAL   Date/Time Value Ref Range Status   02/21/2025 02:34 PM 0.5 0.2 - 1.2 mg/dL Final     Bilirubin, Total   Date/Time Value Ref Range Status   07/03/2025 02:25 PM 0.3 0.0 - 1.2 mg/dL Final     BILIRUBIN, DIRECT   Date/Time Value Ref Range Status   02/21/2025 02:34 PM 0.1 < OR = 0.2 mg/dL Final     ALBUMIN   Date/Time Value Ref Range Status   02/21/2025 02:34 PM 4.8 3.6 - 5.1 g/dL Final     Albumin   Date/Time Value Ref Range Status   07/03/2025 02:25  PM 4.8 3.4 - 5.0 g/dL Final     PROTEIN, TOTAL   Date/Time Value Ref Range Status   02/21/2025 02:34 PM 7.4 6.1 - 8.1 g/dL Final     Total Protein   Date/Time Value Ref Range Status   07/03/2025 02:25 PM 7.4 6.4 - 8.2 g/dL Final        PT   Date/Time Value Ref Range Status   02/21/2025 02:34 PM 10.8 9.0 - 11.5 sec Final     Comment:     For additional information, please refer to  http://education.[employer]/faq/YHI004  (This link is being provided for informational/  educational purposes only.)       Protime   Date/Time Value Ref Range Status   05/30/2025 02:47 PM 10.9 9.8 - 12.4 seconds Final     INR   Date/Time Value Ref Range Status   05/30/2025 02:47 PM 1.0 0.9 - 1.1 Final   02/21/2025 02:34 PM 1.0  Final     Comment:     Reference Range                     0.9-1.1  Moderate-intensity Warfarin Therapy 2.0-3.0  Higher-intensity Warfarin Therapy   3.0-4.0           MELD 3.0: 8 at 5/7/2025  7:46 AM  MELD-Na: 7 at 5/7/2025  7:46 AM  Calculated from:  Serum Creatinine: 0.95 mg/dL (Using min of 1 mg/dL) at 5/7/2025  7:46 AM  Serum Sodium: 139 mmol/L (Using max of 137 mmol/L) at 5/7/2025  7:46 AM  Total Bilirubin: 0.4 mg/dL (Using min of 1 mg/dL) at 5/7/2025  7:46 AM  Serum Albumin: 4.7 g/dL (Using max of 3.5 g/dL) at 5/7/2025  7:46 AM  INR(ratio): 1.1 at 5/5/2025  4:11 PM  Age at listing (hypothetical): 59 years  Sex: Female at 5/7/2025  7:46 AM    LIVER IMAGING    FibroScan  2/16/24  Results:   E (median liver stiffness measurement):   21.5   kPa   CAP (controlled attenuation parameter):  227   dB/m     Interventional Radiology:  10/3/24  HVPG Assessment:    The catheter and wire were advanced into the IVC and a pressure measurement was obtained which measured 8/7 (7)  mmHg.     The right hepatic vein was engaged with the curved catheter and steerable 035 Glidewire.  After selective catheterization of the  hepatic vein, a selective venogram was performed which demonstrated patency of the selected hepatic  vein.     The endhole catheter was wedged in the selected hepatic vein and a wedged pressure was obtained to indirectly estimate the portal pressure.  The wedged hepatic pressure was measured at 11/9 (10) mmHg.     At this point the catheter was retracted and a free hepatic pressure was measured and a value of 10/8 (9) mmHg was obtained.     Therefore, the indirect portosystemic gradient was calculated at 1 mmHg.    PATHOLOGY    GI Pathology  FINAL DIAGNOSIS      A. DUODENUM SECOND PART, ABNORMAL MUCOSA, BIOPSY:               -Small intestinal mucosa with gastric heterotopic mucosa.        B. STOMACH ANTRUM, BIOPSY:               -Gastric mucosa with no significant histopathologic findings.  See note.               -No Helicobacter pylori organisms identified.     Note: Immunohistochemical stain for Helicobacter pylori organisms is negative.        C. COLON, RANDOM BIOPSY:                -Colon with no significant histopathologic findings.               -Melanosis coli.        D. COLON - TRANSVERSE POLYP, BIOPSY:               -Tubular adenoma.         A. LIVER BIOPSY RIGHT:   -Liver with mild steatosis and bridging fibrosis to cirrhosis     Note: The liver tissue is slightly nodular. The hepatocytes demonstrate small-droplet type macrovesicular steatosis (5-10%) without ballooning degeneration or Hilary bodies. The portal tracts are mildly expanded with chronic inflammatory cells, without bile duct injury or interface activity. Scattered pigmented macrophages are present in the portal tracts, indicating healing injury. No cholestasis or hepatocyte necrosis is identified.     Special stains:  Iron                              negative  PAS with digestionno cytoplasmic eosinophilic globules  Trichrome                    periportal fibrosis, bridging fibrosis, and focal cirrhotic nodules  Reticulin                      no collapsed liver parenchyma    ENDOSCOPY  8/14/2024    EGD  Impression  Small type I hiatal  "hernia  The stomach appeared normal. Performed random biopsy to rule out H. pylori.  Mild nodular mucosa in the duodenal bulb; performed cold forceps biopsy  Two grade I varices    COLONOSCOPY  Impression  Subcentimeter polyp in the distal transverse colon  Mild abnormal mucosa  (grade 1) hemorrhoids  The terminal ileum, ileocecal valve and cecum appeared normal. Performed random biopsy using biopsy forceps.       No evidence of old/fresh bleeding terminal ileum.    Assessment/Plan   Dinorah Horn \"Raquel\" is a 60 y.o. female who presents to liver clinic for a follow up evaluation for cirrhosis.    She has multiple risk factors for chronic liver disease. Most notably she has a history of alcohol excess which she self-reported consumption of 50 beers per week.  This level of drinking certainly puts her at risk for significant liver disease.  Fortunately she has cut back and more or less stopper her alcohol use.  She also has significant metabolic risk factors for metabolic associated fatty liver -including hypertension, diabetes, heart disease, and obesity.  She also has a history of autoimmune disease and a positive GORDON.    Based on our overall suspicion of compensated advanced liver disease, based on her clinical, laboratory, and imagine assessment, we decided on further evaluation with a TJ Liver Biopsy, last October. There was no portal gradient on pressure measurements. Thus, at this time, I do not believe she has portal HTN. On my review of her endoscopy pictures from August, I do not believe the findings in her esophagus were consistent with esophageal varices. Her liver histology is consistent with advanced fibrosis and cirrhosis (probably early cirrhosis). There is steatosis on the biopsy (5-10%). There are no other major findings on the biopsy.    I had also recommended some lab work to complete her workup for concomitant liver disorders, including  testing for genetic hemochromatosis testing, genetic " testing for alpha-1 antitrypsin deficiency - these were negative.     I provided counseling regarding her liver disease evaluation to date.  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 She remains compensated at this time. She will obtain update labs to reassess liver function. She will have a liver US for liver cancer screening. We will check a PETH - it has been positive in the past. She has cut back her alcohol use significantly, but she still has had some periodic alcohol consumption.  I have reinforced prior recommendations to strictly abstain from all alcohol use. She is trying.    Her Hematology provided is still concerned about her anemia and requested a follow up EGD. We will arrange for  this tomorrow.    She will continue to follow up in liver clinic.     Instructions      Pasha Benson MD

## 2025-07-25 ENCOUNTER — HOSPITAL ENCOUNTER (OUTPATIENT)
Dept: GASTROENTEROLOGY | Facility: HOSPITAL | Age: 60
Discharge: HOME | End: 2025-07-25
Payer: MEDICARE

## 2025-07-25 VITALS
HEART RATE: 74 BPM | BODY MASS INDEX: 26.66 KG/M2 | WEIGHT: 180 LBS | SYSTOLIC BLOOD PRESSURE: 94 MMHG | HEIGHT: 69 IN | OXYGEN SATURATION: 97 % | DIASTOLIC BLOOD PRESSURE: 64 MMHG | TEMPERATURE: 97 F | RESPIRATION RATE: 16 BRPM

## 2025-07-25 DIAGNOSIS — K76.0 METALD: ICD-10-CM

## 2025-07-25 DIAGNOSIS — K74.69 OTHER CIRRHOSIS OF LIVER: ICD-10-CM

## 2025-07-25 DIAGNOSIS — R79.89 LIVER FUNCTION TEST ABNORMALITY: ICD-10-CM

## 2025-07-25 DIAGNOSIS — F10.90 METALD: ICD-10-CM

## 2025-07-25 LAB
GLUCOSE BLD MANUAL STRIP-MCNC: 95 MG/DL (ref 74–99)
PREGNANCY TEST URINE, POC: NEGATIVE

## 2025-07-25 PROCEDURE — 7100000009 HC PHASE TWO TIME - INITIAL BASE CHARGE

## 2025-07-25 PROCEDURE — 43235 EGD DIAGNOSTIC BRUSH WASH: CPT | Performed by: INTERNAL MEDICINE

## 2025-07-25 PROCEDURE — 82947 ASSAY GLUCOSE BLOOD QUANT: CPT

## 2025-07-25 PROCEDURE — 7100000010 HC PHASE TWO TIME - EACH INCREMENTAL 1 MINUTE

## 2025-07-25 PROCEDURE — 2500000004 HC RX 250 GENERAL PHARMACY W/ HCPCS (ALT 636 FOR OP/ED): Performed by: INTERNAL MEDICINE

## 2025-07-25 PROCEDURE — 3700000012 HC SEDATION LEVEL 5+ TIME - INITIAL 15 MINUTES 5/> YEARS

## 2025-07-25 PROCEDURE — 81025 URINE PREGNANCY TEST: CPT | Performed by: INTERNAL MEDICINE

## 2025-07-25 RX ORDER — MIDAZOLAM HYDROCHLORIDE 1 MG/ML
INJECTION, SOLUTION INTRAMUSCULAR; INTRAVENOUS AS NEEDED
Status: COMPLETED | OUTPATIENT
Start: 2025-07-25 | End: 2025-07-25

## 2025-07-25 RX ORDER — FENTANYL CITRATE 50 UG/ML
INJECTION, SOLUTION INTRAMUSCULAR; INTRAVENOUS AS NEEDED
Status: COMPLETED | OUTPATIENT
Start: 2025-07-25 | End: 2025-07-25

## 2025-07-25 RX ADMIN — FENTANYL CITRATE 25 MCG: 50 INJECTION, SOLUTION INTRAMUSCULAR; INTRAVENOUS at 13:50

## 2025-07-25 RX ADMIN — FENTANYL CITRATE 25 MCG: 50 INJECTION, SOLUTION INTRAMUSCULAR; INTRAVENOUS at 13:47

## 2025-07-25 RX ADMIN — FENTANYL CITRATE 25 MCG: 50 INJECTION, SOLUTION INTRAMUSCULAR; INTRAVENOUS at 13:54

## 2025-07-25 RX ADMIN — FENTANYL CITRATE 25 MCG: 50 INJECTION, SOLUTION INTRAMUSCULAR; INTRAVENOUS at 14:00

## 2025-07-25 RX ADMIN — FENTANYL CITRATE 25 MCG: 50 INJECTION, SOLUTION INTRAMUSCULAR; INTRAVENOUS at 13:57

## 2025-07-25 RX ADMIN — MIDAZOLAM HYDROCHLORIDE 1 MG: 1 INJECTION, SOLUTION INTRAMUSCULAR; INTRAVENOUS at 13:45

## 2025-07-25 RX ADMIN — MIDAZOLAM HYDROCHLORIDE 1 MG: 1 INJECTION, SOLUTION INTRAMUSCULAR; INTRAVENOUS at 13:54

## 2025-07-25 RX ADMIN — MIDAZOLAM HYDROCHLORIDE 1 MG: 1 INJECTION, SOLUTION INTRAMUSCULAR; INTRAVENOUS at 13:47

## 2025-07-25 RX ADMIN — MIDAZOLAM HYDROCHLORIDE 1 MG: 1 INJECTION, SOLUTION INTRAMUSCULAR; INTRAVENOUS at 13:57

## 2025-07-25 RX ADMIN — MIDAZOLAM HYDROCHLORIDE 1 MG: 1 INJECTION, SOLUTION INTRAMUSCULAR; INTRAVENOUS at 13:50

## 2025-07-25 RX ADMIN — FENTANYL CITRATE 25 MCG: 50 INJECTION, SOLUTION INTRAMUSCULAR; INTRAVENOUS at 13:45

## 2025-07-25 RX ADMIN — FENTANYL CITRATE 50 MCG: 50 INJECTION, SOLUTION INTRAMUSCULAR; INTRAVENOUS at 13:43

## 2025-07-25 RX ADMIN — MIDAZOLAM HYDROCHLORIDE 2 MG: 1 INJECTION, SOLUTION INTRAMUSCULAR; INTRAVENOUS at 13:43

## 2025-07-25 ASSESSMENT — PAIN - FUNCTIONAL ASSESSMENT
PAIN_FUNCTIONAL_ASSESSMENT: 0-10
PAIN_FUNCTIONAL_ASSESSMENT: UNABLE TO SELF-REPORT
PAIN_FUNCTIONAL_ASSESSMENT: 0-10
PAIN_FUNCTIONAL_ASSESSMENT: UNABLE TO SELF-REPORT
PAIN_FUNCTIONAL_ASSESSMENT: 0-10
PAIN_FUNCTIONAL_ASSESSMENT: UNABLE TO SELF-REPORT
PAIN_FUNCTIONAL_ASSESSMENT: 0-10
PAIN_FUNCTIONAL_ASSESSMENT: UNABLE TO SELF-REPORT

## 2025-07-25 ASSESSMENT — PAIN SCALES - GENERAL
PAINLEVEL_OUTOF10: 0 - NO PAIN

## 2025-07-25 ASSESSMENT — COLUMBIA-SUICIDE SEVERITY RATING SCALE - C-SSRS
2. HAVE YOU ACTUALLY HAD ANY THOUGHTS OF KILLING YOURSELF?: NO
6. HAVE YOU EVER DONE ANYTHING, STARTED TO DO ANYTHING, OR PREPARED TO DO ANYTHING TO END YOUR LIFE?: NO
1. IN THE PAST MONTH, HAVE YOU WISHED YOU WERE DEAD OR WISHED YOU COULD GO TO SLEEP AND NOT WAKE UP?: NO

## 2025-07-25 NOTE — H&P
"History Of Present Illness  Dinorah Horn \"Raquel\" is a 60 y.o. female with cirrhosis who presents for EGD evaluation.      Past Medical History  Medical History[1]  Surgical History  Surgical History[2]  Social History  She reports that she quit smoking about 16 years ago. Her smoking use included cigarettes. She started smoking about 31 years ago. She has a 15 pack-year smoking history. She has never used smokeless tobacco. She reports that she does not currently use alcohol after a past usage of about 20.0 standard drinks of alcohol per week. She reports that she does not use drugs.    Family History  Family History[3]     Allergies  Allergies[4]  Review of Systems   All other systems reviewed and are negative.    Pre-sedation Evaluation:  ASA Classification - ASA 3 - Patient with moderate systemic disease with functional limitations  Mallampati Score - II (hard and soft palate, upper portion of tonsils and uvula visible)    Physical Exam    Eyes:      General: No scleral icterus.      Cardiovascular:      Rate and Rhythm: Normal rate and regular rhythm.      Pulses: Normal pulses.   Pulmonary:      Effort: Pulmonary effort is normal.   Abdominal:      General: Abdomen is flat. There is no distension.      Tenderness: There is no abdominal tenderness.     Neurological:      General: No focal deficit present.      Mental Status: She is alert and oriented to person, place, and time.     Psychiatric:         Mood and Affect: Mood normal.          Last Recorded Vitals  There were no vitals taken for this visit.     Assessment/Plan   Will proceed with EGD      PTA/Current Medications:  Prescriptions Prior to Admission[5]  Current Medications[6]  Earl Peguero MD         [1]   Past Medical History:  Diagnosis Date    Alopecia     Anemia 04/2024    Cervical spinal cord compression     C5-7    Chronic back pain     Chronic sinusitis     Cirrhosis of liver (Multi)     former alcohol abuse    Coronary artery disease     " CABG in 2010, no recent follow up with cardiology, Apt for cardiac risk assessment made with Dr. Dubon    Depression 1995    Diabetes mellitus (Multi) 2023    Fibromyalgia     GERD (gastroesophageal reflux disease)     under control with medication    Hiatal hernia     HLD (hyperlipidemia)     Hypertension     I take BP medications    Insomnia     Irritable bowel syndrome     Liver disease     Lung nodule     CT 10/2021 calcified granulomas in the right  lower lobe.    Lymphadenopathy     US 1/2021 Sonographically benign-appearing lymph node redemonstrated superior  and lateral to the left thyroid lobe    Numbness and tingling     arms, hands, feet and legs    Obesity ?    Palpitations     occasional, did wear heart monitor 6/15/21-results in [facility], seeing dr. Dubon 5/30/25    Postural dizziness with near syncope     recent ED admit 5/11/25; Carotid US, head CT and MRI all WNL, patient found to have a UTI, discharged on ATB's [id number], per patient no further issues    Prolonged QT interval     Psoriasis     Psoriatic arthritis (Multi)     follows with Rhematology    RA (rheumatoid arthritis)     Raynaud's disease     cold fingertips witth pain    Thyroid nodule     US 1/2021 Nonenlarged thyroid gland with scattered subcentimeter nodules. No  routine follow-up is required per ACR TI-RADS guidelines.    Trigger finger     UTI (urinary tract infection) 05/11/2025    treated with 10 day coarse of ATB's   [2]   Past Surgical History:  Procedure Laterality Date    APPENDECTOMY  1995    BREAST SURGERY  2003    Augmentation    CARDIAC CATHETERIZATION      COLONOSCOPY      CORONARY ARTERY BYPASS GRAFT  10/2010    FOREARM FRACTURE SURGERY Right 1992    FOREARM HARDWARE REMOVAL Right 2003    HYSTERECTOMY  Partial, 2000    KNEE ARTHROSCOPY W/ LASER Left     LIVER BIOPSY     [3]   Family History  Problem Relation Name Age of Onset    Alcohol abuse Mother Trice     Cancer Mother Trice     Diabetes Mother Trice      Heart disease Mother Trice     Arthritis Father Reed     Atrial fibrillation Father Reed     Cancer Father Reed     COPD Father Reed     Hearing loss Father Reed     Heart disease Father Reed     Stroke Paternal Grandfather Guy     Alcohol abuse Paternal Grandmother Brigette     Alcohol abuse Sister Ioana     Alcohol abuse Sister Alis     Alcohol abuse Daughter Katerine     Cancer Father's Brother Xavier     Cancer Father's Brother Daljit     Colon cancer Father's Brother Daljit J     Drug abuse Sister Ioana M     Depression Mother Trice     Hypertension Mother Trice     Neuropathy Mother Trice     Depression Father Reed     Hypertension Father Reed     Depression Sister Ioana     Broken bones Sister Alis     Drug abuse Sister Alis (sister)    [4]   Allergies  Allergen Reactions    Cosentyx [Secukinumab] Rash   [5] (Not in a hospital admission)  [6]   Current Outpatient Medications   Medication Sig Dispense Refill    aspirin 81 mg EC tablet Take 1 tablet (81 mg) by mouth once daily.      cholecalciferol (Vitamin D3) 25 MCG (1000 UT) capsule Take 1 capsule (25 mcg) by mouth once daily.      cyanocobalamin (Vitamin B-12) 50 mcg tablet Take 1 tablet (50 mcg) by mouth once daily. (Patient not taking: Reported on 7/9/2025)      ferrous sulfate, 325 mg ferrous sulfate, tablet Take 1 tablet by mouth once daily. (Patient not taking: Reported on 7/9/2025)      hydroCHLOROthiazide (Microzide) 12.5 mg tablet TAKE ONE TABLET BY MOUTH once DAILY 90 tablet 0    lisinopril 20 mg tablet Take 1 tablet (20 mg) by mouth once daily. 90 tablet 1    meclizine (Antivert) 12.5 mg tablet Take 1 tablet (12.5 mg) by mouth 3 times a day as needed for dizziness. 30 tablet 0    metFORMIN  mg 24 hr tablet Take one tablet twice daily 180 tablet 1    metoprolol tartrate (Lopressor) 25 mg tablet TAKE ONE TABLET BY MOUTH TWO TIMES A  tablet 0    multivitamin tablet Take 1 tablet by mouth once  daily.      nitroglycerin (Nitrostat) 0.3 mg SL tablet Place 1 tablet (0.3 mg) under the tongue every 5 minutes if needed for chest pain.      omeprazole (PriLOSEC) 40 mg DR capsule Take 1 capsule (40 mg) by mouth once daily in the morning. Take before meals. Do not crush or chew. 90 capsule 3    rosuvastatin (Crestor) 20 mg tablet TAKE ONE TABLET BY MOUTH DAILY 90 tablet 0    traZODone (Desyrel) 50 mg tablet TAKE ONE-HALF TABLET BY MOUTH ONCE DAILY AT BEDTIME 90 tablet 0    venlafaxine (Effexor) 75 mg tablet TAKE ONE TABLET BY MOUTH DAILY IN THE MORNING 90 tablet 0    venlafaxine XR (Effexor-XR) 150 mg 24 hr capsule TAKE ONE CAPSULE BY MOUTH ONCE DAILY. DO NOT CRUSH OR CHEW. 90 capsule 0     No current facility-administered medications for this encounter.

## 2025-07-25 NOTE — DISCHARGE INSTRUCTIONS

## 2025-07-28 DIAGNOSIS — F32.A DEPRESSION, UNSPECIFIED DEPRESSION TYPE: ICD-10-CM

## 2025-07-29 RX ORDER — VENLAFAXINE HYDROCHLORIDE 150 MG/1
150 CAPSULE, EXTENDED RELEASE ORAL DAILY
Qty: 90 CAPSULE | Refills: 0 | Status: SHIPPED | OUTPATIENT
Start: 2025-07-29

## 2025-07-31 ENCOUNTER — APPOINTMENT (OUTPATIENT)
Dept: HEMATOLOGY/ONCOLOGY | Facility: CLINIC | Age: 60
End: 2025-07-31
Payer: MEDICARE

## 2025-07-31 ENCOUNTER — LAB (OUTPATIENT)
Dept: LAB | Facility: HOSPITAL | Age: 60
End: 2025-07-31
Payer: MEDICARE

## 2025-07-31 DIAGNOSIS — D64.9 ANEMIA, UNSPECIFIED TYPE: Primary | ICD-10-CM

## 2025-07-31 DIAGNOSIS — D64.9 ANEMIA, UNSPECIFIED: Primary | ICD-10-CM

## 2025-07-31 LAB
ALBUMIN SERPL BCP-MCNC: 4.3 G/DL (ref 3.4–5)
ALP SERPL-CCNC: 95 U/L (ref 33–136)
ALT SERPL W P-5'-P-CCNC: 25 U/L (ref 7–45)
ANION GAP SERPL CALC-SCNC: 13 MMOL/L (ref 10–20)
AST SERPL W P-5'-P-CCNC: 28 U/L (ref 9–39)
BASOPHILS # BLD AUTO: 0.01 X10*3/UL (ref 0–0.1)
BASOPHILS NFR BLD AUTO: 0.2 %
BILIRUB SERPL-MCNC: 0.4 MG/DL (ref 0–1.2)
BUN SERPL-MCNC: 14 MG/DL (ref 6–23)
CALCIUM SERPL-MCNC: 9.2 MG/DL (ref 8.6–10.3)
CHLORIDE SERPL-SCNC: 98 MMOL/L (ref 98–107)
CO2 SERPL-SCNC: 29 MMOL/L (ref 21–32)
CREAT SERPL-MCNC: 0.81 MG/DL (ref 0.5–1.05)
EGFRCR SERPLBLD CKD-EPI 2021: 83 ML/MIN/1.73M*2
EOSINOPHIL # BLD AUTO: 0.08 X10*3/UL (ref 0–0.7)
EOSINOPHIL NFR BLD AUTO: 1.4 %
ERYTHROCYTE [DISTWIDTH] IN BLOOD BY AUTOMATED COUNT: 13.3 % (ref 11.5–14.5)
FERRITIN SERPL-MCNC: 435 NG/ML (ref 8–150)
GLUCOSE SERPL-MCNC: 103 MG/DL (ref 74–99)
HCT VFR BLD AUTO: 30.6 % (ref 36–46)
HGB BLD-MCNC: 10.3 G/DL (ref 12–16)
IMM GRANULOCYTES # BLD AUTO: 0.02 X10*3/UL (ref 0–0.7)
IMM GRANULOCYTES NFR BLD AUTO: 0.3 % (ref 0–0.9)
IRON SATN MFR SERPL: 28 % (ref 25–45)
IRON SERPL-MCNC: 80 UG/DL (ref 35–150)
LYMPHOCYTES # BLD AUTO: 1.25 X10*3/UL (ref 1.2–4.8)
LYMPHOCYTES NFR BLD AUTO: 21.3 %
MCH RBC QN AUTO: 32.6 PG (ref 26–34)
MCHC RBC AUTO-ENTMCNC: 33.7 G/DL (ref 32–36)
MCV RBC AUTO: 97 FL (ref 80–100)
MONOCYTES # BLD AUTO: 0.34 X10*3/UL (ref 0.1–1)
MONOCYTES NFR BLD AUTO: 5.8 %
NEUTROPHILS # BLD AUTO: 4.17 X10*3/UL (ref 1.2–7.7)
NEUTROPHILS NFR BLD AUTO: 71 %
NRBC BLD-RTO: 0 /100 WBCS (ref 0–0)
PLATELET # BLD AUTO: 203 X10*3/UL (ref 150–450)
POTASSIUM SERPL-SCNC: 4.1 MMOL/L (ref 3.5–5.3)
PROT SERPL-MCNC: 7.2 G/DL (ref 6.4–8.2)
RBC # BLD AUTO: 3.16 X10*6/UL (ref 4–5.2)
SODIUM SERPL-SCNC: 136 MMOL/L (ref 136–145)
TIBC SERPL-MCNC: 290 UG/DL (ref 240–445)
UIBC SERPL-MCNC: 210 UG/DL (ref 110–370)
WBC # BLD AUTO: 5.9 X10*3/UL (ref 4.4–11.3)

## 2025-07-31 PROCEDURE — 82746 ASSAY OF FOLIC ACID SERUM: CPT

## 2025-07-31 PROCEDURE — 80053 COMPREHEN METABOLIC PANEL: CPT

## 2025-07-31 PROCEDURE — 85025 COMPLETE CBC W/AUTO DIFF WBC: CPT

## 2025-07-31 PROCEDURE — 82728 ASSAY OF FERRITIN: CPT

## 2025-07-31 PROCEDURE — 83540 ASSAY OF IRON: CPT

## 2025-07-31 PROCEDURE — 36415 COLL VENOUS BLD VENIPUNCTURE: CPT

## 2025-07-31 PROCEDURE — 83550 IRON BINDING TEST: CPT

## 2025-08-01 LAB — FOLATE SERPL-MCNC: 17.8 NG/ML

## 2025-08-05 DIAGNOSIS — E11.9 TYPE 2 DIABETES MELLITUS WITHOUT COMPLICATION, WITHOUT LONG-TERM CURRENT USE OF INSULIN: ICD-10-CM

## 2025-08-06 RX ORDER — METFORMIN HYDROCHLORIDE 500 MG/1
500 TABLET, EXTENDED RELEASE ORAL
Qty: 180 TABLET | Refills: 0 | Status: SHIPPED | OUTPATIENT
Start: 2025-08-06

## 2025-08-11 DIAGNOSIS — G47.00 INSOMNIA, UNSPECIFIED TYPE: ICD-10-CM

## 2025-08-12 RX ORDER — VENLAFAXINE 75 MG/1
75 TABLET ORAL
Qty: 90 TABLET | Refills: 0 | Status: SHIPPED | OUTPATIENT
Start: 2025-08-12

## 2025-08-18 ENCOUNTER — APPOINTMENT (OUTPATIENT)
Dept: HEMATOLOGY/ONCOLOGY | Facility: CLINIC | Age: 60
End: 2025-08-18
Payer: MEDICARE

## 2025-08-21 ENCOUNTER — APPOINTMENT (OUTPATIENT)
Dept: GASTROENTEROLOGY | Facility: CLINIC | Age: 60
End: 2025-08-21
Payer: MEDICARE

## 2025-10-21 ENCOUNTER — APPOINTMENT (OUTPATIENT)
Dept: PRIMARY CARE | Facility: CLINIC | Age: 60
End: 2025-10-21
Payer: MEDICARE

## 2025-11-11 ENCOUNTER — APPOINTMENT (OUTPATIENT)
Dept: RADIOLOGY | Facility: HOSPITAL | Age: 60
End: 2025-11-11
Payer: MEDICARE

## 2025-11-11 DIAGNOSIS — Z12.31 SCREENING MAMMOGRAM FOR BREAST CANCER: ICD-10-CM

## 2025-11-12 ENCOUNTER — APPOINTMENT (OUTPATIENT)
Facility: CLINIC | Age: 60
End: 2025-11-12
Payer: MEDICARE

## 2025-12-24 ENCOUNTER — APPOINTMENT (OUTPATIENT)
Dept: NEUROLOGY | Facility: HOSPITAL | Age: 60
End: 2025-12-24
Payer: MEDICARE

## 2026-03-06 ENCOUNTER — APPOINTMENT (OUTPATIENT)
Dept: OPHTHALMOLOGY | Facility: CLINIC | Age: 61
End: 2026-03-06
Payer: MEDICARE

## 2026-06-09 ENCOUNTER — APPOINTMENT (OUTPATIENT)
Dept: ORTHOPEDIC SURGERY | Facility: CLINIC | Age: 61
End: 2026-06-09
Payer: MEDICARE

## (undated) DEVICE — GLOVE, SURGICAL, PROTEXIS PI ORTHO, 8.0, PF, LF

## (undated) DEVICE — Device

## (undated) DEVICE — DIFFUSER, MAESTRO, CORE

## (undated) DEVICE — PREP TRAY, VAGINAL

## (undated) DEVICE — DRAPE, MICROSCOPE, FOR ZEISS 65MM, VARI-LENS II, 52 X 150

## (undated) DEVICE — DRAIN, WOUND, FLAT, HUBLESS, FULL LENGTH PERFORATION, 7 MM X 20 CM

## (undated) DEVICE — DRILL, NEURO, PRECISION, 3MM X 3.8MM, CARBIDE

## (undated) DEVICE — STAPLER, SKIN PROXIMATE, 35 WIDE

## (undated) DEVICE — SPONGE, HEMOSTATIC, GELATIN, SURGIFOAM, 2 X 6 CM X 7 MM

## (undated) DEVICE — DISTRACTION PIN, 14MM, STERILE

## (undated) DEVICE — EVACUATOR, WOUND, SUCTION, CLOSED, JACKSON-PRATT, 100 CC, SILICONE

## (undated) DEVICE — SUTURE, MONOCRYL, 3-0, 18 IN, PS2, UNDYED

## (undated) DEVICE — DRAPE, INCISE, ANTIMICROBIAL, IOBAN 2, STERI DRAPE, 23 X 33 IN, DISPOSABLE, STERILE

## (undated) DEVICE — DRILL, 11MM

## (undated) DEVICE — SPONGE, DISSECTOR, PEANUT, 3/8, STERILE 5 FOAM HOLDER"

## (undated) DEVICE — ELECTRODE, ELECTROSURGICAL, BLADE, INSULATED, ENT/IMA, STERILE

## (undated) DEVICE — SUTURE, SILK, 2-0, 18 IN, BLACK

## (undated) DEVICE — ELECTRODE, ELECTROSURGICAL, BLADE EXT 4 INCH, INSULATED

## (undated) DEVICE — SPONGE, NEURO, 1 X 1 IN, STERILE

## (undated) DEVICE — CARTRIDGE, MAESTRO OIL, CORE

## (undated) DEVICE — SUTURE, PDS II, 2-0, 27 IN, SH, VIOLET

## (undated) DEVICE — GLOVE, SURGICAL, PROTEXIS PI MICRO, 7.5, PF, LF

## (undated) DEVICE — ADHESIVE, SKIN, DERMABOND ADVANCED, 15CM, PEN-STYLE

## (undated) DEVICE — STRIP, SKIN CLOSURE, STERI STRIP, REINFORCED, 0.5 X 4 IN

## (undated) DEVICE — TOWEL, SURGICAL, NEURO, O/R, 16 X 26, BLUE, STERILE